# Patient Record
Sex: MALE | Race: BLACK OR AFRICAN AMERICAN | NOT HISPANIC OR LATINO | Employment: OTHER | URBAN - METROPOLITAN AREA
[De-identification: names, ages, dates, MRNs, and addresses within clinical notes are randomized per-mention and may not be internally consistent; named-entity substitution may affect disease eponyms.]

---

## 2020-09-01 ENCOUNTER — OFFICE VISIT (OUTPATIENT)
Dept: FAMILY MEDICINE | Facility: CLINIC | Age: 80
End: 2020-09-01
Payer: MEDICARE

## 2020-09-01 ENCOUNTER — TELEPHONE (OUTPATIENT)
Dept: ORTHOPEDICS | Facility: CLINIC | Age: 80
End: 2020-09-01

## 2020-09-01 ENCOUNTER — LAB VISIT (OUTPATIENT)
Dept: LAB | Facility: HOSPITAL | Age: 80
End: 2020-09-01
Attending: INTERNAL MEDICINE
Payer: MEDICARE

## 2020-09-01 VITALS
HEIGHT: 69 IN | RESPIRATION RATE: 18 BRPM | DIASTOLIC BLOOD PRESSURE: 60 MMHG | TEMPERATURE: 99 F | SYSTOLIC BLOOD PRESSURE: 112 MMHG | HEART RATE: 71 BPM | BODY MASS INDEX: 25.18 KG/M2 | WEIGHT: 170 LBS | OXYGEN SATURATION: 97 %

## 2020-09-01 DIAGNOSIS — M17.12 PRIMARY OSTEOARTHRITIS OF LEFT KNEE: ICD-10-CM

## 2020-09-01 DIAGNOSIS — E78.5 HYPERLIPIDEMIA, UNSPECIFIED HYPERLIPIDEMIA TYPE: ICD-10-CM

## 2020-09-01 DIAGNOSIS — I50.9 CONGESTIVE HEART FAILURE, UNSPECIFIED HF CHRONICITY, UNSPECIFIED HEART FAILURE TYPE: ICD-10-CM

## 2020-09-01 PROCEDURE — 1101F PT FALLS ASSESS-DOCD LE1/YR: CPT | Mod: HCNC,CPTII,S$GLB, | Performed by: INTERNAL MEDICINE

## 2020-09-01 PROCEDURE — 1159F PR MEDICATION LIST DOCUMENTED IN MEDICAL RECORD: ICD-10-PCS | Mod: HCNC,S$GLB,, | Performed by: INTERNAL MEDICINE

## 2020-09-01 PROCEDURE — 1101F PR PT FALLS ASSESS DOC 0-1 FALLS W/OUT INJ PAST YR: ICD-10-PCS | Mod: HCNC,CPTII,S$GLB, | Performed by: INTERNAL MEDICINE

## 2020-09-01 PROCEDURE — 99999 PR PBB SHADOW E&M-NEW PATIENT-LVL IV: ICD-10-PCS | Mod: PBBFAC,HCNC,, | Performed by: INTERNAL MEDICINE

## 2020-09-01 PROCEDURE — 80053 COMPREHEN METABOLIC PANEL: CPT | Mod: HCNC

## 2020-09-01 PROCEDURE — 99999 PR PBB SHADOW E&M-NEW PATIENT-LVL IV: CPT | Mod: PBBFAC,HCNC,, | Performed by: INTERNAL MEDICINE

## 2020-09-01 PROCEDURE — 84443 ASSAY THYROID STIM HORMONE: CPT | Mod: HCNC

## 2020-09-01 PROCEDURE — 1159F MED LIST DOCD IN RCRD: CPT | Mod: HCNC,S$GLB,, | Performed by: INTERNAL MEDICINE

## 2020-09-01 PROCEDURE — 80061 LIPID PANEL: CPT | Mod: HCNC

## 2020-09-01 PROCEDURE — 99204 PR OFFICE/OUTPT VISIT, NEW, LEVL IV, 45-59 MIN: ICD-10-PCS | Mod: HCNC,S$GLB,, | Performed by: INTERNAL MEDICINE

## 2020-09-01 PROCEDURE — 36415 COLL VENOUS BLD VENIPUNCTURE: CPT | Mod: HCNC,PO

## 2020-09-01 PROCEDURE — 1125F AMNT PAIN NOTED PAIN PRSNT: CPT | Mod: HCNC,S$GLB,, | Performed by: INTERNAL MEDICINE

## 2020-09-01 PROCEDURE — 85025 COMPLETE CBC W/AUTO DIFF WBC: CPT | Mod: HCNC

## 2020-09-01 PROCEDURE — 99499 RISK ADDL DX/OHS AUDIT: ICD-10-PCS | Mod: S$GLB,,, | Performed by: INTERNAL MEDICINE

## 2020-09-01 PROCEDURE — 99204 OFFICE O/P NEW MOD 45 MIN: CPT | Mod: HCNC,S$GLB,, | Performed by: INTERNAL MEDICINE

## 2020-09-01 PROCEDURE — 99499 UNLISTED E&M SERVICE: CPT | Mod: S$GLB,,, | Performed by: INTERNAL MEDICINE

## 2020-09-01 PROCEDURE — 1125F PR PAIN SEVERITY QUANTIFIED, PAIN PRESENT: ICD-10-PCS | Mod: HCNC,S$GLB,, | Performed by: INTERNAL MEDICINE

## 2020-09-01 RX ORDER — ATORVASTATIN CALCIUM 40 MG/1
40 TABLET, FILM COATED ORAL DAILY
COMMUNITY
End: 2020-09-01

## 2020-09-01 RX ORDER — ROSUVASTATIN CALCIUM 40 MG/1
40 TABLET, COATED ORAL NIGHTLY
COMMUNITY
End: 2021-01-21

## 2020-09-01 RX ORDER — FUROSEMIDE 20 MG/1
20 TABLET ORAL DAILY
COMMUNITY
End: 2021-01-21

## 2020-09-01 NOTE — PROGRESS NOTES
Subjective:       Patient ID: Rocael Perez is a 80 y.o. male.    Chief Complaint: Establish Care, Knee Pain (left knee pain x years ), Hyperlipidemia, and Congestive Heart Failure    Knee Pain   Pertinent negatives include no numbness.   Hyperlipidemia  Associated symptoms include myalgias. Pertinent negatives include no chest pain or shortness of breath.   Congestive Heart Failure  Pertinent negatives include no abdominal pain, chest pain, fatigue, palpitations, shortness of breath or unexpected weight change.     Past Medical History:   Diagnosis Date    CHF (congestive heart failure)     Hyperlipidemia      Past Surgical History:   Procedure Laterality Date    HERNIA REPAIR       Family History   Problem Relation Age of Onset    Diabetes Mother      Social History     Socioeconomic History    Marital status: Single     Spouse name: Not on file    Number of children: Not on file    Years of education: Not on file    Highest education level: Not on file   Occupational History    Not on file   Social Needs    Financial resource strain: Not on file    Food insecurity     Worry: Not on file     Inability: Not on file    Transportation needs     Medical: Not on file     Non-medical: Not on file   Tobacco Use    Smoking status: Former Smoker     Types: Cigarettes   Substance and Sexual Activity    Alcohol use: Yes     Frequency: Monthly or less     Drinks per session: 1 or 2     Binge frequency: Never    Drug use: Never    Sexual activity: Not Currently   Lifestyle    Physical activity     Days per week: Not on file     Minutes per session: Not on file    Stress: Not on file   Relationships    Social connections     Talks on phone: Not on file     Gets together: Not on file     Attends Congregation service: Not on file     Active member of club or organization: Not on file     Attends meetings of clubs or organizations: Not on file     Relationship status: Not on file   Other Topics Concern    Not on  file   Social History Narrative    Not on file     Review of Systems   Constitutional: Negative for activity change, appetite change, chills, diaphoresis, fatigue, fever and unexpected weight change.   HENT: Negative for drooling, ear discharge, ear pain, facial swelling, hearing loss, mouth sores, nosebleeds, postnasal drip, rhinorrhea, sinus pressure, sneezing, sore throat, tinnitus, trouble swallowing and voice change.    Eyes: Negative for photophobia, redness and visual disturbance.   Respiratory: Negative for apnea, cough, choking, chest tightness, shortness of breath and wheezing.    Cardiovascular: Negative for chest pain and palpitations.   Gastrointestinal: Negative for abdominal distention, abdominal pain, anal bleeding, blood in stool, constipation, diarrhea, nausea and vomiting.   Endocrine: Negative for cold intolerance, heat intolerance, polydipsia, polyphagia and polyuria.   Genitourinary: Negative for difficulty urinating, dysuria, enuresis, flank pain, frequency, genital sores, hematuria and urgency.   Musculoskeletal: Positive for arthralgias, gait problem and myalgias. Negative for back pain, joint swelling, neck pain and neck stiffness.   Skin: Negative for color change, pallor, rash and wound.   Allergic/Immunologic: Negative for food allergies and immunocompromised state.   Neurological: Negative for dizziness, tremors, seizures, syncope, facial asymmetry, speech difficulty, weakness, light-headedness, numbness and headaches.   Hematological: Negative for adenopathy. Does not bruise/bleed easily.   Psychiatric/Behavioral: Negative for agitation, behavioral problems, confusion, decreased concentration, dysphoric mood, hallucinations, self-injury, sleep disturbance and suicidal ideas. The patient is not nervous/anxious and is not hyperactive.        Objective:      Physical Exam  Vitals signs and nursing note reviewed.   Constitutional:       General: He is not in acute distress.      Appearance: He is well-developed. He is not diaphoretic.   HENT:      Head: Normocephalic and atraumatic.      Mouth/Throat:      Pharynx: No oropharyngeal exudate.   Eyes:      General: No scleral icterus.     Pupils: Pupils are equal, round, and reactive to light.   Neck:      Musculoskeletal: Normal range of motion and neck supple.      Thyroid: No thyromegaly.      Vascular: No carotid bruit or JVD.      Trachea: No tracheal deviation.   Cardiovascular:      Rate and Rhythm: Normal rate and regular rhythm.      Heart sounds: Normal heart sounds.   Pulmonary:      Effort: Pulmonary effort is normal. No respiratory distress.      Breath sounds: Normal breath sounds. No wheezing or rales.   Chest:      Chest wall: No tenderness.   Abdominal:      General: Bowel sounds are normal. There is no distension.      Palpations: Abdomen is soft.      Tenderness: There is no abdominal tenderness. There is no guarding or rebound.   Musculoskeletal: Normal range of motion.         General: No tenderness.   Lymphadenopathy:      Cervical: No cervical adenopathy.   Skin:     General: Skin is warm and dry.      Coloration: Skin is not pale.      Findings: No erythema or rash.   Neurological:      Mental Status: He is alert and oriented to person, place, and time.   Psychiatric:         Behavior: Behavior normal.         Thought Content: Thought content normal.         Judgment: Judgment normal.         CMP  No results found for: NA, K, CL, CO2, GLU, BUN, CREATININE, CALCIUM, PROT, ALBUMIN, BILITOT, ALKPHOS, AST, ALT, ANIONGAP, ESTGFRAFRICA, EGFRNONAA  No results found for: WBC, HGB, HCT, MCV, PLT  No results found for: CHOL  No results found for: HDL  No results found for: LDLCALC  No results found for: TRIG  No results found for: CHOLHDL  No results found for: TSH  No results found for: LABA1C, HGBA1C  Assessment:       1. Primary osteoarthritis of left knee    2. Hyperlipidemia, unspecified hyperlipidemia type    3. Congestive  heart failure, unspecified HF chronicity, unspecified heart failure type        Plan:   Primary osteoarthritis of left knee  -     Ambulatory referral/consult to Orthopedics; Future; Expected date: 09/08/2020    Hyperlipidemia, unspecified hyperlipidemia type  -     Comprehensive metabolic panel; Future; Expected date: 09/01/2020  -     CBC auto differential; Future; Expected date: 09/01/2020  -     Lipid Panel; Future; Expected date: 09/01/2020  -     TSH; Future; Expected date: 09/01/2020    Congestive heart failure, unspecified HF chronicity, unspecified heart failure type    Stable-------------------continue meds.                        Sees cards dr morgan.                           F/u 3 months------------

## 2020-09-02 ENCOUNTER — TELEPHONE (OUTPATIENT)
Dept: ORTHOPEDICS | Facility: CLINIC | Age: 80
End: 2020-09-02

## 2020-09-02 ENCOUNTER — TELEPHONE (OUTPATIENT)
Dept: FAMILY MEDICINE | Facility: CLINIC | Age: 80
End: 2020-09-02

## 2020-09-02 DIAGNOSIS — R79.89 ABNORMAL CBC: Primary | ICD-10-CM

## 2020-09-02 LAB
ALBUMIN SERPL BCP-MCNC: 3.4 G/DL (ref 3.5–5.2)
ALP SERPL-CCNC: 71 U/L (ref 55–135)
ALT SERPL W/O P-5'-P-CCNC: 16 U/L (ref 10–44)
ANION GAP SERPL CALC-SCNC: 7 MMOL/L (ref 8–16)
AST SERPL-CCNC: 27 U/L (ref 10–40)
BASOPHILS # BLD AUTO: 0.02 K/UL (ref 0–0.2)
BASOPHILS NFR BLD: 0.5 % (ref 0–1.9)
BILIRUB SERPL-MCNC: 0.5 MG/DL (ref 0.1–1)
BUN SERPL-MCNC: 18 MG/DL (ref 8–23)
CALCIUM SERPL-MCNC: 9.5 MG/DL (ref 8.7–10.5)
CHLORIDE SERPL-SCNC: 106 MMOL/L (ref 95–110)
CHOLEST SERPL-MCNC: 191 MG/DL (ref 120–199)
CHOLEST/HDLC SERPL: 6 {RATIO} (ref 2–5)
CO2 SERPL-SCNC: 22 MMOL/L (ref 23–29)
CREAT SERPL-MCNC: 1.3 MG/DL (ref 0.5–1.4)
DIFFERENTIAL METHOD: ABNORMAL
EOSINOPHIL # BLD AUTO: 0 K/UL (ref 0–0.5)
EOSINOPHIL NFR BLD: 0.9 % (ref 0–8)
ERYTHROCYTE [DISTWIDTH] IN BLOOD BY AUTOMATED COUNT: 16.1 % (ref 11.5–14.5)
EST. GFR  (AFRICAN AMERICAN): 59.6 ML/MIN/1.73 M^2
EST. GFR  (NON AFRICAN AMERICAN): 51.5 ML/MIN/1.73 M^2
GLUCOSE SERPL-MCNC: 62 MG/DL (ref 70–110)
HCT VFR BLD AUTO: 42.2 % (ref 40–54)
HDLC SERPL-MCNC: 32 MG/DL (ref 40–75)
HDLC SERPL: 16.8 % (ref 20–50)
HGB BLD-MCNC: 12.9 G/DL (ref 14–18)
IMM GRANULOCYTES # BLD AUTO: 0.01 K/UL (ref 0–0.04)
IMM GRANULOCYTES NFR BLD AUTO: 0.2 % (ref 0–0.5)
LDLC SERPL CALC-MCNC: 141.4 MG/DL (ref 63–159)
LYMPHOCYTES # BLD AUTO: 2.4 K/UL (ref 1–4.8)
LYMPHOCYTES NFR BLD: 55.7 % (ref 18–48)
MCH RBC QN AUTO: 28.4 PG (ref 27–31)
MCHC RBC AUTO-ENTMCNC: 30.6 G/DL (ref 32–36)
MCV RBC AUTO: 93 FL (ref 82–98)
MONOCYTES # BLD AUTO: 0.6 K/UL (ref 0.3–1)
MONOCYTES NFR BLD: 14 % (ref 4–15)
NEUTROPHILS # BLD AUTO: 1.3 K/UL (ref 1.8–7.7)
NEUTROPHILS NFR BLD: 28.7 % (ref 38–73)
NONHDLC SERPL-MCNC: 159 MG/DL
NRBC BLD-RTO: 0 /100 WBC
PLATELET # BLD AUTO: 115 K/UL (ref 150–350)
PMV BLD AUTO: 14 FL (ref 9.2–12.9)
POTASSIUM SERPL-SCNC: 4.3 MMOL/L (ref 3.5–5.1)
PROT SERPL-MCNC: 8.8 G/DL (ref 6–8.4)
RBC # BLD AUTO: 4.55 M/UL (ref 4.6–6.2)
SODIUM SERPL-SCNC: 135 MMOL/L (ref 136–145)
TRIGL SERPL-MCNC: 88 MG/DL (ref 30–150)
TSH SERPL DL<=0.005 MIU/L-ACNC: 2.07 UIU/ML (ref 0.4–4)
WBC # BLD AUTO: 4.36 K/UL (ref 3.9–12.7)

## 2020-09-03 ENCOUNTER — OFFICE VISIT (OUTPATIENT)
Dept: ORTHOPEDICS | Facility: CLINIC | Age: 80
End: 2020-09-03
Payer: MEDICARE

## 2020-09-03 ENCOUNTER — HOSPITAL ENCOUNTER (OUTPATIENT)
Dept: RADIOLOGY | Facility: HOSPITAL | Age: 80
Discharge: HOME | End: 2020-09-03
Attending: PHYSICAL MEDICINE & REHABILITATION
Payer: MEDICARE

## 2020-09-03 VITALS
WEIGHT: 169 LBS | HEIGHT: 69 IN | HEART RATE: 93 BPM | SYSTOLIC BLOOD PRESSURE: 136 MMHG | DIASTOLIC BLOOD PRESSURE: 79 MMHG | BODY MASS INDEX: 25.03 KG/M2

## 2020-09-03 DIAGNOSIS — M17.12 PRIMARY OSTEOARTHRITIS OF LEFT KNEE: Primary | ICD-10-CM

## 2020-09-03 DIAGNOSIS — R26.9 GAIT ABNORMALITY: ICD-10-CM

## 2020-09-03 DIAGNOSIS — M17.12 PRIMARY OSTEOARTHRITIS OF LEFT KNEE: ICD-10-CM

## 2020-09-03 DIAGNOSIS — M17.11 PRIMARY OSTEOARTHRITIS OF RIGHT KNEE: ICD-10-CM

## 2020-09-03 PROCEDURE — 1125F PR PAIN SEVERITY QUANTIFIED, PAIN PRESENT: ICD-10-PCS | Mod: HCNC,S$GLB,, | Performed by: PHYSICAL MEDICINE & REHABILITATION

## 2020-09-03 PROCEDURE — 1101F PR PT FALLS ASSESS DOC 0-1 FALLS W/OUT INJ PAST YR: ICD-10-PCS | Mod: HCNC,CPTII,S$GLB, | Performed by: PHYSICAL MEDICINE & REHABILITATION

## 2020-09-03 PROCEDURE — 99999 PR PBB SHADOW E&M-EST. PATIENT-LVL III: ICD-10-PCS | Mod: PBBFAC,HCNC,, | Performed by: PHYSICAL MEDICINE & REHABILITATION

## 2020-09-03 PROCEDURE — 73562 X-RAY EXAM OF KNEE 3: CPT | Mod: TC,HCNC,RT,59

## 2020-09-03 PROCEDURE — 1125F AMNT PAIN NOTED PAIN PRSNT: CPT | Mod: HCNC,S$GLB,, | Performed by: PHYSICAL MEDICINE & REHABILITATION

## 2020-09-03 PROCEDURE — 99204 OFFICE O/P NEW MOD 45 MIN: CPT | Mod: HCNC,S$GLB,, | Performed by: PHYSICAL MEDICINE & REHABILITATION

## 2020-09-03 PROCEDURE — 99204 PR OFFICE/OUTPT VISIT, NEW, LEVL IV, 45-59 MIN: ICD-10-PCS | Mod: HCNC,S$GLB,, | Performed by: PHYSICAL MEDICINE & REHABILITATION

## 2020-09-03 PROCEDURE — 73564 XR KNEE ORTHO LEFT WITH FLEXION: ICD-10-PCS | Mod: 26,HCNC,LT, | Performed by: RADIOLOGY

## 2020-09-03 PROCEDURE — 73562 XR KNEE ORTHO LEFT WITH FLEXION: ICD-10-PCS | Mod: 26,HCNC,59,RT | Performed by: RADIOLOGY

## 2020-09-03 PROCEDURE — 1101F PT FALLS ASSESS-DOCD LE1/YR: CPT | Mod: HCNC,CPTII,S$GLB, | Performed by: PHYSICAL MEDICINE & REHABILITATION

## 2020-09-03 PROCEDURE — 73564 X-RAY EXAM KNEE 4 OR MORE: CPT | Mod: 26,HCNC,LT, | Performed by: RADIOLOGY

## 2020-09-03 PROCEDURE — 1159F PR MEDICATION LIST DOCUMENTED IN MEDICAL RECORD: ICD-10-PCS | Mod: HCNC,S$GLB,, | Performed by: PHYSICAL MEDICINE & REHABILITATION

## 2020-09-03 PROCEDURE — 73562 X-RAY EXAM OF KNEE 3: CPT | Mod: 26,HCNC,59,RT | Performed by: RADIOLOGY

## 2020-09-03 PROCEDURE — 99999 PR PBB SHADOW E&M-EST. PATIENT-LVL III: CPT | Mod: PBBFAC,HCNC,, | Performed by: PHYSICAL MEDICINE & REHABILITATION

## 2020-09-03 PROCEDURE — 1159F MED LIST DOCD IN RCRD: CPT | Mod: HCNC,S$GLB,, | Performed by: PHYSICAL MEDICINE & REHABILITATION

## 2020-09-03 RX ORDER — NAPROXEN 500 MG/1
500 TABLET ORAL 2 TIMES DAILY WITH MEALS
Qty: 60 TABLET | Refills: 0 | Status: SHIPPED | OUTPATIENT
Start: 2020-09-03 | End: 2021-01-21

## 2020-09-03 NOTE — PROGRESS NOTES
SPORTS MEDICINE / PM&R New Patient Visit :    Referring Physician: Eric Mcdaniel*    Chief Complaint   Patient presents with    Left Knee - Pain       HPI: This is a 80 y.o.  male being seen in clinic today for evaluation of Pain of the Left Knee   The problem first began a few years ago. He feels aching pain on his left knee .The symptoms are worsening. He has tried rub his knee with alcohol without improvement. He has not tried therapy. Has not seen anyone previously for knee pain. Denies right knee pain. Lives in .     History obtained from patient.    Past family, medical, social, surgical history, and vital signs reviewed in chart.    Review of Systems   Constitutional: Negative for chills, fever and weight loss.   HENT: Negative for hearing loss and sore throat.    Eyes: Negative for blurred vision, photophobia and pain.   Respiratory: Negative for shortness of breath.    Cardiovascular: Negative for chest pain.   Gastrointestinal: Negative for abdominal pain.   Genitourinary: Negative for dysuria.   Skin: Negative for rash.   Neurological: Negative for tingling and headaches.   Endo/Heme/Allergies: Does not bruise/bleed easily.   Psychiatric/Behavioral: Negative for depression.       General    Nursing note and vitals reviewed.  Constitutional: He is oriented to person, place, and time. He appears well-developed and well-nourished.   HENT:   Head: Normocephalic and atraumatic.   Eyes: Conjunctivae and EOM are normal. Pupils are equal, round, and reactive to light.   Neck: Neck supple.   Cardiovascular: Intact distal pulses.    Pulmonary/Chest: Effort normal. No respiratory distress.   Abdominal: He exhibits no distension.   Neurological: He is alert and oriented to person, place, and time.   Psychiatric: He has a normal mood and affect.     General Musculoskeletal Exam   Gait: antalgic       Right Knee Exam     Inspection   Erythema: absent  Swelling: absent  Effusion: absent  Deformity:  absent  Bruising: absent    Range of Motion   Extension: normal   Flexion: abnormal     Tests   Ligament Examination Lachman: normal (-1 to 2mm) PCL-Posterior Drawer: normal (0 to 2mm)     MCL - Valgus: normal (0 to 2mm)  LCL - Varus: normal  Patella   Patellar apprehension: negative  Patellar Tracking: normal    Other   Popliteal (Baker's) Cyst: absent  Sensation: normal    Left Knee Exam     Inspection   Erythema: absent  Scars: absent  Swelling: absent  Effusion: absent  Deformity: absent  Bruising: absent    Tenderness   The patient tender to palpation of the medial joint line.    Range of Motion   Extension: abnormal   Flexion: abnormal     Tests   Stability Lachman: normal (-1 to 2mm) PCL-Posterior Drawer: normal (0 to 2mm)  MCL - Valgus: normal (0 to 2mm)  LCL - Varus: normal (0 to 2mm)  Patella   Patellar apprehension: negative  Patellar Tracking: normal    Other   Popliteal (Baker's) Cyst: absent  Sensation: normal    Right Hip Exam   Right hip exam is normal.     Tests   Pain w/ forced internal rotation (CASSIE): absent  Left Hip Exam   Left hip exam is normal.    Tests   Pain w/ forced internal rotation (CASSIE): absent          Muscle Strength   Right Lower Extremity   Hip Abduction: 4/5   Hip Adduction: 5/5   Hip Flexion: 5/5   Quadriceps:  4/5   Hamstrin/5   Left Lower Extremity   Hip Abduction: 4/5   Hip Adduction: 5/5   Hip Flexion: 5/5   Quadriceps:  4/5   Hamstrin/5     Reflexes     Left Side  Achilles:  0  Quadriceps:  0    Right Side   Achilles:  0  Quadriceps:  0    Vascular Exam     Right Pulses  Dorsalis Pedis:      2+          Left Pulses  Dorsalis Pedis:      2+              Narrative & Impression     EXAMINATION:  XR KNEE ORTHO LEFT WITH FLEXION     CLINICAL HISTORY:  . Unilateral primary osteoarthritis, left knee     TECHNIQUE:  AP standing view of both knees, PA flexion standing views of both knees, and Merchant views of both knees were performed. A lateral view of the left knee  was also performed.     COMPARISON:  None     FINDINGS:  Osteopenia and bilateral tricompartment degenerative changes.  There is bone on bone articulation in the left medial compartment and near bone on bone articulation in the right lateral compartment.  Prominent degenerative change throughout the remaining compartments bilaterally.  Prominent degenerative change patellofemoral joints, greater on the right.  Equivocal small suprapatellar effusion on the left with calcific density projecting superiorly in this area.  Cannot exclude loose body.  Prominent hypertrophic marginal osteophytes bilaterally in all compartments.  There is lateral subluxation of the left tibia.     Impression:     As above.        Electronically signed by: Rk Aiken MD  Date:                                            09/03/2020  Time:                                           16:51       IMPRESSION/PLAN: This is a 80 y.o.  male with:    Primary osteoarthritis of left knee  -     Ambulatory referral/consult to Orthopedics  -     naproxen (NAPROSYN) 500 MG tablet; Take 1 tablet (500 mg total) by mouth 2 (two) times daily with meals.  Dispense: 60 tablet; Refill: 0    Primary osteoarthritis of right knee    Gait abnormality        The findings were discussed with Rocael and his wife in detail. We personally reviewed his x-rays, which show worst bone on bone OA in his left medial knee joint where he hurts most. However, the other areas of both knees are nearly bone on bone as well. It's amazing that his right knee doesn't hurt him much. He's really never tried conservative options for this, and it was unclear if he was seeking surgical treatment. We will try naproxen and voltaren gel for now. If he decides to consider surgery, I'd rather hold off on CSI for now. If decides no surgery, we'll do CSI in a few weeks. I will check in with him in 2 weeks to see how he's feeling.  He was provided with this plan in writing. All of his questions were  answered.      Aleisha Hargrove M.D.  Sports Medicine

## 2020-09-03 NOTE — PATIENT INSTRUCTIONS
Recommend trying Voltaren Gel over the counter, and following the directions on the package.   Remember that you may need to use it consistently for several days before seeing results.      Getting Ready for Knee Replacement: Preparing for Your Recovery     Aerobic exercise, such as riding a stationary bike, can help improve your general fitness and ease your recovery.     Preparing for your knee replacement helps make your recovery faster and smoother. You can even prepare for your rehabilitation program that youll follow after surgery. It will help you strengthen and use your new knee.  Why preparing for recovery helps  The more in shape you are before surgery, the sooner youll be able to get back to activities you enjoy.  Help recovery faster by:  · Strengthening and stretching your leg muscles. This helps to support the knee as it heals. It also gives you a head start on rehabilitation.  · Preparing to use a walker or crutches. Learn to safely use walking aids before surgery. This will help you get up and around sooner. Strengthening your upper body can also make it easier to use walking aids.  · Preparing your home. Make some simple arrangements at home. These can prevent falls. They can also make daily tasks easier as you recover. This includes moving objects youll need within reach and asking in advance for help with certain chores. You should also remove objects on the floor that could cause a fall, such as loose rugs and cords.   You and your team  Your healthcare team may include:  · A physical therapist (PT). He or she will design an exercise program to build strength and aid recovery.  · An occupational therapist (OT). He or she will help you make daily activities safer and easier during recovery.  · An orthopaedic surgeon. He or she will perform the surgery and oversee your care.  · A nurse or . He or she will coordinate your care.  Understanding your role  When it comes to preparing for  recovery, much of the work is up to you. So make time each day for the exercises youve been given. Always follow the instructions that your healthcare team gives you.  Date Last Reviewed: 10/1/2015  © 5989-6823 [employer]. [address] [place] [address], [place] [address]. All rights reserved. This information is not intended as a substitute for professional medical care. Always follow your healthcare professional's instructions.        Total Knee Replacement  During total knee replacement surgery, your damaged knee joint is replaced with an artificial joint, called a prosthesis. This surgery almost always reduces joint pain and improves your quality of life.     The parts of the prosthesis are secured to the bones of the knee. Together they form the new joint.   Before your surgery  You will most likely arrive at the hospital on the morning of the surgery. Be sure to follow all of your doctors instructions on preparing for surgery:  · Follow any directions you are given for taking medicines or for not eating or drinking before surgery.  · At the hospital, your temperature, pulse, breathing, and blood pressure will be checked.  · An IV (intravenous) line will be started to provide fluids and medicines needed during surgery.  The surgical procedure  When the surgical team is ready, youll be taken to the operating room. There youll be given anesthesia to help you sleep through surgery, or to make you numb from the waist down. Then an incision is made on the front or side of your knee. Any damaged bone is cleaned away, and the new joint components are put into place. The incision is closed with surgical staples or stitches.  After your surgery  After surgery, youll be sent to the recovery room. When you are fully awake, youll be moved to your room. The nurses will give you medicines to ease your pain. You may have a catheter (small tube) in your bladder. A continuous passive motion machine may be used on  your knee to keep it from getting stiff. A sequential compression machine may be used to prevent blood clots by gently squeezing then releasing your leg. You may be given medicine to prevent blood clots. Soon, healthcare providers will help you get up and moving.  When to call your doctor  Once at home, call your doctor if you have any of the symptoms below:  · An increase in knee pain  · Pain or swelling in the calf or leg  · Unusual redness, heat, or drainage at the incision site  · Fever of 100.4°F (38°C) or higher  · Shaking chills  · Trouble breathing or chest pain (call 911)   Date Last Reviewed: 9/20/2015  © 9992-1603 [employer]. [address] [place] [address], [place] [address]. All rights reserved. This information is not intended as a substitute for professional medical care. Always follow your healthcare professional's instructions.        Understanding Knee Replacement  The knee is a hingelike joint, formed where the thighbone (femur), shinbone (tibia), and kneecap (patella) meet. It is supported by muscles, tendons, and ligaments and lined with cushioning cartilage. Over time, cartilage can wear away. As it does, the knee becomes stiff and painful. A knee prosthesis (artificial joint) can replace the painful joint and restore movement.    A healthy knee  A healthy knee joint bends easily. Cartilage, a smooth tissue, covers the ends of the thighbone and shinbone and the underside of the kneecap. Healthy cartilage absorbs stress and allows the bones to glide freely over each other. Joint fluid lubricates the cartilage surfaces, making movement even easier.       A problem knee  A problem knee is stiff or painful. Cartilage cracks or wears away due to usage, inflammation, or injury. Worn, roughened cartilage no longer allows the joint to glide freely, so it feels stiff and painful. As more cartilage wears away, exposed bones rub together when the knee bends, causing pain. With time, bone surfaces  also become rough, making pain worse.       A knee prosthesis  A knee prosthesis lets your knee bend easily again. The roughened ends of the thighbone and shinbone and the underside of the kneecap are replaced with metal and strong plastic components. With new smooth surfaces, the bones can once again glide freely jwithout arthritic pain. A knee prosthesis does have limitations. But it can let you walk and move with greater comfort.  Date Last Reviewed: 9/20/2015 © 2000-2017 [employer]. [address] [place] [address], [place] [address]. All rights reserved. This information is not intended as a substitute for professional medical care. Always follow your healthcare professional's instructions.

## 2020-09-03 NOTE — LETTER
September 3, 2020      Eric Mcdaniel MD  [address] [place] [address]           [facility]  [address]  [place] [address]  Phone: [phone]  Fax: [phone]          Patient: Rocael Perez   MR Number: [id number]   YOB: 1940   Date of Visit: 9/3/2020       Dear Dr. Eric Mcdaniel:    Thank you for referring Rocael Perez to me for evaluation. Attached you will find relevant portions of my assessment and plan of care.    If you have questions, please do not hesitate to call me. I look forward to following Rocael Perez along with you.    Sincerely,    Aleisha Hargrove MD    Enclosure  CC:  No Recipients    If you would like to receive this communication electronically, please contact [email or web address] or [phone] to request more information on [employer] access.    For providers and/or their staff who would like to refer a patient to [facility], please contact us through our one-stop-shop provider referral line, [facility] , at [phone].    If you feel you have received this communication in error or would no longer like to receive these types of communications, please e-mail [email or web address]

## 2020-10-01 ENCOUNTER — LAB VISIT (OUTPATIENT)
Dept: LAB | Facility: HOSPITAL | Age: 80
End: 2020-10-01
Attending: INTERNAL MEDICINE
Payer: MEDICARE

## 2020-10-01 ENCOUNTER — OFFICE VISIT (OUTPATIENT)
Dept: HEMATOLOGY/ONCOLOGY | Facility: CLINIC | Age: 80
End: 2020-10-01
Payer: MEDICARE

## 2020-10-01 VITALS
HEART RATE: 57 BPM | TEMPERATURE: 98 F | SYSTOLIC BLOOD PRESSURE: 156 MMHG | HEIGHT: 69 IN | BODY MASS INDEX: 26.02 KG/M2 | DIASTOLIC BLOOD PRESSURE: 66 MMHG | OXYGEN SATURATION: 95 % | WEIGHT: 175.69 LBS

## 2020-10-01 DIAGNOSIS — M17.12 PRIMARY OSTEOARTHRITIS OF LEFT KNEE: ICD-10-CM

## 2020-10-01 DIAGNOSIS — C91.10 CLL (CHRONIC LYMPHOCYTIC LEUKEMIA): ICD-10-CM

## 2020-10-01 DIAGNOSIS — D51.8 OTHER VITAMIN B12 DEFICIENCY ANEMIAS: ICD-10-CM

## 2020-10-01 DIAGNOSIS — R79.89 ABNORMAL CBC: ICD-10-CM

## 2020-10-01 DIAGNOSIS — R94.5 ABNORMAL RESULTS OF LIVER FUNCTION STUDIES: ICD-10-CM

## 2020-10-01 DIAGNOSIS — C91.10 CLL (CHRONIC LYMPHOCYTIC LEUKEMIA): Primary | ICD-10-CM

## 2020-10-01 LAB
BASOPHILS # BLD AUTO: 0.02 K/UL (ref 0–0.2)
BASOPHILS NFR BLD: 0.4 % (ref 0–1.9)
DIFFERENTIAL METHOD: ABNORMAL
EOSINOPHIL # BLD AUTO: 0.1 K/UL (ref 0–0.5)
EOSINOPHIL NFR BLD: 1.6 % (ref 0–8)
ERYTHROCYTE [DISTWIDTH] IN BLOOD BY AUTOMATED COUNT: 15.4 % (ref 11.5–14.5)
HCT VFR BLD AUTO: 40.9 % (ref 40–54)
HGB BLD-MCNC: 13.3 G/DL (ref 14–18)
IMM GRANULOCYTES # BLD AUTO: 0 K/UL (ref 0–0.04)
IMM GRANULOCYTES NFR BLD AUTO: 0 % (ref 0–0.5)
LDH SERPL L TO P-CCNC: 197 U/L (ref 110–260)
LYMPHOCYTES # BLD AUTO: 3 K/UL (ref 1–4.8)
LYMPHOCYTES NFR BLD: 61.1 % (ref 18–48)
MCH RBC QN AUTO: 28.8 PG (ref 27–31)
MCHC RBC AUTO-ENTMCNC: 32.5 G/DL (ref 32–36)
MCV RBC AUTO: 89 FL (ref 82–98)
MONOCYTES # BLD AUTO: 0.6 K/UL (ref 0.3–1)
MONOCYTES NFR BLD: 12.1 % (ref 4–15)
NEUTROPHILS # BLD AUTO: 1.2 K/UL (ref 1.8–7.7)
NEUTROPHILS NFR BLD: 24.8 % (ref 38–73)
NRBC BLD-RTO: 0 /100 WBC
PLATELET # BLD AUTO: 137 K/UL (ref 150–350)
PMV BLD AUTO: 12.5 FL (ref 9.2–12.9)
RBC # BLD AUTO: 4.62 M/UL (ref 4.6–6.2)
RETICS/RBC NFR AUTO: 0.8 % (ref 0.4–2)
WBC # BLD AUTO: 4.88 K/UL (ref 3.9–12.7)

## 2020-10-01 PROCEDURE — 99499 RISK ADDL DX/OHS AUDIT: ICD-10-PCS | Mod: S$GLB,,, | Performed by: INTERNAL MEDICINE

## 2020-10-01 PROCEDURE — 87389 HIV-1 AG W/HIV-1&-2 AB AG IA: CPT

## 2020-10-01 PROCEDURE — 84165 PROTEIN E-PHORESIS SERUM: CPT | Mod: 26,,, | Performed by: PATHOLOGY

## 2020-10-01 PROCEDURE — 1159F MED LIST DOCD IN RCRD: CPT | Mod: S$GLB,,, | Performed by: INTERNAL MEDICINE

## 2020-10-01 PROCEDURE — 83615 LACTATE (LD) (LDH) ENZYME: CPT

## 2020-10-01 PROCEDURE — 86334 IMMUNOFIX E-PHORESIS SERUM: CPT

## 2020-10-01 PROCEDURE — 1159F PR MEDICATION LIST DOCUMENTED IN MEDICAL RECORD: ICD-10-PCS | Mod: S$GLB,,, | Performed by: INTERNAL MEDICINE

## 2020-10-01 PROCEDURE — 99999 PR PBB SHADOW E&M-EST. PATIENT-LVL IV: CPT | Mod: PBBFAC,,, | Performed by: INTERNAL MEDICINE

## 2020-10-01 PROCEDURE — 1126F AMNT PAIN NOTED NONE PRSNT: CPT | Mod: S$GLB,,, | Performed by: INTERNAL MEDICINE

## 2020-10-01 PROCEDURE — 85025 COMPLETE CBC W/AUTO DIFF WBC: CPT

## 2020-10-01 PROCEDURE — 86334 PATHOLOGIST INTERPRETATION IFE: ICD-10-PCS | Mod: 26,,, | Performed by: PATHOLOGY

## 2020-10-01 PROCEDURE — 30000890 MAYO MISCELLANEOUS TEST (REFLEX)

## 2020-10-01 PROCEDURE — 80074 ACUTE HEPATITIS PANEL: CPT

## 2020-10-01 PROCEDURE — 85045 AUTOMATED RETICULOCYTE COUNT: CPT

## 2020-10-01 PROCEDURE — 84165 PROTEIN E-PHORESIS SERUM: CPT

## 2020-10-01 PROCEDURE — 82607 VITAMIN B-12: CPT

## 2020-10-01 PROCEDURE — 86703 HIV-1/HIV-2 1 RESULT ANTBDY: CPT

## 2020-10-01 PROCEDURE — 83010 ASSAY OF HAPTOGLOBIN QUANT: CPT

## 2020-10-01 PROCEDURE — 1126F PR PAIN SEVERITY QUANTIFIED, NO PAIN PRESENT: ICD-10-PCS | Mod: S$GLB,,, | Performed by: INTERNAL MEDICINE

## 2020-10-01 PROCEDURE — 99205 PR OFFICE/OUTPT VISIT, NEW, LEVL V, 60-74 MIN: ICD-10-PCS | Mod: S$GLB,,, | Performed by: INTERNAL MEDICINE

## 2020-10-01 PROCEDURE — 84165 PATHOLOGIST INTERPRETATION SPE: ICD-10-PCS | Mod: 26,,, | Performed by: PATHOLOGY

## 2020-10-01 PROCEDURE — 1101F PT FALLS ASSESS-DOCD LE1/YR: CPT | Mod: CPTII,S$GLB,, | Performed by: INTERNAL MEDICINE

## 2020-10-01 PROCEDURE — 88189 FLOWCYTOMETRY/READ 16 & >: CPT | Mod: ,,, | Performed by: PATHOLOGY

## 2020-10-01 PROCEDURE — 82728 ASSAY OF FERRITIN: CPT

## 2020-10-01 PROCEDURE — 83540 ASSAY OF IRON: CPT

## 2020-10-01 PROCEDURE — 88185 FLOWCYTOMETRY/TC ADD-ON: CPT | Mod: 59 | Performed by: PATHOLOGY

## 2020-10-01 PROCEDURE — 99999 PR PBB SHADOW E&M-EST. PATIENT-LVL IV: ICD-10-PCS | Mod: PBBFAC,,, | Performed by: INTERNAL MEDICINE

## 2020-10-01 PROCEDURE — 1101F PR PT FALLS ASSESS DOC 0-1 FALLS W/OUT INJ PAST YR: ICD-10-PCS | Mod: CPTII,S$GLB,, | Performed by: INTERNAL MEDICINE

## 2020-10-01 PROCEDURE — 83520 IMMUNOASSAY QUANT NOS NONAB: CPT

## 2020-10-01 PROCEDURE — 88271 CYTOGENETICS DNA PROBE: CPT

## 2020-10-01 PROCEDURE — 86334 IMMUNOFIX E-PHORESIS SERUM: CPT | Mod: 26,,, | Performed by: PATHOLOGY

## 2020-10-01 PROCEDURE — 36415 COLL VENOUS BLD VENIPUNCTURE: CPT

## 2020-10-01 PROCEDURE — 88189 PR  FLOWCYTOMETRY/READ, 16 & > MARKERS: ICD-10-PCS | Mod: ,,, | Performed by: PATHOLOGY

## 2020-10-01 PROCEDURE — 99205 OFFICE O/P NEW HI 60 MIN: CPT | Mod: S$GLB,,, | Performed by: INTERNAL MEDICINE

## 2020-10-01 PROCEDURE — 88184 FLOWCYTOMETRY/ TC 1 MARKER: CPT | Performed by: PATHOLOGY

## 2020-10-01 PROCEDURE — 99499 UNLISTED E&M SERVICE: CPT | Mod: S$GLB,,, | Performed by: INTERNAL MEDICINE

## 2020-10-01 NOTE — PROGRESS NOTES
Subjective:       Patient ID: Rocael Perez is a 80 y.o. male.    Chief Complaint: Results and Anemia    HPI 80-year-old male referred by primary physician for abnormal CBC with anemia and thrombocytopenia and in decreasing will lymphocytosis.  ECOG status 2    Past Medical History:   Diagnosis Date    CHF (congestive heart failure)     Hyperlipidemia      Family History   Problem Relation Age of Onset    Diabetes Mother      Social History     Socioeconomic History    Marital status: Single     Spouse name: Not on file    Number of children: Not on file    Years of education: Not on file    Highest education level: Not on file   Occupational History    Not on file   Social Needs    Financial resource strain: Not on file    Food insecurity     Worry: Not on file     Inability: Not on file    Transportation needs     Medical: Not on file     Non-medical: Not on file   Tobacco Use    Smoking status: Former Smoker     Types: Cigarettes   Substance and Sexual Activity    Alcohol use: Yes     Frequency: Monthly or less     Drinks per session: 1 or 2     Binge frequency: Never    Drug use: Never    Sexual activity: Not Currently   Lifestyle    Physical activity     Days per week: Not on file     Minutes per session: Not on file    Stress: Not on file   Relationships    Social connections     Talks on phone: Not on file     Gets together: Not on file     Attends [employer]ism service: Not on file     Active member of club or organization: Not on file     Attends meetings of clubs or organizations: Not on file     Relationship status: Not on file   Other Topics Concern    Not on file   Social History Narrative    Not on file     Past Surgical History:   Procedure Laterality Date    HERNIA REPAIR         Labs:  Lab Results   Component Value Date    WBC 4.36 09/01/2020    HGB 12.9 (L) 09/01/2020    HCT 42.2 09/01/2020    MCV 93 09/01/2020     (L) 09/01/2020     BMP  Lab Results   Component Value Date      (L) 09/01/2020    K 4.3 09/01/2020     09/01/2020    CO2 22 (L) 09/01/2020    BUN 18 09/01/2020    CREATININE 1.3 09/01/2020    CALCIUM 9.5 09/01/2020    ANIONGAP 7 (L) 09/01/2020    ESTGFRAFRICA 59.6 (A) 09/01/2020    EGFRNONAA 51.5 (A) 09/01/2020     Lab Results   Component Value Date    ALT 16 09/01/2020    AST 27 09/01/2020    ALKPHOS 71 09/01/2020    BILITOT 0.5 09/01/2020       No results found for: IRON, TIBC, FERRITIN, SATURATEDIRO  No results found for: [id number]  No results found for: FOLATE  Lab Results   Component Value Date    TSH 2.074 09/01/2020         Review of Systems   Constitutional: Negative for activity change, appetite change, chills, diaphoresis, fatigue, fever and unexpected weight change.   HENT: Negative for congestion, dental problem, drooling, ear discharge, ear pain, facial swelling, hearing loss, mouth sores, nosebleeds, postnasal drip, rhinorrhea, sinus pressure, sneezing, sore throat, tinnitus, trouble swallowing and voice change.    Eyes: Negative for photophobia, pain, discharge, redness, itching and visual disturbance.   Respiratory: Negative for apnea, cough, choking, chest tightness, shortness of breath, wheezing and stridor.    Cardiovascular: Negative for chest pain, palpitations and leg swelling.   Gastrointestinal: Negative for abdominal distention, abdominal pain, anal bleeding, blood in stool, constipation, diarrhea, nausea, rectal pain and vomiting.   Endocrine: Negative for cold intolerance, heat intolerance, polydipsia, polyphagia and polyuria.   Genitourinary: Negative for decreased urine volume, difficulty urinating, discharge, dysuria, enuresis, flank pain, frequency, genital sores, hematuria, penile pain, penile swelling, scrotal swelling, testicular pain and urgency.   Musculoskeletal: Positive for gait problem. Negative for arthralgias, back pain, joint swelling, myalgias, neck pain and neck stiffness.   Skin: Negative for color change, pallor,  rash and wound.   Allergic/Immunologic: Negative for environmental allergies, food allergies and immunocompromised state.   Neurological: Positive for weakness. Negative for dizziness, tremors, seizures, syncope, facial asymmetry, speech difficulty, light-headedness, numbness and headaches.   Hematological: Negative for adenopathy. Does not bruise/bleed easily.   Psychiatric/Behavioral: Positive for dysphoric mood. Negative for agitation, behavioral problems, confusion, decreased concentration, hallucinations, self-injury, sleep disturbance and suicidal ideas. The patient is nervous/anxious. The patient is not hyperactive.        Objective:      Physical Exam  Vitals signs reviewed.   Constitutional:       General: He is not in acute distress.     Appearance: He is well-developed. He is not diaphoretic.   HENT:      Head: Normocephalic.      Right Ear: External ear normal.      Left Ear: External ear normal.      Nose: Nose normal.      Right Sinus: No maxillary sinus tenderness or frontal sinus tenderness.      Left Sinus: No maxillary sinus tenderness or frontal sinus tenderness.      Mouth/Throat:      Pharynx: No oropharyngeal exudate.   Eyes:      General: Lids are normal. No scleral icterus.        Right eye: No discharge.         Left eye: No discharge.      Extraocular Movements:      Right eye: Normal extraocular motion.      Left eye: Normal extraocular motion.      Conjunctiva/sclera:      Right eye: Right conjunctiva is not injected. No hemorrhage.     Left eye: Left conjunctiva is not injected. No hemorrhage.     Pupils: Pupils are equal, round, and reactive to light.   Neck:      Musculoskeletal: Normal range of motion and neck supple.      Thyroid: No thyromegaly.      Vascular: No JVD.      Trachea: No tracheal deviation.   Cardiovascular:      Rate and Rhythm: Normal rate and regular rhythm.      Heart sounds: Normal heart sounds.   Pulmonary:      Effort: Pulmonary effort is normal. No respiratory  distress.      Breath sounds: Normal breath sounds. No stridor.   Abdominal:      General: Bowel sounds are normal.      Palpations: Abdomen is soft. There is no hepatomegaly, splenomegaly or mass.      Tenderness: There is no abdominal tenderness.   Musculoskeletal: Normal range of motion.         General: No tenderness.   Lymphadenopathy:      Head:      Right side of head: No posterior auricular or occipital adenopathy.      Left side of head: No posterior auricular or occipital adenopathy.      Cervical: No cervical adenopathy.      Right cervical: No superficial, deep or posterior cervical adenopathy.     Left cervical: No superficial, deep or posterior cervical adenopathy.      Upper Body:      Right upper body: No supraclavicular adenopathy.      Left upper body: No supraclavicular adenopathy.   Skin:     General: Skin is dry.      Findings: No erythema or rash.      Nails: There is no clubbing.     Neurological:      Mental Status: He is alert and oriented to person, place, and time.      Cranial Nerves: No cranial nerve deficit.      Coordination: Coordination abnormal.   Psychiatric:         Behavior: Behavior normal.         Thought Content: Thought content normal.         Judgment: Judgment normal.             Assessment:      1. CLL (chronic lymphocytic leukemia)    2. Abnormal CBC    3. Primary osteoarthritis of left knee           Plan:     Review of laboratory studies high likelihood representing CLL with anemia lymphocytosis and thrombocytopenia.  Will proceed with leukemia immunophenotyping.  Peripheral blood in addition to other laboratory studies and have patient return after was for review will try to establish on patient portal for communication.  Otherwise follow-up with me in 7-10 days discussed implications with him and his wife        Edin Li Jr, MD FACP

## 2020-10-01 NOTE — LETTER
October 1, 2020      Eric Mcdaniel MD  [address] [place] [address]           [facility] Hematology Oncology  [address] [facility]  [place] [address]  Phone: [phone]  Fax: [phone]          Patient: Rocael Perez   MR Number: [id number]   YOB: 1940   Date of Visit: 10/1/2020       Dear Dr. Eric Mcdaniel:    Thank you for referring Rocael Perez to me for evaluation. Attached you will find relevant portions of my assessment and plan of care.    If you have questions, please do not hesitate to call me. I look forward to following Rocael Perez along with you.    Sincerely,    Edin iL MD    Enclosure  CC:  No Recipients    If you would like to receive this communication electronically, please contact [email or web address] or [phone] to request more information on [employer] Link access.    For providers and/or their staff who would like to refer a patient to [facility], please contact us through our one-stop-shop provider referral line, [facility], at [phone].    If you feel you have received this communication in error or would no longer like to receive these types of communications, please e-mail [email or web address]

## 2020-10-02 LAB
FERRITIN SERPL-MCNC: 485 NG/ML (ref 20–300)
HAPTOGLOB SERPL-MCNC: 141 MG/DL (ref 30–250)
IRON SERPL-MCNC: 64 UG/DL (ref 45–160)
SATURATED IRON: 20 % (ref 20–50)
TOTAL IRON BINDING CAPACITY: 320 UG/DL (ref 250–450)
TRANSFERRIN SERPL-MCNC: 216 MG/DL (ref 200–375)
VIT B12 SERPL-MCNC: 573 PG/ML (ref 210–950)

## 2020-10-05 LAB
ALBUMIN SERPL ELPH-MCNC: 3.79 G/DL (ref 3.35–5.55)
ALPHA1 GLOB SERPL ELPH-MCNC: 0.62 G/DL (ref 0.17–0.41)
ALPHA2 GLOB SERPL ELPH-MCNC: 0.63 G/DL (ref 0.43–0.99)
B-GLOBULIN SERPL ELPH-MCNC: 1.26 G/DL (ref 0.5–1.1)
GAMMA GLOB SERPL ELPH-MCNC: 2.5 G/DL (ref 0.67–1.58)
KAPPA LC SER QL IA: 12.76 MG/DL (ref 0.33–1.94)
KAPPA LC/LAMBDA SER IA: 1.06 (ref 0.26–1.65)
LAMBDA LC SER QL IA: 12.05 MG/DL (ref 0.57–2.63)
PATHOLOGIST INTERPRETATION SPE: NORMAL
PROT SERPL-MCNC: 8.8 G/DL (ref 6–8.4)

## 2020-10-06 LAB
HAV IGM SERPL QL IA: NEGATIVE
HBV CORE IGM SERPL QL IA: NEGATIVE
HBV SURFACE AG SERPL QL IA: NEGATIVE
HCV AB SERPL QL IA: NEGATIVE
INTERPRETATION SERPL IFE-IMP: NORMAL
PATHOLOGIST INTERPRETATION IFE: NORMAL

## 2020-10-07 LAB
FLOW CYTOMETRY ANTIBODIES ANALYZED - BLOOD: NORMAL
FLOW CYTOMETRY COMMENT - BLOOD: NORMAL
FLOW CYTOMETRY INTERPRETATION - BLOOD: NORMAL

## 2020-10-08 ENCOUNTER — TELEPHONE (OUTPATIENT)
Dept: HEMATOLOGY/ONCOLOGY | Facility: CLINIC | Age: 80
End: 2020-10-08

## 2020-10-08 LAB
HIV 1+2 AB+HIV1 P24 AG SERPL QL IA: POSITIVE
HIV SUPPLEMENTAL ASSAY INTERPRETATION: ABNORMAL
HIV-1 RESULT: POSITIVE
HIV-2 RESULT: NEGATIVE

## 2020-10-08 NOTE — TELEPHONE ENCOUNTER
Attempted to reach the patient he is  unreachable I could not leave a message. Dr. Li wanted to see patient on tomorrow in regards to his lab testing.

## 2020-10-12 LAB — MAYO MISCELLANEOUS RESULT (REF): NORMAL

## 2020-10-15 ENCOUNTER — DOCUMENTATION ONLY (OUTPATIENT)
Dept: EMERGENCY MEDICINE | Facility: HOSPITAL | Age: 80
End: 2020-10-15

## 2020-10-15 NOTE — PROGRESS NOTES
Certified letter mailed on this day to attempt to contact patient in regards to results from Dr Li office.  Please have patient contact Dr Li office if he replies and secure alternate contact information.

## 2020-10-16 ENCOUNTER — TELEPHONE (OUTPATIENT)
Dept: HEMATOLOGY/ONCOLOGY | Facility: CLINIC | Age: 80
End: 2020-10-16

## 2020-10-16 NOTE — TELEPHONE ENCOUNTER
Attempted to reach the patient patient did not answer he has been rescheduled and appt has been mailed out to him. Patient did not have a voicemail to leave a message.

## 2020-10-19 ENCOUNTER — TELEPHONE (OUTPATIENT)
Dept: EMERGENCY MEDICINE | Facility: HOSPITAL | Age: 80
End: 2020-10-19

## 2020-10-19 NOTE — TELEPHONE ENCOUNTER
Contact with WILEY ODELL concerning patients HIV status and information on locating patient.  Patient has been entered into the HIV Registry as per [facility] and attempts will be made to contact him in person to initiate linkage to care and [facility] Medications, if indicated.  Case assessment assigned to Ms Gloria Young @ [facility].  Will follow

## 2020-10-21 ENCOUNTER — TELEPHONE (OUTPATIENT)
Dept: EMERGENCY MEDICINE | Facility: HOSPITAL | Age: 80
End: 2020-10-21

## 2020-10-21 NOTE — TELEPHONE ENCOUNTER
10/21/2020:  [place]  [facility]igator reports no success with locating this client.  None of the phone numbers listed allow one to leave a message and one number answered and said it was the housekeeping department and they were unable to verify if client was there or not as well as if he was employed there or not.  Navigator left card in the apartment door of the listed address but 'felt as though no one lived there'.  She has not been contacted by client or family as of today at 1345.  Dr Li and Dr Mcdaniel were notified of the above.

## 2020-10-21 NOTE — TELEPHONE ENCOUNTER
Patient contacted by [employer] and found to live at his sisters house. RSN informed sister that this nurse would be calling.  1415:  Patient was informed of his Positive HIV status after proper identification confirmed.  He was informed of the need to f/u at appointment tomorrow at 4 pm with Dr Li.   He stated that he was not aware of his status, that he was not sexually active and asked that I talk to his sister who was there with him. I educated the patient on refraining from sexual intercourse and he voiced understanding.  I spoke with his sister and explained that it was important for him to keep his appointment tomorrow 10/22/2020 with Dr Li for f/u and possible referral to Infectious Disease.  She stated that she would have him there tomorrow.  She also stated that she did not want [place]  to come to her house to offer linkage to care.  She stated that she would seek care for him within [facility].  She voiced her concerns about transmission because she is patients primary care giver.  Caregiver educated about means of transmission in simple terms and she was able to voice understanding and teach back to this nurse. I asked sister to provide an updated address for patients file and she stated that she would tend to that at Dr Li office tomorrow.

## 2020-10-22 ENCOUNTER — OFFICE VISIT (OUTPATIENT)
Dept: HEMATOLOGY/ONCOLOGY | Facility: CLINIC | Age: 80
End: 2020-10-22
Payer: MEDICARE

## 2020-10-22 VITALS
DIASTOLIC BLOOD PRESSURE: 69 MMHG | BODY MASS INDEX: 25.4 KG/M2 | WEIGHT: 171.5 LBS | SYSTOLIC BLOOD PRESSURE: 142 MMHG | TEMPERATURE: 98 F | HEART RATE: 82 BPM | HEIGHT: 69 IN

## 2020-10-22 DIAGNOSIS — B20 HIV INFECTION, UNSPECIFIED SYMPTOM STATUS: Primary | ICD-10-CM

## 2020-10-22 DIAGNOSIS — D64.9 ANEMIA, UNSPECIFIED TYPE: ICD-10-CM

## 2020-10-22 DIAGNOSIS — D69.6 THROMBOCYTOPENIA: ICD-10-CM

## 2020-10-22 PROCEDURE — 1159F PR MEDICATION LIST DOCUMENTED IN MEDICAL RECORD: ICD-10-PCS | Mod: S$GLB,,, | Performed by: NURSE PRACTITIONER

## 2020-10-22 PROCEDURE — 99999 PR PBB SHADOW E&M-EST. PATIENT-LVL III: ICD-10-PCS | Mod: PBBFAC,,, | Performed by: NURSE PRACTITIONER

## 2020-10-22 PROCEDURE — 1126F PR PAIN SEVERITY QUANTIFIED, NO PAIN PRESENT: ICD-10-PCS | Mod: S$GLB,,, | Performed by: NURSE PRACTITIONER

## 2020-10-22 PROCEDURE — 1101F PR PT FALLS ASSESS DOC 0-1 FALLS W/OUT INJ PAST YR: ICD-10-PCS | Mod: CPTII,S$GLB,, | Performed by: NURSE PRACTITIONER

## 2020-10-22 PROCEDURE — 99499 RISK ADDL DX/OHS AUDIT: ICD-10-PCS | Mod: S$GLB,,, | Performed by: NURSE PRACTITIONER

## 2020-10-22 PROCEDURE — 99214 PR OFFICE/OUTPT VISIT, EST, LEVL IV, 30-39 MIN: ICD-10-PCS | Mod: S$GLB,,, | Performed by: NURSE PRACTITIONER

## 2020-10-22 PROCEDURE — 99499 UNLISTED E&M SERVICE: CPT | Mod: S$GLB,,, | Performed by: NURSE PRACTITIONER

## 2020-10-22 PROCEDURE — 1159F MED LIST DOCD IN RCRD: CPT | Mod: S$GLB,,, | Performed by: NURSE PRACTITIONER

## 2020-10-22 PROCEDURE — 99999 PR PBB SHADOW E&M-EST. PATIENT-LVL III: CPT | Mod: PBBFAC,,, | Performed by: NURSE PRACTITIONER

## 2020-10-22 PROCEDURE — 1101F PT FALLS ASSESS-DOCD LE1/YR: CPT | Mod: CPTII,S$GLB,, | Performed by: NURSE PRACTITIONER

## 2020-10-22 PROCEDURE — 1126F AMNT PAIN NOTED NONE PRSNT: CPT | Mod: S$GLB,,, | Performed by: NURSE PRACTITIONER

## 2020-10-22 PROCEDURE — 99214 OFFICE O/P EST MOD 30 MIN: CPT | Mod: S$GLB,,, | Performed by: NURSE PRACTITIONER

## 2020-10-23 NOTE — PROGRESS NOTES
Subjective:      Patient ID: Rocael Perez is a 80 y.o. male.    Chief Complaint: lab discussion    HPI:  Patient is an 80 year old male who has previously been evaluated in the clinic by Dr. Li sent by PCP for evaluation of anemia, thrombocytopenia, lymphocytosis.  Extensive workup done found patient to have positive HIV 1/2 AG/AB 4 th gen lab results.  Patient is accompanied by his sister today, who he is currently living with.  Asked permission with patient by himself, after telling him his diagnosis, if it would be ok to speak in front of his sister regarding his diagnosis.  Patient agreed.    He denies recent flu like illness.  He denies being sexually active.  He denies IV drug use.  I believe his hematology abnormalities are due to his current HIV infection.  His anemia is stable as well as his thrombocytopenia.  He denies melena, hematochezia, hematuria, epistaxis, gingival bleeding, or unusual bruising.       Social History     Socioeconomic History    Marital status: Single     Spouse name: Not on file    Number of children: Not on file    Years of education: Not on file    Highest education level: Not on file   Occupational History    Not on file   Social Needs    Financial resource strain: Not on file    Food insecurity     Worry: Not on file     Inability: Not on file    Transportation needs     Medical: Not on file     Non-medical: Not on file   Tobacco Use    Smoking status: Former Smoker     Types: Cigarettes   Substance and Sexual Activity    Alcohol use: Yes     Frequency: Monthly or less     Drinks per session: 1 or 2     Binge frequency: Never    Drug use: Never    Sexual activity: Not Currently   Lifestyle    Physical activity     Days per week: Not on file     Minutes per session: Not on file    Stress: Not on file   Relationships    Social connections     Talks on phone: Not on file     Gets together: Not on file     Attends Orthodoxy service: Not on file     Active member  of club or organization: Not on file     Attends meetings of clubs or organizations: Not on file     Relationship status: Not on file   Other Topics Concern    Not on file   Social History Narrative    Not on file       Family History   Problem Relation Age of Onset    Diabetes Mother        Past Surgical History:   Procedure Laterality Date    HERNIA REPAIR         Past Medical History:   Diagnosis Date    CHF (congestive heart failure)     Hyperlipidemia        Review of Systems   Constitutional: Negative.    HENT: Negative.    Respiratory: Negative.    Cardiovascular: Negative.    Gastrointestinal: Negative.    Endocrine: Negative.    Genitourinary: Negative.    Musculoskeletal: Positive for gait problem.   Skin: Negative.    Allergic/Immunologic: Negative.    Hematological: Negative.    Psychiatric/Behavioral: The patient is nervous/anxious.           Medication List with Changes/Refills   Current Medications    FUROSEMIDE (LASIX) 20 MG TABLET    Take 20 mg by mouth once daily.    NAPROXEN (NAPROSYN) 500 MG TABLET    Take 1 tablet (500 mg total) by mouth 2 (two) times daily with meals.    ROSUVASTATIN (CRESTOR) 40 MG TAB    Take 40 mg by mouth every evening.        Objective:     Vitals:    10/22/20 1610   BP: (!) 142/69   Pulse: 82   Temp: 97.5 °F (36.4 °C)       Physical Exam  Vitals signs reviewed.   Constitutional:       Appearance: Normal appearance.   HENT:      Head: Normocephalic and atraumatic.   Cardiovascular:      Rate and Rhythm: Normal rate and regular rhythm.      Heart sounds: Normal heart sounds, S1 normal and S2 normal.   Pulmonary:      Effort: Pulmonary effort is normal.      Breath sounds: Normal breath sounds.   Abdominal:      General: There is no distension.   Musculoskeletal: Normal range of motion.   Skin:     General: Skin is warm.   Neurological:      General: No focal deficit present.      Mental Status: He is alert and oriented to person, place, and time.   Psychiatric:          Attention and Perception: Attention and perception normal.         Mood and Affect: Mood is anxious.         Speech: Speech normal.         Behavior: Behavior normal.         Thought Content: Thought content normal.         Cognition and Memory: Cognition normal.         Judgment: Judgment normal.         Assessment:     Problem List Items Addressed This Visit     None      Visit Diagnoses     HIV infection, unspecified symptom status    -  Primary    Relevant Orders    Ambulatory referral/consult to Infectious Disease    CBC auto differential    Anemia, unspecified type        Relevant Orders    CBC auto differential    Thrombocytopenia        Relevant Orders    CBC auto differential        Lab Results   Component Value Date    WBC 4.88 10/01/2020    HGB 13.3 (L) 10/01/2020    HCT 40.9 10/01/2020    MCV 89 10/01/2020     (L) 10/01/2020       BMP  Lab Results   Component Value Date     (L) 09/01/2020    K 4.3 09/01/2020     09/01/2020    CO2 22 (L) 09/01/2020    BUN 18 09/01/2020    CREATININE 1.3 09/01/2020    CALCIUM 9.5 09/01/2020    ANIONGAP 7 (L) 09/01/2020    ESTGFRAFRICA 59.6 (A) 09/01/2020    EGFRNONAA 51.5 (A) 09/01/2020     Lab Results   Component Value Date    ALT 16 09/01/2020    AST 27 09/01/2020    ALKPHOS 71 09/01/2020    BILITOT 0.5 09/01/2020       Plan:   HIV infection, unspecified symptom status  -     Ambulatory referral/consult to Infectious Disease; Future; Expected date: 10/29/2020  -     CBC auto differential; Future; Expected date: 10/22/2020    Anemia, unspecified type  -     CBC auto differential; Future; Expected date: 10/22/2020    Thrombocytopenia  -     CBC auto differential; Future; Expected date: 10/22/2020    Will refer patient to ID for complete evaluation and treatment.  patient verbalized understanding.  We discussed avoidance of sexual intercourse at this time.  Patient verbalized understanding.  Will see patient back in 6 months with labs to ensure  resolution of hematologic abnormalities once starting treatment - monitor for lymphoma due to increased risk with HIV infection.      Collaborating Provider:  Dr. Francisco Javier Miguel    Thank You,  SUSAN Bentley

## 2021-01-11 ENCOUNTER — TELEPHONE (OUTPATIENT)
Dept: INFECTIOUS DISEASES | Facility: CLINIC | Age: 81
End: 2021-01-11

## 2021-01-21 ENCOUNTER — LAB VISIT (OUTPATIENT)
Dept: LAB | Facility: HOSPITAL | Age: 81
End: 2021-01-21
Attending: REGISTERED NURSE
Payer: MEDICARE

## 2021-01-21 ENCOUNTER — OFFICE VISIT (OUTPATIENT)
Dept: FAMILY MEDICINE | Facility: CLINIC | Age: 81
End: 2021-01-21
Payer: MEDICARE

## 2021-01-21 VITALS
HEART RATE: 72 BPM | WEIGHT: 176.38 LBS | BODY MASS INDEX: 26.12 KG/M2 | DIASTOLIC BLOOD PRESSURE: 70 MMHG | TEMPERATURE: 98 F | HEIGHT: 69 IN | SYSTOLIC BLOOD PRESSURE: 132 MMHG

## 2021-01-21 DIAGNOSIS — I50.9 CONGESTIVE HEART FAILURE, UNSPECIFIED HF CHRONICITY, UNSPECIFIED HEART FAILURE TYPE: ICD-10-CM

## 2021-01-21 DIAGNOSIS — R60.9 CHRONIC EDEMA: Primary | ICD-10-CM

## 2021-01-21 DIAGNOSIS — Z21 HIV POSITIVE: ICD-10-CM

## 2021-01-21 DIAGNOSIS — R60.9 CHRONIC EDEMA: ICD-10-CM

## 2021-01-21 LAB
BASOPHILS # BLD AUTO: 0.03 K/UL (ref 0–0.2)
BASOPHILS NFR BLD: 0.6 % (ref 0–1.9)
DIFFERENTIAL METHOD: ABNORMAL
EOSINOPHIL # BLD AUTO: 0.1 K/UL (ref 0–0.5)
EOSINOPHIL NFR BLD: 2.4 % (ref 0–8)
ERYTHROCYTE [DISTWIDTH] IN BLOOD BY AUTOMATED COUNT: 14.6 % (ref 11.5–14.5)
HCT VFR BLD AUTO: 39 % (ref 40–54)
HGB BLD-MCNC: 12.1 G/DL (ref 14–18)
IMM GRANULOCYTES # BLD AUTO: 0 K/UL (ref 0–0.04)
IMM GRANULOCYTES NFR BLD AUTO: 0 % (ref 0–0.5)
LYMPHOCYTES # BLD AUTO: 2.8 K/UL (ref 1–4.8)
LYMPHOCYTES NFR BLD: 50.9 % (ref 18–48)
MCH RBC QN AUTO: 28.7 PG (ref 27–31)
MCHC RBC AUTO-ENTMCNC: 31 G/DL (ref 32–36)
MCV RBC AUTO: 92 FL (ref 82–98)
MONOCYTES # BLD AUTO: 0.9 K/UL (ref 0.3–1)
MONOCYTES NFR BLD: 17.2 % (ref 4–15)
NEUTROPHILS # BLD AUTO: 1.6 K/UL (ref 1.8–7.7)
NEUTROPHILS NFR BLD: 28.9 % (ref 38–73)
NRBC BLD-RTO: 0 /100 WBC
PLATELET # BLD AUTO: 138 K/UL (ref 150–350)
PMV BLD AUTO: 13.8 FL (ref 9.2–12.9)
RBC # BLD AUTO: 4.22 M/UL (ref 4.6–6.2)
WBC # BLD AUTO: 5.42 K/UL (ref 3.9–12.7)

## 2021-01-21 PROCEDURE — 99999 PR PBB SHADOW E&M-EST. PATIENT-LVL III: ICD-10-PCS | Mod: PBBFAC,,, | Performed by: REGISTERED NURSE

## 2021-01-21 PROCEDURE — 3288F FALL RISK ASSESSMENT DOCD: CPT | Mod: CPTII,S$GLB,, | Performed by: REGISTERED NURSE

## 2021-01-21 PROCEDURE — 80053 COMPREHEN METABOLIC PANEL: CPT

## 2021-01-21 PROCEDURE — 85025 COMPLETE CBC W/AUTO DIFF WBC: CPT

## 2021-01-21 PROCEDURE — 3288F PR FALLS RISK ASSESSMENT DOCUMENTED: ICD-10-PCS | Mod: CPTII,S$GLB,, | Performed by: REGISTERED NURSE

## 2021-01-21 PROCEDURE — 36415 COLL VENOUS BLD VENIPUNCTURE: CPT | Mod: PO

## 2021-01-21 PROCEDURE — 1101F PR PT FALLS ASSESS DOC 0-1 FALLS W/OUT INJ PAST YR: ICD-10-PCS | Mod: CPTII,S$GLB,, | Performed by: REGISTERED NURSE

## 2021-01-21 PROCEDURE — 99213 PR OFFICE/OUTPT VISIT, EST, LEVL III, 20-29 MIN: ICD-10-PCS | Mod: S$GLB,,, | Performed by: REGISTERED NURSE

## 2021-01-21 PROCEDURE — 1159F MED LIST DOCD IN RCRD: CPT | Mod: S$GLB,,, | Performed by: REGISTERED NURSE

## 2021-01-21 PROCEDURE — 1126F PR PAIN SEVERITY QUANTIFIED, NO PAIN PRESENT: ICD-10-PCS | Mod: S$GLB,,, | Performed by: REGISTERED NURSE

## 2021-01-21 PROCEDURE — 99213 OFFICE O/P EST LOW 20 MIN: CPT | Mod: S$GLB,,, | Performed by: REGISTERED NURSE

## 2021-01-21 PROCEDURE — 99999 PR PBB SHADOW E&M-EST. PATIENT-LVL III: CPT | Mod: PBBFAC,,, | Performed by: REGISTERED NURSE

## 2021-01-21 PROCEDURE — 1101F PT FALLS ASSESS-DOCD LE1/YR: CPT | Mod: CPTII,S$GLB,, | Performed by: REGISTERED NURSE

## 2021-01-21 PROCEDURE — 99499 RISK ADDL DX/OHS AUDIT: ICD-10-PCS | Mod: S$GLB,,, | Performed by: REGISTERED NURSE

## 2021-01-21 PROCEDURE — 99499 UNLISTED E&M SERVICE: CPT | Mod: S$GLB,,, | Performed by: REGISTERED NURSE

## 2021-01-21 PROCEDURE — 1126F AMNT PAIN NOTED NONE PRSNT: CPT | Mod: S$GLB,,, | Performed by: REGISTERED NURSE

## 2021-01-21 PROCEDURE — 83880 ASSAY OF NATRIURETIC PEPTIDE: CPT

## 2021-01-21 PROCEDURE — 1159F PR MEDICATION LIST DOCUMENTED IN MEDICAL RECORD: ICD-10-PCS | Mod: S$GLB,,, | Performed by: REGISTERED NURSE

## 2021-01-21 RX ORDER — METOPROLOL SUCCINATE 25 MG/1
25 TABLET, EXTENDED RELEASE ORAL DAILY
COMMUNITY
Start: 2020-11-11

## 2021-01-21 RX ORDER — ASPIRIN 81 MG/1
81 TABLET ORAL DAILY
COMMUNITY
Start: 2020-11-11

## 2021-01-22 LAB
ALBUMIN SERPL BCP-MCNC: 3.3 G/DL (ref 3.5–5.2)
ALP SERPL-CCNC: 84 U/L (ref 55–135)
ALT SERPL W/O P-5'-P-CCNC: 8 U/L (ref 10–44)
ANION GAP SERPL CALC-SCNC: 8 MMOL/L (ref 8–16)
AST SERPL-CCNC: 21 U/L (ref 10–40)
BILIRUB SERPL-MCNC: 0.4 MG/DL (ref 0.1–1)
BNP SERPL-MCNC: 272 PG/ML (ref 0–99)
BUN SERPL-MCNC: 23 MG/DL (ref 8–23)
CALCIUM SERPL-MCNC: 9.5 MG/DL (ref 8.7–10.5)
CHLORIDE SERPL-SCNC: 103 MMOL/L (ref 95–110)
CO2 SERPL-SCNC: 24 MMOL/L (ref 23–29)
CREAT SERPL-MCNC: 1.4 MG/DL (ref 0.5–1.4)
EST. GFR  (AFRICAN AMERICAN): 54.5 ML/MIN/1.73 M^2
EST. GFR  (NON AFRICAN AMERICAN): 47.1 ML/MIN/1.73 M^2
GLUCOSE SERPL-MCNC: 86 MG/DL (ref 70–110)
POTASSIUM SERPL-SCNC: 4.7 MMOL/L (ref 3.5–5.1)
PROT SERPL-MCNC: 9 G/DL (ref 6–8.4)
SODIUM SERPL-SCNC: 135 MMOL/L (ref 136–145)

## 2021-01-27 ENCOUNTER — OFFICE VISIT (OUTPATIENT)
Dept: INFECTIOUS DISEASES | Facility: CLINIC | Age: 81
End: 2021-01-27
Payer: MEDICARE

## 2021-01-27 ENCOUNTER — LAB VISIT (OUTPATIENT)
Dept: LAB | Facility: HOSPITAL | Age: 81
End: 2021-01-27
Attending: INTERNAL MEDICINE
Payer: MEDICARE

## 2021-01-27 VITALS — SYSTOLIC BLOOD PRESSURE: 132 MMHG | HEART RATE: 87 BPM | TEMPERATURE: 97 F | DIASTOLIC BLOOD PRESSURE: 60 MMHG

## 2021-01-27 DIAGNOSIS — D64.9 ANEMIA, UNSPECIFIED TYPE: ICD-10-CM

## 2021-01-27 DIAGNOSIS — D69.6 THROMBOCYTOPENIA: ICD-10-CM

## 2021-01-27 DIAGNOSIS — B20 HIV INFECTION, UNSPECIFIED SYMPTOM STATUS: ICD-10-CM

## 2021-01-27 DIAGNOSIS — I50.9 CONGESTIVE HEART FAILURE, UNSPECIFIED HF CHRONICITY, UNSPECIFIED HEART FAILURE TYPE: ICD-10-CM

## 2021-01-27 DIAGNOSIS — Z21 HIV POSITIVE: Primary | ICD-10-CM

## 2021-01-27 DIAGNOSIS — E78.5 HYPERLIPIDEMIA, UNSPECIFIED HYPERLIPIDEMIA TYPE: ICD-10-CM

## 2021-01-27 DIAGNOSIS — Z21 HIV POSITIVE: ICD-10-CM

## 2021-01-27 LAB
BASOPHILS # BLD AUTO: 0.02 K/UL (ref 0–0.2)
BASOPHILS NFR BLD: 0.5 % (ref 0–1.9)
DIFFERENTIAL METHOD: ABNORMAL
EOSINOPHIL # BLD AUTO: 0.2 K/UL (ref 0–0.5)
EOSINOPHIL NFR BLD: 3.5 % (ref 0–8)
ERYTHROCYTE [DISTWIDTH] IN BLOOD BY AUTOMATED COUNT: 14 % (ref 11.5–14.5)
HCT VFR BLD AUTO: 39.4 % (ref 40–54)
HGB BLD-MCNC: 12.5 G/DL (ref 14–18)
IMM GRANULOCYTES # BLD AUTO: 0.01 K/UL (ref 0–0.04)
IMM GRANULOCYTES NFR BLD AUTO: 0.2 % (ref 0–0.5)
LYMPHOCYTES # BLD AUTO: 2.3 K/UL (ref 1–4.8)
LYMPHOCYTES NFR BLD: 53.4 % (ref 18–48)
MCH RBC QN AUTO: 28.2 PG (ref 27–31)
MCHC RBC AUTO-ENTMCNC: 31.7 G/DL (ref 32–36)
MCV RBC AUTO: 89 FL (ref 82–98)
MONOCYTES # BLD AUTO: 0.5 K/UL (ref 0.3–1)
MONOCYTES NFR BLD: 12.1 % (ref 4–15)
NEUTROPHILS # BLD AUTO: 1.3 K/UL (ref 1.8–7.7)
NEUTROPHILS NFR BLD: 30.3 % (ref 38–73)
NRBC BLD-RTO: 0 /100 WBC
PLATELET # BLD AUTO: 147 K/UL (ref 150–350)
PMV BLD AUTO: 11.5 FL (ref 9.2–12.9)
RBC # BLD AUTO: 4.44 M/UL (ref 4.6–6.2)
WBC # BLD AUTO: 4.29 K/UL (ref 3.9–12.7)

## 2021-01-27 PROCEDURE — 1159F PR MEDICATION LIST DOCUMENTED IN MEDICAL RECORD: ICD-10-PCS | Mod: S$GLB,,, | Performed by: INTERNAL MEDICINE

## 2021-01-27 PROCEDURE — 86592 SYPHILIS TEST NON-TREP QUAL: CPT

## 2021-01-27 PROCEDURE — 80061 LIPID PANEL: CPT

## 2021-01-27 PROCEDURE — 86361 T CELL ABSOLUTE COUNT: CPT

## 2021-01-27 PROCEDURE — 86593 SYPHILIS TEST NON-TREP QUANT: CPT

## 2021-01-27 PROCEDURE — 99204 OFFICE O/P NEW MOD 45 MIN: CPT | Mod: S$GLB,,, | Performed by: INTERNAL MEDICINE

## 2021-01-27 PROCEDURE — 99204 PR OFFICE/OUTPT VISIT, NEW, LEVL IV, 45-59 MIN: ICD-10-PCS | Mod: S$GLB,,, | Performed by: INTERNAL MEDICINE

## 2021-01-27 PROCEDURE — 85025 COMPLETE CBC W/AUTO DIFF WBC: CPT

## 2021-01-27 PROCEDURE — 99999 PR PBB SHADOW E&M-EST. PATIENT-LVL II: ICD-10-PCS | Mod: PBBFAC,,, | Performed by: INTERNAL MEDICINE

## 2021-01-27 PROCEDURE — 36415 COLL VENOUS BLD VENIPUNCTURE: CPT

## 2021-01-27 PROCEDURE — 87536 HIV-1 QUANT&REVRSE TRNSCRPJ: CPT | Mod: 59

## 2021-01-27 PROCEDURE — 1159F MED LIST DOCD IN RCRD: CPT | Mod: S$GLB,,, | Performed by: INTERNAL MEDICINE

## 2021-01-27 PROCEDURE — 1101F PT FALLS ASSESS-DOCD LE1/YR: CPT | Mod: CPTII,S$GLB,, | Performed by: INTERNAL MEDICINE

## 2021-01-27 PROCEDURE — 3288F PR FALLS RISK ASSESSMENT DOCUMENTED: ICD-10-PCS | Mod: CPTII,S$GLB,, | Performed by: INTERNAL MEDICINE

## 2021-01-27 PROCEDURE — 87901 NFCT AGT GNTYP ALYS HIV1 REV: CPT

## 2021-01-27 PROCEDURE — 81381 HLA I TYPING 1 ALLELE HR: CPT

## 2021-01-27 PROCEDURE — 1101F PR PT FALLS ASSESS DOC 0-1 FALLS W/OUT INJ PAST YR: ICD-10-PCS | Mod: CPTII,S$GLB,, | Performed by: INTERNAL MEDICINE

## 2021-01-27 PROCEDURE — 99999 PR PBB SHADOW E&M-EST. PATIENT-LVL II: CPT | Mod: PBBFAC,,, | Performed by: INTERNAL MEDICINE

## 2021-01-27 PROCEDURE — 86780 TREPONEMA PALLIDUM: CPT

## 2021-01-27 PROCEDURE — 3288F FALL RISK ASSESSMENT DOCD: CPT | Mod: CPTII,S$GLB,, | Performed by: INTERNAL MEDICINE

## 2021-01-28 LAB
CD3+CD4+ CELLS # BLD: 341 CELLS/UL (ref 300–1400)
CD3+CD4+ CELLS NFR BLD: 14.6 % (ref 28–57)
CHOLEST SERPL-MCNC: 304 MG/DL (ref 120–199)
CHOLEST/HDLC SERPL: 8.9 {RATIO} (ref 2–5)
HDLC SERPL-MCNC: 34 MG/DL (ref 40–75)
HDLC SERPL: 11.2 % (ref 20–50)
LDLC SERPL CALC-MCNC: 240.8 MG/DL (ref 63–159)
NONHDLC SERPL-MCNC: 270 MG/DL
RPR SER QL: REACTIVE
RPR SER-TITR: ABNORMAL {TITER}
TRIGL SERPL-MCNC: 146 MG/DL (ref 30–150)

## 2021-01-29 LAB
HIV UQ DATE RECEIVED: ABNORMAL
HIV UQ DATE REPORTED: ABNORMAL
HIV1 RNA # SERPL NAA+PROBE: ABNORMAL COPIES/ML
HIV1 RNA SERPL NAA+PROBE-LOG#: 3.71 LOG (10) COPIES/ML
HIV1 RNA SERPL QL NAA+PROBE: DETECTED

## 2021-02-01 ENCOUNTER — DOCUMENTATION ONLY (OUTPATIENT)
Dept: INFECTIOUS DISEASES | Facility: HOSPITAL | Age: 81
End: 2021-02-01

## 2021-02-01 LAB — T PALLIDUM AB SER QL IF: REACTIVE

## 2021-02-02 LAB
B5701 INTERPRETATION: NORMAL
HLA-B27 RELATED AG QL: NEGATIVE
HLA-B27 RELATED AG QL: NORMAL

## 2021-02-03 ENCOUNTER — TELEPHONE (OUTPATIENT)
Dept: INFECTIOUS DISEASES | Facility: CLINIC | Age: 81
End: 2021-02-03

## 2021-02-04 ENCOUNTER — INFUSION (OUTPATIENT)
Dept: INFUSION THERAPY | Facility: HOSPITAL | Age: 81
End: 2021-02-04
Attending: INTERNAL MEDICINE
Payer: MEDICARE

## 2021-02-04 VITALS
HEIGHT: 69 IN | BODY MASS INDEX: 26.12 KG/M2 | TEMPERATURE: 99 F | OXYGEN SATURATION: 99 % | WEIGHT: 176.38 LBS | DIASTOLIC BLOOD PRESSURE: 67 MMHG | SYSTOLIC BLOOD PRESSURE: 129 MMHG | RESPIRATION RATE: 18 BRPM | HEART RATE: 88 BPM

## 2021-02-04 DIAGNOSIS — A53.0 LATENT SYPHILIS: ICD-10-CM

## 2021-02-04 DIAGNOSIS — Z21 HIV POSITIVE: Primary | ICD-10-CM

## 2021-02-04 PROCEDURE — 96372 THER/PROPH/DIAG INJ SC/IM: CPT

## 2021-02-04 PROCEDURE — 63600175 PHARM REV CODE 636 W HCPCS: Mod: JG | Performed by: INTERNAL MEDICINE

## 2021-02-04 RX ADMIN — PENICILLIN G BENZATHINE 2.4 MILLION UNITS: 2400000 INJECTION, SUSPENSION INTRAMUSCULAR at 11:02

## 2021-02-08 LAB — HIV GENTYP ISLT: DETECTED

## 2021-02-11 ENCOUNTER — INFUSION (OUTPATIENT)
Dept: INFUSION THERAPY | Facility: HOSPITAL | Age: 81
End: 2021-02-11
Attending: INTERNAL MEDICINE
Payer: MEDICARE

## 2021-02-11 ENCOUNTER — DOCUMENTATION ONLY (OUTPATIENT)
Dept: INFECTIOUS DISEASES | Facility: HOSPITAL | Age: 81
End: 2021-02-11

## 2021-02-11 VITALS
RESPIRATION RATE: 18 BRPM | OXYGEN SATURATION: 98 % | DIASTOLIC BLOOD PRESSURE: 69 MMHG | BODY MASS INDEX: 26.05 KG/M2 | TEMPERATURE: 98 F | HEIGHT: 69 IN | SYSTOLIC BLOOD PRESSURE: 120 MMHG | HEART RATE: 64 BPM

## 2021-02-11 DIAGNOSIS — Z21 HIV POSITIVE: Primary | ICD-10-CM

## 2021-02-11 PROCEDURE — 96372 THER/PROPH/DIAG INJ SC/IM: CPT

## 2021-02-11 PROCEDURE — 63600175 PHARM REV CODE 636 W HCPCS: Mod: JG | Performed by: INTERNAL MEDICINE

## 2021-02-11 RX ADMIN — PENICILLIN G BENZATHINE 2.4 MILLION UNITS: 2400000 INJECTION, SUSPENSION INTRAMUSCULAR at 11:02

## 2021-02-12 ENCOUNTER — TELEPHONE (OUTPATIENT)
Dept: INFECTIOUS DISEASES | Facility: CLINIC | Age: 81
End: 2021-02-12

## 2021-02-18 ENCOUNTER — INFUSION (OUTPATIENT)
Dept: INFUSION THERAPY | Facility: HOSPITAL | Age: 81
End: 2021-02-18
Attending: INTERNAL MEDICINE
Payer: MEDICARE

## 2021-02-18 VITALS
DIASTOLIC BLOOD PRESSURE: 81 MMHG | RESPIRATION RATE: 18 BRPM | TEMPERATURE: 99 F | HEART RATE: 65 BPM | OXYGEN SATURATION: 98 % | BODY MASS INDEX: 26.05 KG/M2 | SYSTOLIC BLOOD PRESSURE: 130 MMHG | HEIGHT: 69 IN

## 2021-02-18 DIAGNOSIS — Z21 HIV POSITIVE: Primary | ICD-10-CM

## 2021-02-18 PROCEDURE — 96372 THER/PROPH/DIAG INJ SC/IM: CPT

## 2021-02-18 PROCEDURE — 63600175 PHARM REV CODE 636 W HCPCS: Mod: JG | Performed by: INTERNAL MEDICINE

## 2021-02-18 RX ADMIN — PENICILLIN G BENZATHINE 2.4 MILLION UNITS: 2400000 INJECTION, SUSPENSION INTRAMUSCULAR at 10:02

## 2021-02-22 ENCOUNTER — PATIENT OUTREACH (OUTPATIENT)
Dept: ADMINISTRATIVE | Facility: OTHER | Age: 81
End: 2021-02-22

## 2021-02-23 ENCOUNTER — OFFICE VISIT (OUTPATIENT)
Dept: INFECTIOUS DISEASES | Facility: CLINIC | Age: 81
End: 2021-02-23
Payer: MEDICARE

## 2021-02-23 VITALS — DIASTOLIC BLOOD PRESSURE: 70 MMHG | TEMPERATURE: 98 F | SYSTOLIC BLOOD PRESSURE: 164 MMHG | HEART RATE: 86 BPM

## 2021-02-23 DIAGNOSIS — A53.9 SYPHILIS IN MALE: ICD-10-CM

## 2021-02-23 DIAGNOSIS — C91.10 CLL (CHRONIC LYMPHOCYTIC LEUKEMIA): ICD-10-CM

## 2021-02-23 DIAGNOSIS — I50.9 CONGESTIVE HEART FAILURE, UNSPECIFIED HF CHRONICITY, UNSPECIFIED HEART FAILURE TYPE: ICD-10-CM

## 2021-02-23 DIAGNOSIS — Z21 HIV POSITIVE: ICD-10-CM

## 2021-02-23 PROCEDURE — 99999 PR PBB SHADOW E&M-EST. PATIENT-LVL II: CPT | Mod: PBBFAC,,, | Performed by: INTERNAL MEDICINE

## 2021-02-23 PROCEDURE — 99999 PR PBB SHADOW E&M-EST. PATIENT-LVL II: ICD-10-PCS | Mod: PBBFAC,,, | Performed by: INTERNAL MEDICINE

## 2021-02-23 PROCEDURE — 3288F PR FALLS RISK ASSESSMENT DOCUMENTED: ICD-10-PCS | Mod: CPTII,S$GLB,, | Performed by: INTERNAL MEDICINE

## 2021-02-23 PROCEDURE — 99214 PR OFFICE/OUTPT VISIT, EST, LEVL IV, 30-39 MIN: ICD-10-PCS | Mod: S$GLB,,, | Performed by: INTERNAL MEDICINE

## 2021-02-23 PROCEDURE — 99214 OFFICE O/P EST MOD 30 MIN: CPT | Mod: S$GLB,,, | Performed by: INTERNAL MEDICINE

## 2021-02-23 PROCEDURE — 3288F FALL RISK ASSESSMENT DOCD: CPT | Mod: CPTII,S$GLB,, | Performed by: INTERNAL MEDICINE

## 2021-02-23 PROCEDURE — 1101F PT FALLS ASSESS-DOCD LE1/YR: CPT | Mod: CPTII,S$GLB,, | Performed by: INTERNAL MEDICINE

## 2021-02-23 PROCEDURE — 1159F PR MEDICATION LIST DOCUMENTED IN MEDICAL RECORD: ICD-10-PCS | Mod: S$GLB,,, | Performed by: INTERNAL MEDICINE

## 2021-02-23 PROCEDURE — 1159F MED LIST DOCD IN RCRD: CPT | Mod: S$GLB,,, | Performed by: INTERNAL MEDICINE

## 2021-02-23 PROCEDURE — 1101F PR PT FALLS ASSESS DOC 0-1 FALLS W/OUT INJ PAST YR: ICD-10-PCS | Mod: CPTII,S$GLB,, | Performed by: INTERNAL MEDICINE

## 2021-02-23 RX ORDER — FUROSEMIDE 20 MG/1
40 TABLET ORAL DAILY
COMMUNITY
End: 2022-06-13

## 2021-05-17 ENCOUNTER — OFFICE VISIT (OUTPATIENT)
Dept: HEMATOLOGY/ONCOLOGY | Facility: CLINIC | Age: 81
End: 2021-05-17
Payer: MEDICARE

## 2021-05-17 ENCOUNTER — LAB VISIT (OUTPATIENT)
Dept: LAB | Facility: HOSPITAL | Age: 81
End: 2021-05-17
Attending: INTERNAL MEDICINE
Payer: MEDICARE

## 2021-05-17 VITALS
HEART RATE: 72 BPM | HEIGHT: 69 IN | SYSTOLIC BLOOD PRESSURE: 125 MMHG | DIASTOLIC BLOOD PRESSURE: 50 MMHG | OXYGEN SATURATION: 99 % | BODY MASS INDEX: 27.88 KG/M2 | WEIGHT: 188.25 LBS

## 2021-05-17 DIAGNOSIS — Z12.89 ENCOUNTER FOR SCREENING FOR MALIGNANT NEOPLASM OF OTHER SITES: ICD-10-CM

## 2021-05-17 DIAGNOSIS — C91.10 CLL (CHRONIC LYMPHOCYTIC LEUKEMIA): ICD-10-CM

## 2021-05-17 DIAGNOSIS — C91.10 CLL (CHRONIC LYMPHOCYTIC LEUKEMIA): Primary | ICD-10-CM

## 2021-05-17 DIAGNOSIS — R60.0 LOCALIZED EDEMA: ICD-10-CM

## 2021-05-17 DIAGNOSIS — B20 HIV INFECTION, UNSPECIFIED SYMPTOM STATUS: ICD-10-CM

## 2021-05-17 LAB — BNP SERPL-MCNC: 353 PG/ML (ref 0–99)

## 2021-05-17 PROCEDURE — 1101F PR PT FALLS ASSESS DOC 0-1 FALLS W/OUT INJ PAST YR: ICD-10-PCS | Mod: CPTII,S$GLB,, | Performed by: INTERNAL MEDICINE

## 2021-05-17 PROCEDURE — 36415 COLL VENOUS BLD VENIPUNCTURE: CPT | Performed by: INTERNAL MEDICINE

## 2021-05-17 PROCEDURE — 1101F PT FALLS ASSESS-DOCD LE1/YR: CPT | Mod: CPTII,S$GLB,, | Performed by: INTERNAL MEDICINE

## 2021-05-17 PROCEDURE — 83880 ASSAY OF NATRIURETIC PEPTIDE: CPT | Performed by: INTERNAL MEDICINE

## 2021-05-17 PROCEDURE — 1126F PR PAIN SEVERITY QUANTIFIED, NO PAIN PRESENT: ICD-10-PCS | Mod: S$GLB,,, | Performed by: INTERNAL MEDICINE

## 2021-05-17 PROCEDURE — 99214 OFFICE O/P EST MOD 30 MIN: CPT | Mod: S$GLB,,, | Performed by: INTERNAL MEDICINE

## 2021-05-17 PROCEDURE — 99999 PR PBB SHADOW E&M-EST. PATIENT-LVL III: CPT | Mod: PBBFAC,,, | Performed by: INTERNAL MEDICINE

## 2021-05-17 PROCEDURE — 99999 PR PBB SHADOW E&M-EST. PATIENT-LVL III: ICD-10-PCS | Mod: PBBFAC,,, | Performed by: INTERNAL MEDICINE

## 2021-05-17 PROCEDURE — 3288F FALL RISK ASSESSMENT DOCD: CPT | Mod: CPTII,S$GLB,, | Performed by: INTERNAL MEDICINE

## 2021-05-17 PROCEDURE — 1159F PR MEDICATION LIST DOCUMENTED IN MEDICAL RECORD: ICD-10-PCS | Mod: S$GLB,,, | Performed by: INTERNAL MEDICINE

## 2021-05-17 PROCEDURE — 1126F AMNT PAIN NOTED NONE PRSNT: CPT | Mod: S$GLB,,, | Performed by: INTERNAL MEDICINE

## 2021-05-17 PROCEDURE — 82565 ASSAY OF CREATININE: CPT | Performed by: INTERNAL MEDICINE

## 2021-05-17 PROCEDURE — 1159F MED LIST DOCD IN RCRD: CPT | Mod: S$GLB,,, | Performed by: INTERNAL MEDICINE

## 2021-05-17 PROCEDURE — 3288F PR FALLS RISK ASSESSMENT DOCUMENTED: ICD-10-PCS | Mod: CPTII,S$GLB,, | Performed by: INTERNAL MEDICINE

## 2021-05-17 PROCEDURE — 99214 PR OFFICE/OUTPT VISIT, EST, LEVL IV, 30-39 MIN: ICD-10-PCS | Mod: S$GLB,,, | Performed by: INTERNAL MEDICINE

## 2021-05-18 LAB
CREAT SERPL-MCNC: 1.6 MG/DL (ref 0.5–1.4)
EST. GFR  (AFRICAN AMERICAN): 46 ML/MIN/1.73 M^2
EST. GFR  (NON AFRICAN AMERICAN): 39.8 ML/MIN/1.73 M^2

## 2021-05-19 ENCOUNTER — LAB VISIT (OUTPATIENT)
Dept: LAB | Facility: HOSPITAL | Age: 81
End: 2021-05-19
Attending: INTERNAL MEDICINE
Payer: MEDICARE

## 2021-05-19 DIAGNOSIS — C91.10 CLL (CHRONIC LYMPHOCYTIC LEUKEMIA): ICD-10-CM

## 2021-05-19 DIAGNOSIS — Z12.89 ENCOUNTER FOR SCREENING FOR MALIGNANT NEOPLASM OF OTHER SITES: ICD-10-CM

## 2021-05-19 DIAGNOSIS — B20 HIV INFECTION, UNSPECIFIED SYMPTOM STATUS: ICD-10-CM

## 2021-05-19 DIAGNOSIS — R60.0 LOCALIZED EDEMA: ICD-10-CM

## 2021-05-19 PROCEDURE — 84166 PROTEIN E-PHORESIS/URINE/CSF: CPT | Mod: 26,,, | Performed by: PATHOLOGY

## 2021-05-19 PROCEDURE — 84166 PROTEIN E-PHORESIS/URINE/CSF: CPT | Performed by: INTERNAL MEDICINE

## 2021-05-19 PROCEDURE — 84156 ASSAY OF PROTEIN URINE: CPT | Performed by: INTERNAL MEDICINE

## 2021-05-19 PROCEDURE — 84166 PATHOLOGIST INTERPRETATION UPE: ICD-10-PCS | Mod: 26,,, | Performed by: PATHOLOGY

## 2021-05-20 LAB
PROT 24H UR-MRATE: 239 MG/SPEC (ref 0–100)
PROT PATTERN UR ELPH-IMP: NORMAL
PROT UR-MCNC: 9 MG/DL (ref 0–15)
URINE COLLECTION DURATION: 24 HR
URINE VOLUME: 2650 ML

## 2021-05-21 ENCOUNTER — TELEPHONE (OUTPATIENT)
Dept: RADIOLOGY | Facility: HOSPITAL | Age: 81
End: 2021-05-21

## 2021-05-21 LAB — PATHOLOGIST INTERPRETATION UPE: NORMAL

## 2021-05-24 ENCOUNTER — HOSPITAL ENCOUNTER (OUTPATIENT)
Dept: RADIOLOGY | Facility: HOSPITAL | Age: 81
Discharge: HOME | End: 2021-05-24
Attending: INTERNAL MEDICINE
Payer: MEDICARE

## 2021-05-24 DIAGNOSIS — B20 HIV INFECTION, UNSPECIFIED SYMPTOM STATUS: ICD-10-CM

## 2021-05-24 DIAGNOSIS — Z12.89 ENCOUNTER FOR SCREENING FOR MALIGNANT NEOPLASM OF OTHER SITES: ICD-10-CM

## 2021-05-24 DIAGNOSIS — C91.10 CLL (CHRONIC LYMPHOCYTIC LEUKEMIA): ICD-10-CM

## 2021-05-24 DIAGNOSIS — R60.0 LOCALIZED EDEMA: ICD-10-CM

## 2021-05-24 PROCEDURE — 74177 CT ABD & PELVIS W/CONTRAST: CPT | Mod: 26,,, | Performed by: RADIOLOGY

## 2021-05-24 PROCEDURE — 71260 CT THORAX DX C+: CPT | Mod: 26,,, | Performed by: RADIOLOGY

## 2021-05-24 PROCEDURE — 74177 CT CHEST ABDOMEN PELVIS WITH CONTRAST (XPD): ICD-10-PCS | Mod: 26,,, | Performed by: RADIOLOGY

## 2021-05-24 PROCEDURE — 74177 CT ABD & PELVIS W/CONTRAST: CPT | Mod: TC

## 2021-05-24 PROCEDURE — 25500020 PHARM REV CODE 255: Performed by: INTERNAL MEDICINE

## 2021-05-24 PROCEDURE — 71260 CT CHEST ABDOMEN PELVIS WITH CONTRAST (XPD): ICD-10-PCS | Mod: 26,,, | Performed by: RADIOLOGY

## 2021-05-24 PROCEDURE — 71260 CT THORAX DX C+: CPT | Mod: TC

## 2021-05-24 RX ADMIN — IOHEXOL 100 ML: 350 INJECTION, SOLUTION INTRAVENOUS at 03:05

## 2021-05-24 RX ADMIN — IOHEXOL 30 ML: 350 INJECTION, SOLUTION INTRAVENOUS at 02:05

## 2021-06-11 ENCOUNTER — LAB VISIT (OUTPATIENT)
Dept: LAB | Facility: HOSPITAL | Age: 81
End: 2021-06-11
Attending: INTERNAL MEDICINE
Payer: MEDICARE

## 2021-06-11 ENCOUNTER — OFFICE VISIT (OUTPATIENT)
Dept: HEMATOLOGY/ONCOLOGY | Facility: CLINIC | Age: 81
End: 2021-06-11
Payer: MEDICARE

## 2021-06-11 VITALS — TEMPERATURE: 97 F | BODY MASS INDEX: 27.24 KG/M2 | HEIGHT: 69 IN | WEIGHT: 183.88 LBS | OXYGEN SATURATION: 98 %

## 2021-06-11 DIAGNOSIS — A53.9 SYPHILIS IN MALE: ICD-10-CM

## 2021-06-11 DIAGNOSIS — R60.0 BILATERAL LOWER EXTREMITY EDEMA: Primary | ICD-10-CM

## 2021-06-11 DIAGNOSIS — D69.6 THROMBOCYTOPENIA: ICD-10-CM

## 2021-06-11 DIAGNOSIS — Z21 HIV POSITIVE: ICD-10-CM

## 2021-06-11 DIAGNOSIS — R60.0 BILATERAL LOWER EXTREMITY EDEMA: ICD-10-CM

## 2021-06-11 PROCEDURE — 99214 PR OFFICE/OUTPT VISIT, EST, LEVL IV, 30-39 MIN: ICD-10-PCS | Mod: S$GLB,,, | Performed by: INTERNAL MEDICINE

## 2021-06-11 PROCEDURE — 1101F PT FALLS ASSESS-DOCD LE1/YR: CPT | Mod: CPTII,S$GLB,, | Performed by: INTERNAL MEDICINE

## 2021-06-11 PROCEDURE — 1126F PR PAIN SEVERITY QUANTIFIED, NO PAIN PRESENT: ICD-10-PCS | Mod: S$GLB,,, | Performed by: INTERNAL MEDICINE

## 2021-06-11 PROCEDURE — 1126F AMNT PAIN NOTED NONE PRSNT: CPT | Mod: S$GLB,,, | Performed by: INTERNAL MEDICINE

## 2021-06-11 PROCEDURE — 1159F MED LIST DOCD IN RCRD: CPT | Mod: S$GLB,,, | Performed by: INTERNAL MEDICINE

## 2021-06-11 PROCEDURE — 1159F PR MEDICATION LIST DOCUMENTED IN MEDICAL RECORD: ICD-10-PCS | Mod: S$GLB,,, | Performed by: INTERNAL MEDICINE

## 2021-06-11 PROCEDURE — 83880 ASSAY OF NATRIURETIC PEPTIDE: CPT | Performed by: INTERNAL MEDICINE

## 2021-06-11 PROCEDURE — 99214 OFFICE O/P EST MOD 30 MIN: CPT | Mod: S$GLB,,, | Performed by: INTERNAL MEDICINE

## 2021-06-11 PROCEDURE — 1101F PR PT FALLS ASSESS DOC 0-1 FALLS W/OUT INJ PAST YR: ICD-10-PCS | Mod: CPTII,S$GLB,, | Performed by: INTERNAL MEDICINE

## 2021-06-11 PROCEDURE — 3288F PR FALLS RISK ASSESSMENT DOCUMENTED: ICD-10-PCS | Mod: CPTII,S$GLB,, | Performed by: INTERNAL MEDICINE

## 2021-06-11 PROCEDURE — 36415 COLL VENOUS BLD VENIPUNCTURE: CPT | Performed by: INTERNAL MEDICINE

## 2021-06-11 PROCEDURE — 99999 PR PBB SHADOW E&M-EST. PATIENT-LVL IV: CPT | Mod: PBBFAC,,, | Performed by: INTERNAL MEDICINE

## 2021-06-11 PROCEDURE — 3288F FALL RISK ASSESSMENT DOCD: CPT | Mod: CPTII,S$GLB,, | Performed by: INTERNAL MEDICINE

## 2021-06-11 PROCEDURE — 99999 PR PBB SHADOW E&M-EST. PATIENT-LVL IV: ICD-10-PCS | Mod: PBBFAC,,, | Performed by: INTERNAL MEDICINE

## 2021-06-11 RX ORDER — BICTEGRAVIR SODIUM, EMTRICITABINE, AND TENOFOVIR ALAFENAMIDE FUMARATE 50; 200; 25 MG/1; MG/1; MG/1
1 TABLET ORAL DAILY
COMMUNITY
End: 2021-07-29

## 2021-06-12 LAB — BNP SERPL-MCNC: 158 PG/ML (ref 0–99)

## 2021-06-23 ENCOUNTER — TELEPHONE (OUTPATIENT)
Dept: RADIOLOGY | Facility: HOSPITAL | Age: 81
End: 2021-06-23

## 2021-06-24 ENCOUNTER — HOSPITAL ENCOUNTER (OUTPATIENT)
Dept: RADIOLOGY | Facility: HOSPITAL | Age: 81
Discharge: HOME | End: 2021-06-24
Attending: INTERNAL MEDICINE
Payer: MEDICARE

## 2021-06-24 ENCOUNTER — HOSPITAL ENCOUNTER (OUTPATIENT)
Dept: CARDIOLOGY | Facility: HOSPITAL | Age: 81
Discharge: HOME | End: 2021-06-24
Attending: INTERNAL MEDICINE
Payer: MEDICARE

## 2021-06-24 VITALS
BODY MASS INDEX: 27.11 KG/M2 | HEIGHT: 69 IN | DIASTOLIC BLOOD PRESSURE: 50 MMHG | SYSTOLIC BLOOD PRESSURE: 125 MMHG | WEIGHT: 183 LBS

## 2021-06-24 DIAGNOSIS — R60.0 BILATERAL LOWER EXTREMITY EDEMA: ICD-10-CM

## 2021-06-24 LAB
AORTIC ROOT ANNULUS: 3.38 CM
ASCENDING AORTA: 3.67 CM
AV INDEX (PROSTH): 0.38
AV MEAN GRADIENT: 11 MMHG
AV PEAK GRADIENT: 20 MMHG
AV VALVE AREA: 1.44 CM2
AV VELOCITY RATIO: 0.36
BSA FOR ECHO PROCEDURE: 2.01 M2
CV ECHO LV RWT: 0.43 CM
DOP CALC AO PEAK VEL: 2.22 M/S
DOP CALC AO VTI: 45.89 CM
DOP CALC LVOT AREA: 3.8 CM2
DOP CALC LVOT DIAMETER: 2.21 CM
DOP CALC LVOT PEAK VEL: 0.81 M/S
DOP CALC LVOT STROKE VOLUME: 66.29 CM3
DOP CALC RVOT PEAK VEL: 0.66 M/S
DOP CALC RVOT VTI: 14.42 CM
DOP CALCLVOT PEAK VEL VTI: 17.29 CM
E WAVE DECELERATION TIME: 190.28 MSEC
E/A RATIO: 1.6
E/E' RATIO: 9.86 M/S
ECHO LV POSTERIOR WALL: 1.06 CM (ref 0.6–1.1)
EJECTION FRACTION: 60 %
FRACTIONAL SHORTENING: 34 % (ref 28–44)
INTERVENTRICULAR SEPTUM: 1.12 CM (ref 0.6–1.1)
IVRT: 97.05 MSEC
LA MAJOR: 5.78 CM
LA MINOR: 5.58 CM
LA WIDTH: 4.93 CM
LEFT ATRIUM SIZE: 4.44 CM
LEFT ATRIUM VOLUME INDEX: 53.1 ML/M2
LEFT ATRIUM VOLUME: 105.65 CM3
LEFT INTERNAL DIMENSION IN SYSTOLE: 3.23 CM (ref 2.1–4)
LEFT VENTRICLE DIASTOLIC VOLUME INDEX: 57.47 ML/M2
LEFT VENTRICLE DIASTOLIC VOLUME: 114.37 ML
LEFT VENTRICLE MASS INDEX: 100 G/M2
LEFT VENTRICLE SYSTOLIC VOLUME INDEX: 21 ML/M2
LEFT VENTRICLE SYSTOLIC VOLUME: 41.77 ML
LEFT VENTRICULAR INTERNAL DIMENSION IN DIASTOLE: 4.93 CM (ref 3.5–6)
LEFT VENTRICULAR MASS: 199.95 G
LV LATERAL E/E' RATIO: 9.86 M/S
LV SEPTAL E/E' RATIO: 9.86 M/S
MV PEAK A VEL: 0.43 M/S
MV PEAK E VEL: 0.69 M/S
MV STENOSIS PRESSURE HALF TIME: 55.18 MS
MV VALVE AREA P 1/2 METHOD: 3.99 CM2
PISA TR MAX VEL: 3.2 M/S
PV MEAN GRADIENT: 1 MMHG
PV PEAK VELOCITY: 1.81 CM/S
RA MAJOR: 5.28 CM
RA PRESSURE: 3 MMHG
RA WIDTH: 3.49 CM
RIGHT VENTRICULAR END-DIASTOLIC DIMENSION: 3.41 CM
SINUS: 3.54 CM
STJ: 3.15 CM
TDI LATERAL: 0.07 M/S
TDI SEPTAL: 0.07 M/S
TDI: 0.07 M/S
TR MAX PG: 41 MMHG
TRICUSPID ANNULAR PLANE SYSTOLIC EXCURSION: 3.17 CM
TV REST PULMONARY ARTERY PRESSURE: 44 MMHG

## 2021-06-24 PROCEDURE — 93970 EXTREMITY STUDY: CPT | Mod: TC

## 2021-06-24 PROCEDURE — 93306 ECHO (CUPID ONLY): ICD-10-PCS | Mod: 26,,, | Performed by: INTERNAL MEDICINE

## 2021-06-24 PROCEDURE — 93970 US LOWER EXTREMITY VEINS BILATERAL: ICD-10-PCS | Mod: 26,,, | Performed by: RADIOLOGY

## 2021-06-24 PROCEDURE — 93306 TTE W/DOPPLER COMPLETE: CPT | Mod: 26,,, | Performed by: INTERNAL MEDICINE

## 2021-06-24 PROCEDURE — 93970 EXTREMITY STUDY: CPT | Mod: 26,,, | Performed by: RADIOLOGY

## 2021-06-24 PROCEDURE — 93306 TTE W/DOPPLER COMPLETE: CPT

## 2021-06-29 ENCOUNTER — LAB VISIT (OUTPATIENT)
Dept: LAB | Facility: HOSPITAL | Age: 81
End: 2021-06-29
Attending: INTERNAL MEDICINE
Payer: MEDICARE

## 2021-06-29 ENCOUNTER — OFFICE VISIT (OUTPATIENT)
Dept: INFECTIOUS DISEASES | Facility: CLINIC | Age: 81
End: 2021-06-29
Payer: MEDICARE

## 2021-06-29 VITALS — DIASTOLIC BLOOD PRESSURE: 76 MMHG | SYSTOLIC BLOOD PRESSURE: 164 MMHG | HEART RATE: 68 BPM | TEMPERATURE: 98 F

## 2021-06-29 DIAGNOSIS — C91.10 CLL (CHRONIC LYMPHOCYTIC LEUKEMIA): ICD-10-CM

## 2021-06-29 DIAGNOSIS — I50.9 CONGESTIVE HEART FAILURE, UNSPECIFIED HF CHRONICITY, UNSPECIFIED HEART FAILURE TYPE: ICD-10-CM

## 2021-06-29 DIAGNOSIS — Z21 HIV POSITIVE: ICD-10-CM

## 2021-06-29 DIAGNOSIS — A53.9 SYPHILIS IN MALE: ICD-10-CM

## 2021-06-29 PROCEDURE — 1101F PT FALLS ASSESS-DOCD LE1/YR: CPT | Mod: CPTII,S$GLB,, | Performed by: INTERNAL MEDICINE

## 2021-06-29 PROCEDURE — 99214 OFFICE O/P EST MOD 30 MIN: CPT | Mod: S$GLB,,, | Performed by: INTERNAL MEDICINE

## 2021-06-29 PROCEDURE — 99499 UNLISTED E&M SERVICE: CPT | Mod: S$GLB,,, | Performed by: INTERNAL MEDICINE

## 2021-06-29 PROCEDURE — 99499 RISK ADDL DX/OHS AUDIT: ICD-10-PCS | Mod: S$GLB,,, | Performed by: INTERNAL MEDICINE

## 2021-06-29 PROCEDURE — 3288F PR FALLS RISK ASSESSMENT DOCUMENTED: ICD-10-PCS | Mod: CPTII,S$GLB,, | Performed by: INTERNAL MEDICINE

## 2021-06-29 PROCEDURE — 99214 PR OFFICE/OUTPT VISIT, EST, LEVL IV, 30-39 MIN: ICD-10-PCS | Mod: S$GLB,,, | Performed by: INTERNAL MEDICINE

## 2021-06-29 PROCEDURE — 87536 HIV-1 QUANT&REVRSE TRNSCRPJ: CPT | Performed by: INTERNAL MEDICINE

## 2021-06-29 PROCEDURE — 1101F PR PT FALLS ASSESS DOC 0-1 FALLS W/OUT INJ PAST YR: ICD-10-PCS | Mod: CPTII,S$GLB,, | Performed by: INTERNAL MEDICINE

## 2021-06-29 PROCEDURE — 99999 PR PBB SHADOW E&M-EST. PATIENT-LVL II: ICD-10-PCS | Mod: PBBFAC,,, | Performed by: INTERNAL MEDICINE

## 2021-06-29 PROCEDURE — 1159F MED LIST DOCD IN RCRD: CPT | Mod: S$GLB,,, | Performed by: INTERNAL MEDICINE

## 2021-06-29 PROCEDURE — 36415 COLL VENOUS BLD VENIPUNCTURE: CPT | Performed by: INTERNAL MEDICINE

## 2021-06-29 PROCEDURE — 86361 T CELL ABSOLUTE COUNT: CPT | Performed by: INTERNAL MEDICINE

## 2021-06-29 PROCEDURE — 3288F FALL RISK ASSESSMENT DOCD: CPT | Mod: CPTII,S$GLB,, | Performed by: INTERNAL MEDICINE

## 2021-06-29 PROCEDURE — 99999 PR PBB SHADOW E&M-EST. PATIENT-LVL II: CPT | Mod: PBBFAC,,, | Performed by: INTERNAL MEDICINE

## 2021-06-29 PROCEDURE — 1159F PR MEDICATION LIST DOCUMENTED IN MEDICAL RECORD: ICD-10-PCS | Mod: S$GLB,,, | Performed by: INTERNAL MEDICINE

## 2021-06-30 LAB
CD3+CD4+ CELLS # BLD: 485 CELLS/UL (ref 300–1400)
CD3+CD4+ CELLS NFR BLD: 15.9 % (ref 28–57)

## 2021-07-01 LAB — HIV1 RNA # PLAS NAA DL=20: NORMAL COPIES/ML

## 2021-07-06 ENCOUNTER — TELEPHONE (OUTPATIENT)
Dept: HEMATOLOGY/ONCOLOGY | Facility: CLINIC | Age: 81
End: 2021-07-06

## 2021-07-07 ENCOUNTER — OFFICE VISIT (OUTPATIENT)
Dept: HEMATOLOGY/ONCOLOGY | Facility: CLINIC | Age: 81
End: 2021-07-07
Payer: MEDICARE

## 2021-07-07 ENCOUNTER — LAB VISIT (OUTPATIENT)
Dept: LAB | Facility: HOSPITAL | Age: 81
End: 2021-07-07
Attending: INTERNAL MEDICINE
Payer: MEDICARE

## 2021-07-07 ENCOUNTER — TELEPHONE (OUTPATIENT)
Dept: HEMATOLOGY/ONCOLOGY | Facility: CLINIC | Age: 81
End: 2021-07-07

## 2021-07-07 VITALS
WEIGHT: 180.75 LBS | BODY MASS INDEX: 26.77 KG/M2 | OXYGEN SATURATION: 99 % | SYSTOLIC BLOOD PRESSURE: 131 MMHG | HEART RATE: 67 BPM | DIASTOLIC BLOOD PRESSURE: 66 MMHG | TEMPERATURE: 98 F | HEIGHT: 69 IN

## 2021-07-07 DIAGNOSIS — R60.0 BILATERAL LOWER EXTREMITY EDEMA: ICD-10-CM

## 2021-07-07 DIAGNOSIS — D47.2 MGUS (MONOCLONAL GAMMOPATHY OF UNKNOWN SIGNIFICANCE): Primary | ICD-10-CM

## 2021-07-07 DIAGNOSIS — D69.6 THROMBOCYTOPENIA: ICD-10-CM

## 2021-07-07 DIAGNOSIS — Z21 HIV POSITIVE: ICD-10-CM

## 2021-07-07 DIAGNOSIS — C91.10 CLL (CHRONIC LYMPHOCYTIC LEUKEMIA): ICD-10-CM

## 2021-07-07 DIAGNOSIS — A53.9 SYPHILIS IN MALE: ICD-10-CM

## 2021-07-07 LAB
BASOPHILS # BLD AUTO: 0.05 K/UL (ref 0–0.2)
BASOPHILS NFR BLD: 0.8 % (ref 0–1.9)
DIFFERENTIAL METHOD: ABNORMAL
EOSINOPHIL # BLD AUTO: 0.2 K/UL (ref 0–0.5)
EOSINOPHIL NFR BLD: 2.4 % (ref 0–8)
ERYTHROCYTE [DISTWIDTH] IN BLOOD BY AUTOMATED COUNT: 14.5 % (ref 11.5–14.5)
HCT VFR BLD AUTO: 40.5 % (ref 40–54)
HGB BLD-MCNC: 12.9 G/DL (ref 14–18)
IMM GRANULOCYTES # BLD AUTO: 0.01 K/UL (ref 0–0.04)
IMM GRANULOCYTES NFR BLD AUTO: 0.2 % (ref 0–0.5)
LYMPHOCYTES # BLD AUTO: 3.6 K/UL (ref 1–4.8)
LYMPHOCYTES NFR BLD: 56.4 % (ref 18–48)
MCH RBC QN AUTO: 29.8 PG (ref 27–31)
MCHC RBC AUTO-ENTMCNC: 31.9 G/DL (ref 32–36)
MCV RBC AUTO: 94 FL (ref 82–98)
MONOCYTES # BLD AUTO: 0.9 K/UL (ref 0.3–1)
MONOCYTES NFR BLD: 13.3 % (ref 4–15)
NEUTROPHILS # BLD AUTO: 1.7 K/UL (ref 1.8–7.7)
NEUTROPHILS NFR BLD: 26.9 % (ref 38–73)
NRBC BLD-RTO: 0 /100 WBC
PLATELET # BLD AUTO: 171 K/UL (ref 150–450)
PMV BLD AUTO: 11.6 FL (ref 9.2–12.9)
RBC # BLD AUTO: 4.33 M/UL (ref 4.6–6.2)
WBC # BLD AUTO: 6.38 K/UL (ref 3.9–12.7)

## 2021-07-07 PROCEDURE — 1159F PR MEDICATION LIST DOCUMENTED IN MEDICAL RECORD: ICD-10-PCS | Mod: S$GLB,,, | Performed by: INTERNAL MEDICINE

## 2021-07-07 PROCEDURE — 99214 PR OFFICE/OUTPT VISIT, EST, LEVL IV, 30-39 MIN: ICD-10-PCS | Mod: S$GLB,,, | Performed by: INTERNAL MEDICINE

## 2021-07-07 PROCEDURE — 1126F AMNT PAIN NOTED NONE PRSNT: CPT | Mod: S$GLB,,, | Performed by: INTERNAL MEDICINE

## 2021-07-07 PROCEDURE — 99499 RISK ADDL DX/OHS AUDIT: ICD-10-PCS | Mod: HCNC,S$GLB,, | Performed by: INTERNAL MEDICINE

## 2021-07-07 PROCEDURE — 99999 PR PBB SHADOW E&M-EST. PATIENT-LVL III: ICD-10-PCS | Mod: PBBFAC,,, | Performed by: INTERNAL MEDICINE

## 2021-07-07 PROCEDURE — 1101F PT FALLS ASSESS-DOCD LE1/YR: CPT | Mod: CPTII,S$GLB,, | Performed by: INTERNAL MEDICINE

## 2021-07-07 PROCEDURE — 36415 COLL VENOUS BLD VENIPUNCTURE: CPT | Performed by: INTERNAL MEDICINE

## 2021-07-07 PROCEDURE — 99999 PR PBB SHADOW E&M-EST. PATIENT-LVL III: CPT | Mod: PBBFAC,,, | Performed by: INTERNAL MEDICINE

## 2021-07-07 PROCEDURE — 99499 UNLISTED E&M SERVICE: CPT | Mod: HCNC,S$GLB,, | Performed by: INTERNAL MEDICINE

## 2021-07-07 PROCEDURE — 3288F PR FALLS RISK ASSESSMENT DOCUMENTED: ICD-10-PCS | Mod: CPTII,S$GLB,, | Performed by: INTERNAL MEDICINE

## 2021-07-07 PROCEDURE — 1159F MED LIST DOCD IN RCRD: CPT | Mod: S$GLB,,, | Performed by: INTERNAL MEDICINE

## 2021-07-07 PROCEDURE — 99214 OFFICE O/P EST MOD 30 MIN: CPT | Mod: S$GLB,,, | Performed by: INTERNAL MEDICINE

## 2021-07-07 PROCEDURE — 85025 COMPLETE CBC W/AUTO DIFF WBC: CPT | Performed by: INTERNAL MEDICINE

## 2021-07-07 PROCEDURE — 1101F PR PT FALLS ASSESS DOC 0-1 FALLS W/OUT INJ PAST YR: ICD-10-PCS | Mod: CPTII,S$GLB,, | Performed by: INTERNAL MEDICINE

## 2021-07-07 PROCEDURE — 1126F PR PAIN SEVERITY QUANTIFIED, NO PAIN PRESENT: ICD-10-PCS | Mod: S$GLB,,, | Performed by: INTERNAL MEDICINE

## 2021-07-07 PROCEDURE — 3288F FALL RISK ASSESSMENT DOCD: CPT | Mod: CPTII,S$GLB,, | Performed by: INTERNAL MEDICINE

## 2021-09-28 ENCOUNTER — IMMUNIZATION (OUTPATIENT)
Dept: PRIMARY CARE CLINIC | Facility: CLINIC | Age: 81
End: 2021-09-28
Payer: MEDICARE

## 2021-09-28 DIAGNOSIS — Z23 NEED FOR VACCINATION: Primary | ICD-10-CM

## 2021-09-28 PROCEDURE — 91300 COVID-19, MRNA, LNP-S, PF, 30 MCG/0.3 ML DOSE VACCINE: CPT | Mod: PBBFAC | Performed by: FAMILY MEDICINE

## 2021-09-28 PROCEDURE — 0003A COVID-19, MRNA, LNP-S, PF, 30 MCG/0.3 ML DOSE VACCINE: CPT | Mod: CV19,PBBFAC | Performed by: FAMILY MEDICINE

## 2021-11-02 ENCOUNTER — OFFICE VISIT (OUTPATIENT)
Dept: INFECTIOUS DISEASES | Facility: CLINIC | Age: 81
End: 2021-11-02
Payer: MEDICARE

## 2021-11-02 ENCOUNTER — LAB VISIT (OUTPATIENT)
Dept: LAB | Facility: HOSPITAL | Age: 81
End: 2021-11-02
Attending: INTERNAL MEDICINE
Payer: MEDICARE

## 2021-11-02 VITALS
BODY MASS INDEX: 26.77 KG/M2 | SYSTOLIC BLOOD PRESSURE: 158 MMHG | WEIGHT: 180.75 LBS | RESPIRATION RATE: 16 BRPM | DIASTOLIC BLOOD PRESSURE: 80 MMHG | HEIGHT: 69 IN | HEART RATE: 47 BPM

## 2021-11-02 DIAGNOSIS — C91.10 CLL (CHRONIC LYMPHOCYTIC LEUKEMIA): ICD-10-CM

## 2021-11-02 DIAGNOSIS — Z21 HIV POSITIVE: ICD-10-CM

## 2021-11-02 DIAGNOSIS — D69.6 THROMBOCYTOPENIA: ICD-10-CM

## 2021-11-02 DIAGNOSIS — Z21 HIV POSITIVE: Primary | ICD-10-CM

## 2021-11-02 DIAGNOSIS — M17.12 PRIMARY OSTEOARTHRITIS OF LEFT KNEE: ICD-10-CM

## 2021-11-02 LAB
ALBUMIN SERPL BCP-MCNC: 3.3 G/DL (ref 3.5–5.2)
ALP SERPL-CCNC: 82 U/L (ref 55–135)
ALT SERPL W/O P-5'-P-CCNC: 12 U/L (ref 10–44)
ANION GAP SERPL CALC-SCNC: 10 MMOL/L (ref 8–16)
AST SERPL-CCNC: 22 U/L (ref 10–40)
BILIRUB SERPL-MCNC: 0.6 MG/DL (ref 0.1–1)
BUN SERPL-MCNC: 20 MG/DL (ref 8–23)
CALCIUM SERPL-MCNC: 9.5 MG/DL (ref 8.7–10.5)
CHLORIDE SERPL-SCNC: 105 MMOL/L (ref 95–110)
CO2 SERPL-SCNC: 23 MMOL/L (ref 23–29)
CREAT SERPL-MCNC: 1.5 MG/DL (ref 0.5–1.4)
EST. GFR  (AFRICAN AMERICAN): 49.8 ML/MIN/1.73 M^2
EST. GFR  (NON AFRICAN AMERICAN): 43 ML/MIN/1.73 M^2
GLUCOSE SERPL-MCNC: 90 MG/DL (ref 70–110)
POTASSIUM SERPL-SCNC: 4.4 MMOL/L (ref 3.5–5.1)
PROT SERPL-MCNC: 8.2 G/DL (ref 6–8.4)
SODIUM SERPL-SCNC: 138 MMOL/L (ref 136–145)

## 2021-11-02 PROCEDURE — 3077F SYST BP >= 140 MM HG: CPT | Mod: HCNC,CPTII,S$GLB, | Performed by: INTERNAL MEDICINE

## 2021-11-02 PROCEDURE — 87536 HIV-1 QUANT&REVRSE TRNSCRPJ: CPT | Mod: HCNC | Performed by: INTERNAL MEDICINE

## 2021-11-02 PROCEDURE — 1101F PT FALLS ASSESS-DOCD LE1/YR: CPT | Mod: HCNC,CPTII,S$GLB, | Performed by: INTERNAL MEDICINE

## 2021-11-02 PROCEDURE — 99499 UNLISTED E&M SERVICE: CPT | Mod: S$GLB,,, | Performed by: INTERNAL MEDICINE

## 2021-11-02 PROCEDURE — 99499 RISK ADDL DX/OHS AUDIT: ICD-10-PCS | Mod: S$GLB,,, | Performed by: INTERNAL MEDICINE

## 2021-11-02 PROCEDURE — 1159F MED LIST DOCD IN RCRD: CPT | Mod: HCNC,CPTII,S$GLB, | Performed by: INTERNAL MEDICINE

## 2021-11-02 PROCEDURE — 1159F PR MEDICATION LIST DOCUMENTED IN MEDICAL RECORD: ICD-10-PCS | Mod: HCNC,CPTII,S$GLB, | Performed by: INTERNAL MEDICINE

## 2021-11-02 PROCEDURE — 85025 COMPLETE CBC W/AUTO DIFF WBC: CPT | Mod: HCNC | Performed by: INTERNAL MEDICINE

## 2021-11-02 PROCEDURE — 99999 PR PBB SHADOW E&M-EST. PATIENT-LVL III: CPT | Mod: PBBFAC,HCNC,, | Performed by: INTERNAL MEDICINE

## 2021-11-02 PROCEDURE — 3079F DIAST BP 80-89 MM HG: CPT | Mod: HCNC,CPTII,S$GLB, | Performed by: INTERNAL MEDICINE

## 2021-11-02 PROCEDURE — 80053 COMPREHEN METABOLIC PANEL: CPT | Mod: HCNC | Performed by: INTERNAL MEDICINE

## 2021-11-02 PROCEDURE — 3288F FALL RISK ASSESSMENT DOCD: CPT | Mod: HCNC,CPTII,S$GLB, | Performed by: INTERNAL MEDICINE

## 2021-11-02 PROCEDURE — 99999 PR PBB SHADOW E&M-EST. PATIENT-LVL III: ICD-10-PCS | Mod: PBBFAC,HCNC,, | Performed by: INTERNAL MEDICINE

## 2021-11-02 PROCEDURE — 99214 PR OFFICE/OUTPT VISIT, EST, LEVL IV, 30-39 MIN: ICD-10-PCS | Mod: HCNC,S$GLB,, | Performed by: INTERNAL MEDICINE

## 2021-11-02 PROCEDURE — 3079F PR MOST RECENT DIASTOLIC BLOOD PRESSURE 80-89 MM HG: ICD-10-PCS | Mod: HCNC,CPTII,S$GLB, | Performed by: INTERNAL MEDICINE

## 2021-11-02 PROCEDURE — 86361 T CELL ABSOLUTE COUNT: CPT | Mod: HCNC | Performed by: INTERNAL MEDICINE

## 2021-11-02 PROCEDURE — 99214 OFFICE O/P EST MOD 30 MIN: CPT | Mod: HCNC,S$GLB,, | Performed by: INTERNAL MEDICINE

## 2021-11-02 PROCEDURE — 3288F PR FALLS RISK ASSESSMENT DOCUMENTED: ICD-10-PCS | Mod: HCNC,CPTII,S$GLB, | Performed by: INTERNAL MEDICINE

## 2021-11-02 PROCEDURE — 1101F PR PT FALLS ASSESS DOC 0-1 FALLS W/OUT INJ PAST YR: ICD-10-PCS | Mod: HCNC,CPTII,S$GLB, | Performed by: INTERNAL MEDICINE

## 2021-11-02 PROCEDURE — 36415 COLL VENOUS BLD VENIPUNCTURE: CPT | Mod: HCNC | Performed by: INTERNAL MEDICINE

## 2021-11-02 PROCEDURE — 3077F PR MOST RECENT SYSTOLIC BLOOD PRESSURE >= 140 MM HG: ICD-10-PCS | Mod: HCNC,CPTII,S$GLB, | Performed by: INTERNAL MEDICINE

## 2021-11-03 LAB
BASOPHILS # BLD AUTO: 0.04 K/UL (ref 0–0.2)
BASOPHILS NFR BLD: 0.8 % (ref 0–1.9)
CD3+CD4+ CELLS # BLD: 377 CELLS/UL (ref 300–1400)
CD3+CD4+ CELLS NFR BLD: 15.9 % (ref 28–57)
DIFFERENTIAL METHOD: ABNORMAL
EOSINOPHIL # BLD AUTO: 0.1 K/UL (ref 0–0.5)
EOSINOPHIL NFR BLD: 1.5 % (ref 0–8)
ERYTHROCYTE [DISTWIDTH] IN BLOOD BY AUTOMATED COUNT: 15 % (ref 11.5–14.5)
HCT VFR BLD AUTO: 39.3 % (ref 40–54)
HGB BLD-MCNC: 12.4 G/DL (ref 14–18)
IMM GRANULOCYTES # BLD AUTO: 0.01 K/UL (ref 0–0.04)
IMM GRANULOCYTES NFR BLD AUTO: 0.2 % (ref 0–0.5)
LYMPHOCYTES # BLD AUTO: 2.3 K/UL (ref 1–4.8)
LYMPHOCYTES NFR BLD: 48.7 % (ref 18–48)
MCH RBC QN AUTO: 30.6 PG (ref 27–31)
MCHC RBC AUTO-ENTMCNC: 31.6 G/DL (ref 32–36)
MCV RBC AUTO: 97 FL (ref 82–98)
MONOCYTES # BLD AUTO: 0.7 K/UL (ref 0.3–1)
MONOCYTES NFR BLD: 13.9 % (ref 4–15)
NEUTROPHILS # BLD AUTO: 1.7 K/UL (ref 1.8–7.7)
NEUTROPHILS NFR BLD: 34.9 % (ref 38–73)
NRBC BLD-RTO: 0 /100 WBC
PLATELET # BLD AUTO: 174 K/UL (ref 150–450)
PMV BLD AUTO: 12.7 FL (ref 9.2–12.9)
RBC # BLD AUTO: 4.05 M/UL (ref 4.6–6.2)
WBC # BLD AUTO: 4.74 K/UL (ref 3.9–12.7)

## 2021-11-04 LAB — HIV1 RNA # PLAS NAA DL=20: <20 COPIES/ML

## 2022-02-02 ENCOUNTER — LAB VISIT (OUTPATIENT)
Dept: LAB | Facility: HOSPITAL | Age: 82
End: 2022-02-02
Attending: INTERNAL MEDICINE
Payer: MEDICARE

## 2022-02-02 DIAGNOSIS — Z21 HIV POSITIVE: ICD-10-CM

## 2022-02-02 LAB
ALBUMIN SERPL BCP-MCNC: 3.1 G/DL (ref 3.5–5.2)
ALP SERPL-CCNC: 90 U/L (ref 55–135)
ALT SERPL W/O P-5'-P-CCNC: 10 U/L (ref 10–44)
ANION GAP SERPL CALC-SCNC: 12 MMOL/L (ref 8–16)
AST SERPL-CCNC: 23 U/L (ref 10–40)
BASOPHILS # BLD AUTO: 0.03 K/UL (ref 0–0.2)
BASOPHILS NFR BLD: 0.6 % (ref 0–1.9)
BILIRUB SERPL-MCNC: 0.4 MG/DL (ref 0.1–1)
BUN SERPL-MCNC: 20 MG/DL (ref 8–23)
CALCIUM SERPL-MCNC: 10.1 MG/DL (ref 8.7–10.5)
CHLORIDE SERPL-SCNC: 99 MMOL/L (ref 95–110)
CO2 SERPL-SCNC: 22 MMOL/L (ref 23–29)
CREAT SERPL-MCNC: 1.4 MG/DL (ref 0.5–1.4)
DIFFERENTIAL METHOD: ABNORMAL
EOSINOPHIL # BLD AUTO: 0.1 K/UL (ref 0–0.5)
EOSINOPHIL NFR BLD: 1.8 % (ref 0–8)
ERYTHROCYTE [DISTWIDTH] IN BLOOD BY AUTOMATED COUNT: 13.2 % (ref 11.5–14.5)
EST. GFR  (AFRICAN AMERICAN): 54.1 ML/MIN/1.73 M^2
EST. GFR  (NON AFRICAN AMERICAN): 46.8 ML/MIN/1.73 M^2
GLUCOSE SERPL-MCNC: 65 MG/DL (ref 70–110)
HCT VFR BLD AUTO: 37.8 % (ref 40–54)
HGB BLD-MCNC: 11.8 G/DL (ref 14–18)
IMM GRANULOCYTES # BLD AUTO: 0 K/UL (ref 0–0.04)
IMM GRANULOCYTES NFR BLD AUTO: 0 % (ref 0–0.5)
LYMPHOCYTES # BLD AUTO: 2.8 K/UL (ref 1–4.8)
LYMPHOCYTES NFR BLD: 56.1 % (ref 18–48)
MCH RBC QN AUTO: 30.4 PG (ref 27–31)
MCHC RBC AUTO-ENTMCNC: 31.2 G/DL (ref 32–36)
MCV RBC AUTO: 97 FL (ref 82–98)
MONOCYTES # BLD AUTO: 0.7 K/UL (ref 0.3–1)
MONOCYTES NFR BLD: 13.6 % (ref 4–15)
NEUTROPHILS # BLD AUTO: 1.4 K/UL (ref 1.8–7.7)
NEUTROPHILS NFR BLD: 27.9 % (ref 38–73)
NRBC BLD-RTO: 0 /100 WBC
PLATELET # BLD AUTO: 206 K/UL (ref 150–450)
PMV BLD AUTO: 12.7 FL (ref 9.2–12.9)
POTASSIUM SERPL-SCNC: 4.2 MMOL/L (ref 3.5–5.1)
PROT SERPL-MCNC: 8.2 G/DL (ref 6–8.4)
RBC # BLD AUTO: 3.88 M/UL (ref 4.6–6.2)
SODIUM SERPL-SCNC: 133 MMOL/L (ref 136–145)
WBC # BLD AUTO: 5.01 K/UL (ref 3.9–12.7)

## 2022-02-02 PROCEDURE — 80053 COMPREHEN METABOLIC PANEL: CPT | Mod: HCNC | Performed by: INTERNAL MEDICINE

## 2022-02-02 PROCEDURE — 87536 HIV-1 QUANT&REVRSE TRNSCRPJ: CPT | Mod: HCNC | Performed by: INTERNAL MEDICINE

## 2022-02-02 PROCEDURE — 85025 COMPLETE CBC W/AUTO DIFF WBC: CPT | Mod: HCNC | Performed by: INTERNAL MEDICINE

## 2022-02-02 PROCEDURE — 36415 COLL VENOUS BLD VENIPUNCTURE: CPT | Mod: HCNC | Performed by: INTERNAL MEDICINE

## 2022-02-06 LAB — HIV1 RNA # PLAS NAA DL=20: NORMAL COPIES/ML

## 2022-02-22 DIAGNOSIS — D84.9 IMMUNOSUPPRESSED STATUS: ICD-10-CM

## 2022-03-04 ENCOUNTER — PATIENT OUTREACH (OUTPATIENT)
Dept: ADMINISTRATIVE | Facility: OTHER | Age: 82
End: 2022-03-04
Payer: MEDICARE

## 2022-03-05 NOTE — PROGRESS NOTES
Health Maintenance Due   Topic Date Due    Shingles Vaccine (1 of 2) Never done    Influenza Vaccine (1) 09/01/2021     Updates were requested from care everywhere.  Chart was reviewed for overdue Proactive [facility] Encounters (JUAN ANTONIO) topics (CRS, Breast Cancer Screening, Eye exam)  Health Maintenance has been updated.  LINKS immunization registry triggered.  Immunizations were reconciled.

## 2022-03-06 NOTE — PROGRESS NOTES
HIV Follow-up Visit  Rocael Perez is here for follow-up of HIV infection. He is feeling better since his last visit.     Patient is compliant with HIV medications.He is on Biktarvy .  Patient does not have side effects with the HIV medications.none  Labs-  Component      Latest Ref Rng & Units 2/2/2022 11/2/2021 6/29/2021   HIV-1 RNA, Quantitative      Undetected copies/mL Undetected <20 (A) Undetected     The following portions of the patient's history were reviewed and updated as appropriate: allergies, current medications, past family history, past medical history, past social history, past surgical history and problem list.    Review of Systems   Constitutional: Negative.  Negative for chills, diaphoresis, fever, malaise/fatigue and weight loss.   HENT: Negative for hearing loss.    Eyes: Negative for blurred vision, double vision and photophobia.   Respiratory: Negative for cough and hemoptysis.    Cardiovascular: Negative for chest pain.   Gastrointestinal: Negative for heartburn, nausea and vomiting.   Genitourinary: Negative for dysuria and urgency.   Skin: Negative for itching and rash.   Neurological: Negative for dizziness and tingling.   Endo/Heme/Allergies: Negative for environmental allergies. Does not bruise/bleed easily.   Psychiatric/Behavioral: Negative.  Negative for depression and suicidal ideas.          Physical Exam  Vitals and nursing note reviewed.   HENT:      Head: Normocephalic and atraumatic.   Eyes:      Pupils: Pupils are equal, round, and reactive to light.   Neck:      Thyroid: No thyromegaly.   Cardiovascular:      Rate and Rhythm: Normal rate and regular rhythm.   Pulmonary:      Effort: Pulmonary effort is normal. No respiratory distress.      Breath sounds: No wheezing.   Abdominal:      General: Bowel sounds are normal.      Palpations: Abdomen is soft.      Tenderness: There is no abdominal tenderness.   Musculoskeletal:      Cervical back: Normal range of motion and neck  supple.          Immunization History   Administered Date(s) Administered    COVID-19, MRNA, LN-S, PF (Pfizer) (Purple Cap) 02/24/2021, 03/20/2021, 09/28/2021    Influenza - High Dose - PF (65 years and older) 11/07/2015, 08/09/2017    Influenza - Quadrivalent - High Dose - PF (65 years and older) 11/20/2020    Influenza - Quadrivalent - PF *Preferred* (6 months and older) 09/18/2019    Influenza - Trivalent - PF (ADULT) 10/16/2018    Pneumococcal Conjugate - 13 Valent 08/09/2017    Pneumococcal Polysaccharide - 23 Valent 06/25/2015, 10/16/2018    Tdap 09/18/2019             Assessment:  Patient Active Problem List   Diagnosis    Primary osteoarthritis of left knee    Hyperlipidemia    CHF (congestive heart failure)    CLL (chronic lymphocytic leukemia)    HIV positive    Thrombocytopenia    Syphilis in male     Plan:  Problem List Items Addressed This Visit     CLL (chronic lymphocytic leukemia)     Oncology follow up           HIV positive     Component      Latest Ref Rng & Units 2/2/2022 11/2/2021 6/29/2021   HIV-1 RNA, Quantitative      Undetected copies/mL Undetected <20 (A) Undetected   The viral load remains undetectable and the CD4 count remains steady .He  has good virologic and immunologic control of HIV.   Continue Biktarvy.           Syphilis in male     Treated                   The patient has been counseled regarding the importance of compliance with every dose of HIV medications. Possible side effects have been reviewed and the patients questions have been answered.

## 2022-03-06 NOTE — ASSESSMENT & PLAN NOTE
Component      Latest Ref Rng & Units 2/2/2022 11/2/2021 6/29/2021   HIV-1 RNA, Quantitative      Undetected copies/mL Undetected <20 (A) Undetected   The viral load remains undetectable and the CD4 count remains steady .He  has good virologic and immunologic control of HIV.   Continue Biktarvy.

## 2022-03-07 ENCOUNTER — OFFICE VISIT (OUTPATIENT)
Dept: INFECTIOUS DISEASES | Facility: CLINIC | Age: 82
End: 2022-03-07
Payer: MEDICARE

## 2022-03-07 VITALS
WEIGHT: 183 LBS | DIASTOLIC BLOOD PRESSURE: 84 MMHG | HEART RATE: 87 BPM | BODY MASS INDEX: 27.02 KG/M2 | SYSTOLIC BLOOD PRESSURE: 162 MMHG

## 2022-03-07 DIAGNOSIS — A53.9 SYPHILIS IN MALE: ICD-10-CM

## 2022-03-07 DIAGNOSIS — Z21 HIV POSITIVE: ICD-10-CM

## 2022-03-07 DIAGNOSIS — C91.10 CLL (CHRONIC LYMPHOCYTIC LEUKEMIA): ICD-10-CM

## 2022-03-07 PROCEDURE — 3288F PR FALLS RISK ASSESSMENT DOCUMENTED: ICD-10-PCS | Mod: CPTII,S$GLB,, | Performed by: INTERNAL MEDICINE

## 2022-03-07 PROCEDURE — 99214 PR OFFICE/OUTPT VISIT, EST, LEVL IV, 30-39 MIN: ICD-10-PCS | Mod: S$GLB,,, | Performed by: INTERNAL MEDICINE

## 2022-03-07 PROCEDURE — 1101F PT FALLS ASSESS-DOCD LE1/YR: CPT | Mod: CPTII,S$GLB,, | Performed by: INTERNAL MEDICINE

## 2022-03-07 PROCEDURE — 3288F FALL RISK ASSESSMENT DOCD: CPT | Mod: CPTII,S$GLB,, | Performed by: INTERNAL MEDICINE

## 2022-03-07 PROCEDURE — 3079F DIAST BP 80-89 MM HG: CPT | Mod: CPTII,S$GLB,, | Performed by: INTERNAL MEDICINE

## 2022-03-07 PROCEDURE — 3077F PR MOST RECENT SYSTOLIC BLOOD PRESSURE >= 140 MM HG: ICD-10-PCS | Mod: CPTII,S$GLB,, | Performed by: INTERNAL MEDICINE

## 2022-03-07 PROCEDURE — 99214 OFFICE O/P EST MOD 30 MIN: CPT | Mod: S$GLB,,, | Performed by: INTERNAL MEDICINE

## 2022-03-07 PROCEDURE — 1159F MED LIST DOCD IN RCRD: CPT | Mod: CPTII,S$GLB,, | Performed by: INTERNAL MEDICINE

## 2022-03-07 PROCEDURE — 3079F PR MOST RECENT DIASTOLIC BLOOD PRESSURE 80-89 MM HG: ICD-10-PCS | Mod: CPTII,S$GLB,, | Performed by: INTERNAL MEDICINE

## 2022-03-07 PROCEDURE — 3077F SYST BP >= 140 MM HG: CPT | Mod: CPTII,S$GLB,, | Performed by: INTERNAL MEDICINE

## 2022-03-07 PROCEDURE — 99999 PR PBB SHADOW E&M-EST. PATIENT-LVL II: CPT | Mod: PBBFAC,,, | Performed by: INTERNAL MEDICINE

## 2022-03-07 PROCEDURE — 99999 PR PBB SHADOW E&M-EST. PATIENT-LVL II: ICD-10-PCS | Mod: PBBFAC,,, | Performed by: INTERNAL MEDICINE

## 2022-03-07 PROCEDURE — 1159F PR MEDICATION LIST DOCUMENTED IN MEDICAL RECORD: ICD-10-PCS | Mod: CPTII,S$GLB,, | Performed by: INTERNAL MEDICINE

## 2022-03-07 PROCEDURE — 1101F PR PT FALLS ASSESS DOC 0-1 FALLS W/OUT INJ PAST YR: ICD-10-PCS | Mod: CPTII,S$GLB,, | Performed by: INTERNAL MEDICINE

## 2022-03-07 RX ORDER — BICTEGRAVIR SODIUM, EMTRICITABINE, AND TENOFOVIR ALAFENAMIDE FUMARATE 50; 200; 25 MG/1; MG/1; MG/1
1 TABLET ORAL DAILY
Qty: 30 TABLET | Refills: 5 | Status: SHIPPED | OUTPATIENT
Start: 2022-03-07 | End: 2023-01-26

## 2022-03-07 RX ORDER — METOLAZONE 2.5 MG/1
2.5 TABLET ORAL DAILY
COMMUNITY

## 2022-03-07 RX ORDER — FUROSEMIDE 40 MG/1
40 TABLET ORAL DAILY
COMMUNITY

## 2022-04-21 ENCOUNTER — OFFICE VISIT (OUTPATIENT)
Dept: HEMATOLOGY/ONCOLOGY | Facility: CLINIC | Age: 82
End: 2022-04-21
Payer: MEDICARE

## 2022-04-21 VITALS
DIASTOLIC BLOOD PRESSURE: 73 MMHG | OXYGEN SATURATION: 99 % | TEMPERATURE: 98 F | HEIGHT: 69 IN | BODY MASS INDEX: 27.62 KG/M2 | SYSTOLIC BLOOD PRESSURE: 134 MMHG | HEART RATE: 77 BPM | WEIGHT: 186.5 LBS

## 2022-04-21 DIAGNOSIS — D69.6 THROMBOCYTOPENIA: ICD-10-CM

## 2022-04-21 DIAGNOSIS — Z21 HIV POSITIVE: ICD-10-CM

## 2022-04-21 DIAGNOSIS — N18.31 CHRONIC KIDNEY DISEASE, STAGE 3A: ICD-10-CM

## 2022-04-21 DIAGNOSIS — C91.10 CLL (CHRONIC LYMPHOCYTIC LEUKEMIA): Primary | ICD-10-CM

## 2022-04-21 DIAGNOSIS — D47.2 MGUS (MONOCLONAL GAMMOPATHY OF UNKNOWN SIGNIFICANCE): ICD-10-CM

## 2022-04-21 PROCEDURE — 1160F RVW MEDS BY RX/DR IN RCRD: CPT | Mod: CPTII,S$GLB,, | Performed by: INTERNAL MEDICINE

## 2022-04-21 PROCEDURE — 1160F PR REVIEW ALL MEDS BY PRESCRIBER/CLIN PHARMACIST DOCUMENTED: ICD-10-PCS | Mod: CPTII,S$GLB,, | Performed by: INTERNAL MEDICINE

## 2022-04-21 PROCEDURE — 3078F PR MOST RECENT DIASTOLIC BLOOD PRESSURE < 80 MM HG: ICD-10-PCS | Mod: CPTII,S$GLB,, | Performed by: INTERNAL MEDICINE

## 2022-04-21 PROCEDURE — 1126F PR PAIN SEVERITY QUANTIFIED, NO PAIN PRESENT: ICD-10-PCS | Mod: CPTII,S$GLB,, | Performed by: INTERNAL MEDICINE

## 2022-04-21 PROCEDURE — 3288F FALL RISK ASSESSMENT DOCD: CPT | Mod: CPTII,S$GLB,, | Performed by: INTERNAL MEDICINE

## 2022-04-21 PROCEDURE — 1101F PR PT FALLS ASSESS DOC 0-1 FALLS W/OUT INJ PAST YR: ICD-10-PCS | Mod: CPTII,S$GLB,, | Performed by: INTERNAL MEDICINE

## 2022-04-21 PROCEDURE — 99214 PR OFFICE/OUTPT VISIT, EST, LEVL IV, 30-39 MIN: ICD-10-PCS | Mod: S$GLB,,, | Performed by: INTERNAL MEDICINE

## 2022-04-21 PROCEDURE — 3075F PR MOST RECENT SYSTOLIC BLOOD PRESS GE 130-139MM HG: ICD-10-PCS | Mod: CPTII,S$GLB,, | Performed by: INTERNAL MEDICINE

## 2022-04-21 PROCEDURE — 3075F SYST BP GE 130 - 139MM HG: CPT | Mod: CPTII,S$GLB,, | Performed by: INTERNAL MEDICINE

## 2022-04-21 PROCEDURE — 1159F PR MEDICATION LIST DOCUMENTED IN MEDICAL RECORD: ICD-10-PCS | Mod: CPTII,S$GLB,, | Performed by: INTERNAL MEDICINE

## 2022-04-21 PROCEDURE — 3288F PR FALLS RISK ASSESSMENT DOCUMENTED: ICD-10-PCS | Mod: CPTII,S$GLB,, | Performed by: INTERNAL MEDICINE

## 2022-04-21 PROCEDURE — 3078F DIAST BP <80 MM HG: CPT | Mod: CPTII,S$GLB,, | Performed by: INTERNAL MEDICINE

## 2022-04-21 PROCEDURE — 1159F MED LIST DOCD IN RCRD: CPT | Mod: CPTII,S$GLB,, | Performed by: INTERNAL MEDICINE

## 2022-04-21 PROCEDURE — 1101F PT FALLS ASSESS-DOCD LE1/YR: CPT | Mod: CPTII,S$GLB,, | Performed by: INTERNAL MEDICINE

## 2022-04-21 PROCEDURE — 1126F AMNT PAIN NOTED NONE PRSNT: CPT | Mod: CPTII,S$GLB,, | Performed by: INTERNAL MEDICINE

## 2022-04-21 PROCEDURE — 99999 PR PBB SHADOW E&M-EST. PATIENT-LVL IV: CPT | Mod: PBBFAC,,, | Performed by: INTERNAL MEDICINE

## 2022-04-21 PROCEDURE — 99214 OFFICE O/P EST MOD 30 MIN: CPT | Mod: S$GLB,,, | Performed by: INTERNAL MEDICINE

## 2022-04-21 PROCEDURE — 99999 PR PBB SHADOW E&M-EST. PATIENT-LVL IV: ICD-10-PCS | Mod: PBBFAC,,, | Performed by: INTERNAL MEDICINE

## 2022-04-21 RX ORDER — POTASSIUM CHLORIDE 750 MG/1
10 TABLET, EXTENDED RELEASE ORAL ONCE
COMMUNITY

## 2022-04-21 NOTE — PROGRESS NOTES
Subjective:       Patient ID: Rocael Perez is a 81 y.o. male.    Chief Complaint: Results    HPI 81-year-old male history of atypical lymphoid infiltrate.  On peripheral smear.  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    Past Medical History:   Diagnosis Date    CHF (congestive heart failure)     CLL (chronic lymphocytic leukemia)     HIV (human immunodeficiency virus infection)     Hyperlipidemia     MGUS (monoclonal gammopathy of unknown significance)      Family History   Problem Relation Age of Onset    Diabetes Mother      Social History     Socioeconomic History    Marital status: Single   Tobacco Use    Smoking status: Former Smoker     Types: Cigarettes    Smokeless tobacco: Never Used   Substance and Sexual Activity    Alcohol use: Yes    Drug use: Never    Sexual activity: Not Currently     Past Surgical History:   Procedure Laterality Date    HERNIA REPAIR         Labs:  Lab Results   Component Value Date    WBC 5.01 02/02/2022    HGB 11.8 (L) 02/02/2022    HCT 37.8 (L) 02/02/2022    MCV 97 02/02/2022     02/02/2022     BMP  Lab Results   Component Value Date     (L) 02/02/2022    K 4.2 02/02/2022    CL 99 02/02/2022    CO2 22 (L) 02/02/2022    BUN 20 02/02/2022    CREATININE 1.4 02/02/2022    CALCIUM 10.1 02/02/2022    ANIONGAP 12 02/02/2022    ESTGFRAFRICA 54.1 (A) 02/02/2022    EGFRNONAA 46.8 (A) 02/02/2022     Lab Results   Component Value Date    ALT 10 02/02/2022    AST 23 02/02/2022    ALKPHOS 90 02/02/2022    BILITOT 0.4 02/02/2022       Lab Results   Component Value Date    IRON 64 10/01/2020    TIBC 320 10/01/2020    FERRITIN 485 (H) 10/01/2020     Lab Results   Component Value Date    DKMQQALR05 573 10/01/2020     No results found for: FOLATE  Lab Results   Component Value Date    TSH 2.074 09/01/2020         Review of Systems   Constitutional: Negative for activity change, appetite change, chills, diaphoresis, fatigue, fever and unexpected weight  change.   HENT: Negative for congestion, dental problem, drooling, ear discharge, ear pain, facial swelling, hearing loss, mouth sores, nosebleeds, postnasal drip, rhinorrhea, sinus pressure, sneezing, sore throat, tinnitus, trouble swallowing and voice change.    Eyes: Negative for photophobia, pain, discharge, redness, itching and visual disturbance.   Respiratory: Negative for apnea, cough, choking, chest tightness, shortness of breath, wheezing and stridor.    Cardiovascular: Negative for chest pain, palpitations and leg swelling.   Gastrointestinal: Negative for abdominal distention, abdominal pain, anal bleeding, blood in stool, constipation, diarrhea, nausea, rectal pain and vomiting.   Endocrine: Negative for cold intolerance, heat intolerance, polydipsia, polyphagia and polyuria.   Genitourinary: Negative for decreased urine volume, difficulty urinating, dysuria, enuresis, flank pain, frequency, genital sores, hematuria, penile discharge, penile pain, penile swelling, scrotal swelling, testicular pain and urgency.   Musculoskeletal: Positive for arthralgias, gait problem and myalgias. Negative for back pain, joint swelling, neck pain and neck stiffness.   Skin: Negative for color change, pallor, rash and wound.   Allergic/Immunologic: Negative for environmental allergies, food allergies and immunocompromised state.   Neurological: Negative for dizziness, tremors, seizures, syncope, facial asymmetry, speech difficulty, weakness, light-headedness, numbness and headaches.   Hematological: Negative for adenopathy. Does not bruise/bleed easily.   Psychiatric/Behavioral: Positive for dysphoric mood. Negative for agitation, behavioral problems, confusion, decreased concentration, hallucinations, self-injury, sleep disturbance and suicidal ideas. The patient is nervous/anxious. The patient is not hyperactive.        Objective:      Physical Exam  Vitals reviewed.   Constitutional:       General: He is not in acute  distress.     Appearance: He is well-developed. He is not diaphoretic.   HENT:      Head: Normocephalic.      Right Ear: External ear normal.      Left Ear: External ear normal.      Nose: Nose normal.      Right Sinus: No maxillary sinus tenderness or frontal sinus tenderness.      Left Sinus: No maxillary sinus tenderness or frontal sinus tenderness.      Mouth/Throat:      Pharynx: No oropharyngeal exudate.   Eyes:      General: Lids are normal. No scleral icterus.        Right eye: No discharge.         Left eye: No discharge.      Extraocular Movements:      Right eye: Normal extraocular motion.      Left eye: Normal extraocular motion.      Conjunctiva/sclera:      Right eye: Right conjunctiva is not injected. No hemorrhage.     Left eye: Left conjunctiva is not injected. No hemorrhage.     Pupils: Pupils are equal, round, and reactive to light.   Neck:      Thyroid: No thyromegaly.      Vascular: No JVD.      Trachea: No tracheal deviation.   Cardiovascular:      Rate and Rhythm: Normal rate.   Pulmonary:      Effort: Pulmonary effort is normal. No respiratory distress.      Breath sounds: No stridor.   Chest:   Breasts:      Right: No supraclavicular adenopathy.      Left: No supraclavicular adenopathy.       Abdominal:      General: Bowel sounds are normal.      Palpations: Abdomen is soft. There is no hepatomegaly, splenomegaly or mass.      Tenderness: There is no abdominal tenderness.   Musculoskeletal:         General: No tenderness. Normal range of motion.      Cervical back: Normal range of motion and neck supple.   Lymphadenopathy:      Head:      Right side of head: No posterior auricular or occipital adenopathy.      Left side of head: No posterior auricular or occipital adenopathy.      Cervical: No cervical adenopathy.      Right cervical: No superficial, deep or posterior cervical adenopathy.     Left cervical: No superficial, deep or posterior cervical adenopathy.      Upper Body:      Right  upper body: No supraclavicular adenopathy.      Left upper body: No supraclavicular adenopathy.   Skin:     General: Skin is dry.      Findings: No erythema or rash.      Nails: There is no clubbing.   Neurological:      Mental Status: He is alert and oriented to person, place, and time.      Cranial Nerves: No cranial nerve deficit.      Motor: Weakness present.      Coordination: Coordination abnormal.      Gait: Gait abnormal.   Psychiatric:         Behavior: Behavior normal.         Thought Content: Thought content normal.         Judgment: Judgment normal.             Assessment:      1. CLL (chronic lymphocytic leukemia)    2. MGUS (monoclonal gammopathy of unknown significance)    3. HIV positive    4. Chronic kidney disease, stage 3a    5. Thrombocytopenia           Plan:     History of chronic lymphocytic leukemia.  At this time will proceed with follow-up in 6-9 months CBC essentially stable no evidence of progression reviewed information with him.        Edin Li Jr, MD FACP

## 2022-05-31 ENCOUNTER — PATIENT OUTREACH (OUTPATIENT)
Dept: ADMINISTRATIVE | Facility: HOSPITAL | Age: 82
End: 2022-05-31
Payer: MEDICARE

## 2022-06-10 NOTE — PROGRESS NOTES
Subjective:       Patient ID: Rocael Perez is a 82 y.o. Black or  male here today for:   KNEE PAIN    PCP:  Eric Mcdaniel MD --- date of last visit 9/1/2020  The patient's last visit with me was on 1/21/2021.    The patient is brought to the clinic by his sister.    HPI    Mr. Perez is here with c/o chronic knee pain d/t OA.  Both knees bother him with pain, stiffness and swelling.  Elevates sporadically, uses walker.  Treats pain with Tylenol occasionally.       Review of Systems   Constitutional: Positive for activity change.   Respiratory: Negative.    Cardiovascular: Negative.    Musculoskeletal: Positive for arthralgias, gait problem and joint swelling.   Neurological: Positive for weakness. Negative for light-headedness, numbness and headaches.         Review of patient's allergies indicates:  No Known Allergies    Patient Active Problem List   Diagnosis    Primary osteoarthritis of left knee    Hyperlipidemia    CHF (congestive heart failure)    CLL (chronic lymphocytic leukemia)    HIV positive    Thrombocytopenia    Syphilis in male    Chronic kidney disease, stage 3a         Current Outpatient Medications:     aspirin (ECOTRIN) 81 MG EC tablet, Take 81 mg by mouth once daily., Disp: , Rfl:     swlrorrxr-qyerjymz-kdchqrz ala (BIKTARVY) -25 mg (25 kg or greater), Take 1 tablet by mouth once daily., Disp: 30 tablet, Rfl: 5    furosemide (LASIX) 20 MG tablet, Take 40 mg by mouth once daily. , Disp: , Rfl:     furosemide (LASIX) 40 MG tablet, Take 40 mg by mouth once daily., Disp: , Rfl:     metOLazone (ZAROXOLYN) 2.5 MG tablet, Take 2.5 mg by mouth once daily., Disp: , Rfl:     metoprolol succinate (TOPROL-XL) 25 MG 24 hr tablet, Take 25 mg by mouth once daily., Disp: , Rfl:     potassium chloride (KLOR-CON) 10 MEQ TbSR, Take 10 mEq by mouth once., Disp: , Rfl:       Past medical, surgical, family and social histories have been reviewed  "today.      Objective:       Vitals:    06/13/22 1349   BP: 122/72   Pulse: 94   Temp: 97.1 °F (36.2 °C)   SpO2: 98%   Weight: 86.7 kg (191 lb 0.5 oz)   Height: 5' 9" (1.753 m)   PainSc:   6   PainLoc: Knee       Physical Exam  Constitutional:       General: He is not in acute distress.  Musculoskeletal:         General: Swelling and tenderness present.      Comments: Increased joint pain bilat knee with swelling, crepitus and stiffness.  ROM decreased.   Neurological:      Mental Status: He is alert and oriented to person, place, and time.         Diagnosis/Assessment:     1. Osteoarthritis of both knees, unspecified osteoarthritis type  - diclofenac sodium (VOLTAREN) 1 % Gel; Apply 2-4 g topically 4 (four) times daily as needed (knee pain).  Dispense: 100 g; Refill: 2       Plan:     Avoid oral NSAIDs for pain due to CKD.  Tylenol prn pain.  Topical pain creams, rubs or patches prn.  Letter provided for housing accommodations request --- move to 1st floor or near elevator.    Follow-up:     RTC as directed or on prn basis.        ARSENIO Fitzpatrick  [facility]       Patient Care Team:  Eric Mcdaniel MD as PCP - General (Internal Medicine)      30 minutes of total time spent on the encounter, which includes face to face time and non-face to face time preparing to see the patient (eg, review of tests), obtaining and/or reviewing separately obtained history, and documenting clinical information in the electronic or other health record.  Also includes independent interpretation of results (not separately reported) and communicating results to the patient/family/caregiver, with care coordination (not separately reported).           "

## 2022-06-13 ENCOUNTER — OFFICE VISIT (OUTPATIENT)
Dept: FAMILY MEDICINE | Facility: CLINIC | Age: 82
End: 2022-06-13
Payer: MEDICARE

## 2022-06-13 VITALS
HEIGHT: 69 IN | HEART RATE: 94 BPM | SYSTOLIC BLOOD PRESSURE: 122 MMHG | BODY MASS INDEX: 28.29 KG/M2 | TEMPERATURE: 97 F | DIASTOLIC BLOOD PRESSURE: 72 MMHG | OXYGEN SATURATION: 98 % | WEIGHT: 191 LBS

## 2022-06-13 DIAGNOSIS — M17.0 OSTEOARTHRITIS OF BOTH KNEES, UNSPECIFIED OSTEOARTHRITIS TYPE: Primary | ICD-10-CM

## 2022-06-13 PROCEDURE — 1159F MED LIST DOCD IN RCRD: CPT | Mod: CPTII,S$GLB,, | Performed by: REGISTERED NURSE

## 2022-06-13 PROCEDURE — 99214 OFFICE O/P EST MOD 30 MIN: CPT | Mod: S$GLB,,, | Performed by: REGISTERED NURSE

## 2022-06-13 PROCEDURE — 1101F PR PT FALLS ASSESS DOC 0-1 FALLS W/OUT INJ PAST YR: ICD-10-PCS | Mod: CPTII,S$GLB,, | Performed by: REGISTERED NURSE

## 2022-06-13 PROCEDURE — 99999 PR PBB SHADOW E&M-EST. PATIENT-LVL III: ICD-10-PCS | Mod: PBBFAC,,, | Performed by: REGISTERED NURSE

## 2022-06-13 PROCEDURE — 3078F PR MOST RECENT DIASTOLIC BLOOD PRESSURE < 80 MM HG: ICD-10-PCS | Mod: CPTII,S$GLB,, | Performed by: REGISTERED NURSE

## 2022-06-13 PROCEDURE — 3288F FALL RISK ASSESSMENT DOCD: CPT | Mod: CPTII,S$GLB,, | Performed by: REGISTERED NURSE

## 2022-06-13 PROCEDURE — 1125F PR PAIN SEVERITY QUANTIFIED, PAIN PRESENT: ICD-10-PCS | Mod: CPTII,S$GLB,, | Performed by: REGISTERED NURSE

## 2022-06-13 PROCEDURE — 3074F PR MOST RECENT SYSTOLIC BLOOD PRESSURE < 130 MM HG: ICD-10-PCS | Mod: CPTII,S$GLB,, | Performed by: REGISTERED NURSE

## 2022-06-13 PROCEDURE — 99214 PR OFFICE/OUTPT VISIT, EST, LEVL IV, 30-39 MIN: ICD-10-PCS | Mod: S$GLB,,, | Performed by: REGISTERED NURSE

## 2022-06-13 PROCEDURE — 1159F PR MEDICATION LIST DOCUMENTED IN MEDICAL RECORD: ICD-10-PCS | Mod: CPTII,S$GLB,, | Performed by: REGISTERED NURSE

## 2022-06-13 PROCEDURE — 99999 PR PBB SHADOW E&M-EST. PATIENT-LVL III: CPT | Mod: PBBFAC,,, | Performed by: REGISTERED NURSE

## 2022-06-13 PROCEDURE — 3074F SYST BP LT 130 MM HG: CPT | Mod: CPTII,S$GLB,, | Performed by: REGISTERED NURSE

## 2022-06-13 PROCEDURE — 1125F AMNT PAIN NOTED PAIN PRSNT: CPT | Mod: CPTII,S$GLB,, | Performed by: REGISTERED NURSE

## 2022-06-13 PROCEDURE — 1101F PT FALLS ASSESS-DOCD LE1/YR: CPT | Mod: CPTII,S$GLB,, | Performed by: REGISTERED NURSE

## 2022-06-13 PROCEDURE — 3078F DIAST BP <80 MM HG: CPT | Mod: CPTII,S$GLB,, | Performed by: REGISTERED NURSE

## 2022-06-13 PROCEDURE — 3288F PR FALLS RISK ASSESSMENT DOCUMENTED: ICD-10-PCS | Mod: CPTII,S$GLB,, | Performed by: REGISTERED NURSE

## 2022-06-13 RX ORDER — DICLOFENAC SODIUM 10 MG/G
2-4 GEL TOPICAL 4 TIMES DAILY PRN
Qty: 100 G | Refills: 2 | Status: SHIPPED | OUTPATIENT
Start: 2022-06-13

## 2022-06-27 ENCOUNTER — LAB VISIT (OUTPATIENT)
Dept: LAB | Facility: HOSPITAL | Age: 82
End: 2022-06-27
Attending: INTERNAL MEDICINE
Payer: MEDICARE

## 2022-06-27 DIAGNOSIS — Z21 HIV POSITIVE: ICD-10-CM

## 2022-06-27 LAB
ALBUMIN SERPL BCP-MCNC: 3.4 G/DL (ref 3.5–5.2)
ALP SERPL-CCNC: 83 U/L (ref 55–135)
ALT SERPL W/O P-5'-P-CCNC: 6 U/L (ref 10–44)
ANION GAP SERPL CALC-SCNC: 13 MMOL/L (ref 8–16)
AST SERPL-CCNC: 19 U/L (ref 10–40)
BASOPHILS # BLD AUTO: 0.03 K/UL (ref 0–0.2)
BASOPHILS NFR BLD: 0.7 % (ref 0–1.9)
BILIRUB SERPL-MCNC: 0.6 MG/DL (ref 0.1–1)
BUN SERPL-MCNC: 16 MG/DL (ref 8–23)
CALCIUM SERPL-MCNC: 9.3 MG/DL (ref 8.7–10.5)
CHLORIDE SERPL-SCNC: 107 MMOL/L (ref 95–110)
CO2 SERPL-SCNC: 20 MMOL/L (ref 23–29)
CREAT SERPL-MCNC: 1.5 MG/DL (ref 0.5–1.4)
DIFFERENTIAL METHOD: ABNORMAL
EOSINOPHIL # BLD AUTO: 0.1 K/UL (ref 0–0.5)
EOSINOPHIL NFR BLD: 2.6 % (ref 0–8)
ERYTHROCYTE [DISTWIDTH] IN BLOOD BY AUTOMATED COUNT: 14.9 % (ref 11.5–14.5)
EST. GFR  (AFRICAN AMERICAN): 49.4 ML/MIN/1.73 M^2
EST. GFR  (NON AFRICAN AMERICAN): 42.7 ML/MIN/1.73 M^2
GLUCOSE SERPL-MCNC: 92 MG/DL (ref 70–110)
HCT VFR BLD AUTO: 41 % (ref 40–54)
HGB BLD-MCNC: 12.8 G/DL (ref 14–18)
IMM GRANULOCYTES # BLD AUTO: 0 K/UL (ref 0–0.04)
IMM GRANULOCYTES NFR BLD AUTO: 0 % (ref 0–0.5)
LYMPHOCYTES # BLD AUTO: 1.8 K/UL (ref 1–4.8)
LYMPHOCYTES NFR BLD: 43.2 % (ref 18–48)
MCH RBC QN AUTO: 30.3 PG (ref 27–31)
MCHC RBC AUTO-ENTMCNC: 31.2 G/DL (ref 32–36)
MCV RBC AUTO: 97 FL (ref 82–98)
MONOCYTES # BLD AUTO: 0.5 K/UL (ref 0.3–1)
MONOCYTES NFR BLD: 11.8 % (ref 4–15)
NEUTROPHILS # BLD AUTO: 1.8 K/UL (ref 1.8–7.7)
NEUTROPHILS NFR BLD: 41.7 % (ref 38–73)
NRBC BLD-RTO: 0 /100 WBC
PLATELET # BLD AUTO: 193 K/UL (ref 150–450)
PMV BLD AUTO: 12.6 FL (ref 9.2–12.9)
POTASSIUM SERPL-SCNC: 4.4 MMOL/L (ref 3.5–5.1)
PROT SERPL-MCNC: 7.5 G/DL (ref 6–8.4)
RBC # BLD AUTO: 4.22 M/UL (ref 4.6–6.2)
SODIUM SERPL-SCNC: 140 MMOL/L (ref 136–145)
WBC # BLD AUTO: 4.24 K/UL (ref 3.9–12.7)

## 2022-06-27 PROCEDURE — 80053 COMPREHEN METABOLIC PANEL: CPT | Performed by: INTERNAL MEDICINE

## 2022-06-27 PROCEDURE — 36415 COLL VENOUS BLD VENIPUNCTURE: CPT | Performed by: INTERNAL MEDICINE

## 2022-06-27 PROCEDURE — 87536 HIV-1 QUANT&REVRSE TRNSCRPJ: CPT | Performed by: INTERNAL MEDICINE

## 2022-06-27 PROCEDURE — 85025 COMPLETE CBC W/AUTO DIFF WBC: CPT | Performed by: INTERNAL MEDICINE

## 2022-06-29 LAB — HIV1 RNA # PLAS NAA DL=20: <20 COPIES/ML

## 2022-08-16 ENCOUNTER — TELEPHONE (OUTPATIENT)
Dept: ADMINISTRATIVE | Facility: HOSPITAL | Age: 82
End: 2022-08-16
Payer: MEDICARE

## 2022-09-09 ENCOUNTER — LAB VISIT (OUTPATIENT)
Dept: LAB | Facility: HOSPITAL | Age: 82
End: 2022-09-09
Attending: INTERNAL MEDICINE
Payer: MEDICARE

## 2022-09-09 DIAGNOSIS — Z21 HIV POSITIVE: ICD-10-CM

## 2022-09-09 LAB
ALBUMIN SERPL BCP-MCNC: 3.6 G/DL (ref 3.5–5.2)
ALP SERPL-CCNC: 83 U/L (ref 55–135)
ALT SERPL W/O P-5'-P-CCNC: 5 U/L (ref 10–44)
ANION GAP SERPL CALC-SCNC: 15 MMOL/L (ref 8–16)
AST SERPL-CCNC: 18 U/L (ref 10–40)
BASOPHILS # BLD AUTO: 0.03 K/UL (ref 0–0.2)
BASOPHILS NFR BLD: 0.6 % (ref 0–1.9)
BILIRUB SERPL-MCNC: 0.5 MG/DL (ref 0.1–1)
BUN SERPL-MCNC: 16 MG/DL (ref 8–23)
CALCIUM SERPL-MCNC: 9.6 MG/DL (ref 8.7–10.5)
CHLORIDE SERPL-SCNC: 108 MMOL/L (ref 95–110)
CO2 SERPL-SCNC: 20 MMOL/L (ref 23–29)
CREAT SERPL-MCNC: 1.3 MG/DL (ref 0.5–1.4)
DIFFERENTIAL METHOD: ABNORMAL
EOSINOPHIL # BLD AUTO: 0.1 K/UL (ref 0–0.5)
EOSINOPHIL NFR BLD: 2.3 % (ref 0–8)
ERYTHROCYTE [DISTWIDTH] IN BLOOD BY AUTOMATED COUNT: 14 % (ref 11.5–14.5)
EST. GFR  (NO RACE VARIABLE): 54.8 ML/MIN/1.73 M^2
GLUCOSE SERPL-MCNC: 75 MG/DL (ref 70–110)
HCT VFR BLD AUTO: 37.9 % (ref 40–54)
HGB BLD-MCNC: 12.6 G/DL (ref 14–18)
IMM GRANULOCYTES # BLD AUTO: 0.01 K/UL (ref 0–0.04)
IMM GRANULOCYTES NFR BLD AUTO: 0.2 % (ref 0–0.5)
LYMPHOCYTES # BLD AUTO: 2.2 K/UL (ref 1–4.8)
LYMPHOCYTES NFR BLD: 46 % (ref 18–48)
MCH RBC QN AUTO: 31.7 PG (ref 27–31)
MCHC RBC AUTO-ENTMCNC: 33.2 G/DL (ref 32–36)
MCV RBC AUTO: 96 FL (ref 82–98)
MONOCYTES # BLD AUTO: 0.7 K/UL (ref 0.3–1)
MONOCYTES NFR BLD: 13.8 % (ref 4–15)
NEUTROPHILS # BLD AUTO: 1.8 K/UL (ref 1.8–7.7)
NEUTROPHILS NFR BLD: 37.1 % (ref 38–73)
NRBC BLD-RTO: 0 /100 WBC
PLATELET # BLD AUTO: 167 K/UL (ref 150–450)
PMV BLD AUTO: 12.9 FL (ref 9.2–12.9)
POTASSIUM SERPL-SCNC: 4.4 MMOL/L (ref 3.5–5.1)
PROT SERPL-MCNC: 8.4 G/DL (ref 6–8.4)
RBC # BLD AUTO: 3.97 M/UL (ref 4.6–6.2)
SODIUM SERPL-SCNC: 143 MMOL/L (ref 136–145)
WBC # BLD AUTO: 4.8 K/UL (ref 3.9–12.7)

## 2022-09-09 PROCEDURE — 80053 COMPREHEN METABOLIC PANEL: CPT | Performed by: INTERNAL MEDICINE

## 2022-09-09 PROCEDURE — 36415 COLL VENOUS BLD VENIPUNCTURE: CPT | Performed by: INTERNAL MEDICINE

## 2022-09-09 PROCEDURE — 85025 COMPLETE CBC W/AUTO DIFF WBC: CPT | Performed by: INTERNAL MEDICINE

## 2022-09-09 PROCEDURE — 87536 HIV-1 QUANT&REVRSE TRNSCRPJ: CPT | Performed by: INTERNAL MEDICINE

## 2022-09-12 ENCOUNTER — TELEPHONE (OUTPATIENT)
Dept: INTERNAL MEDICINE | Facility: CLINIC | Age: 82
End: 2022-09-12
Payer: MEDICARE

## 2022-09-12 LAB — HIV1 RNA # PLAS NAA DL=20: <20 COPIES/ML

## 2022-09-13 ENCOUNTER — OFFICE VISIT (OUTPATIENT)
Dept: INFECTIOUS DISEASES | Facility: CLINIC | Age: 82
End: 2022-09-13
Payer: MEDICARE

## 2022-09-13 VITALS
HEIGHT: 69 IN | DIASTOLIC BLOOD PRESSURE: 90 MMHG | SYSTOLIC BLOOD PRESSURE: 166 MMHG | WEIGHT: 191.81 LBS | BODY MASS INDEX: 28.41 KG/M2 | HEART RATE: 78 BPM

## 2022-09-13 DIAGNOSIS — I50.9 CONGESTIVE HEART FAILURE, UNSPECIFIED HF CHRONICITY, UNSPECIFIED HEART FAILURE TYPE: ICD-10-CM

## 2022-09-13 DIAGNOSIS — M17.12 PRIMARY OSTEOARTHRITIS OF LEFT KNEE: ICD-10-CM

## 2022-09-13 DIAGNOSIS — Z21 HIV POSITIVE: ICD-10-CM

## 2022-09-13 DIAGNOSIS — I35.0 AORTIC VALVE STENOSIS, ETIOLOGY OF CARDIAC VALVE DISEASE UNSPECIFIED: ICD-10-CM

## 2022-09-13 DIAGNOSIS — C91.10 CLL (CHRONIC LYMPHOCYTIC LEUKEMIA): ICD-10-CM

## 2022-09-13 PROBLEM — I25.10 CORONARY ATHEROSCLEROSIS: Status: ACTIVE | Noted: 2022-09-13

## 2022-09-13 PROBLEM — K76.9 LIVER LESION: Status: ACTIVE | Noted: 2022-09-13

## 2022-09-13 PROBLEM — I70.0 ATHEROSCLEROSIS OF AORTA: Status: ACTIVE | Noted: 2022-09-13

## 2022-09-13 PROBLEM — I27.9 PULMONARY HEART DISEASE: Status: ACTIVE | Noted: 2022-09-13

## 2022-09-13 PROCEDURE — 3080F PR MOST RECENT DIASTOLIC BLOOD PRESSURE >= 90 MM HG: ICD-10-PCS | Mod: CPTII,S$GLB,, | Performed by: INTERNAL MEDICINE

## 2022-09-13 PROCEDURE — 1159F MED LIST DOCD IN RCRD: CPT | Mod: CPTII,S$GLB,, | Performed by: INTERNAL MEDICINE

## 2022-09-13 PROCEDURE — 1159F PR MEDICATION LIST DOCUMENTED IN MEDICAL RECORD: ICD-10-PCS | Mod: CPTII,S$GLB,, | Performed by: INTERNAL MEDICINE

## 2022-09-13 PROCEDURE — 3080F DIAST BP >= 90 MM HG: CPT | Mod: CPTII,S$GLB,, | Performed by: INTERNAL MEDICINE

## 2022-09-13 PROCEDURE — 1101F PR PT FALLS ASSESS DOC 0-1 FALLS W/OUT INJ PAST YR: ICD-10-PCS | Mod: CPTII,S$GLB,, | Performed by: INTERNAL MEDICINE

## 2022-09-13 PROCEDURE — 99999 PR PBB SHADOW E&M-EST. PATIENT-LVL II: ICD-10-PCS | Mod: PBBFAC,,, | Performed by: INTERNAL MEDICINE

## 2022-09-13 PROCEDURE — 99214 OFFICE O/P EST MOD 30 MIN: CPT | Mod: S$GLB,,, | Performed by: INTERNAL MEDICINE

## 2022-09-13 PROCEDURE — 3288F FALL RISK ASSESSMENT DOCD: CPT | Mod: CPTII,S$GLB,, | Performed by: INTERNAL MEDICINE

## 2022-09-13 PROCEDURE — 99999 PR PBB SHADOW E&M-EST. PATIENT-LVL II: CPT | Mod: PBBFAC,,, | Performed by: INTERNAL MEDICINE

## 2022-09-13 PROCEDURE — 99214 PR OFFICE/OUTPT VISIT, EST, LEVL IV, 30-39 MIN: ICD-10-PCS | Mod: S$GLB,,, | Performed by: INTERNAL MEDICINE

## 2022-09-13 PROCEDURE — 3077F PR MOST RECENT SYSTOLIC BLOOD PRESSURE >= 140 MM HG: ICD-10-PCS | Mod: CPTII,S$GLB,, | Performed by: INTERNAL MEDICINE

## 2022-09-13 PROCEDURE — 3077F SYST BP >= 140 MM HG: CPT | Mod: CPTII,S$GLB,, | Performed by: INTERNAL MEDICINE

## 2022-09-13 PROCEDURE — 3288F PR FALLS RISK ASSESSMENT DOCUMENTED: ICD-10-PCS | Mod: CPTII,S$GLB,, | Performed by: INTERNAL MEDICINE

## 2022-09-13 PROCEDURE — 1101F PT FALLS ASSESS-DOCD LE1/YR: CPT | Mod: CPTII,S$GLB,, | Performed by: INTERNAL MEDICINE

## 2022-09-13 NOTE — PROGRESS NOTES
HIV Follow-up Visit  Rocael Perez is here for follow-up of HIV infection. He is feeling better since his last visit.   He is on Biktarvy.    Patient is compliant with HIV medications. He is here with the sister.  Labs-   Component      Latest Ref Rng & Units 9/9/2022 6/27/2022   HIV-1 RNA, Quantitative      Undetected copies/mL <20 (A) <20 (A)     Patient does not have side effects with the HIV medications.none    The following portions of the patient's history were reviewed and updated as appropriate: allergies, current medications, past family history, past medical history, past social history, past surgical history, and problem list.    Review of Systems   Constitutional: Negative.  Negative for chills, diaphoresis, fever, malaise/fatigue and weight loss.   HENT:  Negative for hearing loss.    Eyes:  Negative for blurred vision, double vision and photophobia.   Respiratory:  Negative for cough and hemoptysis.    Cardiovascular:  Negative for chest pain.   Gastrointestinal:  Negative for heartburn, nausea and vomiting.   Genitourinary:  Negative for dysuria and urgency.   Skin:  Negative for itching and rash.   Neurological:  Negative for dizziness and tingling.   Endo/Heme/Allergies:  Negative for environmental allergies. Does not bruise/bleed easily.   Psychiatric/Behavioral: Negative.  Negative for depression and suicidal ideas.         Physical Exam  Vitals and nursing note reviewed.   HENT:      Head: Normocephalic and atraumatic.   Eyes:      Pupils: Pupils are equal, round, and reactive to light.   Neck:      Thyroid: No thyromegaly.   Cardiovascular:      Rate and Rhythm: Normal rate and regular rhythm.      Heart sounds: Murmur heard.   Pulmonary:      Effort: Pulmonary effort is normal. No respiratory distress.      Breath sounds: No wheezing.   Abdominal:      General: Bowel sounds are normal.      Palpations: Abdomen is soft.      Tenderness: There is no abdominal tenderness.   Musculoskeletal:       Cervical back: Normal range of motion and neck supple.      Right lower leg: Edema present.      Left lower leg: Edema present.      Comments: Ambulates with a walker   Neurological:      General: No focal deficit present.        Immunization History   Administered Date(s) Administered    COVID-19, MRNA, LN-S, PF (Pfizer) (Purple Cap) 02/24/2021, 03/20/2021, 09/28/2021    Influenza - High Dose - PF (65 years and older) 11/07/2015, 08/09/2017    Influenza - Quadrivalent - High Dose - PF (65 years and older) 11/20/2020    Influenza - Quadrivalent - PF *Preferred* (6 months and older) 09/18/2019    Influenza - Trivalent - PF (ADULT) 10/16/2018    Pneumococcal Conjugate - 13 Valent 08/09/2017    Pneumococcal Polysaccharide - 23 Valent 06/25/2015, 10/16/2018    Tdap 09/18/2019    Zoster Recombinant 07/05/2022             Assessment:  1. CLL (chronic lymphocytic leukemia)        2. HIV positive        3. Congestive heart failure, unspecified HF chronicity, unspecified heart failure type        4. Primary osteoarthritis of left knee              Plan:  Problem List Items Addressed This Visit       Primary osteoarthritis of left knee     Follow PCP          CHF (congestive heart failure)     On lasix.metoloazone         CLL (chronic lymphocytic leukemia)     Follow oncology          HIV positive     He has good virologic control.  Continue Biktarvy          Aortic stenosis     Cardiology follow up                 The patient has been counseled regarding the importance of compliance with every dose of HIV medications. Possible side effects have been reviewed and the patients questions have been answered.

## 2022-09-21 ENCOUNTER — PES CALL (OUTPATIENT)
Dept: ADMINISTRATIVE | Facility: CLINIC | Age: 82
End: 2022-09-21
Payer: MEDICARE

## 2022-10-11 ENCOUNTER — LAB VISIT (OUTPATIENT)
Dept: LAB | Facility: HOSPITAL | Age: 82
End: 2022-10-11
Attending: INTERNAL MEDICINE
Payer: MEDICARE

## 2022-10-11 ENCOUNTER — OFFICE VISIT (OUTPATIENT)
Dept: HEMATOLOGY/ONCOLOGY | Facility: CLINIC | Age: 82
End: 2022-10-11
Payer: MEDICARE

## 2022-10-11 VITALS
OXYGEN SATURATION: 98 % | RESPIRATION RATE: 20 BRPM | DIASTOLIC BLOOD PRESSURE: 81 MMHG | TEMPERATURE: 98 F | SYSTOLIC BLOOD PRESSURE: 149 MMHG | HEART RATE: 81 BPM | BODY MASS INDEX: 29.03 KG/M2 | HEIGHT: 69 IN | WEIGHT: 196 LBS

## 2022-10-11 DIAGNOSIS — D47.2 MGUS (MONOCLONAL GAMMOPATHY OF UNKNOWN SIGNIFICANCE): ICD-10-CM

## 2022-10-11 DIAGNOSIS — C91.10 CLL (CHRONIC LYMPHOCYTIC LEUKEMIA): Primary | ICD-10-CM

## 2022-10-11 DIAGNOSIS — C91.10 CLL (CHRONIC LYMPHOCYTIC LEUKEMIA): ICD-10-CM

## 2022-10-11 DIAGNOSIS — D69.6 THROMBOCYTOPENIA: ICD-10-CM

## 2022-10-11 DIAGNOSIS — Z21 HIV POSITIVE: ICD-10-CM

## 2022-10-11 DIAGNOSIS — A53.9 SYPHILIS IN MALE: ICD-10-CM

## 2022-10-11 DIAGNOSIS — N18.31 CHRONIC KIDNEY DISEASE, STAGE 3A: ICD-10-CM

## 2022-10-11 DIAGNOSIS — I70.0 ATHEROSCLEROSIS OF AORTA: ICD-10-CM

## 2022-10-11 LAB
ALBUMIN SERPL BCP-MCNC: 3.7 G/DL (ref 3.5–5.2)
ALP SERPL-CCNC: 84 U/L (ref 55–135)
ALT SERPL W/O P-5'-P-CCNC: 10 U/L (ref 10–44)
ANION GAP SERPL CALC-SCNC: 13 MMOL/L (ref 8–16)
AST SERPL-CCNC: 21 U/L (ref 10–40)
BASOPHILS # BLD AUTO: 0.04 K/UL (ref 0–0.2)
BASOPHILS NFR BLD: 0.7 % (ref 0–1.9)
BILIRUB SERPL-MCNC: 0.5 MG/DL (ref 0.1–1)
BUN SERPL-MCNC: 22 MG/DL (ref 8–23)
CALCIUM SERPL-MCNC: 9.9 MG/DL (ref 8.7–10.5)
CHLORIDE SERPL-SCNC: 102 MMOL/L (ref 95–110)
CO2 SERPL-SCNC: 21 MMOL/L (ref 23–29)
CREAT SERPL-MCNC: 1.7 MG/DL (ref 0.5–1.4)
DIFFERENTIAL METHOD: ABNORMAL
EOSINOPHIL # BLD AUTO: 0.1 K/UL (ref 0–0.5)
EOSINOPHIL NFR BLD: 1.5 % (ref 0–8)
ERYTHROCYTE [DISTWIDTH] IN BLOOD BY AUTOMATED COUNT: 13.8 % (ref 11.5–14.5)
EST. GFR  (NO RACE VARIABLE): 40 ML/MIN/1.73 M^2
GLUCOSE SERPL-MCNC: 87 MG/DL (ref 70–110)
HCT VFR BLD AUTO: 38.9 % (ref 40–54)
HGB BLD-MCNC: 12.9 G/DL (ref 14–18)
IMM GRANULOCYTES # BLD AUTO: 0.01 K/UL (ref 0–0.04)
IMM GRANULOCYTES NFR BLD AUTO: 0.2 % (ref 0–0.5)
LDH SERPL L TO P-CCNC: 198 U/L (ref 110–260)
LYMPHOCYTES # BLD AUTO: 2.1 K/UL (ref 1–4.8)
LYMPHOCYTES NFR BLD: 38.7 % (ref 18–48)
MCH RBC QN AUTO: 30.9 PG (ref 27–31)
MCHC RBC AUTO-ENTMCNC: 33.2 G/DL (ref 32–36)
MCV RBC AUTO: 93 FL (ref 82–98)
MONOCYTES # BLD AUTO: 0.7 K/UL (ref 0.3–1)
MONOCYTES NFR BLD: 13.6 % (ref 4–15)
NEUTROPHILS # BLD AUTO: 2.5 K/UL (ref 1.8–7.7)
NEUTROPHILS NFR BLD: 45.3 % (ref 38–73)
NRBC BLD-RTO: 0 /100 WBC
PLATELET # BLD AUTO: 189 K/UL (ref 150–450)
PMV BLD AUTO: 11.9 FL (ref 9.2–12.9)
POTASSIUM SERPL-SCNC: 4.8 MMOL/L (ref 3.5–5.1)
PROT SERPL-MCNC: 8.1 G/DL (ref 6–8.4)
RBC # BLD AUTO: 4.18 M/UL (ref 4.6–6.2)
SODIUM SERPL-SCNC: 136 MMOL/L (ref 136–145)
WBC # BLD AUTO: 5.45 K/UL (ref 3.9–12.7)

## 2022-10-11 PROCEDURE — 1101F PR PT FALLS ASSESS DOC 0-1 FALLS W/OUT INJ PAST YR: ICD-10-PCS | Mod: CPTII,S$GLB,, | Performed by: INTERNAL MEDICINE

## 2022-10-11 PROCEDURE — 3077F PR MOST RECENT SYSTOLIC BLOOD PRESSURE >= 140 MM HG: ICD-10-PCS | Mod: CPTII,S$GLB,, | Performed by: INTERNAL MEDICINE

## 2022-10-11 PROCEDURE — 84165 PROTEIN E-PHORESIS SERUM: CPT | Performed by: INTERNAL MEDICINE

## 2022-10-11 PROCEDURE — 99214 OFFICE O/P EST MOD 30 MIN: CPT | Mod: S$GLB,,, | Performed by: INTERNAL MEDICINE

## 2022-10-11 PROCEDURE — 99999 PR PBB SHADOW E&M-EST. PATIENT-LVL III: CPT | Mod: PBBFAC,,, | Performed by: INTERNAL MEDICINE

## 2022-10-11 PROCEDURE — 3288F FALL RISK ASSESSMENT DOCD: CPT | Mod: CPTII,S$GLB,, | Performed by: INTERNAL MEDICINE

## 2022-10-11 PROCEDURE — 83615 LACTATE (LD) (LDH) ENZYME: CPT | Performed by: INTERNAL MEDICINE

## 2022-10-11 PROCEDURE — 99214 PR OFFICE/OUTPT VISIT, EST, LEVL IV, 30-39 MIN: ICD-10-PCS | Mod: S$GLB,,, | Performed by: INTERNAL MEDICINE

## 2022-10-11 PROCEDURE — 1126F PR PAIN SEVERITY QUANTIFIED, NO PAIN PRESENT: ICD-10-PCS | Mod: CPTII,S$GLB,, | Performed by: INTERNAL MEDICINE

## 2022-10-11 PROCEDURE — 3077F SYST BP >= 140 MM HG: CPT | Mod: CPTII,S$GLB,, | Performed by: INTERNAL MEDICINE

## 2022-10-11 PROCEDURE — 99999 PR PBB SHADOW E&M-EST. PATIENT-LVL III: ICD-10-PCS | Mod: PBBFAC,,, | Performed by: INTERNAL MEDICINE

## 2022-10-11 PROCEDURE — 99499 UNLISTED E&M SERVICE: CPT | Mod: S$GLB,,, | Performed by: INTERNAL MEDICINE

## 2022-10-11 PROCEDURE — 85025 COMPLETE CBC W/AUTO DIFF WBC: CPT | Performed by: INTERNAL MEDICINE

## 2022-10-11 PROCEDURE — 84165 PATHOLOGIST INTERPRETATION SPE: ICD-10-PCS | Mod: 26,,, | Performed by: PATHOLOGY

## 2022-10-11 PROCEDURE — 36415 COLL VENOUS BLD VENIPUNCTURE: CPT | Performed by: INTERNAL MEDICINE

## 2022-10-11 PROCEDURE — 86334 IMMUNOFIX E-PHORESIS SERUM: CPT | Mod: 26,,, | Performed by: PATHOLOGY

## 2022-10-11 PROCEDURE — 80053 COMPREHEN METABOLIC PANEL: CPT | Performed by: INTERNAL MEDICINE

## 2022-10-11 PROCEDURE — 86334 PATHOLOGIST INTERPRETATION IFE: ICD-10-PCS | Mod: 26,,, | Performed by: PATHOLOGY

## 2022-10-11 PROCEDURE — 1159F MED LIST DOCD IN RCRD: CPT | Mod: CPTII,S$GLB,, | Performed by: INTERNAL MEDICINE

## 2022-10-11 PROCEDURE — 83520 IMMUNOASSAY QUANT NOS NONAB: CPT | Mod: 59 | Performed by: INTERNAL MEDICINE

## 2022-10-11 PROCEDURE — 84165 PROTEIN E-PHORESIS SERUM: CPT | Mod: 26,,, | Performed by: PATHOLOGY

## 2022-10-11 PROCEDURE — 1160F RVW MEDS BY RX/DR IN RCRD: CPT | Mod: CPTII,S$GLB,, | Performed by: INTERNAL MEDICINE

## 2022-10-11 PROCEDURE — 3288F PR FALLS RISK ASSESSMENT DOCUMENTED: ICD-10-PCS | Mod: CPTII,S$GLB,, | Performed by: INTERNAL MEDICINE

## 2022-10-11 PROCEDURE — 1159F PR MEDICATION LIST DOCUMENTED IN MEDICAL RECORD: ICD-10-PCS | Mod: CPTII,S$GLB,, | Performed by: INTERNAL MEDICINE

## 2022-10-11 PROCEDURE — 3079F DIAST BP 80-89 MM HG: CPT | Mod: CPTII,S$GLB,, | Performed by: INTERNAL MEDICINE

## 2022-10-11 PROCEDURE — 1101F PT FALLS ASSESS-DOCD LE1/YR: CPT | Mod: CPTII,S$GLB,, | Performed by: INTERNAL MEDICINE

## 2022-10-11 PROCEDURE — 86334 IMMUNOFIX E-PHORESIS SERUM: CPT | Performed by: INTERNAL MEDICINE

## 2022-10-11 PROCEDURE — 1160F PR REVIEW ALL MEDS BY PRESCRIBER/CLIN PHARMACIST DOCUMENTED: ICD-10-PCS | Mod: CPTII,S$GLB,, | Performed by: INTERNAL MEDICINE

## 2022-10-11 PROCEDURE — 3079F PR MOST RECENT DIASTOLIC BLOOD PRESSURE 80-89 MM HG: ICD-10-PCS | Mod: CPTII,S$GLB,, | Performed by: INTERNAL MEDICINE

## 2022-10-11 PROCEDURE — 1126F AMNT PAIN NOTED NONE PRSNT: CPT | Mod: CPTII,S$GLB,, | Performed by: INTERNAL MEDICINE

## 2022-10-11 NOTE — PROGRESS NOTES
Subjective:       Patient ID: Rocael Perez is a 82 y.o. male.    Chief Complaint: Results (Chronic lymphocytic leukemia) and Leukemia    HPI:  82-year-old male history of chronic lymphocytic leukemia patient stage 0 continues follow-up with comorbid conditions no evidence of lymphadenopathy no fevers chills night sweats ECOG status 1    Past Medical History:   Diagnosis Date    CHF (congestive heart failure)     CLL (chronic lymphocytic leukemia)     HIV (human immunodeficiency virus infection)     Hyperlipidemia     MGUS (monoclonal gammopathy of unknown significance)      Family History   Problem Relation Age of Onset    Diabetes Mother      Social History     Socioeconomic History    Marital status: Single   Tobacco Use    Smoking status: Former     Types: Cigarettes    Smokeless tobacco: Never   Substance and Sexual Activity    Alcohol use: Yes    Drug use: Never    Sexual activity: Not Currently     Past Surgical History:   Procedure Laterality Date    HERNIA REPAIR         Labs:  Lab Results   Component Value Date    WBC 5.45 10/11/2022    HGB 12.9 (L) 10/11/2022    HCT 38.9 (L) 10/11/2022    MCV 93 10/11/2022     10/11/2022     BMP  Lab Results   Component Value Date     10/11/2022    K 4.8 10/11/2022     10/11/2022    CO2 21 (L) 10/11/2022    BUN 22 10/11/2022    CREATININE 1.7 (H) 10/11/2022    CALCIUM 9.9 10/11/2022    ANIONGAP 13 10/11/2022    ESTGFRAFRICA 49.4 (A) 06/27/2022    EGFRNONAA 42.7 (A) 06/27/2022     Lab Results   Component Value Date    ALT 10 10/11/2022    AST 21 10/11/2022    ALKPHOS 84 10/11/2022    BILITOT 0.5 10/11/2022       Lab Results   Component Value Date    IRON 64 10/01/2020    TIBC 320 10/01/2020    FERRITIN 485 (H) 10/01/2020     Lab Results   Component Value Date    JHFXZXGQ01 573 10/01/2020     No results found for: FOLATE  Lab Results   Component Value Date    TSH 2.074 09/01/2020         Review of Systems   Constitutional:  Negative for activity change,  appetite change, chills, diaphoresis, fatigue, fever and unexpected weight change.   HENT:  Negative for congestion, dental problem, drooling, ear discharge, ear pain, facial swelling, hearing loss, mouth sores, nosebleeds, postnasal drip, rhinorrhea, sinus pressure, sneezing, sore throat, tinnitus, trouble swallowing and voice change.    Eyes:  Negative for photophobia, pain, discharge, redness, itching and visual disturbance.   Respiratory:  Negative for apnea, cough, choking, chest tightness, shortness of breath, wheezing and stridor.    Cardiovascular:  Negative for chest pain, palpitations and leg swelling.   Gastrointestinal:  Negative for abdominal distention, abdominal pain, anal bleeding, blood in stool, constipation, diarrhea, nausea, rectal pain and vomiting.   Endocrine: Negative for cold intolerance, heat intolerance, polydipsia, polyphagia and polyuria.   Genitourinary:  Negative for decreased urine volume, difficulty urinating, dysuria, enuresis, flank pain, frequency, genital sores, hematuria, penile discharge, penile pain, penile swelling, scrotal swelling, testicular pain and urgency.   Musculoskeletal:  Negative for arthralgias, back pain, gait problem, joint swelling, myalgias, neck pain and neck stiffness.   Skin:  Negative for color change, pallor, rash and wound.   Allergic/Immunologic: Negative for environmental allergies, food allergies and immunocompromised state.   Neurological:  Negative for dizziness, tremors, seizures, syncope, facial asymmetry, speech difficulty, weakness, light-headedness, numbness and headaches.   Hematological:  Negative for adenopathy. Does not bruise/bleed easily.   Psychiatric/Behavioral:  Negative for agitation, behavioral problems, confusion, decreased concentration, dysphoric mood, hallucinations, self-injury, sleep disturbance and suicidal ideas. The patient is not nervous/anxious and is not hyperactive.      Objective:      Physical Exam  Vitals reviewed.    Constitutional:       General: He is not in acute distress.     Appearance: He is well-developed. He is not diaphoretic.   HENT:      Head: Normocephalic.      Right Ear: External ear normal.      Left Ear: External ear normal.      Nose: Nose normal.      Right Sinus: No maxillary sinus tenderness or frontal sinus tenderness.      Left Sinus: No maxillary sinus tenderness or frontal sinus tenderness.      Mouth/Throat:      Pharynx: No oropharyngeal exudate.   Eyes:      General: Lids are normal. No scleral icterus.        Right eye: No discharge.         Left eye: No discharge.      Extraocular Movements:      Right eye: Normal extraocular motion.      Left eye: Normal extraocular motion.      Conjunctiva/sclera:      Right eye: Right conjunctiva is not injected. No hemorrhage.     Left eye: Left conjunctiva is not injected. No hemorrhage.     Pupils: Pupils are equal, round, and reactive to light.   Neck:      Thyroid: No thyromegaly.      Vascular: No JVD.      Trachea: No tracheal deviation.   Cardiovascular:      Rate and Rhythm: Normal rate.   Pulmonary:      Effort: Pulmonary effort is normal. No respiratory distress.      Breath sounds: No stridor.   Abdominal:      General: Bowel sounds are normal.      Palpations: Abdomen is soft. There is no hepatomegaly, splenomegaly or mass.      Tenderness: There is no abdominal tenderness.   Musculoskeletal:         General: No tenderness. Normal range of motion.      Cervical back: Normal range of motion and neck supple.   Lymphadenopathy:      Head:      Right side of head: No posterior auricular or occipital adenopathy.      Left side of head: No posterior auricular or occipital adenopathy.      Cervical: No cervical adenopathy.      Right cervical: No superficial, deep or posterior cervical adenopathy.     Left cervical: No superficial, deep or posterior cervical adenopathy.      Upper Body:      Right upper body: No supraclavicular adenopathy.      Left upper  body: No supraclavicular adenopathy.   Skin:     General: Skin is dry.      Findings: No erythema or rash.      Nails: There is no clubbing.   Neurological:      Mental Status: He is alert and oriented to person, place, and time.      Cranial Nerves: No cranial nerve deficit.      Coordination: Coordination normal.   Psychiatric:         Behavior: Behavior normal.         Thought Content: Thought content normal.         Judgment: Judgment normal.           Assessment:      1. CLL (chronic lymphocytic leukemia)    2. Atherosclerosis of aorta    3. Thrombocytopenia    4. Syphilis in male    5. HIV positive    6. Chronic kidney disease, stage 3a           Plan:     Early stage chronic lymphocytic leukemia with comorbid conditions followed by Infectious Disease HIV previous history of syphilis.  At this time no clear indications for treatment follow-up in 6 months can be seen back by nurse practitioner/AP P and I will see back in year standing labs placed; flu shot in COVID omicron booster recommended        Edin Li Jr, MD FACP

## 2022-10-12 LAB
ALBUMIN SERPL ELPH-MCNC: 3.46 G/DL (ref 3.35–5.55)
ALPHA1 GLOB SERPL ELPH-MCNC: 0.91 G/DL (ref 0.17–0.41)
ALPHA2 GLOB SERPL ELPH-MCNC: 0.75 G/DL (ref 0.43–0.99)
B-GLOBULIN SERPL ELPH-MCNC: 1.31 G/DL (ref 0.5–1.1)
GAMMA GLOB SERPL ELPH-MCNC: 1.57 G/DL (ref 0.67–1.58)
KAPPA LC SER QL IA: 7.36 MG/DL (ref 0.33–1.94)
KAPPA LC/LAMBDA SER IA: 1.56 (ref 0.26–1.65)
LAMBDA LC SER QL IA: 4.71 MG/DL (ref 0.57–2.63)
PROT SERPL-MCNC: 8 G/DL (ref 6–8.4)

## 2022-10-13 LAB
INTERPRETATION SERPL IFE-IMP: NORMAL
PATHOLOGIST INTERPRETATION IFE: NORMAL

## 2022-10-15 LAB — PATHOLOGIST INTERPRETATION SPE: NORMAL

## 2022-10-18 ENCOUNTER — PES CALL (OUTPATIENT)
Dept: ADMINISTRATIVE | Facility: OTHER | Age: 82
End: 2022-10-18
Payer: MEDICARE

## 2022-10-21 ENCOUNTER — PATIENT OUTREACH (OUTPATIENT)
Dept: ADMINISTRATIVE | Facility: HOSPITAL | Age: 82
End: 2022-10-21
Payer: MEDICARE

## 2022-10-21 NOTE — PROGRESS NOTES
Working HTN Report.    Spoke with Mr Perez regarding scheduling an annual exam. He said he would like his sister Ms Cazares to schedule the appt.  Requested he have her call to schedule.

## 2022-10-26 ENCOUNTER — OFFICE VISIT (OUTPATIENT)
Dept: FAMILY MEDICINE | Facility: CLINIC | Age: 82
End: 2022-10-26
Payer: MEDICARE

## 2022-10-26 VITALS
DIASTOLIC BLOOD PRESSURE: 80 MMHG | BODY MASS INDEX: 29.12 KG/M2 | RESPIRATION RATE: 20 BRPM | OXYGEN SATURATION: 95 % | WEIGHT: 196.63 LBS | HEIGHT: 69 IN | TEMPERATURE: 98 F | SYSTOLIC BLOOD PRESSURE: 130 MMHG | HEART RATE: 78 BPM

## 2022-10-26 DIAGNOSIS — I25.10 ATHEROSCLEROSIS OF CORONARY ARTERY, UNSPECIFIED VESSEL OR LESION TYPE, UNSPECIFIED WHETHER ANGINA PRESENT, UNSPECIFIED WHETHER NATIVE OR TRANSPLANTED HEART: ICD-10-CM

## 2022-10-26 DIAGNOSIS — M17.12 PRIMARY OSTEOARTHRITIS OF LEFT KNEE: ICD-10-CM

## 2022-10-26 DIAGNOSIS — I50.9 CONGESTIVE HEART FAILURE, UNSPECIFIED HF CHRONICITY, UNSPECIFIED HEART FAILURE TYPE: ICD-10-CM

## 2022-10-26 DIAGNOSIS — Z95.5 PRESENCE OF CORONARY ANGIOPLASTY IMPLANT AND GRAFT: ICD-10-CM

## 2022-10-26 DIAGNOSIS — I27.9 PULMONARY HEART DISEASE: ICD-10-CM

## 2022-10-26 DIAGNOSIS — C91.10 CLL (CHRONIC LYMPHOCYTIC LEUKEMIA): ICD-10-CM

## 2022-10-26 DIAGNOSIS — E78.5 HYPERLIPIDEMIA, UNSPECIFIED HYPERLIPIDEMIA TYPE: ICD-10-CM

## 2022-10-26 DIAGNOSIS — Z00.00 ENCOUNTER FOR PREVENTIVE HEALTH EXAMINATION: Primary | ICD-10-CM

## 2022-10-26 DIAGNOSIS — K76.9 LIVER LESION: ICD-10-CM

## 2022-10-26 DIAGNOSIS — D69.6 THROMBOCYTOPENIA: ICD-10-CM

## 2022-10-26 DIAGNOSIS — N18.31 CHRONIC KIDNEY DISEASE, STAGE 3A: ICD-10-CM

## 2022-10-26 DIAGNOSIS — Z21 HIV POSITIVE: ICD-10-CM

## 2022-10-26 PROCEDURE — 1101F PR PT FALLS ASSESS DOC 0-1 FALLS W/OUT INJ PAST YR: ICD-10-PCS | Mod: CPTII,S$GLB,, | Performed by: NURSE PRACTITIONER

## 2022-10-26 PROCEDURE — 1159F PR MEDICATION LIST DOCUMENTED IN MEDICAL RECORD: ICD-10-PCS | Mod: CPTII,S$GLB,, | Performed by: NURSE PRACTITIONER

## 2022-10-26 PROCEDURE — G0439 PR MEDICARE ANNUAL WELLNESS SUBSEQUENT VISIT: ICD-10-PCS | Mod: S$GLB,,, | Performed by: NURSE PRACTITIONER

## 2022-10-26 PROCEDURE — 1101F PT FALLS ASSESS-DOCD LE1/YR: CPT | Mod: CPTII,S$GLB,, | Performed by: NURSE PRACTITIONER

## 2022-10-26 PROCEDURE — 99999 PR PBB SHADOW E&M-EST. PATIENT-LVL IV: ICD-10-PCS | Mod: PBBFAC,,, | Performed by: NURSE PRACTITIONER

## 2022-10-26 PROCEDURE — 1160F RVW MEDS BY RX/DR IN RCRD: CPT | Mod: CPTII,S$GLB,, | Performed by: NURSE PRACTITIONER

## 2022-10-26 PROCEDURE — 1159F MED LIST DOCD IN RCRD: CPT | Mod: CPTII,S$GLB,, | Performed by: NURSE PRACTITIONER

## 2022-10-26 PROCEDURE — 1170F PR FUNCTIONAL STATUS ASSESSED: ICD-10-PCS | Mod: CPTII,S$GLB,, | Performed by: NURSE PRACTITIONER

## 2022-10-26 PROCEDURE — G0439 PPPS, SUBSEQ VISIT: HCPCS | Mod: S$GLB,,, | Performed by: NURSE PRACTITIONER

## 2022-10-26 PROCEDURE — 90694 VACC AIIV4 NO PRSRV 0.5ML IM: CPT | Mod: S$GLB,,, | Performed by: NURSE PRACTITIONER

## 2022-10-26 PROCEDURE — 1126F PR PAIN SEVERITY QUANTIFIED, NO PAIN PRESENT: ICD-10-PCS | Mod: CPTII,S$GLB,, | Performed by: NURSE PRACTITIONER

## 2022-10-26 PROCEDURE — 1160F PR REVIEW ALL MEDS BY PRESCRIBER/CLIN PHARMACIST DOCUMENTED: ICD-10-PCS | Mod: CPTII,S$GLB,, | Performed by: NURSE PRACTITIONER

## 2022-10-26 PROCEDURE — 3079F DIAST BP 80-89 MM HG: CPT | Mod: CPTII,S$GLB,, | Performed by: NURSE PRACTITIONER

## 2022-10-26 PROCEDURE — 3288F FALL RISK ASSESSMENT DOCD: CPT | Mod: CPTII,S$GLB,, | Performed by: NURSE PRACTITIONER

## 2022-10-26 PROCEDURE — 3075F PR MOST RECENT SYSTOLIC BLOOD PRESS GE 130-139MM HG: ICD-10-PCS | Mod: CPTII,S$GLB,, | Performed by: NURSE PRACTITIONER

## 2022-10-26 PROCEDURE — 1170F FXNL STATUS ASSESSED: CPT | Mod: CPTII,S$GLB,, | Performed by: NURSE PRACTITIONER

## 2022-10-26 PROCEDURE — 1126F AMNT PAIN NOTED NONE PRSNT: CPT | Mod: CPTII,S$GLB,, | Performed by: NURSE PRACTITIONER

## 2022-10-26 PROCEDURE — 3079F PR MOST RECENT DIASTOLIC BLOOD PRESSURE 80-89 MM HG: ICD-10-PCS | Mod: CPTII,S$GLB,, | Performed by: NURSE PRACTITIONER

## 2022-10-26 PROCEDURE — 3075F SYST BP GE 130 - 139MM HG: CPT | Mod: CPTII,S$GLB,, | Performed by: NURSE PRACTITIONER

## 2022-10-26 PROCEDURE — 99999 PR PBB SHADOW E&M-EST. PATIENT-LVL IV: CPT | Mod: PBBFAC,,, | Performed by: NURSE PRACTITIONER

## 2022-10-26 PROCEDURE — 99499 RISK ADDL DX/OHS AUDIT: ICD-10-PCS | Mod: S$GLB,,, | Performed by: NURSE PRACTITIONER

## 2022-10-26 PROCEDURE — 99499 UNLISTED E&M SERVICE: CPT | Mod: S$GLB,,, | Performed by: NURSE PRACTITIONER

## 2022-10-26 PROCEDURE — 3288F PR FALLS RISK ASSESSMENT DOCUMENTED: ICD-10-PCS | Mod: CPTII,S$GLB,, | Performed by: NURSE PRACTITIONER

## 2022-10-26 PROCEDURE — 90694 FLU VACCINE - QUADRIVALENT - ADJUVANTED: ICD-10-PCS | Mod: S$GLB,,, | Performed by: NURSE PRACTITIONER

## 2022-10-26 PROCEDURE — G0008 FLU VACCINE - QUADRIVALENT - ADJUVANTED: ICD-10-PCS | Mod: S$GLB,,, | Performed by: NURSE PRACTITIONER

## 2022-10-26 PROCEDURE — G0008 ADMIN INFLUENZA VIRUS VAC: HCPCS | Mod: S$GLB,,, | Performed by: NURSE PRACTITIONER

## 2022-10-26 NOTE — PROGRESS NOTES
"  Rocael Perez presented for a  Medicare AWV and comprehensive Health Risk Assessment today. The following components were reviewed and updated:  Patient accompanied by sister  , who was present throughout the visit and aided in information gathering.        Medical history  Family History  Social history  Allergies and Current Medications  Health Risk Assessment  Health Maintenance  Care Team         ** See Completed Assessments for Annual Wellness Visit within the encounter summary.**         The following assessments were completed:  Living Situation  CAGE  Depression Screening  Timed Get Up and Go  Whisper Test  Cognitive Function Screening  Nutrition Screening  ADL Screening  PAQ Screening        Vitals:    10/26/22 1113   BP: 130/80   BP Location: Left arm   Patient Position: Sitting   Pulse: 78   Resp: 20   Temp: 98.3 °F (36.8 °C)   SpO2: 95%   Weight: 89.2 kg (196 lb 10.4 oz)   Height: 5' 9" (1.753 m)     Body mass index is 29.04 kg/m².  Physical Exam  Vitals and nursing note reviewed.   Constitutional:       General: He is not in acute distress.     Appearance: He is well-developed.   HENT:      Head: Normocephalic and atraumatic.   Eyes:      Pupils: Pupils are equal, round, and reactive to light.   Neck:      Vascular: No carotid bruit.   Cardiovascular:      Rate and Rhythm: Normal rate and regular rhythm.      Pulses: Normal pulses.      Heart sounds: Normal heart sounds. No murmur heard.    No gallop.   Pulmonary:      Effort: Pulmonary effort is normal.      Breath sounds: Normal breath sounds.   Abdominal:      General: Bowel sounds are normal. There is no distension.      Palpations: Abdomen is soft.      Tenderness: no abdominal tenderness   Musculoskeletal:         General: Normal range of motion.      Right lower le+ Edema present.      Left lower le+ Edema present.   Skin:     General: Skin is warm and dry.   Neurological:      Motor: Weakness present. No abnormal muscle tone.      " Gait: Gait abnormal.   Psychiatric:         Speech: Speech normal.         Behavior: Behavior is slowed.         Thought Content: Thought content normal.         Judgment: Judgment normal.     Current Outpatient Medications   Medication Instructions    aspirin (ECOTRIN) 81 mg, Oral, Daily    ceharfkut-clnzpzsh-mbkowdw ala (BIKTARVY) -25 mg (25 kg or greater) 1 tablet, Oral, Daily    diclofenac sodium (VOLTAREN) 2-4 g, Topical (Top), 4 times daily PRN    furosemide (LASIX) 40 mg, Oral, Daily    metOLazone (ZAROXOLYN) 2.5 mg, Oral, Daily    metoprolol succinate (TOPROL-XL) 25 mg, Oral, Daily    potassium chloride (KLOR-CON) 10 MEQ TbSR 10 mEq, Oral, Once             Diagnoses and health risks identified today and associated recommendations/orders:    1. Encounter for preventive health examination  Flu shot given     2. CLL (chronic lymphocytic leukemia)  Chronic and Stable. Grade 0  no medical tx -undservellance  Continue current treatment plan as previously prescribed with your oncologist    3. HIV positive  Component Ref Range & Units 1 mo ago 4 mo ago 8 mo ago 11 mo ago 1 yr ago   HIV-1 RNA, Quantitative Undetected copies/mL <20 Abnormal   <20 Abnormal  CM  Undetected CM  <20 Abnormal     Chronic and Stable on Biktarvy . Continue current treatment plan as previously prescribed with your infectious MD    4. Thrombocytopenia  10/11/2022   4.18 (L)   12.9 (L)   38.9 (L)   93   30.9   33.2     Component      Latest Ref Rng & Units 10/11/2022   Platelets      150 - 450 K/uL 189   Chronic and Stable on Platelets. Continue current treatment plan as previously prescribed with your oncologist    5. Congestive heart failure, unspecified HF chronicity, unspecified heart failure type   Echo 6/24/ 2021  Concentric remodeling and normal systolic function.  The estimated ejection fraction is 60%.  Grade II left ventricular diastolic dysfunction.  With normal right ventricular systolic function.  There is mild aortic valve  stenosis.  Aortic valve area is 1.44 cm2; peak velocity is 2.22 m/s; mean gradient is 11 mmHg.  Mild to moderate tricuspid regurgitation.  Severe left atrial enlargement.  Mild to moderate pulmonic regurgitation.  Moderate right atrial enlargement.  Mild mitral regurgitation.  Normal central venous pressure (3 mmHg).  The estimated PA systolic pressure is 44 mmHg.  There is pulmonary hypertension.   Chronic and Stable on Lasix/Toprol and ZarolsynContinue current treatment plan as previously prescribed with your outside cardiologist- Dr. Johnson    6. Atherosclerosis of coronary artery, unspecified vessel or lesion type, unspecified whether angina present, unspecified whether native or transplanted heart  Hx of angioplasty inplant  Chronic and Stable on Lasix/Toprol and Zarolsyn Continue current treatment plan as previously prescribed with your outside cardiologist- Dr. Johnson    7. Hyperlipidemia, unspecified hyperlipidemia type  Chronic and Stable. Continue current treatment plan as previously prescribed with your PCP    8. Pulmonary heart disease  The estimated PA systolic pressure is 44 mmHg.  Chronic and Stable on Lasix/Toprol and Zaroslyn Continue current treatment plan as previously prescribed with your outside cardiologist- Dr. Johnson    9. Chronic kidney disease, stage 3a    Creatinine 0.5 - 1.4 mg/dL 1.7 High   1.3  1.   Chronic and Stable. Continue current treatment plan as previously prescribed with your PCP    10. Primary osteoarthritis of left knee  Chronic and Stable on Tylenol. Continue current treatment plan as previously prescribed with your PCP    I offered to discuss advanced care planning, including how to pick a person who would make decisions for you if you were unable to make them for yourself, called a health care power of , and what kind of decisions you might make such as use of life sustaining treatments such as ventilators and tube feeding when faced with a life limiting illness  recorded on a living will that they will need to know. (How you want to be cared for as you near the end of your natural life)     X  Patient is unable to engage in a discussion regarding advanced directives due to severe physical and/or cognitive impairment- sister POA      Provided Rocael with a 5-10 year written screening schedule and personal prevention plan. Recommendations were developed using the USPSTF age appropriate recommendations. Education, counseling, and referrals were provided as needed. After Visit Summary printed and given to patient which includes a list of additional screenings\tests needed.    Follow up in about 1 year (around 10/26/2023) for Follow up with PCP- follow up.    Chey Henry NP

## 2022-10-26 NOTE — PATIENT INSTRUCTIONS
Counseling and Referral of Other Preventative  (Italic type indicates deductible and co-insurance are waived)    Patient Name: Rocael Perez  Today's Date: 10/26/2022    Health Maintenance       Date Due Completion Date    Shingles Vaccine (2 of 2) 08/30/2022 7/5/2022    Influenza Vaccine (1) 09/01/2022 11/20/2020    Lipid Panel 01/27/2026 1/27/2021    TETANUS VACCINE 09/18/2029 9/18/2019        No orders of the defined types were placed in this encounter.      The following information is provided to all patients.  This information is to help you find resources for any of the problems found today that may be affecting your health:                Living healthy guide: www.[facility].[place].gov      Understanding Diabetes: www.diabetes.org      Eating healthy: www.cdc.gov/healthyweight      CDC home safety checklist: www.cdc.gov/steadi/patient.html      Agency on Aging: www.goea.[place].[facility]      Alcoholics anonymous (AA): www.aa.org      Physical Activity: www.claus.nih.gov/bw7eguy      Tobacco use: www.quitwithusla.org

## 2022-10-27 ENCOUNTER — TELEPHONE (OUTPATIENT)
Dept: ADMINISTRATIVE | Facility: HOSPITAL | Age: 82
End: 2022-10-27
Payer: MEDICARE

## 2023-03-01 ENCOUNTER — LAB VISIT (OUTPATIENT)
Dept: LAB | Facility: HOSPITAL | Age: 83
End: 2023-03-01
Attending: INTERNAL MEDICINE
Payer: MEDICARE

## 2023-03-01 DIAGNOSIS — Z21 HIV POSITIVE: ICD-10-CM

## 2023-03-01 DIAGNOSIS — Z21 HIV POSITIVE: Primary | ICD-10-CM

## 2023-03-01 LAB
ALBUMIN SERPL BCP-MCNC: 3.5 G/DL (ref 3.5–5.2)
ALP SERPL-CCNC: 75 U/L (ref 55–135)
ALT SERPL W/O P-5'-P-CCNC: 8 U/L (ref 10–44)
ANION GAP SERPL CALC-SCNC: 9 MMOL/L (ref 8–16)
AST SERPL-CCNC: 17 U/L (ref 10–40)
BILIRUB SERPL-MCNC: 0.3 MG/DL (ref 0.1–1)
BUN SERPL-MCNC: 19 MG/DL (ref 8–23)
CALCIUM SERPL-MCNC: 9.4 MG/DL (ref 8.7–10.5)
CHLORIDE SERPL-SCNC: 107 MMOL/L (ref 95–110)
CO2 SERPL-SCNC: 22 MMOL/L (ref 23–29)
CREAT SERPL-MCNC: 1.6 MG/DL (ref 0.5–1.4)
EST. GFR  (NO RACE VARIABLE): 42.8 ML/MIN/1.73 M^2
GLUCOSE SERPL-MCNC: 79 MG/DL (ref 70–110)
POTASSIUM SERPL-SCNC: 4.6 MMOL/L (ref 3.5–5.1)
PROT SERPL-MCNC: 7.4 G/DL (ref 6–8.4)
SODIUM SERPL-SCNC: 138 MMOL/L (ref 136–145)

## 2023-03-01 PROCEDURE — 87536 HIV-1 QUANT&REVRSE TRNSCRPJ: CPT | Mod: HCNC | Performed by: INTERNAL MEDICINE

## 2023-03-01 PROCEDURE — 85025 COMPLETE CBC W/AUTO DIFF WBC: CPT | Mod: HCNC | Performed by: INTERNAL MEDICINE

## 2023-03-01 PROCEDURE — 36415 COLL VENOUS BLD VENIPUNCTURE: CPT | Mod: HCNC | Performed by: INTERNAL MEDICINE

## 2023-03-01 PROCEDURE — 80053 COMPREHEN METABOLIC PANEL: CPT | Mod: HCNC | Performed by: INTERNAL MEDICINE

## 2023-03-02 LAB
BASOPHILS # BLD AUTO: 0.03 K/UL (ref 0–0.2)
BASOPHILS NFR BLD: 0.7 % (ref 0–1.9)
DIFFERENTIAL METHOD: ABNORMAL
EOSINOPHIL # BLD AUTO: 0 K/UL (ref 0–0.5)
EOSINOPHIL NFR BLD: 0.9 % (ref 0–8)
ERYTHROCYTE [DISTWIDTH] IN BLOOD BY AUTOMATED COUNT: 14.1 % (ref 11.5–14.5)
HCT VFR BLD AUTO: 38.2 % (ref 40–54)
HGB BLD-MCNC: 11.8 G/DL (ref 14–18)
HIV1 RNA # SERPL NAA+PROBE: NOT DETECTED COPIES/ML
HIV1 RNA SERPL QL NAA+PROBE: NOT DETECTED
IMM GRANULOCYTES # BLD AUTO: 0.01 K/UL (ref 0–0.04)
IMM GRANULOCYTES NFR BLD AUTO: 0.2 % (ref 0–0.5)
LYMPHOCYTES # BLD AUTO: 1.7 K/UL (ref 1–4.8)
LYMPHOCYTES NFR BLD: 36.7 % (ref 18–48)
MCH RBC QN AUTO: 29.6 PG (ref 27–31)
MCHC RBC AUTO-ENTMCNC: 30.9 G/DL (ref 32–36)
MCV RBC AUTO: 96 FL (ref 82–98)
MONOCYTES # BLD AUTO: 0.6 K/UL (ref 0.3–1)
MONOCYTES NFR BLD: 13.9 % (ref 4–15)
NEUTROPHILS # BLD AUTO: 2.2 K/UL (ref 1.8–7.7)
NEUTROPHILS NFR BLD: 47.6 % (ref 38–73)
NRBC BLD-RTO: 0 /100 WBC
PLATELET # BLD AUTO: 189 K/UL (ref 150–450)
PMV BLD AUTO: 13.3 FL (ref 9.2–12.9)
RBC # BLD AUTO: 3.98 M/UL (ref 4.6–6.2)
WBC # BLD AUTO: 4.52 K/UL (ref 3.9–12.7)

## 2023-03-14 ENCOUNTER — OFFICE VISIT (OUTPATIENT)
Dept: INFECTIOUS DISEASES | Facility: CLINIC | Age: 83
End: 2023-03-14
Payer: MEDICARE

## 2023-03-14 VITALS
HEIGHT: 69 IN | HEART RATE: 109 BPM | DIASTOLIC BLOOD PRESSURE: 96 MMHG | BODY MASS INDEX: 29.06 KG/M2 | SYSTOLIC BLOOD PRESSURE: 184 MMHG | WEIGHT: 196.19 LBS

## 2023-03-14 DIAGNOSIS — N18.31 CHRONIC KIDNEY DISEASE, STAGE 3A: ICD-10-CM

## 2023-03-14 DIAGNOSIS — C91.10 CLL (CHRONIC LYMPHOCYTIC LEUKEMIA): ICD-10-CM

## 2023-03-14 DIAGNOSIS — Z21 HIV POSITIVE: ICD-10-CM

## 2023-03-14 PROCEDURE — 1101F PR PT FALLS ASSESS DOC 0-1 FALLS W/OUT INJ PAST YR: ICD-10-PCS | Mod: HCNC,CPTII,S$GLB, | Performed by: INTERNAL MEDICINE

## 2023-03-14 PROCEDURE — 3288F FALL RISK ASSESSMENT DOCD: CPT | Mod: HCNC,CPTII,S$GLB, | Performed by: INTERNAL MEDICINE

## 2023-03-14 PROCEDURE — 1159F PR MEDICATION LIST DOCUMENTED IN MEDICAL RECORD: ICD-10-PCS | Mod: HCNC,CPTII,S$GLB, | Performed by: INTERNAL MEDICINE

## 2023-03-14 PROCEDURE — 1101F PT FALLS ASSESS-DOCD LE1/YR: CPT | Mod: HCNC,CPTII,S$GLB, | Performed by: INTERNAL MEDICINE

## 2023-03-14 PROCEDURE — 3080F DIAST BP >= 90 MM HG: CPT | Mod: HCNC,CPTII,S$GLB, | Performed by: INTERNAL MEDICINE

## 2023-03-14 PROCEDURE — 3080F PR MOST RECENT DIASTOLIC BLOOD PRESSURE >= 90 MM HG: ICD-10-PCS | Mod: HCNC,CPTII,S$GLB, | Performed by: INTERNAL MEDICINE

## 2023-03-14 PROCEDURE — 1159F MED LIST DOCD IN RCRD: CPT | Mod: HCNC,CPTII,S$GLB, | Performed by: INTERNAL MEDICINE

## 2023-03-14 PROCEDURE — 99214 PR OFFICE/OUTPT VISIT, EST, LEVL IV, 30-39 MIN: ICD-10-PCS | Mod: HCNC,S$GLB,, | Performed by: INTERNAL MEDICINE

## 2023-03-14 PROCEDURE — 3077F SYST BP >= 140 MM HG: CPT | Mod: HCNC,CPTII,S$GLB, | Performed by: INTERNAL MEDICINE

## 2023-03-14 PROCEDURE — 99214 OFFICE O/P EST MOD 30 MIN: CPT | Mod: HCNC,S$GLB,, | Performed by: INTERNAL MEDICINE

## 2023-03-14 PROCEDURE — 99999 PR PBB SHADOW E&M-EST. PATIENT-LVL III: ICD-10-PCS | Mod: PBBFAC,HCNC,, | Performed by: INTERNAL MEDICINE

## 2023-03-14 PROCEDURE — 3288F PR FALLS RISK ASSESSMENT DOCUMENTED: ICD-10-PCS | Mod: HCNC,CPTII,S$GLB, | Performed by: INTERNAL MEDICINE

## 2023-03-14 PROCEDURE — 3077F PR MOST RECENT SYSTOLIC BLOOD PRESSURE >= 140 MM HG: ICD-10-PCS | Mod: HCNC,CPTII,S$GLB, | Performed by: INTERNAL MEDICINE

## 2023-03-14 PROCEDURE — 99999 PR PBB SHADOW E&M-EST. PATIENT-LVL III: CPT | Mod: PBBFAC,HCNC,, | Performed by: INTERNAL MEDICINE

## 2023-03-14 NOTE — ASSESSMENT & PLAN NOTE
The viral load remains undetectable and the CD4 count remains steady .  He has good virologic and immunologic control of HIV.       Will continue Biktarvy

## 2023-03-14 NOTE — PROGRESS NOTES
HIV Follow-up Visit  Rocael Perez is here for follow-up of HIV infection. He is feeling better since his last visit.     Patient is not compliant with HIV medications.  Patient does not have side effects with the HIV medications.none  He is on Biktarvy     HIV labs -03/01- viral load -undetected   The following portions of the patient's history were reviewed and updated as appropriate: allergies, current medications, past family history, past medical history, past social history, past surgical history, and problem list.    Review of Systems   Constitutional:  Negative for chills and fever.   Eyes:  Negative for blurred vision and double vision.        Physical Exam  Vitals and nursing note reviewed.   Constitutional:       Appearance: Normal appearance.   HENT:      Mouth/Throat:      Mouth: Mucous membranes are moist.   Cardiovascular:      Rate and Rhythm: Normal rate.      Heart sounds: Murmur heard.   Pulmonary:      Effort: Pulmonary effort is normal.   Musculoskeletal:      Comments: Has a walker    Skin:     General: Skin is warm.   Neurological:      General: No focal deficit present.      Mental Status: He is alert.        Immunization History   Administered Date(s) Administered    COVID-19, MRNA, LN-S, PF (Pfizer) (Purple Cap) 02/24/2021, 03/20/2021, 09/28/2021    COVID-19, mRNA, LNP-S, bivalent booster, PF (PFIZER OMICRON) 10/14/2022    Influenza (FLUAD) - Quadrivalent - Adjuvanted - PF *Preferred* (65+) 10/26/2022    Influenza - High Dose - PF (65 years and older) 11/07/2015, 08/09/2017    Influenza - Quadrivalent - High Dose - PF (65 years and older) 11/20/2020    Influenza - Quadrivalent - PF *Preferred* (6 months and older) 09/18/2019    Influenza - Trivalent - PF (ADULT) 10/16/2018    Pneumococcal Conjugate - 13 Valent 08/09/2017    Pneumococcal Polysaccharide - 23 Valent 06/25/2015, 10/16/2018    Tdap 09/18/2019    Zoster Recombinant 07/05/2022             Assessment:  1. HIV positive        2.  CLL (chronic lymphocytic leukemia)        3. Chronic kidney disease, stage 3a              Plan:  Problem List Items Addressed This Visit       CLL (chronic lymphocytic leukemia)     Will follow oncology          HIV positive     The viral load remains undetectable and the CD4 count remains steady .  He has good virologic and immunologic control of HIV.       Will continue Biktarvy         Chronic kidney disease, stage 3a     Will monitor CMP,              The patient has been counseled regarding the importance of compliance with every dose of HIV medications. Possible side effects have been reviewed and the patients questions have been answered.

## 2023-04-11 NOTE — PROGRESS NOTES
Subjective:       Patient ID: Rocael Perez is a 82 y.o. male.    Chief Complaint: Leukemia    Primary Oncologist/Hematologist: Dr. Li    HPI: Mr. Perez is an 82 year old male who is following up for his chronic lymphocytic leukemia (CLL).   Pmhx: followed by infectious disease for HIV and hx of syphilis.     Today: patient presents with wife. He states he has been well. He denies any swelling, adenopathy, n/v/d/c, fevers, night sweats, weight loss, falls.     Social History     Socioeconomic History    Marital status: Single   Tobacco Use    Smoking status: Former     Types: Cigarettes    Smokeless tobacco: Never   Substance and Sexual Activity    Alcohol use: Not Currently    Drug use: Never    Sexual activity: Not Currently       Past Medical History:   Diagnosis Date    CHF (congestive heart failure)     CLL (chronic lymphocytic leukemia)     HIV (human immunodeficiency virus infection)     Hyperlipidemia     MGUS (monoclonal gammopathy of unknown significance)        Family History   Problem Relation Age of Onset    Diabetes Mother        Past Surgical History:   Procedure Laterality Date    HERNIA REPAIR      SHOULDER SURGERY Right        Review of Systems   Constitutional:  Negative for activity change, appetite change, chills, diaphoresis, fatigue, fever and unexpected weight change.   HENT:  Negative for congestion, nosebleeds and rhinorrhea.    Respiratory:  Negative for cough and shortness of breath.    Cardiovascular:  Negative for chest pain and leg swelling.   Gastrointestinal:  Negative for abdominal pain, anal bleeding, blood in stool, constipation, diarrhea, nausea and vomiting.   Genitourinary:  Negative for hematuria.   Musculoskeletal:  Positive for gait problem.   Skin:  Negative for color change and pallor.   Allergic/Immunologic: Positive for immunocompromised state.   Neurological:  Negative for dizziness, weakness, light-headedness, numbness and headaches.       Medication List  with Changes/Refills   Current Medications    ASPIRIN (ECOTRIN) 81 MG EC TABLET    Take 81 mg by mouth once daily.    BIKTARVY -25 MG (25 KG OR GREATER)    TAKE 1 TABLET BY MOUTH EVERY DAY    DICLOFENAC SODIUM (VOLTAREN) 1 % GEL    Apply 2-4 g topically 4 (four) times daily as needed (knee pain).    FUROSEMIDE (LASIX) 40 MG TABLET    Take 40 mg by mouth once daily.    METOLAZONE (ZAROXOLYN) 2.5 MG TABLET    Take 2.5 mg by mouth once daily.    METOPROLOL SUCCINATE (TOPROL-XL) 25 MG 24 HR TABLET    Take 25 mg by mouth once daily.    POTASSIUM CHLORIDE (KLOR-CON) 10 MEQ TBSR    Take 10 mEq by mouth once.     Objective:     Vitals:    04/13/23 1116   BP: (!) 141/77   Pulse: 81   Temp: 98.3 °F (36.8 °C)       Physical Exam  Vitals reviewed.   Constitutional:       General: He is not in acute distress.     Appearance: He is not ill-appearing, toxic-appearing or diaphoretic.   HENT:      Head: Normocephalic and atraumatic.   Eyes:      Conjunctiva/sclera: Conjunctivae normal.   Cardiovascular:      Rate and Rhythm: Normal rate.      Heart sounds: Murmur heard.   Pulmonary:      Effort: Pulmonary effort is normal.   Abdominal:      General: Bowel sounds are normal.   Musculoskeletal:      Right lower leg: No edema.      Left lower leg: No edema.   Skin:     General: Skin is warm.      Coloration: Skin is not jaundiced or pale.      Findings: No bruising, erythema, lesion or rash.   Neurological:      Mental Status: He is alert.      Gait: Gait abnormal (walker).   Psychiatric:         Mood and Affect: Mood normal.         Behavior: Behavior normal.         Thought Content: Thought content normal.          Labs/Results:  Lab Results   Component Value Date    WBC 4.37 04/13/2023    RBC 4.32 (L) 04/13/2023    HGB 12.8 (L) 04/13/2023    HCT 39.2 (L) 04/13/2023    MCV 91 04/13/2023    MCH 29.6 04/13/2023    MCHC 32.7 04/13/2023    RDW 13.4 04/13/2023     04/13/2023    MPV 11.2 04/13/2023    GRAN 2.2 03/01/2023     GRAN 47.6 03/01/2023    LYMPH 1.7 03/01/2023    LYMPH 36.7 03/01/2023    MONO 0.6 03/01/2023    MONO 13.9 03/01/2023    EOS 0.0 03/01/2023    BASO 0.03 03/01/2023    EOSINOPHIL 0.9 03/01/2023    BASOPHIL 0.7 03/01/2023     CMP  Sodium   Date Value Ref Range Status   04/13/2023 135 (L) 136 - 145 mmol/L Final     Potassium   Date Value Ref Range Status   04/13/2023 4.2 3.5 - 5.1 mmol/L Final     Chloride   Date Value Ref Range Status   04/13/2023 102 95 - 110 mmol/L Final     CO2   Date Value Ref Range Status   04/13/2023 23 23 - 29 mmol/L Final     Glucose   Date Value Ref Range Status   04/13/2023 94 70 - 110 mg/dL Final     BUN   Date Value Ref Range Status   04/13/2023 19 8 - 23 mg/dL Final     Creatinine   Date Value Ref Range Status   04/13/2023 1.6 (H) 0.5 - 1.4 mg/dL Final     Calcium   Date Value Ref Range Status   04/13/2023 9.6 8.7 - 10.5 mg/dL Final     Total Protein   Date Value Ref Range Status   04/13/2023 8.2 6.0 - 8.4 g/dL Final     Albumin   Date Value Ref Range Status   04/13/2023 3.6 3.5 - 5.2 g/dL Final     Total Bilirubin   Date Value Ref Range Status   04/13/2023 0.4 0.1 - 1.0 mg/dL Final     Comment:     For infants and newborns, interpretation of results should be based  on gestational age, weight and in agreement with clinical  observations.    Premature Infant recommended reference ranges:  Up to 24 hours.............<8.0 mg/dL  Up to 48 hours............<12.0 mg/dL  3-5 days..................<15.0 mg/dL  6-29 days.................<15.0 mg/dL       Alkaline Phosphatase   Date Value Ref Range Status   04/13/2023 86 55 - 135 U/L Final     AST   Date Value Ref Range Status   04/13/2023 20 10 - 40 U/L Final     ALT   Date Value Ref Range Status   04/13/2023 8 (L) 10 - 44 U/L Final     Anion Gap   Date Value Ref Range Status   04/13/2023 10 8 - 16 mmol/L Final     eGFR   Date Value Ref Range Status   04/13/2023 43 (A) >60 mL/min/1.73 m^2 Final      - 260 U/L 202        Assessment:      Problem List Items Addressed This Visit          Renal/    Chronic kidney disease, stage 3a - Primary       Hematology    Thrombocytopenia       Oncology    CLL (chronic lymphocytic leukemia)     Plan:     CLL (chronic lymphocytic leukemia)  --wbc:4.37-WNL  -- early stage atypical lymphocytosis  --continue to monitor  --low likelihood of progression    --continue to follow with infectious dz    Follow-Up: 6 months with cbc cmp prior with primary oncologist-standing orders in    Dayna Pérez PA-C  Hematology Oncology    Route Chart for Scheduling    Med Onc Chart Routing      Follow up with physician 6 months. with cbc cmp ldh hapto prior   Follow up with LAZARO    Infusion scheduling note    Injection scheduling note    Labs    Imaging    Pharmacy appointment    Other referrals

## 2023-04-13 ENCOUNTER — OFFICE VISIT (OUTPATIENT)
Dept: HEMATOLOGY/ONCOLOGY | Facility: CLINIC | Age: 83
End: 2023-04-13
Payer: MEDICARE

## 2023-04-13 ENCOUNTER — LAB VISIT (OUTPATIENT)
Dept: LAB | Facility: HOSPITAL | Age: 83
End: 2023-04-13
Attending: INTERNAL MEDICINE
Payer: MEDICARE

## 2023-04-13 ENCOUNTER — TELEPHONE (OUTPATIENT)
Dept: HEMATOLOGY/ONCOLOGY | Facility: CLINIC | Age: 83
End: 2023-04-13

## 2023-04-13 VITALS
HEIGHT: 69 IN | BODY MASS INDEX: 26.64 KG/M2 | WEIGHT: 179.88 LBS | HEART RATE: 81 BPM | TEMPERATURE: 98 F | OXYGEN SATURATION: 99 % | DIASTOLIC BLOOD PRESSURE: 77 MMHG | SYSTOLIC BLOOD PRESSURE: 141 MMHG

## 2023-04-13 DIAGNOSIS — C91.10 CLL (CHRONIC LYMPHOCYTIC LEUKEMIA): ICD-10-CM

## 2023-04-13 DIAGNOSIS — D69.6 THROMBOCYTOPENIA: ICD-10-CM

## 2023-04-13 DIAGNOSIS — N18.31 CHRONIC KIDNEY DISEASE, STAGE 3A: Primary | ICD-10-CM

## 2023-04-13 LAB
ALBUMIN SERPL BCP-MCNC: 3.6 G/DL (ref 3.5–5.2)
ALP SERPL-CCNC: 86 U/L (ref 55–135)
ALT SERPL W/O P-5'-P-CCNC: 8 U/L (ref 10–44)
ANION GAP SERPL CALC-SCNC: 10 MMOL/L (ref 8–16)
AST SERPL-CCNC: 20 U/L (ref 10–40)
BASOPHILS # BLD AUTO: 0.05 K/UL (ref 0–0.2)
BASOPHILS NFR BLD: 1.1 % (ref 0–1.9)
BILIRUB SERPL-MCNC: 0.4 MG/DL (ref 0.1–1)
BUN SERPL-MCNC: 19 MG/DL (ref 8–23)
CALCIUM SERPL-MCNC: 9.6 MG/DL (ref 8.7–10.5)
CHLORIDE SERPL-SCNC: 102 MMOL/L (ref 95–110)
CO2 SERPL-SCNC: 23 MMOL/L (ref 23–29)
CREAT SERPL-MCNC: 1.6 MG/DL (ref 0.5–1.4)
DIFFERENTIAL METHOD: ABNORMAL
EOSINOPHIL # BLD AUTO: 0.1 K/UL (ref 0–0.5)
EOSINOPHIL NFR BLD: 2.1 % (ref 0–8)
ERYTHROCYTE [DISTWIDTH] IN BLOOD BY AUTOMATED COUNT: 13.4 % (ref 11.5–14.5)
EST. GFR  (NO RACE VARIABLE): 43 ML/MIN/1.73 M^2
GLUCOSE SERPL-MCNC: 94 MG/DL (ref 70–110)
HCT VFR BLD AUTO: 39.2 % (ref 40–54)
HGB BLD-MCNC: 12.8 G/DL (ref 14–18)
IMM GRANULOCYTES # BLD AUTO: 0.01 K/UL (ref 0–0.04)
IMM GRANULOCYTES NFR BLD AUTO: 0.2 % (ref 0–0.5)
LDH SERPL L TO P-CCNC: 202 U/L (ref 110–260)
LYMPHOCYTES # BLD AUTO: 2.2 K/UL (ref 1–4.8)
LYMPHOCYTES NFR BLD: 49.7 % (ref 18–48)
MCH RBC QN AUTO: 29.6 PG (ref 27–31)
MCHC RBC AUTO-ENTMCNC: 32.7 G/DL (ref 32–36)
MCV RBC AUTO: 91 FL (ref 82–98)
MONOCYTES # BLD AUTO: 0.6 K/UL (ref 0.3–1)
MONOCYTES NFR BLD: 13.3 % (ref 4–15)
NEUTROPHILS # BLD AUTO: 1.5 K/UL (ref 1.8–7.7)
NEUTROPHILS NFR BLD: 33.6 % (ref 38–73)
NRBC BLD-RTO: 0 /100 WBC
PLATELET # BLD AUTO: 195 K/UL (ref 150–450)
PMV BLD AUTO: 11.2 FL (ref 9.2–12.9)
POTASSIUM SERPL-SCNC: 4.2 MMOL/L (ref 3.5–5.1)
PROT SERPL-MCNC: 8.2 G/DL (ref 6–8.4)
RBC # BLD AUTO: 4.32 M/UL (ref 4.6–6.2)
SODIUM SERPL-SCNC: 135 MMOL/L (ref 136–145)
WBC # BLD AUTO: 4.37 K/UL (ref 3.9–12.7)

## 2023-04-13 PROCEDURE — 1126F AMNT PAIN NOTED NONE PRSNT: CPT | Mod: HCNC,CPTII,S$GLB,

## 2023-04-13 PROCEDURE — 3288F FALL RISK ASSESSMENT DOCD: CPT | Mod: HCNC,CPTII,S$GLB,

## 2023-04-13 PROCEDURE — 99215 OFFICE O/P EST HI 40 MIN: CPT | Mod: HCNC,S$GLB,,

## 2023-04-13 PROCEDURE — 85025 COMPLETE CBC W/AUTO DIFF WBC: CPT | Mod: HCNC | Performed by: INTERNAL MEDICINE

## 2023-04-13 PROCEDURE — 36415 COLL VENOUS BLD VENIPUNCTURE: CPT | Mod: HCNC | Performed by: INTERNAL MEDICINE

## 2023-04-13 PROCEDURE — 99215 PR OFFICE/OUTPT VISIT, EST, LEVL V, 40-54 MIN: ICD-10-PCS | Mod: HCNC,S$GLB,,

## 2023-04-13 PROCEDURE — 3077F SYST BP >= 140 MM HG: CPT | Mod: HCNC,CPTII,S$GLB,

## 2023-04-13 PROCEDURE — 3288F PR FALLS RISK ASSESSMENT DOCUMENTED: ICD-10-PCS | Mod: HCNC,CPTII,S$GLB,

## 2023-04-13 PROCEDURE — 83615 LACTATE (LD) (LDH) ENZYME: CPT | Mod: HCNC | Performed by: INTERNAL MEDICINE

## 2023-04-13 PROCEDURE — 3078F DIAST BP <80 MM HG: CPT | Mod: HCNC,CPTII,S$GLB,

## 2023-04-13 PROCEDURE — 99999 PR PBB SHADOW E&M-EST. PATIENT-LVL III: CPT | Mod: PBBFAC,HCNC,,

## 2023-04-13 PROCEDURE — 83010 ASSAY OF HAPTOGLOBIN QUANT: CPT | Mod: HCNC | Performed by: INTERNAL MEDICINE

## 2023-04-13 PROCEDURE — 1101F PT FALLS ASSESS-DOCD LE1/YR: CPT | Mod: HCNC,CPTII,S$GLB,

## 2023-04-13 PROCEDURE — 80053 COMPREHEN METABOLIC PANEL: CPT | Mod: HCNC | Performed by: INTERNAL MEDICINE

## 2023-04-13 PROCEDURE — 1101F PR PT FALLS ASSESS DOC 0-1 FALLS W/OUT INJ PAST YR: ICD-10-PCS | Mod: HCNC,CPTII,S$GLB,

## 2023-04-13 PROCEDURE — 3077F PR MOST RECENT SYSTOLIC BLOOD PRESSURE >= 140 MM HG: ICD-10-PCS | Mod: HCNC,CPTII,S$GLB,

## 2023-04-13 PROCEDURE — 1159F PR MEDICATION LIST DOCUMENTED IN MEDICAL RECORD: ICD-10-PCS | Mod: HCNC,CPTII,S$GLB,

## 2023-04-13 PROCEDURE — 99999 PR PBB SHADOW E&M-EST. PATIENT-LVL III: ICD-10-PCS | Mod: PBBFAC,HCNC,,

## 2023-04-13 PROCEDURE — 3078F PR MOST RECENT DIASTOLIC BLOOD PRESSURE < 80 MM HG: ICD-10-PCS | Mod: HCNC,CPTII,S$GLB,

## 2023-04-13 PROCEDURE — 1126F PR PAIN SEVERITY QUANTIFIED, NO PAIN PRESENT: ICD-10-PCS | Mod: HCNC,CPTII,S$GLB,

## 2023-04-13 PROCEDURE — 1159F MED LIST DOCD IN RCRD: CPT | Mod: HCNC,CPTII,S$GLB,

## 2023-04-14 LAB — HAPTOGLOB SERPL-MCNC: 171 MG/DL (ref 30–250)

## 2023-06-29 ENCOUNTER — PES CALL (OUTPATIENT)
Dept: ADMINISTRATIVE | Facility: CLINIC | Age: 83
End: 2023-06-29
Payer: MEDICARE

## 2023-07-25 ENCOUNTER — PES CALL (OUTPATIENT)
Dept: ADMINISTRATIVE | Facility: CLINIC | Age: 83
End: 2023-07-25
Payer: MEDICARE

## 2023-07-31 DIAGNOSIS — Z21 HIV POSITIVE: Primary | ICD-10-CM

## 2023-08-04 RX ORDER — BICTEGRAVIR SODIUM, EMTRICITABINE, AND TENOFOVIR ALAFENAMIDE FUMARATE 50; 200; 25 MG/1; MG/1; MG/1
1 TABLET ORAL DAILY
Qty: 30 TABLET | Refills: 5 | Status: SHIPPED | OUTPATIENT
Start: 2023-08-04

## 2023-09-11 ENCOUNTER — TELEPHONE (OUTPATIENT)
Dept: INFECTIOUS DISEASES | Facility: CLINIC | Age: 83
End: 2023-09-11
Payer: MEDICARE

## 2023-09-11 NOTE — TELEPHONE ENCOUNTER
Attempted to contact pt to schedule labs prior to appt on 9/13. Pt did not answer. Unable to lvm.

## 2023-09-12 ENCOUNTER — TELEPHONE (OUTPATIENT)
Dept: INFECTIOUS DISEASES | Facility: CLINIC | Age: 83
End: 2023-09-12
Payer: MEDICARE

## 2023-09-12 NOTE — TELEPHONE ENCOUNTER
Attempted to inform pt that appt on 9/13 with Nnadi needed to be r/s due to provider book out. Pt did not answer. Unable to lvm, line just kept ringing and eventually gave a busy signal. Portal not set up for messaging. Appt cancelled.

## 2023-09-26 ENCOUNTER — OFFICE VISIT (OUTPATIENT)
Dept: INTERNAL MEDICINE | Facility: CLINIC | Age: 83
End: 2023-09-26
Payer: MEDICARE

## 2023-09-26 ENCOUNTER — HOSPITAL ENCOUNTER (OUTPATIENT)
Dept: RADIOLOGY | Facility: HOSPITAL | Age: 83
Discharge: HOME | End: 2023-09-26
Attending: FAMILY MEDICINE
Payer: MEDICARE

## 2023-09-26 VITALS
BODY MASS INDEX: 29.19 KG/M2 | HEART RATE: 73 BPM | DIASTOLIC BLOOD PRESSURE: 76 MMHG | OXYGEN SATURATION: 96 % | HEIGHT: 69 IN | SYSTOLIC BLOOD PRESSURE: 134 MMHG | WEIGHT: 197.06 LBS | TEMPERATURE: 98 F

## 2023-09-26 DIAGNOSIS — I27.9 PULMONARY HEART DISEASE: ICD-10-CM

## 2023-09-26 DIAGNOSIS — M25.511 CHRONIC RIGHT SHOULDER PAIN: ICD-10-CM

## 2023-09-26 DIAGNOSIS — C91.10 CLL (CHRONIC LYMPHOCYTIC LEUKEMIA): ICD-10-CM

## 2023-09-26 DIAGNOSIS — M25.511 CHRONIC RIGHT SHOULDER PAIN: Primary | ICD-10-CM

## 2023-09-26 DIAGNOSIS — Z23 NEED FOR INFLUENZA VACCINATION: ICD-10-CM

## 2023-09-26 DIAGNOSIS — I35.0 NONRHEUMATIC AORTIC VALVE STENOSIS: ICD-10-CM

## 2023-09-26 DIAGNOSIS — N18.31 CHRONIC KIDNEY DISEASE, STAGE 3A: ICD-10-CM

## 2023-09-26 DIAGNOSIS — Z21 HIV POSITIVE: ICD-10-CM

## 2023-09-26 DIAGNOSIS — I70.0 ATHEROSCLEROSIS OF AORTA: ICD-10-CM

## 2023-09-26 DIAGNOSIS — I50.42 CHRONIC COMBINED SYSTOLIC AND DIASTOLIC CONGESTIVE HEART FAILURE: ICD-10-CM

## 2023-09-26 DIAGNOSIS — E78.01 FAMILIAL HYPERCHOLESTEROLEMIA: ICD-10-CM

## 2023-09-26 DIAGNOSIS — Z53.20 STATIN MEDICATION DECLINED BY PATIENT: ICD-10-CM

## 2023-09-26 DIAGNOSIS — Z95.5 PRESENCE OF CORONARY ANGIOPLASTY IMPLANT AND GRAFT: ICD-10-CM

## 2023-09-26 DIAGNOSIS — G89.29 CHRONIC RIGHT SHOULDER PAIN: Primary | ICD-10-CM

## 2023-09-26 DIAGNOSIS — I25.10 ATHEROSCLEROSIS OF NATIVE CORONARY ARTERY OF NATIVE HEART WITHOUT ANGINA PECTORIS: ICD-10-CM

## 2023-09-26 DIAGNOSIS — D69.6 THROMBOCYTOPENIA: ICD-10-CM

## 2023-09-26 DIAGNOSIS — G89.29 CHRONIC RIGHT SHOULDER PAIN: ICD-10-CM

## 2023-09-26 DIAGNOSIS — M17.12 PRIMARY OSTEOARTHRITIS OF LEFT KNEE: ICD-10-CM

## 2023-09-26 PROBLEM — E55.9 VITAMIN D DEFICIENCY: Status: ACTIVE | Noted: 2018-02-14

## 2023-09-26 PROBLEM — N40.1 NOCTURIA ASSOCIATED WITH BENIGN PROSTATIC HYPERPLASIA: Status: ACTIVE | Noted: 2018-02-14

## 2023-09-26 PROBLEM — R35.1 NOCTURIA ASSOCIATED WITH BENIGN PROSTATIC HYPERPLASIA: Status: ACTIVE | Noted: 2018-02-14

## 2023-09-26 PROCEDURE — 3288F PR FALLS RISK ASSESSMENT DOCUMENTED: ICD-10-PCS | Mod: HCNC,CPTII,S$GLB, | Performed by: FAMILY MEDICINE

## 2023-09-26 PROCEDURE — 1126F AMNT PAIN NOTED NONE PRSNT: CPT | Mod: HCNC,CPTII,S$GLB, | Performed by: FAMILY MEDICINE

## 2023-09-26 PROCEDURE — 99215 OFFICE O/P EST HI 40 MIN: CPT | Mod: 25,HCNC,S$GLB, | Performed by: FAMILY MEDICINE

## 2023-09-26 PROCEDURE — 1159F PR MEDICATION LIST DOCUMENTED IN MEDICAL RECORD: ICD-10-PCS | Mod: HCNC,CPTII,S$GLB, | Performed by: FAMILY MEDICINE

## 2023-09-26 PROCEDURE — 1100F PR PT FALLS ASSESS DOC 2+ FALLS/FALL W/INJURY/YR: ICD-10-PCS | Mod: HCNC,CPTII,S$GLB, | Performed by: FAMILY MEDICINE

## 2023-09-26 PROCEDURE — 3288F FALL RISK ASSESSMENT DOCD: CPT | Mod: HCNC,CPTII,S$GLB, | Performed by: FAMILY MEDICINE

## 2023-09-26 PROCEDURE — 73030 XR SHOULDER COMPLETE 2 OR MORE VIEWS RIGHT: ICD-10-PCS | Mod: 26,HCNC,RT, | Performed by: RADIOLOGY

## 2023-09-26 PROCEDURE — 99999 PR PBB SHADOW E&M-EST. PATIENT-LVL V: ICD-10-PCS | Mod: PBBFAC,HCNC,, | Performed by: FAMILY MEDICINE

## 2023-09-26 PROCEDURE — 99999 PR PBB SHADOW E&M-EST. PATIENT-LVL V: CPT | Mod: PBBFAC,HCNC,, | Performed by: FAMILY MEDICINE

## 2023-09-26 PROCEDURE — 90694 VACC AIIV4 NO PRSRV 0.5ML IM: CPT | Mod: HCNC,S$GLB,, | Performed by: FAMILY MEDICINE

## 2023-09-26 PROCEDURE — 3075F SYST BP GE 130 - 139MM HG: CPT | Mod: HCNC,CPTII,S$GLB, | Performed by: FAMILY MEDICINE

## 2023-09-26 PROCEDURE — 73030 X-RAY EXAM OF SHOULDER: CPT | Mod: TC,HCNC,RT

## 2023-09-26 PROCEDURE — 3078F PR MOST RECENT DIASTOLIC BLOOD PRESSURE < 80 MM HG: ICD-10-PCS | Mod: HCNC,CPTII,S$GLB, | Performed by: FAMILY MEDICINE

## 2023-09-26 PROCEDURE — 73030 X-RAY EXAM OF SHOULDER: CPT | Mod: 26,HCNC,RT, | Performed by: RADIOLOGY

## 2023-09-26 PROCEDURE — G0008 ADMIN INFLUENZA VIRUS VAC: HCPCS | Mod: HCNC,S$GLB,, | Performed by: FAMILY MEDICINE

## 2023-09-26 PROCEDURE — 3078F DIAST BP <80 MM HG: CPT | Mod: HCNC,CPTII,S$GLB, | Performed by: FAMILY MEDICINE

## 2023-09-26 PROCEDURE — 99215 PR OFFICE/OUTPT VISIT, EST, LEVL V, 40-54 MIN: ICD-10-PCS | Mod: 25,HCNC,S$GLB, | Performed by: FAMILY MEDICINE

## 2023-09-26 PROCEDURE — 3075F PR MOST RECENT SYSTOLIC BLOOD PRESS GE 130-139MM HG: ICD-10-PCS | Mod: HCNC,CPTII,S$GLB, | Performed by: FAMILY MEDICINE

## 2023-09-26 PROCEDURE — G0008 FLU VACCINE - QUADRIVALENT - ADJUVANTED: ICD-10-PCS | Mod: HCNC,S$GLB,, | Performed by: FAMILY MEDICINE

## 2023-09-26 PROCEDURE — 1126F PR PAIN SEVERITY QUANTIFIED, NO PAIN PRESENT: ICD-10-PCS | Mod: HCNC,CPTII,S$GLB, | Performed by: FAMILY MEDICINE

## 2023-09-26 PROCEDURE — 1100F PTFALLS ASSESS-DOCD GE2>/YR: CPT | Mod: HCNC,CPTII,S$GLB, | Performed by: FAMILY MEDICINE

## 2023-09-26 PROCEDURE — 1159F MED LIST DOCD IN RCRD: CPT | Mod: HCNC,CPTII,S$GLB, | Performed by: FAMILY MEDICINE

## 2023-09-26 PROCEDURE — 90694 FLU VACCINE - QUADRIVALENT - ADJUVANTED: ICD-10-PCS | Mod: HCNC,S$GLB,, | Performed by: FAMILY MEDICINE

## 2023-09-26 NOTE — PROGRESS NOTES
"Subjective:   Patient ID: Rocael Perez is a 83 y.o. male.  Chief Complaint:  Establish Care and Shoulder Pain    Presents to establish care  Main complaint is chronic right shoulder pain     Overall poor historian   Medical history obtained from chart shows:   - CHF with chronic kidney disease stage IIIA.  No diagnosis of hypertension.  On Toprol-XL 25 mg daily, Zaroxolyn 2.5 mg daily, Lasix 40 mg daily, and potassium supplementation..  Reports compliance.  Denies side effects.  Denies any current shortness of breath swelling.  - Familial hypercholesteremia with known coronary artery disease, aortic atherosclerosis ptosis, status post angioplasty but refusal of statin medication/treatment.  On aspirin 81 mg daily.  Denies any current chest pain or claudication.  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  Stable.  Asymptomatic.  - HIV positive.  On Biktarvy.  Followed by Infectious Disease.    - CLL and thrombocytopenia.  Followed by Hematology/Oncology.  - Osteoarthritis left knee.  Was told needed surgery.  Due to concerns about ability to care for self following procedure inability to rehab following procedure, surgery deferred    Regarding chronic right shoulder pain, states significantly increased in severity   Topical Voltaren no help  Had previous rotator cuff surgery repair   In addition to pain has had significant decrease in range of motion, mobility, and overall function    Health maintenance needs include shingles vaccine, COVID vaccine, and flu vaccine      Review of Systems   Musculoskeletal:  Positive for arthralgias, gait problem, joint swelling and myalgias.   Neurological:  Positive for weakness.       Objective:   /76 (BP Location: Left arm, Patient Position: Sitting, BP Method: Large (Manual))   Pulse 73   Temp 97.7 °F (36.5 °C) (Tympanic)   Ht 5' 9" (1.753 m)   Wt 89.4 kg (197 lb 1.5 oz)   SpO2 96%   BMI 29.11 kg/m²     Physical Exam  Vitals and nursing " note reviewed.   Constitutional:       Appearance: Normal appearance. He is well-developed and overweight.      Comments:   Examined in wheelchair   Cardiovascular:      Rate and Rhythm: Normal rate and regular rhythm.   Pulmonary:      Effort: Pulmonary effort is normal.   Musculoskeletal:      Right shoulder: Bony tenderness (AC joint) and crepitus present. No deformity. Decreased range of motion (Significant both active and passive). Decreased strength.      Right lower leg: No edema.      Left lower leg: No edema.   Skin:     Findings: No rash.   Neurological:      Motor: Weakness present. No tremor.      Gait: Gait abnormal.   Psychiatric:         Attention and Perception: He is inattentive. He does not perceive auditory or visual hallucinations.         Mood and Affect: Mood and affect normal.         Speech: Speech normal.         Behavior: Behavior is cooperative.         Thought Content: Thought content is not paranoid or delusional.         Cognition and Memory: Cognition is impaired. Memory is impaired.       Assessment:       ICD-10-CM ICD-9-CM   1. Chronic right shoulder pain  M25.511 719.41    G89.29 338.29   2. Primary osteoarthritis of left knee  M17.12 715.16   3. Need for influenza vaccination  Z23 V04.81   4. Chronic combined systolic and diastolic congestive heart failure  I50.42 428.42     428.0   5. Chronic kidney disease, stage 3a  N18.31 585.3   6. Familial hypercholesterolemia  E78.01 272.0   7. Atherosclerosis of native coronary artery of native heart without angina pectoris  I25.10 414.01   8. Atherosclerosis of aorta  I70.0 440.0   9. Presence of coronary angioplasty implant and graft  Z95.5 V45.82   10. Statin medication declined by patient  Z53.20 V64.2   11. Nonrheumatic aortic valve stenosis  I35.0 424.1   12. Pulmonary heart disease  I27.9 416.9   13. HIV positive  Z21 V08   14. CLL (chronic lymphocytic leukemia)  C91.10 204.10   15. Thrombocytopenia  D69.6 287.5     Plan:   Chronic  right shoulder pain  -     X-ray Shoulder 2 or More Views Right; Future; Expected date: 09/26/2023  -     BATH/SHOWER CHAIR FOR HOME USE  Continue topical Voltaren   Check x-ray  Referral to orthopedist for injection   Home health consult for PT/OT     Primary osteoarthritis of left knee  -     BATH/SHOWER CHAIR FOR HOME USE    Need for influenza vaccination  -     Influenza - Quadrivalent (Adjuvanted)    Chronic combined systolic and diastolic congestive heart failure  Chronic kidney disease, stage 3a  Stable.  Asymptomatic.    Continue current medications.    Follow-up cardiology as scheduled     Familial hypercholesterolemia  Atherosclerosis of native coronary artery of native heart without angina pectoris  Atherosclerosis of aorta  Presence of coronary angioplasty implant and graft  Statin medication declined by patient  Stable.  Asymptomatic.    Continues to decline statin.    Continue aspirin 81 mg daily.    Follow-up cardiology as scheduled.      Nonrheumatic aortic valve stenosis  Pulmonary heart disease  Stable.  Asymptomatic.    Follow-up cardiology as scheduled     HIV positive  Continue per Infectious Disease     CLL (chronic lymphocytic leukemia)  Thrombocytopenia  Continue per Hematology/Oncology     Return to clinic as needed    60+ minutes of total time spent on the encounter, which includes face to face time and non-face to face time preparing to see the patient (eg, review of tests), Obtaining and/or reviewing separately obtained history, documenting clinical information in the electronic or other health record, independently interpreting results (not separately reported) and communicating results to the patient/family/caregiver, or Care coordination (not separately reported).

## 2023-10-13 ENCOUNTER — LAB VISIT (OUTPATIENT)
Dept: LAB | Facility: HOSPITAL | Age: 83
End: 2023-10-13
Attending: INTERNAL MEDICINE
Payer: MEDICARE

## 2023-10-13 ENCOUNTER — OFFICE VISIT (OUTPATIENT)
Dept: HEMATOLOGY/ONCOLOGY | Facility: CLINIC | Age: 83
End: 2023-10-13
Payer: MEDICARE

## 2023-10-13 VITALS
OXYGEN SATURATION: 98 % | TEMPERATURE: 99 F | HEART RATE: 79 BPM | RESPIRATION RATE: 20 BRPM | DIASTOLIC BLOOD PRESSURE: 80 MMHG | SYSTOLIC BLOOD PRESSURE: 127 MMHG | BODY MASS INDEX: 28.63 KG/M2 | HEIGHT: 69 IN | WEIGHT: 193.31 LBS

## 2023-10-13 DIAGNOSIS — C91.10 CLL (CHRONIC LYMPHOCYTIC LEUKEMIA): Primary | ICD-10-CM

## 2023-10-13 DIAGNOSIS — Z21 HIV POSITIVE: ICD-10-CM

## 2023-10-13 DIAGNOSIS — D47.2 MGUS (MONOCLONAL GAMMOPATHY OF UNKNOWN SIGNIFICANCE): ICD-10-CM

## 2023-10-13 DIAGNOSIS — D69.6 THROMBOCYTOPENIA: ICD-10-CM

## 2023-10-13 DIAGNOSIS — C91.10 CLL (CHRONIC LYMPHOCYTIC LEUKEMIA): ICD-10-CM

## 2023-10-13 LAB
ALBUMIN SERPL BCP-MCNC: 3.4 G/DL (ref 3.5–5.2)
ALP SERPL-CCNC: 74 U/L (ref 55–135)
ALT SERPL W/O P-5'-P-CCNC: 7 U/L (ref 10–44)
ANION GAP SERPL CALC-SCNC: 12 MMOL/L (ref 8–16)
AST SERPL-CCNC: 18 U/L (ref 10–40)
BASOPHILS # BLD AUTO: 0.03 K/UL (ref 0–0.2)
BASOPHILS NFR BLD: 0.8 % (ref 0–1.9)
BILIRUB SERPL-MCNC: 0.5 MG/DL (ref 0.1–1)
BUN SERPL-MCNC: 18 MG/DL (ref 8–23)
CALCIUM SERPL-MCNC: 9.3 MG/DL (ref 8.7–10.5)
CHLORIDE SERPL-SCNC: 103 MMOL/L (ref 95–110)
CO2 SERPL-SCNC: 21 MMOL/L (ref 23–29)
CREAT SERPL-MCNC: 1.5 MG/DL (ref 0.5–1.4)
DIFFERENTIAL METHOD: ABNORMAL
EOSINOPHIL # BLD AUTO: 0.1 K/UL (ref 0–0.5)
EOSINOPHIL NFR BLD: 2.3 % (ref 0–8)
ERYTHROCYTE [DISTWIDTH] IN BLOOD BY AUTOMATED COUNT: 14.4 % (ref 11.5–14.5)
EST. GFR  (NO RACE VARIABLE): 46 ML/MIN/1.73 M^2
GLUCOSE SERPL-MCNC: 123 MG/DL (ref 70–110)
HCT VFR BLD AUTO: 35.6 % (ref 40–54)
HGB BLD-MCNC: 12 G/DL (ref 14–18)
IMM GRANULOCYTES # BLD AUTO: 0.01 K/UL (ref 0–0.04)
IMM GRANULOCYTES NFR BLD AUTO: 0.3 % (ref 0–0.5)
LDH SERPL L TO P-CCNC: 190 U/L (ref 110–260)
LYMPHOCYTES # BLD AUTO: 1.9 K/UL (ref 1–4.8)
LYMPHOCYTES NFR BLD: 48.6 % (ref 18–48)
MCH RBC QN AUTO: 31 PG (ref 27–31)
MCHC RBC AUTO-ENTMCNC: 33.7 G/DL (ref 32–36)
MCV RBC AUTO: 92 FL (ref 82–98)
MONOCYTES # BLD AUTO: 0.6 K/UL (ref 0.3–1)
MONOCYTES NFR BLD: 14.7 % (ref 4–15)
NEUTROPHILS # BLD AUTO: 1.3 K/UL (ref 1.8–7.7)
NEUTROPHILS NFR BLD: 33.3 % (ref 38–73)
NRBC BLD-RTO: 0 /100 WBC
PLATELET # BLD AUTO: 198 K/UL (ref 150–450)
PMV BLD AUTO: 11.1 FL (ref 9.2–12.9)
POTASSIUM SERPL-SCNC: 3.7 MMOL/L (ref 3.5–5.1)
PROT SERPL-MCNC: 8.1 G/DL (ref 6–8.4)
RBC # BLD AUTO: 3.87 M/UL (ref 4.6–6.2)
SODIUM SERPL-SCNC: 136 MMOL/L (ref 136–145)
WBC # BLD AUTO: 3.95 K/UL (ref 3.9–12.7)

## 2023-10-13 PROCEDURE — 1101F PR PT FALLS ASSESS DOC 0-1 FALLS W/OUT INJ PAST YR: ICD-10-PCS | Mod: HCNC,CPTII,S$GLB, | Performed by: INTERNAL MEDICINE

## 2023-10-13 PROCEDURE — 1101F PT FALLS ASSESS-DOCD LE1/YR: CPT | Mod: HCNC,CPTII,S$GLB, | Performed by: INTERNAL MEDICINE

## 2023-10-13 PROCEDURE — 3074F SYST BP LT 130 MM HG: CPT | Mod: HCNC,CPTII,S$GLB, | Performed by: INTERNAL MEDICINE

## 2023-10-13 PROCEDURE — 3079F DIAST BP 80-89 MM HG: CPT | Mod: HCNC,CPTII,S$GLB, | Performed by: INTERNAL MEDICINE

## 2023-10-13 PROCEDURE — 85025 COMPLETE CBC W/AUTO DIFF WBC: CPT | Mod: HCNC | Performed by: INTERNAL MEDICINE

## 2023-10-13 PROCEDURE — 1160F PR REVIEW ALL MEDS BY PRESCRIBER/CLIN PHARMACIST DOCUMENTED: ICD-10-PCS | Mod: HCNC,CPTII,S$GLB, | Performed by: INTERNAL MEDICINE

## 2023-10-13 PROCEDURE — 3074F PR MOST RECENT SYSTOLIC BLOOD PRESSURE < 130 MM HG: ICD-10-PCS | Mod: HCNC,CPTII,S$GLB, | Performed by: INTERNAL MEDICINE

## 2023-10-13 PROCEDURE — 3288F PR FALLS RISK ASSESSMENT DOCUMENTED: ICD-10-PCS | Mod: HCNC,CPTII,S$GLB, | Performed by: INTERNAL MEDICINE

## 2023-10-13 PROCEDURE — 99999 PR PBB SHADOW E&M-EST. PATIENT-LVL III: ICD-10-PCS | Mod: PBBFAC,HCNC,, | Performed by: INTERNAL MEDICINE

## 2023-10-13 PROCEDURE — 83010 ASSAY OF HAPTOGLOBIN QUANT: CPT | Mod: HCNC | Performed by: INTERNAL MEDICINE

## 2023-10-13 PROCEDURE — 87536 HIV-1 QUANT&REVRSE TRNSCRPJ: CPT | Mod: HCNC | Performed by: INTERNAL MEDICINE

## 2023-10-13 PROCEDURE — 1159F PR MEDICATION LIST DOCUMENTED IN MEDICAL RECORD: ICD-10-PCS | Mod: HCNC,CPTII,S$GLB, | Performed by: INTERNAL MEDICINE

## 2023-10-13 PROCEDURE — 3079F PR MOST RECENT DIASTOLIC BLOOD PRESSURE 80-89 MM HG: ICD-10-PCS | Mod: HCNC,CPTII,S$GLB, | Performed by: INTERNAL MEDICINE

## 2023-10-13 PROCEDURE — 3288F FALL RISK ASSESSMENT DOCD: CPT | Mod: HCNC,CPTII,S$GLB, | Performed by: INTERNAL MEDICINE

## 2023-10-13 PROCEDURE — 99214 OFFICE O/P EST MOD 30 MIN: CPT | Mod: HCNC,S$GLB,, | Performed by: INTERNAL MEDICINE

## 2023-10-13 PROCEDURE — 1159F MED LIST DOCD IN RCRD: CPT | Mod: HCNC,CPTII,S$GLB, | Performed by: INTERNAL MEDICINE

## 2023-10-13 PROCEDURE — 80053 COMPREHEN METABOLIC PANEL: CPT | Mod: HCNC | Performed by: INTERNAL MEDICINE

## 2023-10-13 PROCEDURE — 83615 LACTATE (LD) (LDH) ENZYME: CPT | Mod: HCNC | Performed by: INTERNAL MEDICINE

## 2023-10-13 PROCEDURE — 99999 PR PBB SHADOW E&M-EST. PATIENT-LVL III: CPT | Mod: PBBFAC,HCNC,, | Performed by: INTERNAL MEDICINE

## 2023-10-13 PROCEDURE — 1126F PR PAIN SEVERITY QUANTIFIED, NO PAIN PRESENT: ICD-10-PCS | Mod: HCNC,CPTII,S$GLB, | Performed by: INTERNAL MEDICINE

## 2023-10-13 PROCEDURE — 1126F AMNT PAIN NOTED NONE PRSNT: CPT | Mod: HCNC,CPTII,S$GLB, | Performed by: INTERNAL MEDICINE

## 2023-10-13 PROCEDURE — 1160F RVW MEDS BY RX/DR IN RCRD: CPT | Mod: HCNC,CPTII,S$GLB, | Performed by: INTERNAL MEDICINE

## 2023-10-13 PROCEDURE — 99214 PR OFFICE/OUTPT VISIT, EST, LEVL IV, 30-39 MIN: ICD-10-PCS | Mod: HCNC,S$GLB,, | Performed by: INTERNAL MEDICINE

## 2023-10-13 PROCEDURE — 36415 COLL VENOUS BLD VENIPUNCTURE: CPT | Mod: HCNC | Performed by: INTERNAL MEDICINE

## 2023-10-13 NOTE — PROGRESS NOTES
"Subjective:       Patient ID: Rocael Perez is a 83 y.o. male.    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  Patient has HIV control returns for clinical follow-up ECOG status 2    Past Medical History:   Diagnosis Date    CHF (congestive heart failure)     CLL (chronic lymphocytic leukemia)     HIV (human immunodeficiency virus infection)     Hyperlipidemia     MGUS (monoclonal gammopathy of unknown significance)      Family History   Problem Relation Age of Onset    Diabetes Mother      Social History     Socioeconomic History    Marital status: Single   Tobacco Use    Smoking status: Former     Types: Cigarettes    Smokeless tobacco: Never   Substance and Sexual Activity    Alcohol use: Not Currently    Drug use: Never    Sexual activity: Not Currently     Past Surgical History:   Procedure Laterality Date    HERNIA REPAIR      SHOULDER SURGERY Right        Labs:  Lab Results   Component Value Date    WBC 3.95 10/13/2023    HGB 12.0 (L) 10/13/2023    HCT 35.6 (L) 10/13/2023    MCV 92 10/13/2023     10/13/2023     BMP  Lab Results   Component Value Date     (L) 04/13/2023    K 4.2 04/13/2023     04/13/2023    CO2 23 04/13/2023    BUN 19 04/13/2023    CREATININE 1.6 (H) 04/13/2023    CALCIUM 9.6 04/13/2023    ANIONGAP 10 04/13/2023    ESTGFRAFRICA 49.4 (A) 06/27/2022    EGFRNONAA 42.7 (A) 06/27/2022     Lab Results   Component Value Date    ALT 8 (L) 04/13/2023    AST 20 04/13/2023    ALKPHOS 86 04/13/2023    BILITOT 0.4 04/13/2023       Lab Results   Component Value Date    IRON 64 10/01/2020    TIBC 320 10/01/2020    FERRITIN 485 (H) 10/01/2020     Lab Results   Component Value Date    IJXHCURX92 573 10/01/2020     No results found for: "FOLATE"  Lab Results   Component Value Date    TSH 2.074 09/01/2020         Review of Systems   Constitutional:  " Positive for fatigue. Negative for activity change, appetite change, chills, diaphoresis, fever and unexpected weight change.   HENT:  Negative for congestion, dental problem, drooling, ear discharge, ear pain, facial swelling, hearing loss, mouth sores, nosebleeds, postnasal drip, rhinorrhea, sinus pressure, sneezing, sore throat, tinnitus, trouble swallowing and voice change.    Eyes:  Negative for photophobia, pain, discharge, redness, itching and visual disturbance.   Respiratory:  Negative for apnea, cough, choking, chest tightness, shortness of breath, wheezing and stridor.    Cardiovascular:  Negative for chest pain, palpitations and leg swelling.   Gastrointestinal:  Negative for abdominal distention, abdominal pain, anal bleeding, blood in stool, constipation, diarrhea, nausea, rectal pain and vomiting.   Endocrine: Negative for cold intolerance, heat intolerance, polydipsia, polyphagia and polyuria.   Genitourinary:  Negative for decreased urine volume, difficulty urinating, dysuria, enuresis, flank pain, frequency, genital sores, hematuria, penile discharge, penile pain, penile swelling, scrotal swelling, testicular pain and urgency.   Musculoskeletal:  Positive for gait problem. Negative for arthralgias, back pain, joint swelling, myalgias, neck pain and neck stiffness.   Skin:  Negative for color change, pallor, rash and wound.   Allergic/Immunologic: Negative for environmental allergies, food allergies and immunocompromised state.   Neurological:  Positive for weakness. Negative for dizziness, tremors, seizures, syncope, facial asymmetry, speech difficulty, light-headedness, numbness and headaches.   Hematological:  Negative for adenopathy. Does not bruise/bleed easily.   Psychiatric/Behavioral:  Negative for agitation, behavioral problems, confusion, decreased concentration, dysphoric mood, hallucinations, self-injury, sleep disturbance and suicidal ideas. The patient is not nervous/anxious and is not  hyperactive.        Objective:      Physical Exam  Vitals reviewed.   Constitutional:       General: He is not in acute distress.     Appearance: He is well-developed. He is not diaphoretic.   HENT:      Head: Normocephalic.      Right Ear: External ear normal.      Left Ear: External ear normal.      Nose: Nose normal.      Right Sinus: No maxillary sinus tenderness or frontal sinus tenderness.      Left Sinus: No maxillary sinus tenderness or frontal sinus tenderness.      Mouth/Throat:      Pharynx: No oropharyngeal exudate.   Eyes:      General: Lids are normal. No scleral icterus.        Right eye: No discharge.         Left eye: No discharge.      Extraocular Movements:      Right eye: Normal extraocular motion.      Left eye: Normal extraocular motion.      Conjunctiva/sclera:      Right eye: Right conjunctiva is not injected. No hemorrhage.     Left eye: Left conjunctiva is not injected. No hemorrhage.     Pupils: Pupils are equal, round, and reactive to light.   Neck:      Thyroid: No thyromegaly.      Vascular: No JVD.      Trachea: No tracheal deviation.   Cardiovascular:      Rate and Rhythm: Normal rate.   Pulmonary:      Effort: Pulmonary effort is normal. No respiratory distress.      Breath sounds: No stridor.   Abdominal:      General: Bowel sounds are normal.      Palpations: Abdomen is soft. There is no hepatomegaly, splenomegaly or mass.      Tenderness: There is no abdominal tenderness.   Musculoskeletal:         General: No tenderness. Normal range of motion.      Cervical back: Normal range of motion and neck supple.   Lymphadenopathy:      Head:      Right side of head: No posterior auricular or occipital adenopathy.      Left side of head: No posterior auricular or occipital adenopathy.      Cervical: No cervical adenopathy.      Right cervical: No superficial, deep or posterior cervical adenopathy.     Left cervical: No superficial, deep or posterior cervical adenopathy.      Upper Body:       Right upper body: No supraclavicular adenopathy.      Left upper body: No supraclavicular adenopathy.   Skin:     General: Skin is dry.      Findings: No erythema or rash.      Nails: There is no clubbing.   Neurological:      Mental Status: He is alert and oriented to person, place, and time.      Cranial Nerves: No cranial nerve deficit.      Coordination: Coordination normal.      Gait: Gait abnormal.   Psychiatric:         Behavior: Behavior normal.         Thought Content: Thought content normal.         Judgment: Judgment normal.             Assessment:      1. CLL (chronic lymphocytic leukemia)    2. HIV positive    3. Thrombocytopenia    4. MGUS (monoclonal gammopathy of unknown significance)           Med Onc Chart Routing      Follow up with physician    Follow up with [facility] . Can be seen in 6 months with CBC CMP LDH haptoglobin along with SPEP free light chain HIV RNA   Infusion scheduling note    Injection scheduling note    Labs    Imaging    Pharmacy appointment    Other referrals                   Plan:     Patient is doing well continue with current treatment plan and course of action.  Followed by Infectious Disease for stable HIV disease laboratory studies as detailed above fever precautions reviewed vaccinations discussed for fall of 2023 including flu, RSV, and COVID booster        Edin Li Jr, MD FACP

## 2023-10-14 LAB — HAPTOGLOB SERPL-MCNC: 179 MG/DL (ref 30–250)

## 2023-10-16 LAB
HIV1 RNA # SERPL NAA+PROBE: NOT DETECTED COPIES/ML
HIV1 RNA SERPL QL NAA+PROBE: NOT DETECTED

## 2023-10-23 ENCOUNTER — TELEPHONE (OUTPATIENT)
Dept: INFECTIOUS DISEASES | Facility: CLINIC | Age: 83
End: 2023-10-23
Payer: MEDICARE

## 2023-10-24 ENCOUNTER — OFFICE VISIT (OUTPATIENT)
Dept: INFECTIOUS DISEASES | Facility: CLINIC | Age: 83
End: 2023-10-24
Payer: MEDICARE

## 2023-10-24 VITALS
TEMPERATURE: 99 F | SYSTOLIC BLOOD PRESSURE: 113 MMHG | BODY MASS INDEX: 28.63 KG/M2 | WEIGHT: 193.31 LBS | HEIGHT: 69 IN | DIASTOLIC BLOOD PRESSURE: 69 MMHG | RESPIRATION RATE: 18 BRPM | HEART RATE: 62 BPM

## 2023-10-24 DIAGNOSIS — C91.10 CLL (CHRONIC LYMPHOCYTIC LEUKEMIA): ICD-10-CM

## 2023-10-24 DIAGNOSIS — Z21 HIV POSITIVE: ICD-10-CM

## 2023-10-24 PROCEDURE — 3074F PR MOST RECENT SYSTOLIC BLOOD PRESSURE < 130 MM HG: ICD-10-PCS | Mod: HCNC,CPTII,S$GLB, | Performed by: INTERNAL MEDICINE

## 2023-10-24 PROCEDURE — 3074F SYST BP LT 130 MM HG: CPT | Mod: HCNC,CPTII,S$GLB, | Performed by: INTERNAL MEDICINE

## 2023-10-24 PROCEDURE — 99214 PR OFFICE/OUTPT VISIT, EST, LEVL IV, 30-39 MIN: ICD-10-PCS | Mod: HCNC,S$GLB,, | Performed by: INTERNAL MEDICINE

## 2023-10-24 PROCEDURE — 99214 OFFICE O/P EST MOD 30 MIN: CPT | Mod: HCNC,S$GLB,, | Performed by: INTERNAL MEDICINE

## 2023-10-24 PROCEDURE — 1101F PT FALLS ASSESS-DOCD LE1/YR: CPT | Mod: HCNC,CPTII,S$GLB, | Performed by: INTERNAL MEDICINE

## 2023-10-24 PROCEDURE — 1159F MED LIST DOCD IN RCRD: CPT | Mod: HCNC,CPTII,S$GLB, | Performed by: INTERNAL MEDICINE

## 2023-10-24 PROCEDURE — 1126F AMNT PAIN NOTED NONE PRSNT: CPT | Mod: HCNC,CPTII,S$GLB, | Performed by: INTERNAL MEDICINE

## 2023-10-24 PROCEDURE — 1159F PR MEDICATION LIST DOCUMENTED IN MEDICAL RECORD: ICD-10-PCS | Mod: HCNC,CPTII,S$GLB, | Performed by: INTERNAL MEDICINE

## 2023-10-24 PROCEDURE — 3078F DIAST BP <80 MM HG: CPT | Mod: HCNC,CPTII,S$GLB, | Performed by: INTERNAL MEDICINE

## 2023-10-24 PROCEDURE — 3078F PR MOST RECENT DIASTOLIC BLOOD PRESSURE < 80 MM HG: ICD-10-PCS | Mod: HCNC,CPTII,S$GLB, | Performed by: INTERNAL MEDICINE

## 2023-10-24 PROCEDURE — 3288F PR FALLS RISK ASSESSMENT DOCUMENTED: ICD-10-PCS | Mod: HCNC,CPTII,S$GLB, | Performed by: INTERNAL MEDICINE

## 2023-10-24 PROCEDURE — 99999 PR PBB SHADOW E&M-EST. PATIENT-LVL III: CPT | Mod: PBBFAC,HCNC,, | Performed by: INTERNAL MEDICINE

## 2023-10-24 PROCEDURE — 1101F PR PT FALLS ASSESS DOC 0-1 FALLS W/OUT INJ PAST YR: ICD-10-PCS | Mod: HCNC,CPTII,S$GLB, | Performed by: INTERNAL MEDICINE

## 2023-10-24 PROCEDURE — 99999 PR PBB SHADOW E&M-EST. PATIENT-LVL III: ICD-10-PCS | Mod: PBBFAC,HCNC,, | Performed by: INTERNAL MEDICINE

## 2023-10-24 PROCEDURE — 1126F PR PAIN SEVERITY QUANTIFIED, NO PAIN PRESENT: ICD-10-PCS | Mod: HCNC,CPTII,S$GLB, | Performed by: INTERNAL MEDICINE

## 2023-10-24 PROCEDURE — 3288F FALL RISK ASSESSMENT DOCD: CPT | Mod: HCNC,CPTII,S$GLB, | Performed by: INTERNAL MEDICINE

## 2023-10-24 NOTE — PROGRESS NOTES
HIV Follow-up Visit  Rocael Perez is here for follow-up of HIV infection. He is feeling better since his last visit.       Patient is compliant with HIV medications.  He is on Biktarvy  Labs- 10/11/23- HIV undetected  Patient does not have side effects with the HIV medications.none    The following portions of the patient's history were reviewed and updated as appropriate: allergies, current medications, past family history, past medical history, past social history, past surgical history, and problem list.    Review of Systems   Constitutional:  Negative for chills, fever and weight loss.   Cardiovascular:  Negative for chest pain and palpitations.          Physical Exam  Vitals and nursing note reviewed.   HENT:      Head: Normocephalic.   Musculoskeletal:      Comments: On a wheelchair    Neurological:      Mental Status: Mental status is at baseline.          Immunization History   Administered Date(s) Administered    COVID-19, MRNA, LN-S, PF (Pfizer) (Purple Cap) 02/24/2021, 03/20/2021, 09/28/2021    COVID-19, mRNA, LNP-S, PF (Moderna 2023)Ages 12+ 10/13/2023    COVID-19, mRNA, LNP-S, bivalent booster, PF (PFIZER OMICRON) 10/14/2022    Influenza (FLUAD) - Quadrivalent - Adjuvanted - PF *Preferred* (65+) 10/26/2022, 09/26/2023    Influenza - High Dose - PF (65 years and older) 11/07/2015, 08/09/2017    Influenza - Quadrivalent 08/09/2017    Influenza - Quadrivalent - High Dose - PF (65 years and older) 11/20/2020    Influenza - Quadrivalent - PF *Preferred* (6 months and older) 09/18/2019    Influenza - Trivalent - PF (ADULT) 10/16/2018    Pneumococcal Conjugate - 13 Valent 08/09/2017    Pneumococcal Polysaccharide - 23 Valent 06/25/2015, 10/16/2018    Tdap 09/18/2019    Zoster Recombinant 07/05/2022             Assessment:    1. HIV positive        2. CLL (chronic lymphocytic leukemia)              Plan:    Problem List Items Addressed This Visit       CLL (chronic lymphocytic leukemia)     Oncology follow  up         HIV positive     Will continue Biktarvy - will continue regime           The patient has been counseled regarding the importance of compliance with every dose of HIV medications. Possible side effects have been reviewed and the patients questions have been answered.

## 2023-11-10 ENCOUNTER — TELEPHONE (OUTPATIENT)
Dept: INTERNAL MEDICINE | Facility: CLINIC | Age: 83
End: 2023-11-10
Payer: MEDICARE

## 2023-11-10 DIAGNOSIS — C91.10 CLL (CHRONIC LYMPHOCYTIC LEUKEMIA): ICD-10-CM

## 2023-11-10 DIAGNOSIS — M17.12 PRIMARY OSTEOARTHRITIS OF LEFT KNEE: ICD-10-CM

## 2023-11-10 DIAGNOSIS — G89.29 CHRONIC RIGHT SHOULDER PAIN: Primary | ICD-10-CM

## 2023-11-10 DIAGNOSIS — M25.511 CHRONIC RIGHT SHOULDER PAIN: Primary | ICD-10-CM

## 2023-11-10 NOTE — TELEPHONE ENCOUNTER
Spoke with Kitty ( nurse) MA asked if patient needed new referral to be able to be seen by HH; Kitty stated PCP would have to order new referral /LD

## 2023-11-10 NOTE — TELEPHONE ENCOUNTER
----- Message from Cassandra Young sent at 11/10/2023 10:49 AM CST -----  Contact: [facility]  ..Type:  Patient Returning Call    Who Called:Kitty   Who Left Message for Patient:  Does the patient know what this is regarding?:referral   Would the patient rather a call back or a response via [facility]? Call back   Best Call Back Number:.[phone]  Additional Information: Kitty states she missed a call

## 2023-11-17 NOTE — TELEPHONE ENCOUNTER
Spoke with Richelle at [facility] and informed her that Dr. Ferguson placed a new referral for patient and that I just faxed it over. She verbalized understanding.

## 2024-04-03 ENCOUNTER — LAB VISIT (OUTPATIENT)
Dept: LAB | Facility: HOSPITAL | Age: 84
End: 2024-04-03
Payer: MEDICARE

## 2024-04-03 DIAGNOSIS — N18.31 CHRONIC KIDNEY DISEASE, STAGE 3A: ICD-10-CM

## 2024-04-03 DIAGNOSIS — Z21 HIV POSITIVE: ICD-10-CM

## 2024-04-03 DIAGNOSIS — D69.6 THROMBOCYTOPENIA: ICD-10-CM

## 2024-04-03 DIAGNOSIS — C91.10 CLL (CHRONIC LYMPHOCYTIC LEUKEMIA): ICD-10-CM

## 2024-04-03 DIAGNOSIS — D47.2 MGUS (MONOCLONAL GAMMOPATHY OF UNKNOWN SIGNIFICANCE): ICD-10-CM

## 2024-04-03 LAB
HAPTOGLOB SERPL-MCNC: 172 MG/DL (ref 30–250)
LDH SERPL L TO P-CCNC: 245 U/L (ref 110–260)

## 2024-04-03 PROCEDURE — 83521 IG LIGHT CHAINS FREE EACH: CPT | Mod: 59,HCNC | Performed by: INTERNAL MEDICINE

## 2024-04-03 PROCEDURE — 83615 LACTATE (LD) (LDH) ENZYME: CPT | Mod: HCNC

## 2024-04-03 PROCEDURE — 36415 COLL VENOUS BLD VENIPUNCTURE: CPT | Mod: HCNC | Performed by: INTERNAL MEDICINE

## 2024-04-03 PROCEDURE — 84165 PROTEIN E-PHORESIS SERUM: CPT | Mod: HCNC | Performed by: INTERNAL MEDICINE

## 2024-04-03 PROCEDURE — 84165 PROTEIN E-PHORESIS SERUM: CPT | Mod: 26,HCNC,, | Performed by: PATHOLOGY

## 2024-04-03 PROCEDURE — 83010 ASSAY OF HAPTOGLOBIN QUANT: CPT | Mod: HCNC

## 2024-04-03 PROCEDURE — 87536 HIV-1 QUANT&REVRSE TRNSCRPJ: CPT | Mod: HCNC | Performed by: INTERNAL MEDICINE

## 2024-04-04 LAB
ALBUMIN SERPL ELPH-MCNC: 4.07 G/DL (ref 3.35–5.55)
ALPHA1 GLOB SERPL ELPH-MCNC: 0.25 G/DL (ref 0.17–0.41)
ALPHA2 GLOB SERPL ELPH-MCNC: 0.65 G/DL (ref 0.43–0.99)
B-GLOBULIN SERPL ELPH-MCNC: 1.35 G/DL (ref 0.5–1.1)
GAMMA GLOB SERPL ELPH-MCNC: 1.39 G/DL (ref 0.67–1.58)
HIV1 RNA # SERPL NAA+PROBE: <20 COPIES/ML
HIV1 RNA SERPL NAA+PROBE-LOG#: <1.3 LOG COPIES/ML
HIV1 RNA SERPL QL NAA+PROBE: DETECTED
KAPPA LC SER QL IA: 6.57 MG/DL (ref 0.33–1.94)
KAPPA LC/LAMBDA SER IA: 1.54 (ref 0.26–1.65)
LAMBDA LC SER QL IA: 4.26 MG/DL (ref 0.57–2.63)
PROT SERPL-MCNC: 7.7 G/DL (ref 6–8.4)

## 2024-04-08 PROBLEM — D47.2 MGUS (MONOCLONAL GAMMOPATHY OF UNKNOWN SIGNIFICANCE): Status: ACTIVE | Noted: 2024-04-08

## 2024-04-08 LAB — PATHOLOGIST INTERPRETATION SPE: NORMAL

## 2024-04-10 ENCOUNTER — TELEPHONE (OUTPATIENT)
Dept: HEMATOLOGY/ONCOLOGY | Facility: CLINIC | Age: 84
End: 2024-04-10
Payer: MEDICARE

## 2024-04-10 NOTE — TELEPHONE ENCOUNTER
----- Message from Haylee Garcia sent at 4/10/2024 10:31 AM CDT -----  Contact: Debora/sister  Debora/sister would like a call back at [phone] in regards to patient not going to be able to make it to appointment due to the weather and they would like to reschedule.  Thanks   Am

## 2024-04-15 ENCOUNTER — OFFICE VISIT (OUTPATIENT)
Dept: HEMATOLOGY/ONCOLOGY | Facility: CLINIC | Age: 84
End: 2024-04-15
Payer: MEDICARE

## 2024-04-15 ENCOUNTER — LAB VISIT (OUTPATIENT)
Dept: LAB | Facility: HOSPITAL | Age: 84
End: 2024-04-15
Attending: NURSE PRACTITIONER
Payer: MEDICARE

## 2024-04-15 VITALS
DIASTOLIC BLOOD PRESSURE: 72 MMHG | HEART RATE: 66 BPM | SYSTOLIC BLOOD PRESSURE: 131 MMHG | WEIGHT: 193.81 LBS | HEIGHT: 69 IN | BODY MASS INDEX: 28.71 KG/M2 | TEMPERATURE: 97 F

## 2024-04-15 DIAGNOSIS — C91.10 CLL (CHRONIC LYMPHOCYTIC LEUKEMIA): ICD-10-CM

## 2024-04-15 DIAGNOSIS — D47.2 MGUS (MONOCLONAL GAMMOPATHY OF UNKNOWN SIGNIFICANCE): ICD-10-CM

## 2024-04-15 DIAGNOSIS — C91.10 CLL (CHRONIC LYMPHOCYTIC LEUKEMIA): Primary | ICD-10-CM

## 2024-04-15 LAB
ALBUMIN SERPL BCP-MCNC: 3.5 G/DL (ref 3.5–5.2)
ALP SERPL-CCNC: 81 U/L (ref 55–135)
ALT SERPL W/O P-5'-P-CCNC: 11 U/L (ref 10–44)
ANION GAP SERPL CALC-SCNC: 7 MMOL/L (ref 8–16)
AST SERPL-CCNC: 17 U/L (ref 10–40)
BILIRUB SERPL-MCNC: 0.4 MG/DL (ref 0.1–1)
BUN SERPL-MCNC: 21 MG/DL (ref 8–23)
CALCIUM SERPL-MCNC: 10 MG/DL (ref 8.7–10.5)
CHLORIDE SERPL-SCNC: 104 MMOL/L (ref 95–110)
CO2 SERPL-SCNC: 26 MMOL/L (ref 23–29)
CREAT SERPL-MCNC: 1.8 MG/DL (ref 0.5–1.4)
ERYTHROCYTE [DISTWIDTH] IN BLOOD BY AUTOMATED COUNT: 14.3 % (ref 11.5–14.5)
EST. GFR  (NO RACE VARIABLE): 37 ML/MIN/1.73 M^2
GLUCOSE SERPL-MCNC: 93 MG/DL (ref 70–110)
HCT VFR BLD AUTO: 36.4 % (ref 40–54)
HGB BLD-MCNC: 12.1 G/DL (ref 14–18)
IMM GRANULOCYTES # BLD AUTO: 0 K/UL (ref 0–0.04)
MCH RBC QN AUTO: 30.4 PG (ref 27–31)
MCHC RBC AUTO-ENTMCNC: 33.2 G/DL (ref 32–36)
MCV RBC AUTO: 92 FL (ref 82–98)
NEUTROPHILS # BLD AUTO: 1.9 K/UL (ref 1.8–7.7)
PLATELET # BLD AUTO: 199 K/UL (ref 150–450)
PMV BLD AUTO: 11.4 FL (ref 9.2–12.9)
POTASSIUM SERPL-SCNC: 4.4 MMOL/L (ref 3.5–5.1)
PROT SERPL-MCNC: 8.1 G/DL (ref 6–8.4)
RBC # BLD AUTO: 3.98 M/UL (ref 4.6–6.2)
SODIUM SERPL-SCNC: 137 MMOL/L (ref 136–145)
WBC # BLD AUTO: 4.29 K/UL (ref 3.9–12.7)

## 2024-04-15 PROCEDURE — 36415 COLL VENOUS BLD VENIPUNCTURE: CPT | Mod: HCNC | Performed by: NURSE PRACTITIONER

## 2024-04-15 PROCEDURE — 1126F AMNT PAIN NOTED NONE PRSNT: CPT | Mod: HCNC,CPTII,S$GLB, | Performed by: NURSE PRACTITIONER

## 2024-04-15 PROCEDURE — 85027 COMPLETE CBC AUTOMATED: CPT | Mod: HCNC | Performed by: NURSE PRACTITIONER

## 2024-04-15 PROCEDURE — 1101F PT FALLS ASSESS-DOCD LE1/YR: CPT | Mod: HCNC,CPTII,S$GLB, | Performed by: NURSE PRACTITIONER

## 2024-04-15 PROCEDURE — 3078F DIAST BP <80 MM HG: CPT | Mod: HCNC,CPTII,S$GLB, | Performed by: NURSE PRACTITIONER

## 2024-04-15 PROCEDURE — 99215 OFFICE O/P EST HI 40 MIN: CPT | Mod: HCNC,S$GLB,, | Performed by: NURSE PRACTITIONER

## 2024-04-15 PROCEDURE — 80053 COMPREHEN METABOLIC PANEL: CPT | Mod: HCNC | Performed by: NURSE PRACTITIONER

## 2024-04-15 PROCEDURE — 99999 PR PBB SHADOW E&M-EST. PATIENT-LVL III: CPT | Mod: PBBFAC,HCNC,, | Performed by: NURSE PRACTITIONER

## 2024-04-15 PROCEDURE — 3288F FALL RISK ASSESSMENT DOCD: CPT | Mod: HCNC,CPTII,S$GLB, | Performed by: NURSE PRACTITIONER

## 2024-04-15 PROCEDURE — 1160F RVW MEDS BY RX/DR IN RCRD: CPT | Mod: HCNC,CPTII,S$GLB, | Performed by: NURSE PRACTITIONER

## 2024-04-15 PROCEDURE — 3075F SYST BP GE 130 - 139MM HG: CPT | Mod: HCNC,CPTII,S$GLB, | Performed by: NURSE PRACTITIONER

## 2024-04-15 PROCEDURE — 1159F MED LIST DOCD IN RCRD: CPT | Mod: HCNC,CPTII,S$GLB, | Performed by: NURSE PRACTITIONER

## 2024-04-15 NOTE — PROGRESS NOTES
Subjective:       Patient ID: Rocael Perez is a 83 y.o. male.    Chief Complaint:   1. CLL (chronic lymphocytic leukemia)  Modified Perkins Stage IV (Modified Perkins risk: High, Binet: Stage A, Lymphocytosis: Present, Adenopathy: Absent, Organomegaly: Absent, Anemia: Absent, Thrombocytopenia: Present)       2. MGUS (monoclonal gammopathy of unknown significance)  IgA kappa        Current Treatment:  Observation    Observation     Treatment History:    HPI: This is an 83 year old  male with HIV, hyperlipidemia, CHF who is seen in Hem/Onc for Stage 0 CLL and IgA kappa MGUS. He is currently on Biktarvy and follows with Infectious Disease every 6 months. He is on observation for CLL and MGUS.     His primary Hematologist/Oncologist is Dr. Li.    Interval History: Patient presents for follow up on labs. He presents in a wheelchair with family and has no complaints today. His family is very vocal and upset that he is not seeing a physician. He denies fatigue, fever, night sweats, and unintentional weight loss. He reports adherence to his HIV medications daily; his family attests to this. No CBC, CMP drawn for today's visit. Will order to be drawn today in preparation for upcoming appointment with ID.   Haptoglobin and LDH WNL. SPEP with paraprotein peak in between beta-1 and beta-2 = 0.3 g/dL (unchanged from previous value). HIV qualitative RNA detectable. Patient to continue Biktarvy and follow up with ID.     Reviewed labs with patient:   CBC:   Recent Labs   Lab 10/13/23  1436   WBC 3.95   RBC 3.87 L   Hemoglobin 12.0 L   Hematocrit 35.6 L   Platelets 198   MCV 92   MCH 31.0   MCHC 33.7     CMP:  Recent Labs   Lab 10/13/23  1436   Glucose 123 H   Calcium 9.3   Albumin 3.4 L   Total Protein 8.1   Sodium 136   Potassium 3.7   CO2 21 L   Chloride 103   BUN 18   Creatinine 1.5 H   Alkaline Phosphatase 74   ALT 7 L   AST 18   Total Bilirubin 0.5     Lab Results   Component Value Date     04/03/2024      10/13/2023     04/13/2023      Social History     Socioeconomic History    Marital status: Single   Tobacco Use    Smoking status: Former     Types: Cigarettes     Passive exposure: Past    Smokeless tobacco: Never   Substance and Sexual Activity    Alcohol use: Not Currently    Drug use: Never    Sexual activity: Not Currently     Past Medical History:   Diagnosis Date    CHF (congestive heart failure)     CLL (chronic lymphocytic leukemia)     HIV (human immunodeficiency virus infection)     Hyperlipidemia     MGUS (monoclonal gammopathy of unknown significance)      Family History   Problem Relation Name Age of Onset    Diabetes Mother       Past Surgical History:   Procedure Laterality Date    HERNIA REPAIR      SHOULDER SURGERY Right      Review of Systems   Constitutional:  Negative for appetite change, chills, diaphoresis, fatigue, fever and unexpected weight change.   HENT:  Negative for mouth sores, rhinorrhea and sore throat.    Eyes: Negative.    Respiratory: Negative.     Cardiovascular: Negative.    Gastrointestinal:  Negative for constipation, diarrhea, nausea and vomiting.   Endocrine: Negative.    Genitourinary: Negative.    Musculoskeletal: Negative.    Integumentary:  Negative.   Allergic/Immunologic: Negative.    Neurological:  Negative for weakness and numbness.   Hematological: Negative.    Psychiatric/Behavioral: Negative.         Medication List with Changes/Refills   Current Medications    ASPIRIN (ECOTRIN) 81 MG EC TABLET    Take 81 mg by mouth once daily.    OZUBUIHPL-WCFKWHOJ-ZRFHXOJ ALA (BIKTARVY) -25 MG (25 KG OR GREATER)    Take 1 tablet by mouth once daily.    DICLOFENAC SODIUM (VOLTAREN) 1 % GEL    Apply 2-4 g topically 4 (four) times daily as needed (knee pain).    FUROSEMIDE (LASIX) 40 MG TABLET    Take 40 mg by mouth once daily.    METOLAZONE (ZAROXOLYN) 2.5 MG TABLET    Take 2.5 mg by mouth once daily.    METOPROLOL SUCCINATE (TOPROL-XL) 25 MG 24 HR TABLET     Take 25 mg by mouth once daily.    POTASSIUM CHLORIDE (KLOR-CON) 10 MEQ TBSR    Take 10 mEq by mouth once.     Objective:     Vitals:    04/15/24 1148   BP: 131/72   Pulse: 66   Temp: 97.2 °F (36.2 °C)     Physical Exam  Vitals reviewed.   Constitutional:       Appearance: Normal appearance.   HENT:      Head: Normocephalic.      Mouth/Throat:      Comments: Wearing mask      Eyes:      Extraocular Movements: Extraocular movements intact.      Pupils: Pupils are equal, round, and reactive to light.   Cardiovascular:      Rate and Rhythm: Normal rate and regular rhythm.   Pulmonary:      Effort: Pulmonary effort is normal.      Breath sounds: Normal breath sounds.   Abdominal:      General: Bowel sounds are normal.      Palpations: Abdomen is soft.      Comments: rounded     Genitourinary:     Comments: deferred    Musculoskeletal:      Cervical back: Normal range of motion and neck supple.      Comments: Presents in wheelchair   Skin:     General: Skin is warm and dry.   Neurological:      Mental Status: He is alert and oriented to person, place, and time.   Psychiatric:         Behavior: Behavior normal.         Thought Content: Thought content normal.          (2) Ambulatory and capable of self care, unable to carry out work activity, up and about > 50% or waking hours  Assessment:     Problem List Items Addressed This Visit          Oncology    CLL (chronic lymphocytic leukemia) - Primary    MGUS (monoclonal gammopathy of unknown significance)     Plan:     CLL (chronic lymphocytic leukemia)    MGUS (monoclonal gammopathy of unknown significance)    Labs reviewed.   No CBC, CMP drawn; will order to be done today.   Continue observation for CLL and MGUS.   Follow up in  6 months Dr. Li with  LDH, SPEP, FLC, HIV RNA, haptoglobin, CBC, and Comprehensive Metabolic Panel.  Continue to follow up with Infectious Disease for treatment of HIV.    Route Chart for Scheduling    Med Onc Chart Routing      Follow up with  physician 6 months. Dr. Li (patient's family prefers MD)   Follow up with [facility]    Infusion scheduling note    Injection scheduling note    Labs CBC, CMP, LDH, other, free light chains and SPEP   Scheduling:  Preferred lab:  Lab interval:  and haptoglobin, HIV RNA in 6 months   Imaging None      Pharmacy appointment No pharmacy appointment needed      Other referrals       No additional referrals needed              I will review assessment/plan with collaborating physician.      NILO Swanson

## 2024-05-08 ENCOUNTER — OFFICE VISIT (OUTPATIENT)
Dept: INTERNAL MEDICINE | Facility: CLINIC | Age: 84
End: 2024-05-08
Payer: MEDICARE

## 2024-05-08 ENCOUNTER — LAB VISIT (OUTPATIENT)
Dept: LAB | Facility: HOSPITAL | Age: 84
End: 2024-05-08
Attending: FAMILY MEDICINE
Payer: MEDICARE

## 2024-05-08 VITALS
BODY MASS INDEX: 28.44 KG/M2 | DIASTOLIC BLOOD PRESSURE: 70 MMHG | SYSTOLIC BLOOD PRESSURE: 118 MMHG | WEIGHT: 192 LBS | HEIGHT: 69 IN | HEART RATE: 75 BPM | OXYGEN SATURATION: 98 %

## 2024-05-08 DIAGNOSIS — Z86.39 PERSONAL HISTORY OF OTHER ENDOCRINE, NUTRITIONAL AND METABOLIC DISEASE: ICD-10-CM

## 2024-05-08 DIAGNOSIS — Z53.20 STATIN MEDICATION DECLINED BY PATIENT: ICD-10-CM

## 2024-05-08 DIAGNOSIS — N18.32 STAGE 3B CHRONIC KIDNEY DISEASE: ICD-10-CM

## 2024-05-08 DIAGNOSIS — E55.9 VITAMIN D DEFICIENCY: ICD-10-CM

## 2024-05-08 DIAGNOSIS — C91.10 CLL (CHRONIC LYMPHOCYTIC LEUKEMIA): ICD-10-CM

## 2024-05-08 DIAGNOSIS — Z00.00 ANNUAL PHYSICAL EXAM: Primary | ICD-10-CM

## 2024-05-08 DIAGNOSIS — E78.01 FAMILIAL HYPERCHOLESTEROLEMIA: ICD-10-CM

## 2024-05-08 DIAGNOSIS — I70.0 ATHEROSCLEROSIS OF AORTA: ICD-10-CM

## 2024-05-08 DIAGNOSIS — Z95.5 PRESENCE OF CORONARY ANGIOPLASTY IMPLANT AND GRAFT: ICD-10-CM

## 2024-05-08 DIAGNOSIS — I50.42 CHRONIC COMBINED SYSTOLIC AND DIASTOLIC CONGESTIVE HEART FAILURE: ICD-10-CM

## 2024-05-08 DIAGNOSIS — I35.0 NONRHEUMATIC AORTIC VALVE STENOSIS: ICD-10-CM

## 2024-05-08 DIAGNOSIS — I25.10 ATHEROSCLEROSIS OF NATIVE CORONARY ARTERY OF NATIVE HEART WITHOUT ANGINA PECTORIS: ICD-10-CM

## 2024-05-08 DIAGNOSIS — D69.6 THROMBOCYTOPENIA: ICD-10-CM

## 2024-05-08 DIAGNOSIS — Z21 HIV POSITIVE: ICD-10-CM

## 2024-05-08 DIAGNOSIS — I27.9 PULMONARY HEART DISEASE: ICD-10-CM

## 2024-05-08 DIAGNOSIS — Z00.00 ANNUAL PHYSICAL EXAM: ICD-10-CM

## 2024-05-08 PROBLEM — N18.31 CHRONIC KIDNEY DISEASE, STAGE 3A: Status: RESOLVED | Noted: 2022-04-21 | Resolved: 2024-05-08

## 2024-05-08 PROBLEM — A53.9 SYPHILIS IN MALE: Status: RESOLVED | Noted: 2021-02-23 | Resolved: 2024-05-08

## 2024-05-08 LAB
25(OH)D3+25(OH)D2 SERPL-MCNC: 16 NG/ML (ref 30–96)
CHOLEST SERPL-MCNC: 358 MG/DL (ref 120–199)
CHOLEST/HDLC SERPL: 11.5 {RATIO} (ref 2–5)
ESTIMATED AVG GLUCOSE: 120 MG/DL (ref 68–131)
HBA1C MFR BLD: 5.8 % (ref 4–5.6)
HDLC SERPL-MCNC: 31 MG/DL (ref 40–75)
HDLC SERPL: 8.7 % (ref 20–50)
LDLC SERPL CALC-MCNC: 299.4 MG/DL (ref 63–159)
NONHDLC SERPL-MCNC: 327 MG/DL
TRIGL SERPL-MCNC: 138 MG/DL (ref 30–150)
TSH SERPL DL<=0.005 MIU/L-ACNC: 2.14 UIU/ML (ref 0.4–4)

## 2024-05-08 PROCEDURE — 3078F DIAST BP <80 MM HG: CPT | Mod: HCNC,CPTII,S$GLB, | Performed by: FAMILY MEDICINE

## 2024-05-08 PROCEDURE — 36415 COLL VENOUS BLD VENIPUNCTURE: CPT | Mod: HCNC | Performed by: FAMILY MEDICINE

## 2024-05-08 PROCEDURE — 84443 ASSAY THYROID STIM HORMONE: CPT | Mod: HCNC | Performed by: FAMILY MEDICINE

## 2024-05-08 PROCEDURE — 99214 OFFICE O/P EST MOD 30 MIN: CPT | Mod: 25,HCNC,S$GLB, | Performed by: FAMILY MEDICINE

## 2024-05-08 PROCEDURE — 3288F FALL RISK ASSESSMENT DOCD: CPT | Mod: HCNC,CPTII,S$GLB, | Performed by: FAMILY MEDICINE

## 2024-05-08 PROCEDURE — 1126F AMNT PAIN NOTED NONE PRSNT: CPT | Mod: HCNC,CPTII,S$GLB, | Performed by: FAMILY MEDICINE

## 2024-05-08 PROCEDURE — 1101F PT FALLS ASSESS-DOCD LE1/YR: CPT | Mod: HCNC,CPTII,S$GLB, | Performed by: FAMILY MEDICINE

## 2024-05-08 PROCEDURE — 1160F RVW MEDS BY RX/DR IN RCRD: CPT | Mod: HCNC,CPTII,S$GLB, | Performed by: FAMILY MEDICINE

## 2024-05-08 PROCEDURE — 1159F MED LIST DOCD IN RCRD: CPT | Mod: HCNC,CPTII,S$GLB, | Performed by: FAMILY MEDICINE

## 2024-05-08 PROCEDURE — 99397 PER PM REEVAL EST PAT 65+ YR: CPT | Mod: 25,HCNC,S$GLB, | Performed by: FAMILY MEDICINE

## 2024-05-08 PROCEDURE — 80061 LIPID PANEL: CPT | Mod: HCNC | Performed by: FAMILY MEDICINE

## 2024-05-08 PROCEDURE — 83036 HEMOGLOBIN GLYCOSYLATED A1C: CPT | Mod: HCNC | Performed by: FAMILY MEDICINE

## 2024-05-08 PROCEDURE — 3074F SYST BP LT 130 MM HG: CPT | Mod: HCNC,CPTII,S$GLB, | Performed by: FAMILY MEDICINE

## 2024-05-08 PROCEDURE — 99999 PR PBB SHADOW E&M-EST. PATIENT-LVL III: CPT | Mod: PBBFAC,HCNC,, | Performed by: FAMILY MEDICINE

## 2024-05-08 PROCEDURE — 82306 VITAMIN D 25 HYDROXY: CPT | Mod: HCNC | Performed by: FAMILY MEDICINE

## 2024-05-08 NOTE — PROGRESS NOTES
Subjective:   Patient ID: Rocael Perez is a 84 y.o. male.  Chief Complaint:  Annual Exam and Foot Swelling    Presents with family member for annual physical exam   Unfortunately both patient and family member overall poor historians   Majority of health information obtained from old records reviewable in [facility].  Also followed by Infectious Disease, Cardiology, and Hematology/Oncology     April 2024 CBC with normal white cell count, mildly decreased hemoglobin/hematocrit, and normal platelet levels   April 2024 renal function panel with increased creatinine 1.8 and EGFR 37 with progression to chronic kidney disease stage IIIB      Medical History obtained from chart shows:   - CHF with chronic kidney disease stage 3b.  No diagnosis of hypertension.  Prescribed Toprol-XL 25 mg daily, Zaroxolyn 2.5 mg daily, Lasix 40 mg daily, and potassium supplementation.  Complains of increased right greater than left bilateral lower extremity edema.  Unclear if actually taking diuretics as prescribed.  No shortness a breath.  - Familial hypercholesteremia with known Coronary artery disease, Aortic atherosclerosis ptosis, status post angioplasty but refusal of statin medication/treatment.  On aspirin 81 mg daily.  Denies any current chest pain or claudication.  Patient and family member report no knowledge of previous angioplasty or refusal of statin medication.  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  Reports saw Cardiology 6 months ago.  Had testing done.  Told stable.  Has increased swelling as noted above.  - HIV positive.  On Biktarvy.  Followed by Infectious Disease.    - CLL and thrombocytopenia.  Followed by Hematology/Oncology.  - Osteoarthritis left knee.  Was told needed surgery.  Due to concerns about ability to care for self following procedure inability to rehab following procedure, surgery deferred.  Last visit prescribed Voltaren for shoulder pain but told he can also apply to as  "needed.  Pain pattern stable.    Health maintenance needs include shingles vaccine, RSV vaccine, and COVID booster.      No additional new complaints today        Current Outpatient Medications:     aspirin (ECOTRIN) 81 MG EC tablet, Take 81 mg by mouth once daily., Disp: , Rfl:     jjadgusdm-kibkoprl-jznjzjl ala (BIKTARVY) -25 mg (25 kg or greater), Take 1 tablet by mouth once daily., Disp: 30 tablet, Rfl: 5    diclofenac sodium (VOLTAREN) 1 % Gel, Apply 2-4 g topically 4 (four) times daily as needed (knee pain)., Disp: 100 g, Rfl: 2    furosemide (LASIX) 40 MG tablet, Take 40 mg by mouth once daily., Disp: , Rfl:     metOLazone (ZAROXOLYN) 2.5 MG tablet, Take 2.5 mg by mouth once daily., Disp: , Rfl:     metoprolol succinate (TOPROL-XL) 25 MG 24 hr tablet, Take 25 mg by mouth once daily., Disp: , Rfl:     potassium chloride (KLOR-CON) 10 MEQ TbSR, Take 10 mEq by mouth once., Disp: , Rfl:     Review of Systems   Cardiovascular:  Positive for leg swelling.   Musculoskeletal:  Positive for arthralgias, gait problem, joint swelling and myalgias.   Neurological:  Positive for weakness.       Objective:   /70 (BP Location: Right arm, Patient Position: Sitting, BP Method: Small (Manual))   Pulse 75   Ht 5' 9" (1.753 m)   Wt 87.1 kg (192 lb 0.3 oz)   SpO2 98%   BMI 28.36 kg/m²     Physical Exam  Vitals and nursing note reviewed.   Constitutional:       Appearance: Normal appearance. He is well-developed and overweight.   Neck:      Vascular: No carotid bruit or JVD.   Cardiovascular:      Rate and Rhythm: Normal rate and regular rhythm.      Heart sounds: Murmur heard.   Pulmonary:      Effort: Pulmonary effort is normal.      Breath sounds: Normal breath sounds.   Musculoskeletal:      Right lower leg: Edema present.      Left lower leg: Edema present.   Neurological:      Motor: Weakness present.      Gait: Gait abnormal.   Psychiatric:         Attention and Perception: He is inattentive. He does not " perceive auditory or visual hallucinations.         Cognition and Memory: Cognition is impaired. Memory is impaired.       Assessment:       ICD-10-CM ICD-9-CM   1. Annual physical exam  Z00.00 V70.0   2. Personal history of other endocrine, nutritional and metabolic disease  Z86.39 V12.29   3. Familial hypercholesterolemia  E78.01 272.0   4. Atherosclerosis of aorta  I70.0 440.0   5. Atherosclerosis of native coronary artery of native heart without angina pectoris  I25.10 414.01   6. Presence of coronary angioplasty implant and graft  Z95.5 V45.82   7. Statin medication declined by patient  Z53.20 V64.2   8. Chronic combined systolic and diastolic congestive heart failure  I50.42 428.42     428.0   9. Pulmonary heart disease  I27.9 416.9   10. Nonrheumatic aortic valve stenosis  I35.0 424.1   11. Stage 3b chronic kidney disease  N18.32 585.3   12. Vitamin D deficiency  E55.9 268.9   13. CLL (chronic lymphocytic leukemia)  C91.10 204.10   14. Thrombocytopenia  D69.6 287.5   15. HIV positive  Z21 V08     Plan:   Annual physical exam  Personal history of other endocrine, nutritional and metabolic disease  -     Hemoglobin A1C; Future; Expected date: 05/08/2024  -     Lipid Panel; Future; Expected date: 05/08/2024  -     TSH; Future; Expected date: 05/08/2024  -     Vitamin D; Future; Expected date: 05/08/2024  Blood pressure normal.  BMI 28.  Exam remarkable for increased bilateral lower extremity edema right greater than left   Check labs.  Treat as indicated.    Colon cancer and prostate cancer screening time-out based on age  Recommend RSV vaccine, shingles vaccine, and COVID booster through pharmacy     Familial hypercholesterolemia  Atherosclerosis of aorta  Atherosclerosis of native coronary artery of native heart without angina pectoris  Presence of coronary angioplasty implant and graft  Statin medication declined by patient  Clinically stable and asymptomatic   Recheck lipid panel today   Patient and family  deny any previous knowledge of angioplasty statin refusal  States if indicated, would be agreeable to starting statin medication    Chronic combined systolic and diastolic congestive heart failure  Pulmonary heart disease  Nonrheumatic aortic valve stenosis  Clinically and hemodynamically stable   Needs to verify that patient is actually taking Zaroxolyn and Lasix for swelling as prescribed   If compliant in all labs normal, will need to follow up with Cardiology regarding decrease swelling.    Stage 3b chronic kidney disease  Stable   Continue current medications     Vitamin D deficiency  -     Vitamin D; Future; Expected date: 05/08/2024  Start weekly high-dose a daily low-dose supplement as indicated     CLL (chronic lymphocytic leukemia)  Thrombocytopenia  Clinically stable   White blood cell count normal   Platelet level normal   Follow-up Hematology/Oncology as scheduled     HIV positive  Reports compliance with Biktarvy  Follow up Infectious Disease as scheduled     Return to clinic 6 months or sooner as needed

## 2024-05-09 DIAGNOSIS — E55.9 VITAMIN D DEFICIENCY: ICD-10-CM

## 2024-05-09 DIAGNOSIS — R73.03 PREDIABETES: ICD-10-CM

## 2024-05-09 DIAGNOSIS — I70.0 ATHEROSCLEROSIS OF AORTA: ICD-10-CM

## 2024-05-09 DIAGNOSIS — E78.01 FAMILIAL HYPERCHOLESTEROLEMIA: Primary | ICD-10-CM

## 2024-05-09 DIAGNOSIS — I25.10 ATHEROSCLEROSIS OF NATIVE CORONARY ARTERY OF NATIVE HEART WITHOUT ANGINA PECTORIS: ICD-10-CM

## 2024-05-09 RX ORDER — ROSUVASTATIN CALCIUM 40 MG/1
40 TABLET, COATED ORAL NIGHTLY
Qty: 90 TABLET | Refills: 3 | Status: SHIPPED | OUTPATIENT
Start: 2024-05-09 | End: 2025-05-09

## 2024-05-28 ENCOUNTER — TELEPHONE (OUTPATIENT)
Dept: INFECTIOUS DISEASES | Facility: CLINIC | Age: 84
End: 2024-05-28
Payer: MEDICARE

## 2024-05-29 ENCOUNTER — LAB VISIT (OUTPATIENT)
Dept: LAB | Facility: HOSPITAL | Age: 84
End: 2024-05-29
Attending: INTERNAL MEDICINE
Payer: MEDICARE

## 2024-05-29 ENCOUNTER — OFFICE VISIT (OUTPATIENT)
Dept: INFECTIOUS DISEASES | Facility: CLINIC | Age: 84
End: 2024-05-29
Payer: MEDICARE

## 2024-05-29 VITALS
DIASTOLIC BLOOD PRESSURE: 74 MMHG | WEIGHT: 194.25 LBS | TEMPERATURE: 98 F | RESPIRATION RATE: 18 BRPM | SYSTOLIC BLOOD PRESSURE: 124 MMHG | HEIGHT: 69 IN | OXYGEN SATURATION: 98 % | BODY MASS INDEX: 28.77 KG/M2 | HEART RATE: 75 BPM

## 2024-05-29 DIAGNOSIS — Z21 HIV POSITIVE: ICD-10-CM

## 2024-05-29 DIAGNOSIS — Z21 HIV POSITIVE: Primary | ICD-10-CM

## 2024-05-29 DIAGNOSIS — C91.10 CLL (CHRONIC LYMPHOCYTIC LEUKEMIA): ICD-10-CM

## 2024-05-29 LAB
ALBUMIN SERPL BCP-MCNC: 3.4 G/DL (ref 3.5–5.2)
ALP SERPL-CCNC: 83 U/L (ref 55–135)
ALT SERPL W/O P-5'-P-CCNC: 10 U/L (ref 10–44)
ANION GAP SERPL CALC-SCNC: 11 MMOL/L (ref 8–16)
AST SERPL-CCNC: 22 U/L (ref 10–40)
BASOPHILS # BLD AUTO: 0.03 K/UL (ref 0–0.2)
BASOPHILS NFR BLD: 0.6 % (ref 0–1.9)
BILIRUB SERPL-MCNC: 0.4 MG/DL (ref 0.1–1)
BUN SERPL-MCNC: 22 MG/DL (ref 8–23)
CALCIUM SERPL-MCNC: 10 MG/DL (ref 8.7–10.5)
CHLORIDE SERPL-SCNC: 100 MMOL/L (ref 95–110)
CO2 SERPL-SCNC: 23 MMOL/L (ref 23–29)
CREAT SERPL-MCNC: 1.7 MG/DL (ref 0.5–1.4)
DIFFERENTIAL METHOD BLD: ABNORMAL
EOSINOPHIL # BLD AUTO: 0 K/UL (ref 0–0.5)
EOSINOPHIL NFR BLD: 0.9 % (ref 0–8)
ERYTHROCYTE [DISTWIDTH] IN BLOOD BY AUTOMATED COUNT: 14.6 % (ref 11.5–14.5)
EST. GFR  (NO RACE VARIABLE): 39.3 ML/MIN/1.73 M^2
GLUCOSE SERPL-MCNC: 83 MG/DL (ref 70–110)
HCT VFR BLD AUTO: 39 % (ref 40–54)
HGB BLD-MCNC: 12.5 G/DL (ref 14–18)
IMM GRANULOCYTES # BLD AUTO: 0.01 K/UL (ref 0–0.04)
IMM GRANULOCYTES NFR BLD AUTO: 0.2 % (ref 0–0.5)
LYMPHOCYTES # BLD AUTO: 1.5 K/UL (ref 1–4.8)
LYMPHOCYTES NFR BLD: 32.4 % (ref 18–48)
MCH RBC QN AUTO: 29.6 PG (ref 27–31)
MCHC RBC AUTO-ENTMCNC: 32.1 G/DL (ref 32–36)
MCV RBC AUTO: 92 FL (ref 82–98)
MONOCYTES # BLD AUTO: 0.7 K/UL (ref 0.3–1)
MONOCYTES NFR BLD: 14.3 % (ref 4–15)
NEUTROPHILS # BLD AUTO: 2.4 K/UL (ref 1.8–7.7)
NEUTROPHILS NFR BLD: 51.6 % (ref 38–73)
NRBC BLD-RTO: 0 /100 WBC
PLATELET # BLD AUTO: 183 K/UL (ref 150–450)
PMV BLD AUTO: 13 FL (ref 9.2–12.9)
POTASSIUM SERPL-SCNC: 3.9 MMOL/L (ref 3.5–5.1)
PROT SERPL-MCNC: 8.3 G/DL (ref 6–8.4)
RBC # BLD AUTO: 4.22 M/UL (ref 4.6–6.2)
SODIUM SERPL-SCNC: 134 MMOL/L (ref 136–145)
WBC # BLD AUTO: 4.63 K/UL (ref 3.9–12.7)

## 2024-05-29 PROCEDURE — 3288F FALL RISK ASSESSMENT DOCD: CPT | Mod: HCNC,CPTII,S$GLB, | Performed by: INTERNAL MEDICINE

## 2024-05-29 PROCEDURE — 87536 HIV-1 QUANT&REVRSE TRNSCRPJ: CPT | Mod: HCNC | Performed by: INTERNAL MEDICINE

## 2024-05-29 PROCEDURE — 1101F PT FALLS ASSESS-DOCD LE1/YR: CPT | Mod: HCNC,CPTII,S$GLB, | Performed by: INTERNAL MEDICINE

## 2024-05-29 PROCEDURE — 3078F DIAST BP <80 MM HG: CPT | Mod: HCNC,CPTII,S$GLB, | Performed by: INTERNAL MEDICINE

## 2024-05-29 PROCEDURE — 36415 COLL VENOUS BLD VENIPUNCTURE: CPT | Mod: HCNC | Performed by: INTERNAL MEDICINE

## 2024-05-29 PROCEDURE — 3074F SYST BP LT 130 MM HG: CPT | Mod: HCNC,CPTII,S$GLB, | Performed by: INTERNAL MEDICINE

## 2024-05-29 PROCEDURE — 99214 OFFICE O/P EST MOD 30 MIN: CPT | Mod: HCNC,S$GLB,, | Performed by: INTERNAL MEDICINE

## 2024-05-29 PROCEDURE — 85025 COMPLETE CBC W/AUTO DIFF WBC: CPT | Mod: HCNC | Performed by: INTERNAL MEDICINE

## 2024-05-29 PROCEDURE — 1159F MED LIST DOCD IN RCRD: CPT | Mod: HCNC,CPTII,S$GLB, | Performed by: INTERNAL MEDICINE

## 2024-05-29 PROCEDURE — 99999 PR PBB SHADOW E&M-EST. PATIENT-LVL III: CPT | Mod: PBBFAC,HCNC,, | Performed by: INTERNAL MEDICINE

## 2024-05-29 PROCEDURE — 80053 COMPREHEN METABOLIC PANEL: CPT | Mod: HCNC | Performed by: INTERNAL MEDICINE

## 2024-05-29 NOTE — ASSESSMENT & PLAN NOTE
Will continue Biktarvy - will continue regime    The viral load remains controlled -less than 20 .  He has good virologic and immunologic control of HIV.

## 2024-05-29 NOTE — PROGRESS NOTES
HIV Follow-up Visit  Rocael Perez is here for follow-up of HIV infection. He is feeling better since his last visit.     Patient is compliant with HIV medications.  He is on Biktarvy  Labs-  04/24/24-    Patient does not have side effects with the HIV medications.none    The following portions of the patient's history were reviewed and updated as appropriate: allergies, current medications, past family history, past medical history, past social history, past surgical history, and problem list.    Review of Systems   Constitutional:  Negative for chills, fever, malaise/fatigue and weight loss.   Cardiovascular:  Negative for chest pain.   Musculoskeletal:  Negative for myalgias.          Physical Exam  Vitals and nursing note reviewed.   HENT:      Nose: Nose normal.   Eyes:      Pupils: Pupils are equal, round, and reactive to light.   Cardiovascular:      Rate and Rhythm: Normal rate.   Abdominal:      General: Abdomen is flat.   Musculoskeletal:      Comments: Has a wheel chair   Neurological:      Mental Status: He is oriented to person, place, and time.          Immunization History   Administered Date(s) Administered    COVID-19, MRNA, LN-S, PF (Pfizer) (Purple Cap) 02/24/2021, 03/20/2021, 09/28/2021    COVID-19, mRNA, LNP-S, PF (Moderna 2023)Ages 12+ 10/13/2023    COVID-19, mRNA, LNP-S, bivalent booster, PF (Moderna Omicron)12 + YEARS 10/13/2023    COVID-19, mRNA, LNP-S, bivalent booster, PF (PFIZER OMICRON) 10/14/2022    Influenza (FLUAD) - Quadrivalent - Adjuvanted - PF *Preferred* (65+) 10/26/2022, 09/26/2023    Influenza - High Dose - PF (65 years and older) 11/07/2015, 08/09/2017    Influenza - Quadrivalent 08/09/2017    Influenza - Quadrivalent - High Dose - PF (65 years and older) 11/20/2020    Influenza - Quadrivalent - PF *Preferred* (6 months and older) 09/18/2019    Influenza - Trivalent - PF (ADULT) 10/16/2018    Pneumococcal Conjugate - 13 Valent 08/09/2017    Pneumococcal Polysaccharide - 23  Valent 06/25/2015, 10/16/2018    Tdap 09/18/2019    Zoster Recombinant 07/05/2022             Assessment:  1. HIV positive  HIV RNA, Quantitative, PCR    Comprehensive Metabolic Panel    CBC Auto Differential      2. CLL (chronic lymphocytic leukemia)              Plan:  Problem List Items Addressed This Visit       CLL (chronic lymphocytic leukemia)     Follow oncology          HIV positive - Primary     Will continue Biktarvy - will continue regime    The viral load remains controlled -less than 20 .  He has good virologic and immunologic control of HIV.            Relevant Orders    HIV RNA, Quantitative, PCR    Comprehensive Metabolic Panel    CBC Auto Differential       Problem List Items Addressed This Visit       HIV positive - Primary    Relevant Orders    HIV RNA, Quantitative, PCR    Comprehensive Metabolic Panel    CBC Auto Differential      The patient has been counseled regarding the importance of compliance with every dose of HIV medications. Possible side effects have been reviewed and the patients questions have been answered.

## 2024-05-30 LAB
HIV1 RNA # SERPL NAA+PROBE: <20 COPIES/ML
HIV1 RNA SERPL NAA+PROBE-LOG#: <1.3 LOG COPIES/ML
HIV1 RNA SERPL QL NAA+PROBE: DETECTED

## 2024-07-24 DIAGNOSIS — Z21 HIV POSITIVE: ICD-10-CM

## 2024-07-25 RX ORDER — BICTEGRAVIR SODIUM, EMTRICITABINE, AND TENOFOVIR ALAFENAMIDE FUMARATE 50; 200; 25 MG/1; MG/1; MG/1
1 TABLET ORAL
Qty: 30 TABLET | Refills: 5 | Status: SHIPPED | OUTPATIENT
Start: 2024-07-25

## 2024-10-17 ENCOUNTER — TELEPHONE (OUTPATIENT)
Dept: INTERNAL MEDICINE | Facility: CLINIC | Age: 84
End: 2024-10-17
Payer: MEDICARE

## 2024-10-17 NOTE — TELEPHONE ENCOUNTER
----- Message from Bekah sent at 10/17/2024 10:13 AM CDT -----  Type:  Needs Medical Advice    Who Called: Tyrese [facility] CEDRIC MEZA [[id number]]  Symptoms (please be specific):    How long has patient had these symptoms:    Pharmacy name and phone #:    Would the patient rather a call back or a response via [facility]?   Best Call Back Number: [phone]  Additional Information: Tyrese called in regards to patient needs orders and to confirm medical history

## 2024-10-17 NOTE — TELEPHONE ENCOUNTER
Spoke with Tyrese; he was calling to get clarification on pt medications & diagnosis; He stated pt was admitted to home health for knee arthritis; Tyrese asked for orders for  to come into pt home due to his recent diagnosis; MA gave verbal for social work; Tyrese stated pt is living with sister which is elderly as well & the home seems to not be stable; he says pt is often forgetful /LD

## 2024-10-21 ENCOUNTER — TELEPHONE (OUTPATIENT)
Dept: HEMATOLOGY/ONCOLOGY | Facility: CLINIC | Age: 84
End: 2024-10-21
Payer: MEDICARE

## 2024-10-21 NOTE — TELEPHONE ENCOUNTER
Called patient and spoke to patient about missed appt today- said he had somewhere he had to go- tried to reschedule him for Wednesday 10/23, patient said he would have to call me back and let me know.

## 2024-12-06 ENCOUNTER — TELEPHONE (OUTPATIENT)
Dept: INFECTIOUS DISEASES | Facility: CLINIC | Age: 84
End: 2024-12-06
Payer: MEDICARE

## 2024-12-06 NOTE — TELEPHONE ENCOUNTER
Attempted to contact number left by caller [phone], no answer. Unable to LVM. VM full.    Contacted number on file, pt answered. Informed that sister called to r/s appt to later time on 12/9. Advised pt that Dr. Martinez does not have any openings for a later appointment time. Asked pt if he would like to r/s appt to another date. Pt stated that he would like to keep appt date and time. Same appt in place. Pt verbalized understanding and agreed to appt date, time, location.

## 2024-12-06 NOTE — TELEPHONE ENCOUNTER
----- Message from Jc sent at 12/6/2024  9:27 AM CST -----  Contact:  @ [phone]  Name of Who is Calling:         What is the request in detail: calling to r/s appt to a later time if possible        Can the clinic reply by [facility]: no        What Number [facility] Back [facility]:   [phone]

## 2024-12-09 ENCOUNTER — LAB VISIT (OUTPATIENT)
Dept: LAB | Facility: HOSPITAL | Age: 84
End: 2024-12-09
Attending: INTERNAL MEDICINE
Payer: MEDICARE

## 2024-12-09 ENCOUNTER — OFFICE VISIT (OUTPATIENT)
Dept: INFECTIOUS DISEASES | Facility: CLINIC | Age: 84
End: 2024-12-09
Payer: MEDICARE

## 2024-12-09 DIAGNOSIS — C91.10 CLL (CHRONIC LYMPHOCYTIC LEUKEMIA): ICD-10-CM

## 2024-12-09 DIAGNOSIS — Z21 HIV POSITIVE: ICD-10-CM

## 2024-12-09 DIAGNOSIS — Z21 HIV POSITIVE: Primary | ICD-10-CM

## 2024-12-09 DIAGNOSIS — N18.32 STAGE 3B CHRONIC KIDNEY DISEASE: ICD-10-CM

## 2024-12-09 LAB
ALBUMIN SERPL BCP-MCNC: 3.5 G/DL (ref 3.5–5.2)
ALP SERPL-CCNC: 63 U/L (ref 40–150)
ALT SERPL W/O P-5'-P-CCNC: 9 U/L (ref 10–44)
ANION GAP SERPL CALC-SCNC: 9 MMOL/L (ref 8–16)
AST SERPL-CCNC: 19 U/L (ref 10–40)
BASOPHILS # BLD AUTO: 0.05 K/UL (ref 0–0.2)
BASOPHILS NFR BLD: 1.2 % (ref 0–1.9)
BILIRUB SERPL-MCNC: 0.5 MG/DL (ref 0.1–1)
BUN SERPL-MCNC: 20 MG/DL (ref 8–23)
CALCIUM SERPL-MCNC: 9.9 MG/DL (ref 8.7–10.5)
CHLORIDE SERPL-SCNC: 101 MMOL/L (ref 95–110)
CO2 SERPL-SCNC: 24 MMOL/L (ref 23–29)
CREAT SERPL-MCNC: 1.5 MG/DL (ref 0.5–1.4)
DIFFERENTIAL METHOD BLD: ABNORMAL
EOSINOPHIL # BLD AUTO: 0 K/UL (ref 0–0.5)
EOSINOPHIL NFR BLD: 1 % (ref 0–8)
ERYTHROCYTE [DISTWIDTH] IN BLOOD BY AUTOMATED COUNT: 15.1 % (ref 11.5–14.5)
EST. GFR  (NO RACE VARIABLE): 45.6 ML/MIN/1.73 M^2
GLUCOSE SERPL-MCNC: 82 MG/DL (ref 70–110)
HCT VFR BLD AUTO: 39.7 % (ref 40–54)
HGB BLD-MCNC: 12.4 G/DL (ref 14–18)
IMM GRANULOCYTES # BLD AUTO: 0 K/UL (ref 0–0.04)
IMM GRANULOCYTES NFR BLD AUTO: 0 % (ref 0–0.5)
LYMPHOCYTES # BLD AUTO: 2 K/UL (ref 1–4.8)
LYMPHOCYTES NFR BLD: 49.1 % (ref 18–48)
MCH RBC QN AUTO: 29.7 PG (ref 27–31)
MCHC RBC AUTO-ENTMCNC: 31.2 G/DL (ref 32–36)
MCV RBC AUTO: 95 FL (ref 82–98)
MONOCYTES # BLD AUTO: 0.6 K/UL (ref 0.3–1)
MONOCYTES NFR BLD: 14 % (ref 4–15)
NEUTROPHILS # BLD AUTO: 1.4 K/UL (ref 1.8–7.7)
NEUTROPHILS NFR BLD: 34.7 % (ref 38–73)
NRBC BLD-RTO: 0 /100 WBC
PLATELET # BLD AUTO: 196 K/UL (ref 150–450)
PMV BLD AUTO: 12.7 FL (ref 9.2–12.9)
POTASSIUM SERPL-SCNC: 3.5 MMOL/L (ref 3.5–5.1)
PROT SERPL-MCNC: 7.9 G/DL (ref 6–8.4)
RBC # BLD AUTO: 4.18 M/UL (ref 4.6–6.2)
SODIUM SERPL-SCNC: 134 MMOL/L (ref 136–145)
WBC # BLD AUTO: 4.01 K/UL (ref 3.9–12.7)

## 2024-12-09 PROCEDURE — 36415 COLL VENOUS BLD VENIPUNCTURE: CPT | Mod: HCNC | Performed by: INTERNAL MEDICINE

## 2024-12-09 PROCEDURE — 99214 OFFICE O/P EST MOD 30 MIN: CPT | Mod: HCNC,S$GLB,, | Performed by: INTERNAL MEDICINE

## 2024-12-09 PROCEDURE — 87536 HIV-1 QUANT&REVRSE TRNSCRPJ: CPT | Mod: HCNC | Performed by: INTERNAL MEDICINE

## 2024-12-09 PROCEDURE — 99999 PR PBB SHADOW E&M-EST. PATIENT-LVL I: CPT | Mod: PBBFAC,HCNC,, | Performed by: INTERNAL MEDICINE

## 2024-12-09 PROCEDURE — 85025 COMPLETE CBC W/AUTO DIFF WBC: CPT | Mod: HCNC | Performed by: INTERNAL MEDICINE

## 2024-12-09 PROCEDURE — 80053 COMPREHEN METABOLIC PANEL: CPT | Mod: HCNC | Performed by: INTERNAL MEDICINE

## 2024-12-09 NOTE — PROGRESS NOTES
HIV Follow-up Visit  Rcoael Perez is here for follow-up of HIV infection. He is feeling better since his last visit.     Patient is compliant with HIV medications.  Patient does not have side effects with the HIV medications.none    The following portions of the patient's history were reviewed and updated as appropriate: allergies, current medications, past family history, past medical history, past social history, past surgical history, and problem list.    Review of Systems   Constitutional:  Negative for chills, diaphoresis, fever, malaise/fatigue and weight loss.   Cardiovascular:  Negative for chest pain and palpitations.   Musculoskeletal:  Negative for myalgias and neck pain.          Physical Exam  Vitals and nursing note reviewed.   HENT:      Head: Normocephalic.   Cardiovascular:      Rate and Rhythm: Normal rate.   Musculoskeletal:         General: Normal range of motion.      Cervical back: Normal range of motion.      Comments: Ambulates with  wheel chair   Skin:     General: Skin is warm.   Neurological:      Mental Status: He is alert. Mental status is at baseline.          Immunization History   Administered Date(s) Administered    COVID-19, MRNA, LN-S, PF (Pfizer) (Purple Cap) 02/24/2021, 03/20/2021, 09/28/2021    COVID-19, mRNA, LNP-S, PF (Moderna) Ages 12+ 10/13/2023    COVID-19, mRNA, LNP-S, bivalent booster, PF (Moderna Omicron)12 + YEARS 10/13/2023    COVID-19, mRNA, LNP-S, bivalent booster, PF (PFIZER OMICRON) 10/14/2022    Influenza (FLUAD) - Quadrivalent - Adjuvanted - PF *Preferred* (65+) 10/26/2022, 09/26/2023    Influenza - Quadrivalent 08/09/2017    Influenza - Quadrivalent - High Dose - PF (65 years and older) 11/20/2020    Influenza - Quadrivalent - PF *Preferred* (6 months and older) 09/18/2019    Influenza - Trivalent - Fluarix, Flulaval, Fluzone, Afluria - PF 10/16/2018    Influenza - Trivalent - Fluzone High Dose - PF (65 years and older) 11/07/2015, 08/09/2017    Pneumococcal  Conjugate - 13 Valent 08/09/2017    Pneumococcal Polysaccharide - 23 Valent 06/25/2015, 10/16/2018    Tdap 09/18/2019    Zoster Recombinant 07/05/2022             Assessment:  1. HIV positive  HIV RNA, Quantitative, PCR    Comprehensive Metabolic Panel    CBC Auto Differential      2. Stage 3b chronic kidney disease  Comprehensive Metabolic Panel    CBC Auto Differential    Ambulatory referral/consult to Nephrology      3. CLL (chronic lymphocytic leukemia)  Comprehensive Metabolic Panel    CBC Auto Differential            Plan:  1. HIV positive  Assessment & Plan:      The viral load remains controlled -less than 20 based on last HIv viral load- 05/29/24  He has good virologic and immunologic control of HIV.     Will continue Biktarvy .  Will do labs today     Orders:  -     HIV RNA, Quantitative, PCR; Standing  -     Comprehensive Metabolic Panel; Standing  -     CBC Auto Differential; Standing    2. Stage 3b chronic kidney disease  Assessment & Plan:  Follow PCP, refer to nephrology     Orders:  -     Comprehensive Metabolic Panel; Standing  -     CBC Auto Differential; Standing  -     Ambulatory referral/consult to Nephrology; Future; Expected date: 12/16/2024    3. CLL (chronic lymphocytic leukemia)  Assessment & Plan:  Follow oncology     Orders:  -     Comprehensive Metabolic Panel; Standing  -     CBC Auto Differential; Standing         The patient has been counseled regarding the importance of compliance with every dose of HIV medications. Possible side effects have been reviewed and the patients questions have been answered.

## 2024-12-09 NOTE — ASSESSMENT & PLAN NOTE
The viral load remains controlled -less than 20 based on last HIv viral load- 05/29/24  He has good virologic and immunologic control of HIV.     Will continue Biktarvy .  Will do labs today

## 2024-12-12 DIAGNOSIS — N18.32 STAGE 3B CHRONIC KIDNEY DISEASE: Primary | ICD-10-CM

## 2025-01-14 DIAGNOSIS — Z00.00 ENCOUNTER FOR MEDICARE ANNUAL WELLNESS EXAM: ICD-10-CM

## 2025-01-20 DIAGNOSIS — Z21 HIV POSITIVE: ICD-10-CM

## 2025-01-24 ENCOUNTER — TELEPHONE (OUTPATIENT)
Dept: INFECTIOUS DISEASES | Facility: CLINIC | Age: 85
End: 2025-01-24
Payer: MEDICARE

## 2025-01-24 NOTE — TELEPHONE ENCOUNTER
Request for Biktaricey refill Last visit 12-9-2024 with Dr. Martinez. Last labs 12-9-24.  Please advise.

## 2025-01-27 DIAGNOSIS — Z21 HIV POSITIVE: ICD-10-CM

## 2025-01-27 RX ORDER — BICTEGRAVIR SODIUM, EMTRICITABINE, AND TENOFOVIR ALAFENAMIDE FUMARATE 50; 200; 25 MG/1; MG/1; MG/1
1 TABLET ORAL DAILY
Qty: 30 TABLET | Refills: 5 | Status: CANCELLED | OUTPATIENT
Start: 2025-01-27

## 2025-01-27 RX ORDER — BICTEGRAVIR SODIUM, EMTRICITABINE, AND TENOFOVIR ALAFENAMIDE FUMARATE 50; 200; 25 MG/1; MG/1; MG/1
1 TABLET ORAL
Qty: 30 TABLET | Refills: 5 | Status: SHIPPED | OUTPATIENT
Start: 2025-01-27 | End: 2025-01-31 | Stop reason: SDUPTHER

## 2025-01-27 NOTE — TELEPHONE ENCOUNTER
Advised pt's sister that Dr. Martinez is currently out of clinic until 2/3/25. Rx refill request routed to Dr. Martinez and Dr. Liu to advise. Sister verbalized understanding.

## 2025-01-31 DIAGNOSIS — Z21 HIV POSITIVE: ICD-10-CM

## 2025-01-31 RX ORDER — BICTEGRAVIR SODIUM, EMTRICITABINE, AND TENOFOVIR ALAFENAMIDE FUMARATE 50; 200; 25 MG/1; MG/1; MG/1
1 TABLET ORAL DAILY
Qty: 30 TABLET | Refills: 5 | Status: SHIPPED | OUTPATIENT
Start: 2025-01-31 | End: 2025-07-30

## 2025-02-05 ENCOUNTER — TELEPHONE (OUTPATIENT)
Dept: NEPHROLOGY | Facility: CLINIC | Age: 85
End: 2025-02-05
Payer: MEDICARE

## 2025-02-05 DIAGNOSIS — N18.32 STAGE 3B CHRONIC KIDNEY DISEASE: Primary | ICD-10-CM

## 2025-03-17 ENCOUNTER — TELEPHONE (OUTPATIENT)
Dept: INTERNAL MEDICINE | Facility: CLINIC | Age: 85
End: 2025-03-17
Payer: MEDICARE

## 2025-03-17 DIAGNOSIS — R13.12 OROPHARYNGEAL DYSPHAGIA: Primary | ICD-10-CM

## 2025-03-17 NOTE — TELEPHONE ENCOUNTER
----- Message from Rose sent at 3/17/2025 11:21 AM CDT -----  Contact: Sister Corazon  Type: Needs Medical AdviceWho Called:  Pts sister Mikyymptoms (please be specific):  issues with his throatHow long has patient had these symptoms:  just started about 3 days ago Pharmacy name and phone #:  [facility] Call Back Number: [phone] Information: Pts sister is calling in regards to taking him to [facility] regarding his throat, stated he is not having issues swallowing but he can taste his food afterwards almost like he is having trouble getting it all the way down, the after hours care doctor told the they couldn't get anything in there to look down his throat and was referred to someone else but the referral was sent to [place] and she can't take him down there. She is wanting to see if we can recommend who he should see and see if we may need a referral for this. Thank You.

## 2025-03-20 ENCOUNTER — OFFICE VISIT (OUTPATIENT)
Dept: OTOLARYNGOLOGY | Facility: CLINIC | Age: 85
End: 2025-03-20
Payer: MEDICARE

## 2025-03-20 VITALS — HEIGHT: 69 IN | WEIGHT: 194.25 LBS | BODY MASS INDEX: 28.77 KG/M2

## 2025-03-20 DIAGNOSIS — R13.12 OROPHARYNGEAL DYSPHAGIA: ICD-10-CM

## 2025-03-20 PROCEDURE — 99999 PR PBB SHADOW E&M-EST. PATIENT-LVL III: CPT | Mod: PBBFAC,HCNC,, | Performed by: STUDENT IN AN ORGANIZED HEALTH CARE EDUCATION/TRAINING PROGRAM

## 2025-03-20 NOTE — PROGRESS NOTES
Chief complaint:    Chief Complaint   Patient presents with    Dysphagia     Pt states he is able to eat and drink with no issues   Pt claims he canstill taste his food after eating x 5-8 days            Referring Provider:  Landry Ferguson [place]  [address] [place] [address]      History of present illness:     Mr. Perez is a 84 y.o. w/CLL and HIV presenting for evaluation of cough and dysphagia.     Poor historian, wife helps with history    Onset: years ago, but now over last week new symptom has developed. He states he is tasting his food much later after eating it. Often several days later.    Denies heartburn, bleching, abdominal pain, regurgitation, vomiting    The patient denies neck mass, odynophagia, dysphagia, otalgia, unusual bleeding, or unintentional weight loss.      Knonw history of HIV. Well controlled. Denies side effects   History of CLL.     History      Past Medical History:   Past Medical History:   Diagnosis Date    CHF (congestive heart failure)     CLL (chronic lymphocytic leukemia)     HIV (human immunodeficiency virus infection)     Hyperlipidemia     MGUS (monoclonal gammopathy of unknown significance)          Past Surgical History:  Past Surgical History:   Procedure Laterality Date    HERNIA REPAIR      SHOULDER SURGERY Right          Medications: Medication list reviewed. He  has a current medication list which includes the following prescription(s): aspirin, biktarvy, diclofenac sodium, furosemide, metolazone, metoprolol succinate, potassium chloride, and rosuvastatin.     Allergies:   Review of patient's allergies indicates:   Allergen Reactions    Seasonale [levonorgestrel-ethinyl estrad] Other (See Comments)         Family history: family history includes Diabetes in his mother.         Social History          Alcohol use:  reports that he does not currently use alcohol.            Tobacco:  reports that he has quit smoking. His smoking use included  "cigarettes. He has been exposed to tobacco smoke. He has never used smokeless tobacco.         Physical Examination      Vitals: Height 5' 9" (1.753 m), weight 88.1 kg (194 lb 3.6 oz).      General: Well developed, well nourished, well hydrated.   Voice: no hoarseness, no dysarthria      Head/Face: Normocephalic, atraumatic. No scars or lesions. Facial musculature equal.     Eyes: No scleral icterus or conjunctival hemorrhage. EOMI. PERRLA.     Ears:     Right ear: No gross deformity. EAC is clear of debris and erythema. TM are intact with a pneumatized middle ear. No signs of retraction, fluid or infection.      Left ear: No gross deformity. EAC is clear of debris and erythema. TM are intact with a pneumatized middle ear. No signs of retraction, fluid or infection.      Nose: No gross deformity or lesions. No purulent discharge. No significant NSD.      Mouth/Oropharynx: Lips without any lesions. No mucosal lesions within the oropharynx. No tonsillar exudate or lesions. Pharyngeal walls symmetrical. Uvula midline. Tongue midline without lesions.     Neck: Trachea midline. No masses. No thyromegaly or nodules palpated.     Lymphatic: No lymphadenopathy in the neck.     Extremities: No cyanosis. Warm and well-perfused.     Skin: No scars or lesions on face or neck.      Neurologic: Moving all extremities without gross abnormality.CN II-XII grossly intact. House-Brackmann 1/6. No signs of nystagmus.          Data reviewed      Review of records:      I reviewed records from the referring provider's office visits.  These describe the history, workup, and/or treatment of this problem thus far:     GI evaluation 2020  IMPRESSION: 1 intermittent nonprogressive dysphagia with solids probably due to mild esophageal stenosis related to chronic reflux esophagitis or from a Schatzki's ring. Had a long discussion with the patient and his wife and all of us agree that his symptoms do not warrant further investigation or " treatment at this time.  RECOMMENDATIONS: I had a long discussion with the patient and his wife about dietary measures to control bolus size. He will return if his symptoms persist despite him being diligent about his bite size and if he does then will refer him for EGD with esophageal dilation.      [place] esophogram 2020  FINDINGS:     The esophagus shows normal distensibility, course, caliber and contour. No focal lesion. No extrinsic mass effect. Normal transit of contrast through the esophagus with normal emptying into stomach. The upper and lower esophageal sphincters opened normally.     Small sliding hiatal hernia. Spontaneous gastroesophageal reflux to the thoracic inlet..         Procedures:    Procedure -Transnasal fiberoptic laryngoscopy     Surgeon: Julio Butterfield M.D. .      Anesthesia: topical 0.05% oxymetazoline with 4% lidocaine      Complications: None.     Description of Procedure: With the patient in the sitting position, topical lidocaine and oxymetazoline was applied to the nose. The scope was passed through the nose. Examination was carried out of the nose, nasopharynx, oropharynx, hypopharynx, and larynx with findings as noted above. Scope was removed. The patient tolerated the procedure well.      Findings: No masses or lesions in the nose, nasopharynx, oropharynx, hypopharynx, or larynx. Vocal fold abduction and adduction is normal. No pooling of secretions in the piriform sinuses, penetration, or aspiration.        Assessment/Plan:      1. Oropharyngeal dysphagia       His primary complaint today is tasting his food much later after eating it. Often several days later. I am not sure of the etiology of this, but I think there is low chance of Zenker's (no regurgitation, timing is not consistent with Zenkers)    I recommend observation, if not improved in next few weeks would consider MBS.        Julio Butterfield MD  [facility]   [facility]  [address]  [place] [address]  P: [phone]  F: [phone]

## 2025-03-24 ENCOUNTER — TELEPHONE (OUTPATIENT)
Dept: INTERNAL MEDICINE | Facility: CLINIC | Age: 85
End: 2025-03-24
Payer: MEDICARE

## 2025-03-24 NOTE — TELEPHONE ENCOUNTER
----- Message from Shailesh sent at 3/24/2025  1:11 PM CDT -----  Contact: Debora Hummel -   .Type:  Sooner Apoointment RequestCaller is requesting a sooner appointment.  Caller declined first available appointment listed below.  Caller will not accept being placed on the waitlist and is requesting a message be sent to doctor.Name of Caller:Debora Hummel - When is the first available appointment? 4/30Symptoms:Would the patient rather a call back or a response via [facility]? Call [facility] Call Back Number:.[phone] Information: Pt sister states she is needing a call in back in regard to getting a sooner syed for the one he missed on 3/19. States she cannot do the syed on 4/2 because 8 am is too early and the next available isn't until 4/30.

## 2025-03-24 NOTE — TELEPHONE ENCOUNTER
Attempt to contact pt sister regarding patient appt; patient answered and stated his sister wasn't in; MA advised him she would callback at a later time /LD

## 2025-03-25 ENCOUNTER — TELEPHONE (OUTPATIENT)
Dept: INTERNAL MEDICINE | Facility: CLINIC | Age: 85
End: 2025-03-25
Payer: MEDICARE

## 2025-03-25 NOTE — TELEPHONE ENCOUNTER
Spoke with pt sister; she was calling to reschedule appt from a missed visit on 03/19; MA scheduled next opening with PCP /LD

## 2025-03-25 NOTE — TELEPHONE ENCOUNTER
----- Message from Payal sent at 3/25/2025  3:45 PM CDT -----  Contact: Pt Sister  Type:  Sooner Appointment RequestName of Caller:  Pt Sister Josiane  [phone] is the first available appointment? 5/6Symptoms: 6 mo F/U  / Pt Sister is trying to get an appt next week Please call to advise... Thank you...

## 2025-04-03 ENCOUNTER — LAB VISIT (OUTPATIENT)
Dept: LAB | Facility: HOSPITAL | Age: 85
End: 2025-04-03
Attending: FAMILY MEDICINE
Payer: MEDICARE

## 2025-04-03 ENCOUNTER — OFFICE VISIT (OUTPATIENT)
Dept: INTERNAL MEDICINE | Facility: CLINIC | Age: 85
End: 2025-04-03
Payer: MEDICARE

## 2025-04-03 VITALS
DIASTOLIC BLOOD PRESSURE: 84 MMHG | HEART RATE: 82 BPM | SYSTOLIC BLOOD PRESSURE: 126 MMHG | BODY MASS INDEX: 27.92 KG/M2 | WEIGHT: 188.5 LBS | HEIGHT: 69 IN | OXYGEN SATURATION: 95 %

## 2025-04-03 DIAGNOSIS — I25.10 ATHEROSCLEROSIS OF NATIVE CORONARY ARTERY OF NATIVE HEART WITHOUT ANGINA PECTORIS: ICD-10-CM

## 2025-04-03 DIAGNOSIS — Z00.00 ANNUAL PHYSICAL EXAM: ICD-10-CM

## 2025-04-03 DIAGNOSIS — I50.42 CHRONIC COMBINED SYSTOLIC AND DIASTOLIC CONGESTIVE HEART FAILURE: ICD-10-CM

## 2025-04-03 DIAGNOSIS — I27.9 PULMONARY HEART DISEASE: ICD-10-CM

## 2025-04-03 DIAGNOSIS — Z86.39 PERSONAL HISTORY OF OTHER ENDOCRINE, NUTRITIONAL AND METABOLIC DISEASE: ICD-10-CM

## 2025-04-03 DIAGNOSIS — E55.9 VITAMIN D DEFICIENCY: ICD-10-CM

## 2025-04-03 DIAGNOSIS — Z00.00 ANNUAL PHYSICAL EXAM: Primary | ICD-10-CM

## 2025-04-03 DIAGNOSIS — I70.0 ATHEROSCLEROSIS OF AORTA: ICD-10-CM

## 2025-04-03 DIAGNOSIS — Z95.5 PRESENCE OF CORONARY ANGIOPLASTY IMPLANT AND GRAFT: ICD-10-CM

## 2025-04-03 DIAGNOSIS — N18.32 STAGE 3B CHRONIC KIDNEY DISEASE: ICD-10-CM

## 2025-04-03 DIAGNOSIS — M17.12 PRIMARY OSTEOARTHRITIS OF LEFT KNEE: ICD-10-CM

## 2025-04-03 DIAGNOSIS — Z21 HIV POSITIVE: ICD-10-CM

## 2025-04-03 DIAGNOSIS — E78.01 FAMILIAL HYPERCHOLESTEROLEMIA: ICD-10-CM

## 2025-04-03 DIAGNOSIS — D69.6 THROMBOCYTOPENIA: ICD-10-CM

## 2025-04-03 DIAGNOSIS — C91.10 CLL (CHRONIC LYMPHOCYTIC LEUKEMIA): ICD-10-CM

## 2025-04-03 DIAGNOSIS — R73.03 PREDIABETES: ICD-10-CM

## 2025-04-03 DIAGNOSIS — I35.0 NONRHEUMATIC AORTIC VALVE STENOSIS: ICD-10-CM

## 2025-04-03 DIAGNOSIS — R13.12 OROPHARYNGEAL DYSPHAGIA: ICD-10-CM

## 2025-04-03 LAB
CHOLEST SERPL-MCNC: 211 MG/DL (ref 120–199)
CHOLEST/HDLC SERPL: 6 {RATIO} (ref 2–5)
HDLC SERPL-MCNC: 35 MG/DL (ref 40–75)
HDLC SERPL: 16.6 % (ref 20–50)
LDLC SERPL CALC-MCNC: 153.4 MG/DL (ref 63–159)
NONHDLC SERPL-MCNC: 176 MG/DL
TRIGL SERPL-MCNC: 113 MG/DL (ref 30–150)
TSH SERPL-ACNC: 1.87 UIU/ML (ref 0.4–4)

## 2025-04-03 PROCEDURE — 36415 COLL VENOUS BLD VENIPUNCTURE: CPT | Mod: HCNC

## 2025-04-03 PROCEDURE — 82306 VITAMIN D 25 HYDROXY: CPT | Mod: HCNC

## 2025-04-03 PROCEDURE — 80061 LIPID PANEL: CPT | Mod: HCNC

## 2025-04-03 PROCEDURE — 99999 PR PBB SHADOW E&M-EST. PATIENT-LVL III: CPT | Mod: PBBFAC,HCNC,, | Performed by: FAMILY MEDICINE

## 2025-04-03 PROCEDURE — 83036 HEMOGLOBIN GLYCOSYLATED A1C: CPT | Mod: HCNC

## 2025-04-03 PROCEDURE — 84443 ASSAY THYROID STIM HORMONE: CPT | Mod: HCNC

## 2025-04-03 NOTE — PROGRESS NOTES
Subjective:   Patient ID: Rocael Perez is a 84 y.o. male.  Chief Complaint:  Annual Exam    Presents with family member for annual physical exam   Unfortunately both patient and family member overall poor historians   Majority of health information obtained from old records reviewable in [facility].  Also followed by Infectious Disease, Cardiology, and Hematology/Oncology      Last visit and annual exam May 2024  Vitamin-D level low. Should take over-the-counter vitamin-D 1000 units daily supplement.  Lipid panel with significant elevations. Should start Crestor 40 mg daily. Prescription sent to pharmacy  A1c 5.8%. Prediabetic range. Stable. No medication needed.  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  No diagnosis of hypertension.  Prescribed Toprol-XL 25 mg daily, Zaroxolyn 2.5 mg daily, Lasix 40 mg daily, and potassium supplementation.  Most recent renal function panel with creatinine 1.5 and GFR 45.6, otherwise normal.  Followed by Dr. Johnson.  - Familial hypercholesteremia with known Coronary artery disease, Aortic atherosclerosis, status post angioplasty but refusal of statin medication/treatment.  On aspirin 81 mg daily and Crestor 40 mg daily. Denies any current chest pain or claudication.  Needs repeat lipid panel.  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  Up-to-date on cardiology follow-up.  - HIV positive.  On Biktarvy.  Up-to-date on Infectious Disease follow-up.    - CLL and thrombocytopenia.  Up-to-date on Hematology/Oncology follow-up.  - Osteoarthritis left knee.  Followed by Dr. Perea Had last knee injection October 2024.  Was previously told needed surgery.  Due to concerns about ability to care for self following procedure and inability to go to outpatient inability to rehab following procedure, surgery deferred.  Continues on Voltaren for shoulder and knee pain.      Health maintenance needs include shingles vaccine and  "RSV vaccine     Recently contacted office for possible oropharyngeal dysphagia   However it is more persistent taste of food in back of throat without regurgitation   Saw ENT   NPL unremarkable   Advised if symptoms persistent would need modified barium swallow         Review of Systems   Constitutional:  Negative for chills, diaphoresis, fatigue and fever.   HENT:  Negative for congestion, dental problem, ear discharge, ear pain, postnasal drip, rhinorrhea, sinus pressure, sinus pain, sore throat, trouble swallowing and voice change.    Eyes:  Negative for pain, redness and visual disturbance.   Respiratory:  Negative for cough, chest tightness, shortness of breath and wheezing.    Cardiovascular:  Negative for chest pain, palpitations and leg swelling.   Gastrointestinal:  Negative for abdominal pain, constipation, diarrhea, nausea and vomiting.   Endocrine: Negative for cold intolerance, heat intolerance, polydipsia, polyphagia and polyuria.   Genitourinary:  Negative for difficulty urinating, dysuria, flank pain, frequency, hematuria and urgency.   Musculoskeletal:  Positive for arthralgias, gait problem, joint swelling and myalgias. Negative for neck pain.   Skin:  Negative for rash.   Neurological:  Positive for weakness. Negative for dizziness, tremors, syncope, light-headedness, numbness and headaches.   Hematological:  Negative for adenopathy. Does not bruise/bleed easily.   Psychiatric/Behavioral:  Negative for sleep disturbance. The patient is not nervous/anxious.      Objective:   /84 (BP Location: Right arm, Patient Position: Sitting)   Pulse 82   Ht 5' 9" (1.753 m)   Wt 85.5 kg (188 lb 7.9 oz)   SpO2 95%   BMI 27.84 kg/m²     Physical Exam  Vitals and nursing note reviewed.   Constitutional:       Appearance: Normal appearance. He is well-developed and overweight.   Eyes:      General: No scleral icterus.     Conjunctiva/sclera: Conjunctivae normal.   Neck:      Vascular: Normal carotid " pulses. No carotid bruit or JVD.   Cardiovascular:      Rate and Rhythm: Normal rate and regular rhythm.      Heart sounds: Murmur heard.   Pulmonary:      Effort: Pulmonary effort is normal.      Breath sounds: Normal breath sounds.   Abdominal:      General: There is no distension.      Palpations: Abdomen is soft.      Tenderness: There is no abdominal tenderness.   Musculoskeletal:      Right lower leg: No edema.      Left lower leg: No edema.   Skin:     Findings: No rash.   Neurological:      Motor: Weakness present.      Gait: Gait abnormal.   Psychiatric:         Attention and Perception: He is inattentive. He does not perceive auditory or visual hallucinations.         Cognition and Memory: Cognition is impaired. Memory is impaired.       Assessment:       ICD-10-CM ICD-9-CM   1. Annual physical exam  Z00.00 V70.0   2. Personal history of other endocrine, nutritional and metabolic disease  Z86.39 V12.29   3. Familial hypercholesterolemia  E78.01 272.0   4. Atherosclerosis of native coronary artery of native heart without angina pectoris  I25.10 414.01   5. Atherosclerosis of aorta  I70.0 440.0   6. Presence of coronary angioplasty implant and graft  Z95.5 V45.82   7. Prediabetes  R73.03 790.29   8. Stage 3b chronic kidney disease  N18.32 585.3   9. Vitamin D deficiency  E55.9 268.9   10. Chronic combined systolic and diastolic congestive heart failure  I50.42 428.42     428.0   11. Nonrheumatic aortic valve stenosis  I35.0 424.1   12. Pulmonary heart disease  I27.9 416.9   13. CLL (chronic lymphocytic leukemia)  C91.10 204.10   14. Thrombocytopenia  D69.6 287.5   15. HIV positive  Z21 V08   16. Primary osteoarthritis of left knee  M17.12 715.16     Plan:   Annual physical exam  Personal history of other endocrine, nutritional and metabolic disease  -     Lipid Panel; Future; Expected date: 04/03/2025  -     Hemoglobin A1C; Future; Expected date: 04/03/2025  -     TSH; Future; Expected date: 04/03/2025  -      Vitamin D; Future; Expected date: 04/03/2025  Blood pressure normal.  BMI 28.    Check labs.  Treat as indicated.    Colon cancer screening discontinue based on age   Prostate cancer screening discontinue based on age   Recommend shingles vaccine series and RSV vaccine through pharmacy if agreeable with Infectious Disease   Other immunizations up-to-date    Familial hypercholesterolemia  Atherosclerosis of native coronary artery of native heart without angina pectoris  Atherosclerosis of aorta  Presence of coronary angioplasty implant and graft  -     Lipid Panel; Future; Expected date: 04/03/2025  Asymptomatic   Check lipid panel   Continue Crestor 40 mg daily   Continue aspirin 81 mg daily  Additional agent as needed     Prediabetes  -     Hemoglobin A1C; Future; Expected date: 04/03/2025  Asymptomatic   If A1c less than 6.5%, okay remain off medication   If A1c greater than 6.5%, start medication     Stage 3b chronic kidney disease  Stable chronic kidney disease stage 3 on most recent renal function panel   Follow-up nephrology as scheduled    Vitamin D deficiency  -     Vitamin D; Future; Expected date: 04/03/2025  Continue daily low-dose or start weekly high-dose supplement if indicated     Chronic combined systolic and diastolic congestive heart failure  Nonrheumatic aortic valve stenosis  Pulmonary heart disease  Continue Toprol-XL, Zaroxolyn, Lasix with potassium supplementation per Cardiology   Follow-up cardiology as scheduled     CLL (chronic lymphocytic leukemia)  Thrombocytopenia  Stable on most recent CBC   Follow-up Hematology/Oncology as scheduled     HIV positive  Stable on most recent lab work   Continue Biktarvy  Follow up Infectious Disease as scheduled     Primary osteoarthritis of left knee  Pain pattern stable   Continue topical Voltaren  Follow up Orthopedics as scheduled     Oropharyngeal dysphagia   Not truly described as swallowing difficulty today   More persistent/recurrent taste of  food and back of throat   NPL normal   No reflux pharyngitis   No suspicion for GERD  Offered but declines modified barium swallow at this time     Return to clinic 1 year for annual exam or sooner as needed

## 2025-04-04 ENCOUNTER — RESULTS FOLLOW-UP (OUTPATIENT)
Dept: INTERNAL MEDICINE | Facility: CLINIC | Age: 85
End: 2025-04-04

## 2025-04-04 LAB
25(OH)D3+25(OH)D2 SERPL-MCNC: 22 NG/ML (ref 30–96)
EAG (OHS): 140 MG/DL (ref 68–131)
HBA1C MFR BLD: 6.5 % (ref 4–5.6)

## 2025-04-25 ENCOUNTER — OFFICE VISIT (OUTPATIENT)
Dept: INTERNAL MEDICINE | Facility: CLINIC | Age: 85
End: 2025-04-25
Payer: MEDICARE

## 2025-04-25 VITALS
RESPIRATION RATE: 18 BRPM | DIASTOLIC BLOOD PRESSURE: 60 MMHG | HEART RATE: 78 BPM | TEMPERATURE: 96 F | SYSTOLIC BLOOD PRESSURE: 102 MMHG

## 2025-04-25 DIAGNOSIS — R26.9 ABNORMALITY OF GAIT AND MOBILITY: ICD-10-CM

## 2025-04-25 DIAGNOSIS — R35.1 NOCTURIA ASSOCIATED WITH BENIGN PROSTATIC HYPERPLASIA: ICD-10-CM

## 2025-04-25 DIAGNOSIS — I27.9 PULMONARY HEART DISEASE: ICD-10-CM

## 2025-04-25 DIAGNOSIS — D47.2 MGUS (MONOCLONAL GAMMOPATHY OF UNKNOWN SIGNIFICANCE): ICD-10-CM

## 2025-04-25 DIAGNOSIS — N40.1 NOCTURIA ASSOCIATED WITH BENIGN PROSTATIC HYPERPLASIA: ICD-10-CM

## 2025-04-25 DIAGNOSIS — Z00.00 ENCOUNTER FOR MEDICARE ANNUAL WELLNESS EXAM: Primary | ICD-10-CM

## 2025-04-25 DIAGNOSIS — N18.32 STAGE 3B CHRONIC KIDNEY DISEASE: ICD-10-CM

## 2025-04-25 DIAGNOSIS — E78.01 FAMILIAL HYPERCHOLESTEROLEMIA: ICD-10-CM

## 2025-04-25 DIAGNOSIS — I50.42 CHRONIC COMBINED SYSTOLIC AND DIASTOLIC CONGESTIVE HEART FAILURE: ICD-10-CM

## 2025-04-25 DIAGNOSIS — I25.10 ATHEROSCLEROSIS OF NATIVE CORONARY ARTERY OF NATIVE HEART WITHOUT ANGINA PECTORIS: ICD-10-CM

## 2025-04-25 DIAGNOSIS — Z21 HIV POSITIVE: ICD-10-CM

## 2025-04-25 DIAGNOSIS — E11.9 NEW ONSET TYPE 2 DIABETES MELLITUS: ICD-10-CM

## 2025-04-25 DIAGNOSIS — E55.9 VITAMIN D DEFICIENCY: ICD-10-CM

## 2025-04-25 DIAGNOSIS — C91.10 CLL (CHRONIC LYMPHOCYTIC LEUKEMIA): ICD-10-CM

## 2025-04-25 DIAGNOSIS — I35.0 NONRHEUMATIC AORTIC VALVE STENOSIS: ICD-10-CM

## 2025-04-25 DIAGNOSIS — M17.12 PRIMARY OSTEOARTHRITIS OF LEFT KNEE: ICD-10-CM

## 2025-04-25 DIAGNOSIS — D69.6 THROMBOCYTOPENIA: ICD-10-CM

## 2025-04-25 DIAGNOSIS — Z99.89 DEPENDENCE ON OTHER ENABLING MACHINES AND DEVICES: ICD-10-CM

## 2025-04-25 DIAGNOSIS — I70.0 ATHEROSCLEROSIS OF AORTA: ICD-10-CM

## 2025-04-25 PROCEDURE — 99999 PR PBB SHADOW E&M-EST. PATIENT-LVL IV: CPT | Mod: PBBFAC,HCNC,, | Performed by: NURSE PRACTITIONER

## 2025-04-25 NOTE — PROGRESS NOTES
Rocael Perez presented for a follow-up Medicare AWV today. The following components were reviewed and updated:  Patient accompanied by  sister , who was present throughout the visit and aided in information gathering.        Medical history  Family History  Social history  Allergies and Current Medications  Health Risk Assessment  Health Maintenance  Care Team    **See Completed Assessments for Annual Wellness visit with in the encounter summary    The following assessments were completed:  Depression Screening  Cognitive function Screening  Timed Get Up Test  Whisper Test      Opioid documentation:      Patient does not have a current opioid prescription.          Vitals:    04/25/25 1333   BP: 102/60   Patient Position: Sitting   Pulse: 78   Resp: 18   Temp: 96.4 °F (35.8 °C)     There is no height or weight on file to calculate BMI.       Physical Exam  Constitutional:       Appearance: He is ill-appearing.   Cardiovascular:      Rate and Rhythm: Normal rate.      Heart sounds: Murmur heard.   Pulmonary:      Effort: Pulmonary effort is normal.      Breath sounds: Examination of the right-lower field reveals decreased breath sounds. Examination of the left-lower field reveals decreased breath sounds. Decreased breath sounds present.   Neurological:      Mental Status: He is alert. Mental status is at baseline.      Motor: Weakness present.      Gait: Gait abnormal.      Comments: In wheelchair     Psychiatric:         Attention and Perception: Attention normal.         Mood and Affect: Affect is flat.         Behavior: Behavior normal.         Thought Content: Thought content normal.         Cognition and Memory: Cognition is impaired.       Current Outpatient Medications   Medication Instructions    aspirin (ECOTRIN) 81 mg, Daily    lraryorod-eepozlrt-mfbwebk ala (BIKTARVY) -25 mg (25 kg or greater) 1 tablet, Oral, Daily    diclofenac sodium (VOLTAREN) 2-4 g, Topical (Top), 4 times daily PRN     furosemide (LASIX) 40 mg, Daily    metOLazone (ZAROXOLYN) 2.5 mg, Daily    metoprolol succinate (TOPROL-XL) 25 mg, Daily    potassium chloride (KLOR-CON) 10 MEQ TbSR 10 mEq, Once    rosuvastatin (CRESTOR) 40 mg, Oral, Nightly         Diagnoses and health risks identified today and associated recommendations/orders:  1. Encounter for Medicare annual wellness exam      2. New onset type 2 diabetes mellitus    Hemoglobin A1c 6.5 High  5.8 High      This is a new problem that has been identified during today's visit.   Discussed and recommend  low fat/carb/chol diet. Cardio exercise as tolerated. Life style modifications.  Follow up with Dr. Ferguson for recommendation    3. HIV positive  Chronic and Stable on BIKARTVY. Continue current treatment plan as previously prescribed with your Infection disease    4. 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   Follow up in  6 months Dr. Li with  LDH, SPEP, FLC, HIV RNA, haptoglobin, CBC, and Comprehensive Metabolic Panel.  Followed by onc md    5. CLL (chronic lymphocytic leukemia)  Continue observation for CLL and MGUS.   Follow up in  6 months Dr. Li with  LDH, SPEP, FLC, HIV RNA, haptoglobin, CBC, and Comprehensive Metabolic Panel.  Followed by oncologist  md       6. Non rheumatic aortic valve stenosis  Chronic and Stable. See meds asymptomatic Continue current treatment plan as previously prescribed with your with Dr. Johnson    7. Atherosclerosis of native coronary artery of native heart without angina pectoris  Chronic and Stable. See meds asymptomatic Continue current treatment plan as previously prescribed with your with Dr. Johnson    8. Stage 3b chronic kidney disease  Chronic and Stable. See meds asymptomatic Continue current treatment plan as previously prescribed with your with Dr. Johnson    9. Pulmonary heart disease  Chronic and Stable. See meds asymptomatic Continue current treatment plan as previously  prescribed with your with Dr. Johnson    10. Thrombocytopenia  Continue observation for CLL and MGUS.   Follow up in  6 months Dr. Li with  LDH, SPEP, FLC, HIV RNA, haptoglobin, CBC, and Comprehensive Metabolic Panel.  Followed by oncologist  md     11. Chronic combined systolic and diastolic congestive heart failure  Chronic and Stable. See meds asymptomatic Continue current treatment plan as previously prescribed with your with Dr. Johnson    12. Dependence on other enabling machines and devices  Chronic and Ongoing with walker and wheelchair-falls in the past.     13. Abnormality of gait and mobility  Chronic and Ongoing with walker and wheelchair-falls in the past.     14. Atherosclerosis of aorta  Chronic and Stable. See meds asymptomatic Continue current treatment plan as previously prescribed with your with Dr. Johnson    15. Familial hypercholesterolemia  Chronic and Stable. See meds asymptomatic Continue current treatment plan as previously prescribed with your with Dr. Johnson    16. Nocturia associated with benign prostatic hyperplasia  Chronic and Ongoing. Continue current treatment plan as previously prescribed with your urologist    17. Vitamin D deficiency  Start vitamin D 1000 iu daily  Follow up PCP    18. Primary osteoarthritis of left knee  Chronic and Ongoing. On Tylenol Continue current treatment plan as previously prescribed with your PCP    I offered to discuss advanced care planning, including how to pick a person who would make decisions for you if you were unable to make them for yourself, called a health care power of , and what kind of decisions you might make such as use of life sustaining treatments such as ventilators and tube feeding when faced with a life limiting illness recorded on a living will that they will need to know. (How you want to be cared for as you near the end of your natural life)     X  Patient is unable to engage in a discussion regarding advanced directives  due to severe physical and/or cognitive impairment.    Provided Rocael with a 5-10 year written screening schedule and personal prevention plan. Recommendations were developed using the USPSTF age appropriate recommendations. Education, counseling, and referrals were provided as needed.  After Visit Summary printed and given to patient which includes a list of additional screenings\tests needed.    Follow up in about 1 year (around 4/25/2026).    hCey Henry NP

## 2025-04-25 NOTE — PATIENT INSTRUCTIONS
Counseling and Referral of Other Preventative  (Italic type indicates deductible and co-insurance are waived)    Patient Name: Rocael Perez  Today's Date: 4/25/2025    Health Maintenance       Date Due Completion Date    RSV Vaccine (Age 60+ and Pregnant patients) (1 - 1-dose 75+ series) Never done ---    Shingles Vaccine (2 of 2) 08/30/2022 7/5/2022    Hemoglobin A1c (Prediabetes) 04/03/2026 4/3/2025    Aspirin/Antiplatelet Therapy 04/25/2026 4/25/2025    TETANUS VACCINE 09/18/2029 9/18/2019    Lipid Panel 04/03/2030 4/3/2025        No orders of the defined types were placed in this encounter.      The following information is provided to all patients.  This information is to help you find resources for any of the problems found today that may be affecting your health:                  Living healthy guide: www.[facility].[place].gov      Understanding Diabetes: www.diabetes.org      Eating healthy: www.cdc.gov/healthyweight      [facility] home safety checklist: www.cdc.gov/steadi/patient.html      Agency on Aging: www.goea.[place].[facility]      Alcoholics anonymous (AA): www.aa.org      Physical Activity: www.claus.nih.gov/au0mdsp      Tobacco use: www.quitwithusla.org

## 2025-04-25 NOTE — PROGRESS NOTES
Rocael Perez presented for a follow-up Medicare AWV today. The following components were reviewed and updated:    Medical history  Family History  Social history  Allergies and Current Medications  Health Risk Assessment  Health Maintenance  Care Team    **See Completed Assessments for Annual Wellness visit with in the encounter summary    The following assessments were completed:  Depression Screening  Cognitive function Screening  Timed Get Up Test  Whisper Test      Opioid documentation:      Patient does not have a current opioid prescription.   {Stop here if answer is 'does not'. (This text will automatically delete.) :[id number]}       Vitals:    04/25/25 1333   BP: 102/60   Patient Position: Sitting   Pulse: 78   Resp: 18   Temp: 96.4 °F (35.8 °C)     There is no height or weight on file to calculate BMI.       Physical Exam  Constitutional:       Appearance: He is ill-appearing.   HENT:      Head: Normocephalic.   Cardiovascular:      Rate and Rhythm: Normal rate.      Heart sounds: Murmur heard.   Neurological:      Mental Status: He is alert.      Motor: Weakness present.       Diagnoses and health risks identified today and associated recommendations/orders:  1. Encounter for Medicare annual wellness exam  ***    2. New onset type 2 diabetes mellitus  ***    3. HIV positive  ***    4. MGUS (monoclonal gammopathy of unknown significance)  ***    5. CLL (chronic lymphocytic leukemia)  ***    6. Nonrheumatic aortic valve stenosis  ***    7. Atherosclerosis of native coronary artery of native heart without angina pectoris  ***    8. Stage 3b chronic kidney disease  ***    9. Pulmonary heart disease  ***    10. Thrombocytopenia  ***    11. Chronic combined systolic and diastolic congestive heart failure  ***    12. Dependence on other enabling machines and devices  ***    13. Abnormality of gait and mobility  ***    14. Atherosclerosis of aorta  ***    15. Familial hypercholesterolemia  ***    16. Nocturia  associated with benign prostatic hyperplasia  ***    17. Vitamin D deficiency  ***    18. Primary osteoarthritis of left knee  ***      Provided Rocael with a 5-10 year written screening schedule and personal prevention plan. Recommendations were developed using the USPSTF age appropriate recommendations. Education, counseling, and referrals were provided as needed.  After Visit Summary printed and given to patient which includes a list of additional screenings\tests needed.    Follow up in about 1 year (around 4/25/2026).      Chey Henry NP

## 2025-05-13 DIAGNOSIS — I70.0 ATHEROSCLEROSIS OF AORTA: ICD-10-CM

## 2025-05-13 DIAGNOSIS — I25.10 ATHEROSCLEROSIS OF NATIVE CORONARY ARTERY OF NATIVE HEART WITHOUT ANGINA PECTORIS: ICD-10-CM

## 2025-05-13 DIAGNOSIS — E78.01 FAMILIAL HYPERCHOLESTEROLEMIA: ICD-10-CM

## 2025-05-13 RX ORDER — ROSUVASTATIN CALCIUM 40 MG/1
40 TABLET, COATED ORAL NIGHTLY
Qty: 90 TABLET | Refills: 1 | Status: SHIPPED | OUTPATIENT
Start: 2025-05-13

## 2025-05-13 NOTE — TELEPHONE ENCOUNTER
Refill Decision Note   Rocael Perez  is requesting a refill authorization.  Brief Assessment and Rationale for Refill:  Approve     Medication Therapy Plan:         Comments:     Note composed:7:57 AM 05/13/2025

## 2025-05-13 NOTE — TELEPHONE ENCOUNTER
No care due was identified.  [facility] Embedded Care Due Messages. Reference number: [id number].   5/13/2025 12:18:33 AM CDT

## 2025-05-27 ENCOUNTER — TELEPHONE (OUTPATIENT)
Dept: UROLOGY | Facility: CLINIC | Age: 85
End: 2025-05-27
Payer: MEDICARE

## 2025-05-28 ENCOUNTER — TELEPHONE (OUTPATIENT)
Dept: INTERNAL MEDICINE | Facility: CLINIC | Age: 85
End: 2025-05-28
Payer: MEDICARE

## 2025-05-28 NOTE — TELEPHONE ENCOUNTER
Spoke with patient sister; she stated she would like patient to be referred to Dr Julien-Urologist; MA advised her she would reach out to staff to schedule appt /LD

## 2025-06-09 ENCOUNTER — OFFICE VISIT (OUTPATIENT)
Dept: INFECTIOUS DISEASES | Facility: CLINIC | Age: 85
End: 2025-06-09
Payer: MEDICARE

## 2025-06-09 ENCOUNTER — LAB VISIT (OUTPATIENT)
Dept: LAB | Facility: HOSPITAL | Age: 85
End: 2025-06-09
Attending: INTERNAL MEDICINE
Payer: MEDICARE

## 2025-06-09 VITALS
HEIGHT: 69 IN | HEART RATE: 52 BPM | OXYGEN SATURATION: 96 % | TEMPERATURE: 98 F | DIASTOLIC BLOOD PRESSURE: 57 MMHG | BODY MASS INDEX: 27.92 KG/M2 | SYSTOLIC BLOOD PRESSURE: 104 MMHG | WEIGHT: 188.5 LBS

## 2025-06-09 DIAGNOSIS — C91.10 CLL (CHRONIC LYMPHOCYTIC LEUKEMIA): ICD-10-CM

## 2025-06-09 DIAGNOSIS — N18.32 STAGE 3B CHRONIC KIDNEY DISEASE: ICD-10-CM

## 2025-06-09 DIAGNOSIS — Z21 HIV POSITIVE: Primary | ICD-10-CM

## 2025-06-09 DIAGNOSIS — Z21 HIV POSITIVE: ICD-10-CM

## 2025-06-09 LAB
ABSOLUTE EOSINOPHIL (OHS): 0.12 K/UL
ABSOLUTE MONOCYTE (OHS): 0.78 K/UL (ref 0.3–1)
ABSOLUTE NEUTROPHIL COUNT (OHS): 2.2 K/UL (ref 1.8–7.7)
ALBUMIN SERPL BCP-MCNC: 3.5 G/DL (ref 3.5–5.2)
ALP SERPL-CCNC: 66 UNIT/L (ref 40–150)
ALT SERPL W/O P-5'-P-CCNC: 13 UNIT/L (ref 10–44)
ANION GAP (OHS): 14 MMOL/L (ref 8–16)
AST SERPL-CCNC: 27 UNIT/L (ref 11–45)
BASOPHILS # BLD AUTO: 0.04 K/UL
BASOPHILS NFR BLD AUTO: 0.8 %
BILIRUB SERPL-MCNC: 0.6 MG/DL (ref 0.1–1)
BUN SERPL-MCNC: 30 MG/DL (ref 8–23)
CALCIUM SERPL-MCNC: 9.8 MG/DL (ref 8.7–10.5)
CHLORIDE SERPL-SCNC: 93 MMOL/L (ref 95–110)
CO2 SERPL-SCNC: 29 MMOL/L (ref 23–29)
CREAT SERPL-MCNC: 2.5 MG/DL (ref 0.5–1.4)
ERYTHROCYTE [DISTWIDTH] IN BLOOD BY AUTOMATED COUNT: 16 % (ref 11.5–14.5)
GFR SERPLBLD CREATININE-BSD FMLA CKD-EPI: 25 ML/MIN/1.73/M2
GLUCOSE SERPL-MCNC: 90 MG/DL (ref 70–110)
HCT VFR BLD AUTO: 38.4 % (ref 40–54)
HGB BLD-MCNC: 12.6 GM/DL (ref 14–18)
IMM GRANULOCYTES # BLD AUTO: 0.01 K/UL (ref 0–0.04)
IMM GRANULOCYTES NFR BLD AUTO: 0.2 % (ref 0–0.5)
LYMPHOCYTES # BLD AUTO: 1.98 K/UL (ref 1–4.8)
MCH RBC QN AUTO: 31.1 PG (ref 27–31)
MCHC RBC AUTO-ENTMCNC: 32.8 G/DL (ref 32–36)
MCV RBC AUTO: 95 FL (ref 82–98)
NUCLEATED RBC (/100WBC) (OHS): 0 /100 WBC
PLATELET # BLD AUTO: 193 K/UL (ref 150–450)
PMV BLD AUTO: 11.6 FL (ref 9.2–12.9)
POTASSIUM SERPL-SCNC: 2.9 MMOL/L (ref 3.5–5.1)
PROT SERPL-MCNC: 8.3 GM/DL (ref 6–8.4)
RBC # BLD AUTO: 4.05 M/UL (ref 4.6–6.2)
RELATIVE EOSINOPHIL (OHS): 2.3 %
RELATIVE LYMPHOCYTE (OHS): 38.6 % (ref 18–48)
RELATIVE MONOCYTE (OHS): 15.2 % (ref 4–15)
RELATIVE NEUTROPHIL (OHS): 42.9 % (ref 38–73)
SODIUM SERPL-SCNC: 136 MMOL/L (ref 136–145)
WBC # BLD AUTO: 5.13 K/UL (ref 3.9–12.7)

## 2025-06-09 PROCEDURE — 87536 HIV-1 QUANT&REVRSE TRNSCRPJ: CPT | Mod: HCNC

## 2025-06-09 PROCEDURE — 80053 COMPREHEN METABOLIC PANEL: CPT | Mod: HCNC

## 2025-06-09 PROCEDURE — 36415 COLL VENOUS BLD VENIPUNCTURE: CPT | Mod: HCNC

## 2025-06-09 PROCEDURE — 99214 OFFICE O/P EST MOD 30 MIN: CPT | Mod: HCNC,S$GLB,, | Performed by: INTERNAL MEDICINE

## 2025-06-09 PROCEDURE — 85025 COMPLETE CBC W/AUTO DIFF WBC: CPT | Mod: HCNC

## 2025-06-09 NOTE — ASSESSMENT & PLAN NOTE
He has good virologic and immunologic control of HIV.     Will continue Biktarvy .  Will do labs today =last labs- 12/2024

## 2025-06-09 NOTE — PROGRESS NOTES
HIV Follow-up Visit  Rocael Perez is here for follow-up of HIV infection. He is feeling better since his last visit.       Patient is compliant with HIV medications.  Patient does not have side effects with the HIV medications.none  Blood work was not done since 12.2024  Last blood work -12/2024- HIV RNA- neg  He is on Biktarvy  He is here with the sister  The following portions of the patient's history were reviewed and updated as appropriate: allergies, current medications, past family history, past medical history, past social history, past surgical history, and problem list.    Review of Systems   Constitutional:  Negative for chills, fever, malaise/fatigue and weight loss.   Cardiovascular:  Negative for chest pain and palpitations.   Neurological:  Negative for tingling and tremors.   Psychiatric/Behavioral:  Negative for depression and suicidal ideas.           Physical Exam  Vitals reviewed.   HENT:      Nose: Nose normal.      Mouth/Throat:      Mouth: Mucous membranes are moist.   Cardiovascular:      Rate and Rhythm: Normal rate.   Musculoskeletal:      Comments: On a wheelcahir   Neurological:      General: No focal deficit present.      Mental Status: He is alert.          Immunization History   Administered Date(s) Administered    COVID-19, MRNA, LN-S, PF (Pfizer) (Purple Cap) 02/24/2021, 03/20/2021, 09/28/2021    COVID-19, mRNA, LNP-S, PF (Moderna) Ages 12+ 10/13/2023    COVID-19, mRNA, LNP-S, PF, andres-sucrose, 30 mcg/0.3 mL (Pfizer Ages 12+) 12/09/2024    COVID-19, mRNA, LNP-S, bivalent booster, PF (Moderna Omicron)12 + YEARS 10/13/2023    COVID-19, mRNA, LNP-S, bivalent booster, PF (PFIZER OMICRON) 10/14/2022    Influenza (FLUAD) - Quadrivalent - Adjuvanted - PF *Preferred* (65+) 10/26/2022, 09/26/2023    Influenza - Quadrivalent 08/09/2017    Influenza - Quadrivalent - High Dose - PF (65 years and older) 11/20/2020    Influenza - Quadrivalent - PF *Preferred* (6 months and older) 09/18/2019     Influenza - Trivalent - Fluad - Adjuvanted - PF (65 years and older 12/09/2024    Influenza - Trivalent - Fluarix, Flulaval, Fluzone, Afluria - PF 10/16/2018    Influenza - Trivalent - Fluzone High Dose - PF (65 years and older) 11/07/2015, 08/09/2017    Pneumococcal Conjugate - 13 Valent 08/09/2017    Pneumococcal Polysaccharide - 23 Valent 06/25/2015, 10/16/2018    Tdap 09/18/2019    Zoster Recombinant 07/05/2022             Assessment:  1. HIV positive        2. Stage 3b chronic kidney disease            Problem List[1]  1. HIV positive  Assessment & Plan:  He has good virologic and immunologic control of HIV.     Will continue Biktarvy .  Will do labs today =last labs- 12/2024      2. Stage 3b chronic kidney disease  Assessment & Plan:  Follow PCP      3. CLL (chronic lymphocytic leukemia)  Assessment & Plan:  Follow oncology              The patient has been counseled regarding the importance of compliance with every dose of HIV medications. Possible side effects have been reviewed and the patients questions have been answered.         [1]   Patient Active Problem List  Diagnosis    Primary osteoarthritis of left knee    Familial hypercholesterolemia    Chronic combined systolic and diastolic congestive heart failure    CLL (chronic lymphocytic leukemia)    HIV positive    Thrombocytopenia    Atherosclerosis of aorta    Atherosclerosis of native coronary artery of native heart without angina pectoris    Liver lesion    Nonrheumatic aortic valve stenosis    Pulmonary heart disease    Presence of coronary angioplasty implant and graft    Vitamin D deficiency    Nocturia associated with benign prostatic hyperplasia    MGUS (monoclonal gammopathy of unknown significance)    Stage 3b chronic kidney disease    Prediabetes    New onset type 2 diabetes mellitus

## 2025-06-10 LAB — HIV1 RNA SERPL NAA+PROBE-LOG#: NOT DETECTED {LOG_COPIES}/ML

## 2025-06-12 ENCOUNTER — OFFICE VISIT (OUTPATIENT)
Dept: UROLOGY | Facility: CLINIC | Age: 85
End: 2025-06-12
Payer: MEDICARE

## 2025-06-12 VITALS
HEIGHT: 69 IN | SYSTOLIC BLOOD PRESSURE: 98 MMHG | HEART RATE: 71 BPM | WEIGHT: 188.5 LBS | BODY MASS INDEX: 27.92 KG/M2 | DIASTOLIC BLOOD PRESSURE: 58 MMHG

## 2025-06-12 DIAGNOSIS — N39.41 URGE INCONTINENCE OF URINE: Primary | ICD-10-CM

## 2025-06-12 LAB
BACTERIA #/AREA URNS HPF: ABNORMAL /HPF
BILIRUBIN, UA POC OHS: NEGATIVE
BLOOD, UA POC OHS: ABNORMAL
CLARITY, UA POC OHS: CLEAR
COLOR, UA POC OHS: YELLOW
GLUCOSE, UA POC OHS: NEGATIVE
HYALINE CASTS #/AREA URNS LPF: 15 /LPF (ref 0–1)
KETONES, UA POC OHS: NEGATIVE
LEUKOCYTES, UA POC OHS: NEGATIVE
MICROSCOPIC COMMENT: ABNORMAL
NITRITE, UA POC OHS: NEGATIVE
PH, UA POC OHS: 7.5
POC RESIDUAL URINE VOLUME: 53 ML (ref 0–100)
PROTEIN, UA POC OHS: >=300
RBC #/AREA URNS HPF: 3 /HPF (ref 0–4)
SPECIFIC GRAVITY, UA POC OHS: 1.02
SQUAMOUS #/AREA URNS HPF: 3 /HPF
UROBILINOGEN, UA POC OHS: 0.2
WBC #/AREA URNS HPF: 5 /HPF (ref 0–5)

## 2025-06-12 PROCEDURE — 99999 PR PBB SHADOW E&M-EST. PATIENT-LVL III: CPT | Mod: PBBFAC,HCNC,, | Performed by: UROLOGY

## 2025-06-12 PROCEDURE — 81000 URINALYSIS NONAUTO W/SCOPE: CPT | Mod: HCNC | Performed by: UROLOGY

## 2025-06-12 RX ORDER — CHOLECALCIFEROL (VITAMIN D3) 25 MCG
1000 TABLET ORAL DAILY
COMMUNITY

## 2025-06-12 RX ORDER — VIBEGRON 75 MG/1
75 TABLET, FILM COATED ORAL DAILY
Qty: 30 TABLET | Refills: 3 | Status: SHIPPED | OUTPATIENT
Start: 2025-06-12 | End: 2025-10-10

## 2025-06-12 NOTE — PROGRESS NOTES
"Chief Complaint:   Encounter Diagnosis   Name Primary?    Urge incontinence of urine Yes       HPI:  HPI Rocael Perez mehreen 85 y.o. male who presents with his sister for evaluation of urinary incontinence.  His sister has started taking care him about 3 years ago.  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  He does have a history of CHF in his on Lasix.  She knows that he used to see Dr. Payan about 7-8 years ago.  It is unclear for what.    History:  Social History[1]  Past Medical History:   Diagnosis Date    CHF (congestive heart failure)     CLL (chronic lymphocytic leukemia)     HIV (human immunodeficiency virus infection)     Hyperlipidemia     MGUS (monoclonal gammopathy of unknown significance)      Past Surgical History:   Procedure Laterality Date    HERNIA REPAIR      SHOULDER SURGERY Right      Family History   Problem Relation Name Age of Onset    Diabetes Mother         Medications Ordered Prior to Encounter[2]     Objective:     Vitals:    06/12/25 1110   BP: (!) 98/58   Pulse: 71   Weight: 85.5 kg (188 lb 7.9 oz)   Height: 5' 9" (1.753 m)      BMI Readings from Last 1 Encounters:   06/12/25 27.84 kg/m²          Physical Exam    Patient was able to stand with some assistance   Respirations even unlabored   Abdomen is thin   Digital rectal exam revealed a benign 30 g gland    Lab Results   Component Value Date    CREATININE 2.5 (H) 06/09/2025      Assessment:       1. Urge incontinence of urine        Plan:     1. Urge incontinence of urine       Orders Placed This Encounter    Urinalysis Microscopic    POCT Urinalysis(Instrument)    POCT Bladder Scan    vibegron (GEMTESA) 75 mg Tab     Suspect predominantly urge incontinence.  This may be related to aging or underlying neurological issue.  I will start him on Gemtesa daily.  We will re-evaluate him in a few months.  We will send urine for microscopy as there is blood in the urine today.  There was no " significant BPH or evidence of prostate cancer.on exam        [1]   Social History  Tobacco Use    Smoking status: Former     Types: Cigarettes     Passive exposure: Past    Smokeless tobacco: Never   Substance Use Topics    Alcohol use: Not Currently    Drug use: Never   [2]   Current Outpatient Medications on File Prior to Visit   Medication Sig Dispense Refill    aspirin (ECOTRIN) 81 MG EC tablet Take 81 mg by mouth once daily.      kpfftstgt-fwtsrlmf-qjvqkdy ala (BIKTARVY) -25 mg (25 kg or greater) Take 1 tablet by mouth once daily. 30 tablet 5    diclofenac sodium (VOLTAREN) 1 % Gel Apply 2-4 g topically 4 (four) times daily as needed (knee pain). 100 g 2    furosemide (LASIX) 40 MG tablet Take 40 mg by mouth once daily.      metOLazone (ZAROXOLYN) 2.5 MG tablet Take 2.5 mg by mouth once daily.      metoprolol succinate (TOPROL-XL) 25 MG 24 hr tablet Take 25 mg by mouth once daily.      potassium chloride (KLOR-CON) 10 MEQ TbSR Take 10 mEq by mouth once.      rosuvastatin (CRESTOR) 40 MG Tab TAKE 1 TABLET BY MOUTH EVERY DAY IN THE EVENING 90 tablet 1    vitamin D (VITAMIN D3) 1000 units Tab Take 1,000 Units by mouth once daily.       No current facility-administered medications on file prior to visit.

## 2025-06-16 RX ORDER — VIBEGRON 75 MG/1
1 TABLET, FILM COATED ORAL
Qty: 90 TABLET | Refills: 0 | Status: ON HOLD | OUTPATIENT
Start: 2025-06-16

## 2025-06-19 ENCOUNTER — HOSPITAL ENCOUNTER (INPATIENT)
Facility: HOSPITAL | Age: 85
LOS: 5 days | Discharge: NURSING, REHAB OR LONG-TERM CARE | DRG: 683 | End: 2025-06-25
Attending: EMERGENCY MEDICINE | Admitting: STUDENT IN AN ORGANIZED HEALTH CARE EDUCATION/TRAINING PROGRAM
Payer: MEDICARE

## 2025-06-19 ENCOUNTER — TELEPHONE (OUTPATIENT)
Dept: INFECTIOUS DISEASES | Facility: CLINIC | Age: 85
End: 2025-06-19
Payer: MEDICARE

## 2025-06-19 ENCOUNTER — RESULTS FOLLOW-UP (OUTPATIENT)
Dept: INFECTIOUS DISEASES | Facility: CLINIC | Age: 85
End: 2025-06-19

## 2025-06-19 DIAGNOSIS — N17.9 ACUTE RENAL FAILURE SUPERIMPOSED ON STAGE 3 CHRONIC KIDNEY DISEASE, UNSPECIFIED ACUTE RENAL FAILURE TYPE, UNSPECIFIED WHETHER STAGE 3A OR 3B CKD: Primary | ICD-10-CM

## 2025-06-19 DIAGNOSIS — I50.9 CHF (CONGESTIVE HEART FAILURE): ICD-10-CM

## 2025-06-19 DIAGNOSIS — R07.9 CHEST PAIN: ICD-10-CM

## 2025-06-19 DIAGNOSIS — N18.30 ACUTE RENAL FAILURE SUPERIMPOSED ON STAGE 3 CHRONIC KIDNEY DISEASE, UNSPECIFIED ACUTE RENAL FAILURE TYPE, UNSPECIFIED WHETHER STAGE 3A OR 3B CKD: Primary | ICD-10-CM

## 2025-06-19 DIAGNOSIS — E87.6 HYPOKALEMIA: ICD-10-CM

## 2025-06-19 PROBLEM — E78.5 HLD (HYPERLIPIDEMIA): Status: ACTIVE | Noted: 2025-06-19

## 2025-06-19 PROBLEM — R13.10 DYSPHAGIA: Status: ACTIVE | Noted: 2025-06-19

## 2025-06-19 LAB
ABSOLUTE EOSINOPHIL (OHS): 0.03 K/UL
ABSOLUTE MONOCYTE (OHS): 0.84 K/UL (ref 0.3–1)
ABSOLUTE NEUTROPHIL COUNT (OHS): 2.12 K/UL (ref 1.8–7.7)
ALBUMIN SERPL BCP-MCNC: 3.3 G/DL (ref 3.5–5.2)
ALP SERPL-CCNC: 64 UNIT/L (ref 40–150)
ALT SERPL W/O P-5'-P-CCNC: 12 UNIT/L (ref 10–44)
ANION GAP (OHS): 10 MMOL/L (ref 8–16)
ANION GAP (OHS): 14 MMOL/L (ref 8–16)
AST SERPL-CCNC: 21 UNIT/L (ref 11–45)
BACTERIA #/AREA URNS AUTO: NORMAL /HPF
BASOPHILS # BLD AUTO: 0.02 K/UL
BASOPHILS NFR BLD AUTO: 0.4 %
BILIRUB SERPL-MCNC: 0.7 MG/DL (ref 0.1–1)
BILIRUB UR QL STRIP.AUTO: NEGATIVE
BNP SERPL-MCNC: 161 PG/ML (ref 0–99)
BUN SERPL-MCNC: 25 MG/DL (ref 8–23)
BUN SERPL-MCNC: 27 MG/DL (ref 8–23)
CALCIUM SERPL-MCNC: 10.1 MG/DL (ref 8.7–10.5)
CALCIUM SERPL-MCNC: 10.4 MG/DL (ref 8.7–10.5)
CHLORIDE SERPL-SCNC: 92 MMOL/L (ref 95–110)
CHLORIDE SERPL-SCNC: 96 MMOL/L (ref 95–110)
CK SERPL-CCNC: 132 U/L (ref 20–200)
CLARITY UR: CLEAR
CO2 SERPL-SCNC: 26 MMOL/L (ref 23–29)
CO2 SERPL-SCNC: 30 MMOL/L (ref 23–29)
COLOR UR AUTO: YELLOW
CREAT SERPL-MCNC: 2.2 MG/DL (ref 0.5–1.4)
CREAT SERPL-MCNC: 2.4 MG/DL (ref 0.5–1.4)
CREAT UR-MCNC: 60.8 MG/DL (ref 23–375)
ERYTHROCYTE [DISTWIDTH] IN BLOOD BY AUTOMATED COUNT: 15.5 % (ref 11.5–14.5)
GFR SERPLBLD CREATININE-BSD FMLA CKD-EPI: 26 ML/MIN/1.73/M2
GFR SERPLBLD CREATININE-BSD FMLA CKD-EPI: 29 ML/MIN/1.73/M2
GLUCOSE SERPL-MCNC: 85 MG/DL (ref 70–110)
GLUCOSE SERPL-MCNC: 92 MG/DL (ref 70–110)
GLUCOSE UR QL STRIP: NEGATIVE
HCT VFR BLD AUTO: 34.9 % (ref 40–54)
HGB BLD-MCNC: 11.8 GM/DL (ref 14–18)
HGB UR QL STRIP: ABNORMAL
HOLD SPECIMEN: NORMAL
HYALINE CASTS UR QL AUTO: 0 /LPF (ref 0–1)
IMM GRANULOCYTES # BLD AUTO: 0.01 K/UL (ref 0–0.04)
IMM GRANULOCYTES NFR BLD AUTO: 0.2 % (ref 0–0.5)
KETONES UR QL STRIP: NEGATIVE
LEUKOCYTE ESTERASE UR QL STRIP: NEGATIVE
LYMPHOCYTES # BLD AUTO: 1.79 K/UL (ref 1–4.8)
MAGNESIUM SERPL-MCNC: 2.5 MG/DL (ref 1.6–2.6)
MAGNESIUM SERPL-MCNC: 2.5 MG/DL (ref 1.6–2.6)
MCH RBC QN AUTO: 31.6 PG (ref 27–31)
MCHC RBC AUTO-ENTMCNC: 33.8 G/DL (ref 32–36)
MCV RBC AUTO: 94 FL (ref 82–98)
MICROSCOPIC COMMENT: NORMAL
NITRITE UR QL STRIP: NEGATIVE
NUCLEATED RBC (/100WBC) (OHS): 0 /100 WBC
OHS QRS DURATION: 122 MS
OHS QTC CALCULATION: 627 MS
PH UR STRIP: 8 [PH]
PLATELET # BLD AUTO: 171 K/UL (ref 150–450)
PMV BLD AUTO: 10.9 FL (ref 9.2–12.9)
POTASSIUM SERPL-SCNC: 2.1 MMOL/L (ref 3.5–5.1)
POTASSIUM SERPL-SCNC: 2.5 MMOL/L (ref 3.5–5.1)
PROT SERPL-MCNC: 8.2 GM/DL (ref 6–8.4)
PROT UR QL STRIP: ABNORMAL
RBC # BLD AUTO: 3.73 M/UL (ref 4.6–6.2)
RBC #/AREA URNS AUTO: 0 /HPF (ref 0–4)
RELATIVE EOSINOPHIL (OHS): 0.6 %
RELATIVE LYMPHOCYTE (OHS): 37.2 % (ref 18–48)
RELATIVE MONOCYTE (OHS): 17.5 % (ref 4–15)
RELATIVE NEUTROPHIL (OHS): 44.1 % (ref 38–73)
SODIUM SERPL-SCNC: 132 MMOL/L (ref 136–145)
SODIUM SERPL-SCNC: 136 MMOL/L (ref 136–145)
SODIUM UR-SCNC: 38 MMOL/L (ref 20–250)
SP GR UR STRIP: 1.01
T4 FREE SERPL-MCNC: 0.98 NG/DL (ref 0.71–1.51)
T4 FREE SERPL-MCNC: 0.98 NG/DL (ref 0.71–1.51)
TSH SERPL-ACNC: 2.57 UIU/ML (ref 0.4–4)
URATE SERPL-MCNC: 3.2 MG/DL (ref 3.4–7)
UROBILINOGEN UR STRIP-ACNC: NEGATIVE EU/DL
WBC # BLD AUTO: 4.81 K/UL (ref 3.9–12.7)
WBC #/AREA URNS AUTO: 1 /HPF (ref 0–5)

## 2025-06-19 PROCEDURE — 84439 ASSAY OF FREE THYROXINE: CPT | Mod: HCNC | Performed by: NURSE PRACTITIONER

## 2025-06-19 PROCEDURE — 25000003 PHARM REV CODE 250: Mod: HCNC | Performed by: NURSE PRACTITIONER

## 2025-06-19 PROCEDURE — 83735 ASSAY OF MAGNESIUM: CPT | Mod: 91,HCNC | Performed by: NURSE PRACTITIONER

## 2025-06-19 PROCEDURE — 93005 ELECTROCARDIOGRAM TRACING: CPT | Mod: HCNC

## 2025-06-19 PROCEDURE — 63600175 PHARM REV CODE 636 W HCPCS: Mod: HCNC | Performed by: NURSE PRACTITIONER

## 2025-06-19 PROCEDURE — 36415 COLL VENOUS BLD VENIPUNCTURE: CPT | Mod: HCNC | Performed by: NURSE PRACTITIONER

## 2025-06-19 PROCEDURE — 96372 THER/PROPH/DIAG INJ SC/IM: CPT | Performed by: NURSE PRACTITIONER

## 2025-06-19 PROCEDURE — 96374 THER/PROPH/DIAG INJ IV PUSH: CPT | Mod: HCNC

## 2025-06-19 PROCEDURE — 81001 URINALYSIS AUTO W/SCOPE: CPT | Mod: HCNC | Performed by: NURSE PRACTITIONER

## 2025-06-19 PROCEDURE — 82570 ASSAY OF URINE CREATININE: CPT | Mod: HCNC | Performed by: NURSE PRACTITIONER

## 2025-06-19 PROCEDURE — 99285 EMERGENCY DEPT VISIT HI MDM: CPT | Mod: 25,HCNC

## 2025-06-19 PROCEDURE — 82550 ASSAY OF CK (CPK): CPT | Mod: HCNC | Performed by: NURSE PRACTITIONER

## 2025-06-19 PROCEDURE — G0378 HOSPITAL OBSERVATION PER HR: HCPCS | Mod: HCNC

## 2025-06-19 PROCEDURE — 25000003 PHARM REV CODE 250: Mod: HCNC | Performed by: EMERGENCY MEDICINE

## 2025-06-19 PROCEDURE — 93010 ELECTROCARDIOGRAM REPORT: CPT | Mod: HCNC,,, | Performed by: INTERNAL MEDICINE

## 2025-06-19 PROCEDURE — 85025 COMPLETE CBC W/AUTO DIFF WBC: CPT | Mod: HCNC | Performed by: NURSE PRACTITIONER

## 2025-06-19 PROCEDURE — 83880 ASSAY OF NATRIURETIC PEPTIDE: CPT | Mod: HCNC | Performed by: NURSE PRACTITIONER

## 2025-06-19 PROCEDURE — 84075 ASSAY ALKALINE PHOSPHATASE: CPT | Mod: HCNC | Performed by: NURSE PRACTITIONER

## 2025-06-19 PROCEDURE — 63600175 PHARM REV CODE 636 W HCPCS: Mod: HCNC | Performed by: EMERGENCY MEDICINE

## 2025-06-19 PROCEDURE — 84550 ASSAY OF BLOOD/URIC ACID: CPT | Mod: HCNC | Performed by: NURSE PRACTITIONER

## 2025-06-19 PROCEDURE — 83735 ASSAY OF MAGNESIUM: CPT | Mod: HCNC | Performed by: NURSE PRACTITIONER

## 2025-06-19 PROCEDURE — 84300 ASSAY OF URINE SODIUM: CPT | Mod: HCNC | Performed by: NURSE PRACTITIONER

## 2025-06-19 RX ORDER — POTASSIUM CHLORIDE 20 MEQ/1
40 TABLET, EXTENDED RELEASE ORAL ONCE
Qty: 2 TABLET | Refills: 0 | Status: ON HOLD | OUTPATIENT
Start: 2025-06-19 | End: 2025-06-19

## 2025-06-19 RX ORDER — IBUPROFEN 200 MG
24 TABLET ORAL
Status: DISCONTINUED | OUTPATIENT
Start: 2025-06-19 | End: 2025-06-25 | Stop reason: HOSPADM

## 2025-06-19 RX ORDER — PROCHLORPERAZINE EDISYLATE 5 MG/ML
5 INJECTION INTRAMUSCULAR; INTRAVENOUS EVERY 6 HOURS PRN
Status: DISCONTINUED | OUTPATIENT
Start: 2025-06-19 | End: 2025-06-25 | Stop reason: HOSPADM

## 2025-06-19 RX ORDER — METOPROLOL SUCCINATE 25 MG/1
25 TABLET, EXTENDED RELEASE ORAL DAILY
Status: DISCONTINUED | OUTPATIENT
Start: 2025-06-20 | End: 2025-06-23

## 2025-06-19 RX ORDER — ONDANSETRON HYDROCHLORIDE 2 MG/ML
4 INJECTION, SOLUTION INTRAVENOUS EVERY 6 HOURS PRN
Status: DISCONTINUED | OUTPATIENT
Start: 2025-06-19 | End: 2025-06-25 | Stop reason: HOSPADM

## 2025-06-19 RX ORDER — POTASSIUM CHLORIDE 7.45 MG/ML
10 INJECTION INTRAVENOUS
Status: COMPLETED | OUTPATIENT
Start: 2025-06-19 | End: 2025-06-19

## 2025-06-19 RX ORDER — CHOLECALCIFEROL (VITAMIN D3) 25 MCG
1000 TABLET ORAL DAILY
Status: DISCONTINUED | OUTPATIENT
Start: 2025-06-20 | End: 2025-06-25 | Stop reason: HOSPADM

## 2025-06-19 RX ORDER — ALUMINUM HYDROXIDE, MAGNESIUM HYDROXIDE, AND SIMETHICONE 1200; 120; 1200 MG/30ML; MG/30ML; MG/30ML
30 SUSPENSION ORAL 4 TIMES DAILY PRN
Status: DISCONTINUED | OUTPATIENT
Start: 2025-06-19 | End: 2025-06-25 | Stop reason: HOSPADM

## 2025-06-19 RX ORDER — SODIUM CHLORIDE 9 MG/ML
INJECTION, SOLUTION INTRAVENOUS CONTINUOUS
Status: DISCONTINUED | OUTPATIENT
Start: 2025-06-19 | End: 2025-06-20

## 2025-06-19 RX ORDER — POTASSIUM CHLORIDE 7.45 MG/ML
10 INJECTION INTRAVENOUS
Status: COMPLETED | OUTPATIENT
Start: 2025-06-20 | End: 2025-06-20

## 2025-06-19 RX ORDER — NALOXONE HCL 0.4 MG/ML
0.02 VIAL (ML) INJECTION
Status: DISCONTINUED | OUTPATIENT
Start: 2025-06-19 | End: 2025-06-25 | Stop reason: HOSPADM

## 2025-06-19 RX ORDER — IPRATROPIUM BROMIDE AND ALBUTEROL SULFATE 2.5; .5 MG/3ML; MG/3ML
3 SOLUTION RESPIRATORY (INHALATION) EVERY 6 HOURS PRN
Status: DISCONTINUED | OUTPATIENT
Start: 2025-06-19 | End: 2025-06-25 | Stop reason: HOSPADM

## 2025-06-19 RX ORDER — HEPARIN SODIUM 5000 [USP'U]/ML
5000 INJECTION, SOLUTION INTRAVENOUS; SUBCUTANEOUS EVERY 8 HOURS
Status: DISCONTINUED | OUTPATIENT
Start: 2025-06-19 | End: 2025-06-25 | Stop reason: HOSPADM

## 2025-06-19 RX ORDER — AMOXICILLIN 250 MG
1 CAPSULE ORAL 2 TIMES DAILY
Status: DISCONTINUED | OUTPATIENT
Start: 2025-06-19 | End: 2025-06-25 | Stop reason: HOSPADM

## 2025-06-19 RX ORDER — IBUPROFEN 200 MG
16 TABLET ORAL
Status: DISCONTINUED | OUTPATIENT
Start: 2025-06-19 | End: 2025-06-25 | Stop reason: HOSPADM

## 2025-06-19 RX ORDER — POLYETHYLENE GLYCOL 3350 17 G/17G
17 POWDER, FOR SOLUTION ORAL 2 TIMES DAILY PRN
Status: DISCONTINUED | OUTPATIENT
Start: 2025-06-19 | End: 2025-06-25 | Stop reason: HOSPADM

## 2025-06-19 RX ORDER — SODIUM CHLORIDE 0.9 % (FLUSH) 0.9 %
10 SYRINGE (ML) INJECTION EVERY 8 HOURS PRN
Status: DISCONTINUED | OUTPATIENT
Start: 2025-06-19 | End: 2025-06-25 | Stop reason: HOSPADM

## 2025-06-19 RX ORDER — ACETAMINOPHEN 325 MG/1
650 TABLET ORAL EVERY 4 HOURS PRN
Status: DISCONTINUED | OUTPATIENT
Start: 2025-06-19 | End: 2025-06-25 | Stop reason: HOSPADM

## 2025-06-19 RX ORDER — FAMOTIDINE 20 MG/1
20 TABLET, FILM COATED ORAL EVERY OTHER DAY
Status: DISCONTINUED | OUTPATIENT
Start: 2025-06-20 | End: 2025-06-21

## 2025-06-19 RX ORDER — GLUCAGON 1 MG
1 KIT INJECTION
Status: DISCONTINUED | OUTPATIENT
Start: 2025-06-19 | End: 2025-06-25 | Stop reason: HOSPADM

## 2025-06-19 RX ORDER — POTASSIUM CHLORIDE 20 MEQ/1
40 TABLET, EXTENDED RELEASE ORAL
Status: COMPLETED | OUTPATIENT
Start: 2025-06-19 | End: 2025-06-19

## 2025-06-19 RX ORDER — SIMETHICONE 80 MG
1 TABLET,CHEWABLE ORAL 4 TIMES DAILY PRN
Status: DISCONTINUED | OUTPATIENT
Start: 2025-06-19 | End: 2025-06-25 | Stop reason: HOSPADM

## 2025-06-19 RX ORDER — TALC
6 POWDER (GRAM) TOPICAL NIGHTLY PRN
Status: DISCONTINUED | OUTPATIENT
Start: 2025-06-19 | End: 2025-06-25 | Stop reason: HOSPADM

## 2025-06-19 RX ORDER — ASPIRIN 81 MG/1
81 TABLET ORAL DAILY
Status: DISCONTINUED | OUTPATIENT
Start: 2025-06-20 | End: 2025-06-25 | Stop reason: HOSPADM

## 2025-06-19 RX ADMIN — POTASSIUM BICARBONATE 50 MEQ: 977.5 TABLET, EFFERVESCENT ORAL at 07:06

## 2025-06-19 RX ADMIN — POTASSIUM CHLORIDE 10 MEQ: 7.46 INJECTION, SOLUTION INTRAVENOUS at 03:06

## 2025-06-19 RX ADMIN — SENNOSIDES AND DOCUSATE SODIUM 1 TABLET: 50; 8.6 TABLET ORAL at 08:06

## 2025-06-19 RX ADMIN — SODIUM CHLORIDE: 9 INJECTION, SOLUTION INTRAVENOUS at 07:06

## 2025-06-19 RX ADMIN — POTASSIUM CHLORIDE 40 MEQ: 1500 TABLET, EXTENDED RELEASE ORAL at 03:06

## 2025-06-19 RX ADMIN — POTASSIUM BICARBONATE 50 MEQ: 977.5 TABLET, EFFERVESCENT ORAL at 10:06

## 2025-06-19 RX ADMIN — POTASSIUM CHLORIDE 10 MEQ: 7.46 INJECTION, SOLUTION INTRAVENOUS at 11:06

## 2025-06-19 RX ADMIN — HEPARIN SODIUM 5000 UNITS: 5000 INJECTION INTRAVENOUS; SUBCUTANEOUS at 10:06

## 2025-06-19 NOTE — TELEPHONE ENCOUNTER
After verifying two patient identifiers, results given to patient's sister. Advised patient's sister per Dr. Martinez of recent critical lab results. Advised sister to bring patient to ED for rpt testing and Serum K and serum Mg, and worsening renal function.Pt's sister v/u and stated that she will bring patient to ED at some point today. Dr. Martinez informed

## 2025-06-19 NOTE — PHARMACY MED REC
"Admission Medication History     The home medication history was taken by Devin Henry.    You may go to "Admission" then "Reconcile Home Medications" tabs to review and/or act upon these items.     The home medication list has been updated by the Pharmacy department.   Please read ALL comments highlighted in yellow.   Please address this information as you see fit.    Feel free to contact us if you have any questions or require assistance.      Medications listed below were obtained from: [email or web address], Medical records, and Patient's pharmacy  (Not in a hospital admission)        Devin Henry  [id number]      Current Outpatient Medications on File Prior to Encounter   Medication Sig Dispense Refill Last Dose/Taking    aspirin (ECOTRIN) 81 MG EC tablet Take 81 mg by mouth once daily.       ksmzoylst-zexuwlbl-zjhbbsd ala (BIKTARVY) -25 mg (25 kg or greater) Take 1 tablet by mouth once daily. 30 tablet 5     diclofenac sodium (VOLTAREN) 1 % Gel Apply 2-4 g topically 4 (four) times daily as needed (knee pain). 100 g 2     furosemide (LASIX) 40 MG tablet Take 40 mg by mouth once daily.       metOLazone (ZAROXOLYN) 2.5 MG tablet Take 2.5 mg by mouth once daily.       metoprolol succinate (TOPROL-XL) 25 MG 24 hr tablet Take 25 mg by mouth once daily.       potassium chloride (KLOR-CON) 10 MEQ TbSR Take 10 mEq by mouth once.       potassium chloride SA (K-DUR,KLOR-CON) 20 MEQ tablet Take 2 tablets (40 mEq total) by mouth once. for 1 dose 2 tablet 0     rosuvastatin (CRESTOR) 40 MG Tab TAKE 1 TABLET BY MOUTH EVERY DAY IN THE EVENING 90 tablet 1     vibegron (GEMTESA) 75 mg Tab TAKE 1 TABLET BY MOUTH EVERY DAY 90 tablet 0     vitamin D (VITAMIN D3) 1000 units Tab Take 1,000 Units by mouth once daily.                              .          "

## 2025-06-19 NOTE — ASSESSMENT & PLAN NOTE
ROSA ELENA is likely due to pre-renal azotemia due to dehydration. Baseline creatinine is 1.4- 1.8. Most recent creatinine and eGFR are listed below.  Recent Labs     06/19/25  1412   CREATININE 2.4*   EGFRNORACEVR 26*      Plan  - ROSA ELENA is worsening.   - Avoid nephrotoxins and renally dose meds for GFR listed above  - Monitor urine output, serial BMP, and adjust therapy as needed  - Hold Lasix and Metolazone   -gentle hydration with IVF  -check urine sodium and creatine  Check CPK and Uric acid   -Check renal U/S

## 2025-06-19 NOTE — ED PROVIDER NOTES
SCRIBE #1 NOTE: I, Karol Wang, am scribing for, and in the presence of, Charley Cevallos MD. I have scribed the entire note.       History     Chief Complaint   Patient presents with    Abnormal Lab     Pt had labs drawn by Dr. Martinez last week.  He was called today and told to go to the ED due to low potassium, low magnesium, and decreased kidney function.     Review of patient's allergies indicates:   Allergen Reactions    Seasonale [levonorgestrel-ethinyl estrad] Other (See Comments)         History of Present Illness     HPI    6/19/2025, 2:41 PM  History obtained from the patient and medical records      History of Present Illness: Rocael Perez is a 85 y.o. male patient with a PMHx of HLD, CHF, HIV, CLL, and MGUS who presents to the Emergency Department for evaluation of low potassium and magnesium as well as worsening renal function from labs drawn last week in Dr. Martinez's (Infectious Diseases) office. Pt has no associated sxs. Patient denies any dizziness, lightheadedness, fever, or n/v at this time. No further complaints or concerns at this time.       Arrival mode: Personal Transportation    PCP: Landry Ferguson MD        Past Medical History:  Past Medical History:   Diagnosis Date    CHF (congestive heart failure)     CLL (chronic lymphocytic leukemia)     HIV (human immunodeficiency virus infection)     Hyperlipidemia     MGUS (monoclonal gammopathy of unknown significance)        Past Surgical History:  Past Surgical History:   Procedure Laterality Date    HERNIA REPAIR      SHOULDER SURGERY Right          Family History:  Family History   Problem Relation Name Age of Onset    Diabetes Mother         Social History:  Social History     Tobacco Use    Smoking status: Former     Types: Cigarettes     Passive exposure: Past    Smokeless tobacco: Never   Substance and Sexual Activity    Alcohol use: Not Currently    Drug use: Never    Sexual activity: Not Currently        Review of Systems      Review of Systems   Constitutional:  Negative for fever.   HENT:  Negative for sore throat.    Respiratory:  Negative for shortness of breath.    Cardiovascular:  Negative for chest pain.   Gastrointestinal:  Negative for nausea and vomiting.   Genitourinary:  Negative for dysuria.   Musculoskeletal:  Negative for back pain.   Skin:  Negative for rash.   Neurological:  Negative for dizziness, weakness and light-headedness.   Hematological:  Does not bruise/bleed easily.   All other systems reviewed and are negative.     Physical Exam     Initial Vitals [06/19/25 1314]   BP Pulse Resp Temp SpO2   (!) 114/59 (!) 59 16 98.9 °F (37.2 °C) 98 %      MAP       --          Physical Exam  Nursing Notes and Vital Signs Reviewed.  Constitutional: Patient is in no acute distress. Well-developed and well-nourished.  Head: Atraumatic. Normocephalic.  Eyes:  EOM intact.  ENT: Mucous membranes are moist. Oropharynx is clear and symmetric.    Neck: Supple. Full ROM.   Cardiovascular: Regular rate. Regular rhythm. No murmurs, rubs, or gallops.   Pulmonary/Chest: No respiratory distress. Clear to auscultation bilaterally. No wheezing or rales.   Abdominal: Soft and non-distended. There is no tenderness.  No rebound, guarding, or rigidity.  Musculoskeletal: Moves all extremities. No obvious deformities. No edema. No calf tenderness.   Skin: Warm and dry.  Neurological:  Alert, awake, and appropriate.  Normal speech.  No acute focal neurological deficits are appreciated.  Psychiatric: Normal affect. Good eye contact. Appropriate in content.      ED Course   Critical Care    Date/Time: 6/19/2025 3:27 PM    Performed by: Charley Cevallos MD  Authorized by: Charley Cevallos MD  Direct patient critical care time: 15 minutes  Additional history critical care time: 10 minutes  Ordering / reviewing critical care time: 5 minutes  Documentation critical care time: 5 minutes  Consulting other physicians critical care time: 5  minutes  Consult with family critical care time: 5 minutes  Total critical care time (exclusive of procedural time) : 45 minutes  Critical care time was exclusive of separately billable procedures and treating other patients and teaching time.  Critical care was necessary to treat or prevent imminent or life-threatening deterioration of the following conditions: Hypokalemia.  Critical care was time spent personally by me on the following activities: blood draw for specimens, development of treatment plan with patient or surrogate, discussions with consultants, interpretation of cardiac output measurements, evaluation of patient's response to treatment, examination of patient, obtaining history from patient or surrogate, ordering and performing treatments and interventions, ordering and review of radiographic studies, ordering and review of laboratory studies, pulse oximetry, re-evaluation of patient's condition and review of old charts.        ED Vital Signs:  Vitals:    06/19/25 1314 06/19/25 1420 06/19/25 1520 06/19/25 1530   BP: (!) 114/59 107/72 114/60    Pulse: (!) 59 61 (!) 55 (!) 56   Resp: 16 17 17    Temp: 98.9 °F (37.2 °C)      TempSrc: Oral      SpO2: 98% 100% 100%    Weight: 71.1 kg (156 lb 12.8 oz)       06/19/25 1600   BP: 112/69   Pulse: (!) 55   Resp: 20   Temp:    TempSrc:    SpO2: 100%   Weight:        Abnormal Lab Results:  Labs Reviewed   COMPREHENSIVE METABOLIC PANEL - Abnormal       Result Value    Sodium 132 (*)     Potassium 2.1 (*)     Chloride 92 (*)     CO2 30 (*)     Glucose 92      BUN 27 (*)     Creatinine 2.4 (*)     Calcium 10.1      Protein Total 8.2      Albumin 3.3 (*)     Bilirubin Total 0.7      ALP 64      AST 21      ALT 12      Anion Gap 10      eGFR 26 (*)    URINALYSIS, REFLEX TO URINE CULTURE - Abnormal    Color, UA Yellow      Appearance, UA Clear      pH, UA 8.0      Spec Grav UA 1.010      Protein, UA 1+ (*)     Glucose, UA Negative      Ketones, UA Negative       Bilirubin, UA Negative      Blood, UA 1+ (*)     Nitrites, UA Negative      Urobilinogen, UA Negative      Leukocyte Esterase, UA Negative     CBC WITH DIFFERENTIAL - Abnormal    WBC 4.81      RBC 3.73 (*)     HGB 11.8 (*)     HCT 34.9 (*)     MCV 94      MCH 31.6 (*)     MCHC 33.8      RDW 15.5 (*)     Platelet Count 171      MPV 10.9      Nucleated RBC 0      Neut % 44.1      Lymph % 37.2      Mono % 17.5 (*)     Eos % 0.6      Basophil % 0.4      Imm Grans % 0.2      Neut # 2.12      Lymph # 1.79      Mono # 0.84      Eos # 0.03      Baso # 0.02      Imm Grans # 0.01     MAGNESIUM - Normal    Magnesium  2.5     CBC W/ AUTO DIFFERENTIAL    Narrative:     The following orders were created for panel order CBC auto differential.  Procedure                               Abnormality         Status                     ---------                               -----------         ------                     CBC with Differential[[id number]]       Abnormal            Final result                 Please view results for these tests on the individual orders.   GREY TOP URINE HOLD    Extra Tube Hold for add-ons.     URINALYSIS MICROSCOPIC    RBC, UA 0      WBC, UA 1      Bacteria, UA None      Hyaline Casts, UA 0      Microscopic Comment       TSH   T4, FREE   CK   URIC ACID        All Lab Results:  Results for orders placed or performed during the hospital encounter of 06/19/25   Comprehensive metabolic panel    Collection Time: 06/19/25  2:12 PM   Result Value Ref Range    Sodium 132 (L) 136 - 145 mmol/L    Potassium 2.1 (LL) 3.5 - 5.1 mmol/L    Chloride 92 (L) 95 - 110 mmol/L    CO2 30 (H) 23 - 29 mmol/L    Glucose 92 70 - 110 mg/dL    BUN 27 (H) 8 - 23 mg/dL    Creatinine 2.4 (H) 0.5 - 1.4 mg/dL    Calcium 10.1 8.7 - 10.5 mg/dL    Protein Total 8.2 6.0 - 8.4 gm/dL    Albumin 3.3 (L) 3.5 - 5.2 g/dL    Bilirubin Total 0.7 0.1 - 1.0 mg/dL    ALP 64 40 - 150 unit/L    AST 21 11 - 45 unit/L    ALT 12 10 - 44 unit/L    Anion Gap  10 8 - 16 mmol/L    eGFR 26 (L) >60 mL/min/1.73/m2   Magnesium    Collection Time: 06/19/25  2:12 PM   Result Value Ref Range    Magnesium  2.5 1.6 - 2.6 mg/dL   CBC with Differential    Collection Time: 06/19/25  2:12 PM   Result Value Ref Range    WBC 4.81 3.90 - 12.70 K/uL    RBC 3.73 (L) 4.60 - 6.20 M/uL    HGB 11.8 (L) 14.0 - 18.0 gm/dL    HCT 34.9 (L) 40.0 - 54.0 %    MCV 94 82 - 98 fL    MCH 31.6 (H) 27.0 - 31.0 pg    MCHC 33.8 32.0 - 36.0 g/dL    RDW 15.5 (H) 11.5 - 14.5 %    Platelet Count 171 150 - 450 K/uL    MPV 10.9 9.2 - 12.9 fL    Nucleated RBC 0 <=0 /100 WBC    Neut % 44.1 38 - 73 %    Lymph % 37.2 18 - 48 %    Mono % 17.5 (H) 4 - 15 %    Eos % 0.6 <=8 %    Basophil % 0.4 <=1.9 %    Imm Grans % 0.2 0.0 - 0.5 %    Neut # 2.12 1.8 - 7.7 K/uL    Lymph # 1.79 1 - 4.8 K/uL    Mono # 0.84 0.3 - 1 K/uL    Eos # 0.03 <=0.5 K/uL    Baso # 0.02 <=0.2 K/uL    Imm Grans # 0.01 0.00 - 0.04 K/uL   Urinalysis, Reflex to Urine Culture Urine, Clean Catch    Collection Time: 06/19/25  2:21 PM    Specimen: Urine, Clean Catch   Result Value Ref Range    Color, UA Yellow Straw, Fawn, Yellow, Light-Orange    Appearance, UA Clear Clear    pH, UA 8.0 5.0 - 8.0    Spec Grav UA 1.010 1.005 - 1.030    Protein, UA 1+ (A) Negative    Glucose, UA Negative Negative    Ketones, UA Negative Negative    Bilirubin, UA Negative Negative    Blood, UA 1+ (A) Negative    Nitrites, UA Negative Negative    Urobilinogen, UA Negative <2.0 EU/dL    Leukocyte Esterase, UA Negative Negative   GREY TOP URINE HOLD    Collection Time: 06/19/25  2:21 PM   Result Value Ref Range    Extra Tube Hold for add-ons.    Urinalysis Microscopic    Collection Time: 06/19/25  2:21 PM   Result Value Ref Range    RBC, UA 0 0 - 4 /HPF    WBC, UA 1 0 - 5 /HPF    Bacteria, UA None None, Rare, Occasional /HPF    Hyaline Casts, UA 0 0 - 1 /LPF    Microscopic Comment     EKG 12-lead    Collection Time: 06/19/25  3:43 PM   Result Value Ref Range    QRS Duration 122 ms     OHS QTC Calculation 627 ms       Imaging Results:  Imaging Results    None          The EKG was ordered, reviewed, and independently interpreted by the ED provider.  Interpretation time: 15:43  Rate: 54 BPM  Rhythm: sinus bradycardia  Interpretation: Nonspecific intraventricular conduction delay. No STEMI.           The Emergency Provider reviewed the vital signs and test results, which are outlined above.     ED Discussion                 Medical Decision Making  Amount and/or Complexity of Data Reviewed  External Data Reviewed: labs and notes.     Details: Pt saw Dr. Martinez on 6/9/25; he has good virologic and immunologic control of HIV; on biktarvy. Had routine blood work done which was noted to have worsening renal function. Sent here to have repeat labs and repletion.   Labs: ordered. Decision-making details documented in ED Course.  ECG/medicine tests: ordered.  Discussion of management or test interpretation with external provider(s):   3:41 PM: Discussed case with Kala Toledo NP ([facility]). Dr. Garcia agrees with current care and management of pt and accepts admission.   Admitting Service: [facility]  Admitting Physician: Dr. Garcia  Admit to:  OBS med/tele    3:41 PM: Re-evaluated pt.  I have discussed test results, shared treatment plan, and the need for admission with patient/family/caretaker at bedside. Pt and/or family/caretaker express understanding at this time and agree with all information. All questions answered. Pt/caretaker/family member(s) have no further questions or concerns at this time. Pt is ready for admit.      Risk  Prescription drug management.  Decision regarding hospitalization.  Risk Details: Differential diagnosis: Dehydration, electrolyte abnormality, arrhythmia, infection                  ED Medication(s):  Medications   aspirin EC tablet 81 mg (has no administration in time range)   vjuvhfqce-ltejztnx-fzxijbt ala -25 mg (25 kg or greater) 1 tablet (has  no administration in time range)   metoprolol succinate (TOPROL-XL) 24 hr tablet 25 mg (has no administration in time range)   vitamin D 1000 units tablet 1,000 Units (has no administration in time range)   sodium chloride 0.9% flush 10 mL (has no administration in time range)   albuterol-ipratropium 2.5 mg-0.5 mg/3 mL nebulizer solution 3 mL (has no administration in time range)   melatonin tablet 6 mg (has no administration in time range)   ondansetron injection 4 mg (has no administration in time range)   prochlorperazine injection Soln 5 mg (has no administration in time range)   polyethylene glycol packet 17 g (has no administration in time range)   senna-docusate 8.6-50 mg per tablet 1 tablet (has no administration in time range)   simethicone chewable tablet 80 mg (has no administration in time range)   aluminum-magnesium hydroxide-simethicone 200-200-20 mg/5 mL suspension 30 mL (has no administration in time range)   acetaminophen tablet 650 mg (has no administration in time range)   naloxone 0.4 mg/mL injection 0.02 mg (has no administration in time range)   glucose chewable tablet 16 g (has no administration in time range)   glucose chewable tablet 24 g (has no administration in time range)   dextrose 50% injection 12.5 g (has no administration in time range)   dextrose 50% injection 25 g (has no administration in time range)   glucagon (human recombinant) injection 1 mg (has no administration in time range)   heparin (porcine) injection 5,000 Units (has no administration in time range)   potassium chloride 10 mEq in 100 mL IVPB (0 mEq Intravenous Stopped 6/19/25 1643)   potassium chloride SA CR tablet 40 mEq (40 mEq Oral Given 6/19/25 1539)       Current Discharge Medication List                  Scribe Attestation:   Scribe #1: I performed the above scribed service and the documentation accurately describes the services I performed. I attest to the accuracy of the note.     Attending:   Physician  Attestation Statement for Scribe #1: I, Charley Cevallos MD, personally performed the services described in this documentation, as scribed by Karol Wang, in my presence, and it is both accurate and complete.           Clinical Impression       ICD-10-CM ICD-9-CM   1. Hypokalemia  E87.6 276.8   2. Chest pain  R07.9 786.50       Disposition:   Disposition: Placed in Observation  Condition: Fair       Charley Cevallos MD  06/19/25 4271

## 2025-06-19 NOTE — PROGRESS NOTES
Pharmacist Renal Dose Adjustment Note    Rocael Perez is a 85 y.o. male being treated with the medication famotidine.     Patient Data:    Vital Signs (Most Recent):  Temp: 98.9 °F (37.2 °C) (06/19/25 1314)  Pulse: (!) 55 (06/19/25 1600)  Resp: 20 (06/19/25 1600)  BP: 112/69 (06/19/25 1600)  SpO2: 100 % (06/19/25 1600) Vital Signs (72h Range):  Temp:  [98.9 °F (37.2 °C)]   Pulse:  [55-61]   Resp:  [16-20]   BP: (107-114)/(59-72)   SpO2:  [98 %-100 %]      Recent Labs   Lab 06/19/25  1412   CREATININE 2.4*     Serum creatinine: 2.4 mg/dL (H) 06/19/25 1412  Estimated creatinine clearance: 22.5 mL/min (A)    Per protocol for enteral Pepcid & CrCl < 30 ml/min, dose will be reduced from 20 mg PO once daily to 20 mg PO every other day.     Pharmacist's Name: Katherine E Mcardle  Pharmacist's Extension: [phone]

## 2025-06-19 NOTE — SUBJECTIVE & OBJECTIVE
Past Medical History:   Diagnosis Date    CHF (congestive heart failure)     CLL (chronic lymphocytic leukemia)     HIV (human immunodeficiency virus infection)     Hyperlipidemia     MGUS (monoclonal gammopathy of unknown significance)        Past Surgical History:   Procedure Laterality Date    HERNIA REPAIR      SHOULDER SURGERY Right        Review of patient's allergies indicates:   Allergen Reactions    Seasonale [levonorgestrel-ethinyl estrad] Other (See Comments)       No current facility-administered medications on file prior to encounter.     Current Outpatient Medications on File Prior to Encounter   Medication Sig    aspirin (ECOTRIN) 81 MG EC tablet Take 81 mg by mouth once daily.    pmsqmyrdr-auklrunu-plalcpb ala (BIKTARVY) -25 mg (25 kg or greater) Take 1 tablet by mouth once daily.    diclofenac sodium (VOLTAREN) 1 % Gel Apply 2-4 g topically 4 (four) times daily as needed (knee pain).    furosemide (LASIX) 40 MG tablet Take 40 mg by mouth once daily.    metOLazone (ZAROXOLYN) 2.5 MG tablet Take 2.5 mg by mouth once daily.    metoprolol succinate (TOPROL-XL) 25 MG 24 hr tablet Take 25 mg by mouth once daily.    potassium chloride (KLOR-CON) 10 MEQ TbSR Take 10 mEq by mouth once.    potassium chloride SA (K-DUR,KLOR-CON) 20 MEQ tablet Take 2 tablets (40 mEq total) by mouth once. for 1 dose    rosuvastatin (CRESTOR) 40 MG Tab TAKE 1 TABLET BY MOUTH EVERY DAY IN THE EVENING    vibegron (GEMTESA) 75 mg Tab TAKE 1 TABLET BY MOUTH EVERY DAY    vitamin D (VITAMIN D3) 1000 units Tab Take 1,000 Units by mouth once daily.     Family History       Problem Relation (Age of Onset)    Diabetes Mother          Tobacco Use    Smoking status: Former     Types: Cigarettes     Passive exposure: Past    Smokeless tobacco: Never   Substance and Sexual Activity    Alcohol use: Not Currently    Drug use: Never    Sexual activity: Not Currently     Review of Systems   Constitutional:  Positive for activity change,  appetite change, fatigue and unexpected weight change.   HENT:  Positive for trouble swallowing.    Genitourinary:         Incontinent of urine    Musculoskeletal:  Positive for arthralgias (chronic right knee pain) and gait problem (walks household distance with walker).   Neurological:  Positive for weakness.   All other systems reviewed and are negative.    Objective:     Vital Signs (Most Recent):  Temp: 98.9 °F (37.2 °C) (06/19/25 1314)  Pulse: (!) 55 (06/19/25 1600)  Resp: 20 (06/19/25 1600)  BP: 112/69 (06/19/25 1600)  SpO2: 100 % (06/19/25 1600) Vital Signs (24h Range):  Temp:  [98.9 °F (37.2 °C)] 98.9 °F (37.2 °C)  Pulse:  [55-61] 55  Resp:  [16-20] 20  SpO2:  [98 %-100 %] 100 %  BP: (107-114)/(59-72) 112/69     Weight: 71.1 kg (156 lb 12.8 oz)  Body mass index is 23.16 kg/m².     Physical Exam  Vitals and nursing note reviewed.   Constitutional:       General: He is not in acute distress.     Appearance: He is underweight. He is not diaphoretic.   HENT:      Head: Normocephalic and atraumatic.      Nose: Nose normal.   Eyes:      General: No scleral icterus.     Conjunctiva/sclera: Conjunctivae normal.   Cardiovascular:      Rate and Rhythm: Normal rate and regular rhythm.      Heart sounds: Murmur heard.      No friction rub. No gallop.   Pulmonary:      Effort: Pulmonary effort is normal. No respiratory distress.      Breath sounds: Normal breath sounds. No stridor. No wheezing or rales.   Chest:      Chest wall: No tenderness.   Abdominal:      General: Bowel sounds are normal. There is no distension.      Palpations: Abdomen is soft.      Tenderness: There is no abdominal tenderness. There is no guarding or rebound.   Musculoskeletal:         General: No tenderness or deformity. Normal range of motion.      Cervical back: Normal range of motion and neck supple.   Skin:     General: Skin is warm and dry.      Coloration: Skin is not pale.      Findings: No erythema or rash.   Neurological:      General:  No focal deficit present.      Mental Status: He is alert and oriented to person, place, and time.      Motor: Weakness (generalized) present.   Psychiatric:         Behavior: Behavior normal.         Thought Content: Thought content normal.                Significant Labs: All pertinent labs within the past 24 hours have been reviewed.    Significant Imaging: I have reviewed all pertinent imaging results/findings within the past 24 hours.

## 2025-06-19 NOTE — ASSESSMENT & PLAN NOTE
Patient's most recent potassium results are listed below.   Recent Labs     06/19/25  1412   K 2.1*     Plan  - Replete potassium per protocol  - Monitor potassium Daily  - Patient's hypokalemia is worsening. Will adjust treatment as follows: replete  - monitor

## 2025-06-19 NOTE — FIRST PROVIDER EVALUATION
Medical screening examination initiated.  I have conducted a focused provider triage encounter, findings are as follows:    Brief history of present illness:  Patient is sent by Infectious Disease, for low magnesium potassium and creatinine function    Vitals:    06/19/25 1314   BP: (!) 114/59   BP Location: Right arm   Pulse: (!) 59   Resp: 16   Temp: 98.9 °F (37.2 °C)   TempSrc: Oral   SpO2: 98%   Weight: 71.1 kg (156 lb 12.8 oz)       Pertinent physical exam:  NAD, frail appearing    Brief workup plan:  Labs    Preliminary workup initiated; this workup will be continued and followed by the physician or advanced practice provider that is assigned to the patient when roomed.

## 2025-06-19 NOTE — H&P
[facility] Medicine  History & Physical    Patient Name: Rocael Perez  MRN: [id number]  Patient Class: OP- Observation  Admission Date: 6/19/2025  Attending Physician: Duane Garcia DO   Primary Care Provider: Landry Ferguson MD         Patient information was obtained from patient and ER records.     Subjective:     Principal Problem:Acute renal failure superimposed on stage 3 chronic kidney disease    Chief Complaint:   Chief Complaint   Patient presents with    Abnormal Lab     Pt had labs drawn by Dr. Martinez last week.  He was called today and told to go to the ED due to low potassium, low magnesium, and decreased kidney function.        HPI: The patient is  84 yo male with HIV, Diastolic CHF, CLL, MGUS, HLD, and CKD3 who was advised to go to ED by Dr. Martinez for hypokalemia. The patient was seen for routine follow up in ID clinic on 6/9/25. F/U labwork showed K 2.9 and worsening renal function with sCr 2.5. The patient's spouse reports poor intake due to trouble swallowing. He is on lasix and Metolazone and feels like he is weak and dehydrated. Pt is on supplemental potassium 10meq which he takes on Monday and Friday. Pt reports dysphagia for several months that is worsening. Spouse reports a 30 lb weight loss in last several months due to not eating.   Pt was recently seen by ENT 3/20/25 or his dysphagia. Laryngoscopy exam showed No masses or lesions in the nose, nasopharynx, oropharynx, hypopharynx, or larynx. Vocal fold abduction and adduction is normal. No pooling of secretions in the piriform sinuses, penetration, or aspiration.   According to ENT note,   GI evaluation 2020  IMPRESSION: intermittent nonprogressive dysphagia with solids probably due to mild esophageal stenosis related to chronic reflux esophagitis or from a Schatzki's ring. Had a long discussion with the patient and his wife and all of us agree that his symptoms do not warrant further investigation or  treatment at this time.  RECOMMENDATIONS: I had a long discussion with the patient and his wife about dietary measures to control bolus size. He will return if his symptoms persist despite him being diligent about his bite size and if he does then will refer him for EGD with esophageal dilation.  FL esophogram 2020  The esophagus shows normal distensibility, course, caliber and contour. No focal lesion. No extrinsic mass effect. Normal transit of contrast through the esophagus with normal emptying into stomach. The upper and lower esophageal sphincters opened normally. Small sliding hiatal hernia. Spontaneous gastroesophageal reflux to the thoracic inlet    In the ED, BP low normal. Afebrile, Labs revealed Na 132, K 2.1, Cr 2.4. (Baseline 1.5- 1.8), UTI normal.              Past Medical History:   Diagnosis Date    CHF (congestive heart failure)     CLL (chronic lymphocytic leukemia)     HIV (human immunodeficiency virus infection)     Hyperlipidemia     MGUS (monoclonal gammopathy of unknown significance)        Past Surgical History:   Procedure Laterality Date    HERNIA REPAIR      SHOULDER SURGERY Right        Review of patient's allergies indicates:   Allergen Reactions    Seasonale [levonorgestrel-ethinyl estrad] Other (See Comments)       No current facility-administered medications on file prior to encounter.     Current Outpatient Medications on File Prior to Encounter   Medication Sig    aspirin (ECOTRIN) 81 MG EC tablet Take 81 mg by mouth once daily.    lgbxmsjto-ebpmxaix-lqoxsfp ala (BIKTARVY) -25 mg (25 kg or greater) Take 1 tablet by mouth once daily.    diclofenac sodium (VOLTAREN) 1 % Gel Apply 2-4 g topically 4 (four) times daily as needed (knee pain).    furosemide (LASIX) 40 MG tablet Take 40 mg by mouth once daily.    metOLazone (ZAROXOLYN) 2.5 MG tablet Take 2.5 mg by mouth once daily.    metoprolol succinate (TOPROL-XL) 25 MG 24 hr tablet Take 25 mg by mouth once daily.    potassium  chloride (KLOR-CON) 10 MEQ TbSR Take 10 mEq by mouth once.    potassium chloride SA (K-DUR,KLOR-CON) 20 MEQ tablet Take 2 tablets (40 mEq total) by mouth once. for 1 dose    rosuvastatin (CRESTOR) 40 MG Tab TAKE 1 TABLET BY MOUTH EVERY DAY IN THE EVENING    vibegron (GEMTESA) 75 mg Tab TAKE 1 TABLET BY MOUTH EVERY DAY    vitamin D (VITAMIN D3) 1000 units Tab Take 1,000 Units by mouth once daily.     Family History       Problem Relation (Age of Onset)    Diabetes Mother          Tobacco Use    Smoking status: Former     Types: Cigarettes     Passive exposure: Past    Smokeless tobacco: Never   Substance and Sexual Activity    Alcohol use: Not Currently    Drug use: Never    Sexual activity: Not Currently     Review of Systems   Constitutional:  Positive for activity change, appetite change, fatigue and unexpected weight change.   HENT:  Positive for trouble swallowing.    Genitourinary:         Incontinent of urine    Musculoskeletal:  Positive for arthralgias (chronic right knee pain) and gait problem (walks household distance with walker).   Neurological:  Positive for weakness.   All other systems reviewed and are negative.    Objective:     Vital Signs (Most Recent):  Temp: 98.9 °F (37.2 °C) (06/19/25 1314)  Pulse: (!) 55 (06/19/25 1600)  Resp: 20 (06/19/25 1600)  BP: 112/69 (06/19/25 1600)  SpO2: 100 % (06/19/25 1600) Vital Signs (24h Range):  Temp:  [98.9 °F (37.2 °C)] 98.9 °F (37.2 °C)  Pulse:  [55-61] 55  Resp:  [16-20] 20  SpO2:  [98 %-100 %] 100 %  BP: (107-114)/(59-72) 112/69     Weight: 71.1 kg (156 lb 12.8 oz)  Body mass index is 23.16 kg/m².     Physical Exam  Vitals and nursing note reviewed.   Constitutional:       General: He is not in acute distress.     Appearance: He is underweight. He is not diaphoretic.   HENT:      Head: Normocephalic and atraumatic.      Nose: Nose normal.   Eyes:      General: No scleral icterus.     Conjunctiva/sclera: Conjunctivae normal.   Cardiovascular:      Rate and  Rhythm: Normal rate and regular rhythm.      Heart sounds: Murmur heard.      No friction rub. No gallop.   Pulmonary:      Effort: Pulmonary effort is normal. No respiratory distress.      Breath sounds: Normal breath sounds. No stridor. No wheezing or rales.   Chest:      Chest wall: No tenderness.   Abdominal:      General: Bowel sounds are normal. There is no distension.      Palpations: Abdomen is soft.      Tenderness: There is no abdominal tenderness. There is no guarding or rebound.   Musculoskeletal:         General: No tenderness or deformity. Normal range of motion.      Cervical back: Normal range of motion and neck supple.   Skin:     General: Skin is warm and dry.      Coloration: Skin is not pale.      Findings: No erythema or rash.   Neurological:      General: No focal deficit present.      Mental Status: He is alert and oriented to person, place, and time.      Motor: Weakness (generalized) present.   Psychiatric:         Behavior: Behavior normal.         Thought Content: Thought content normal.                Significant Labs: All pertinent labs within the past 24 hours have been reviewed.    Significant Imaging: I have reviewed all pertinent imaging results/findings within the past 24 hours.  Assessment/Plan:     Assessment & Plan  Acute renal failure superimposed on stage 3 chronic kidney disease  ROSA ELENA is likely due to pre-renal azotemia due to dehydration. Baseline creatinine is 1.4- 1.8. Most recent creatinine and eGFR are listed below.  Recent Labs     06/19/25  1412   CREATININE 2.4*   EGFRNORACEVR 26*      Plan  - ROSA ELENA is worsening.   - Avoid nephrotoxins and renally dose meds for GFR listed above  - Monitor urine output, serial BMP, and adjust therapy as needed  - Hold Lasix and Metolazone   -gentle hydration with IVF  -check urine sodium and creatine  Check CPK and Uric acid   -Check renal U/S   Hypokalemia  Patient's most recent potassium results are listed below.   Recent Labs      "06/19/25  1412   K 2.1*     Plan  - Replete potassium per protocol  - Monitor potassium Daily  - Patient's hypokalemia is worsening. Will adjust treatment as follows: replete  - monitor   Dysphagia  Dysphagia and 30lb weight loss   According to recent ENT visit:   Pt was recently seen by ENT 3/20/25 or his dysphagia. Laryngoscopy exam showed No masses or lesions in the nose, nasopharynx, oropharynx, hypopharynx, or larynx. Vocal fold abduction and adduction is normal. No pooling of secretions in the piriform sinuses, penetration, or aspiration.   GI evaluation 2020  IMPRESSION: intermittent nonprogressive dysphagia with solids probably due to mild esophageal stenosis related to chronic reflux esophagitis or from a Schatzki's ring. Had a long discussion with the patient and his wife and all of us agree that his symptoms do not warrant further investigation or treatment at this time.  RECOMMENDATIONS: I had a long discussion with the patient and his wife about dietary measures to control bolus size. He will return if his symptoms persist despite him being diligent about his bite size and if he does then will refer him for EGD with esophageal dilation.  FL esophogram 2020  The esophagus shows normal distensibility, course, caliber and contour. No focal lesion. No extrinsic mass effect. Normal transit of contrast through the esophagus with normal emptying into stomach. The upper and lower esophageal sphincters opened normally. Small sliding hiatal hernia. Spontaneous gastroesophageal reflux to the thoracic inlet    Will consult speech, MBSS ordered   Consider GI eval for EGD    Chronic diastolic congestive heart failure  Patient has Diastolic (HFpEF) heart failure that is Chronic. On presentation their CHF was well compensated. Most recent BNP and echo results are listed below.  No results for input(s): "BNP" in the last 72 hours.  Latest ECHO  Results for orders placed during the hospital encounter of " 06/24/21    Echo    Interpretation Summary  · Concentric remodeling and normal systolic function.  · The estimated ejection fraction is 60%.  · Grade II left ventricular diastolic dysfunction.  · With normal right ventricular systolic function.  · There is mild aortic valve stenosis.  · Aortic valve area is 1.44 cm2; peak velocity is 2.22 m/s; mean gradient is 11 mmHg.  · Mild to moderate tricuspid regurgitation.  · Severe left atrial enlargement.  · Mild to moderate pulmonic regurgitation.  · Moderate right atrial enlargement.  · Mild mitral regurgitation.  · Normal central venous pressure (3 mmHg).  · The estimated PA systolic pressure is 44 mmHg.  · There is pulmonary hypertension.    Current Heart Failure Medications  metoprolol succinate (TOPROL-XL) 24 hr tablet 25 mg, Daily, Oral    Plan  - Monitor strict I&Os and daily weights.    - Place on telemetry  - Low sodium diet  - Place on fluid restriction of 1.5 L.   - Cardiology has not been consulted  - The patient's volume status is at their baseline  - appears dry   Will hold diuretics   Cont BB  Provide gentle IV hydration   Check BNP and Echo           CLL (chronic lymphocytic leukemia)  Stable   Followed by heme/Onc for surveillance     HIV positive  Followed by ID   Controlled   Cont Biktarvy     HLD (hyperlipidemia)  Cont Statin     VTE Risk Mitigation (From admission, onward)           Ordered     heparin (porcine) injection 5,000 Units  Every 8 hours         06/19/25 1546     IP VTE HIGH RISK PATIENT  Once         06/19/25 1546     Place sequential compression device  Until discontinued         06/19/25 1546                                     On 06/19/2025, patient should be placed in hospital observation services under my care in collaboration with Dr Garcia .        Pharmacist Renal Dose Adjustment Note    Rocael Perez is a 85 y.o. male being treated with the medication famotidine.     Patient Data:    Vital Signs (Most Recent):  Temp: 98.9 °F  (37.2 °C) (06/19/25 1314)  Pulse: (!) 55 (06/19/25 1600)  Resp: 20 (06/19/25 1600)  BP: 112/69 (06/19/25 1600)  SpO2: 100 % (06/19/25 1600) Vital Signs (72h Range):  Temp:  [98.9 °F (37.2 °C)]   Pulse:  [55-61]   Resp:  [16-20]   BP: (107-114)/(59-72)   SpO2:  [98 %-100 %]      Recent Labs   Lab 06/19/25  1412   CREATININE 2.4*     Serum creatinine: 2.4 mg/dL (H) 06/19/25 1412  Estimated creatinine clearance: 22.5 mL/min (A)    Per protocol for enteral Pepcid & CrCl < 30 ml/min, dose will be reduced from 20 mg PO once daily to 20 mg PO every other day.     Pharmacist's Name: Katherine E Mcardle  Pharmacist's Extension: [phone]    Kala Toledo NP  Department of Hospital Medicine  '[place] - Telemetry ([facility])

## 2025-06-19 NOTE — ASSESSMENT & PLAN NOTE
"Patient has Diastolic (HFpEF) heart failure that is Chronic. On presentation their CHF was well compensated. Most recent BNP and echo results are listed below.  No results for input(s): "BNP" in the last 72 hours.  Latest ECHO  Results for orders placed during the hospital encounter of 06/24/21    Echo    Interpretation Summary  · Concentric remodeling and normal systolic function.  · The estimated ejection fraction is 60%.  · Grade II left ventricular diastolic dysfunction.  · With normal right ventricular systolic function.  · There is mild aortic valve stenosis.  · Aortic valve area is 1.44 cm2; peak velocity is 2.22 m/s; mean gradient is 11 mmHg.  · Mild to moderate tricuspid regurgitation.  · Severe left atrial enlargement.  · Mild to moderate pulmonic regurgitation.  · Moderate right atrial enlargement.  · Mild mitral regurgitation.  · Normal central venous pressure (3 mmHg).  · The estimated PA systolic pressure is 44 mmHg.  · There is pulmonary hypertension.    Current Heart Failure Medications  metoprolol succinate (TOPROL-XL) 24 hr tablet 25 mg, Daily, Oral    Plan  - Monitor strict I&Os and daily weights.    - Place on telemetry  - Low sodium diet  - Place on fluid restriction of 1.5 L.   - Cardiology has not been consulted  - The patient's volume status is at their baseline  - appears dry   Will hold diuretics   Cont BB  Provide gentle IV hydration   Check BNP and Echo           "

## 2025-06-19 NOTE — ASSESSMENT & PLAN NOTE
Dysphagia and 30lb weight loss   According to recent ENT visit:   Pt was recently seen by ENT 3/20/25 or his dysphagia. Laryngoscopy exam showed No masses or lesions in the nose, nasopharynx, oropharynx, hypopharynx, or larynx. Vocal fold abduction and adduction is normal. No pooling of secretions in the piriform sinuses, penetration, or aspiration.   GI evaluation 2020  IMPRESSION: intermittent nonprogressive dysphagia with solids probably due to mild esophageal stenosis related to chronic reflux esophagitis or from a Schatzki's ring. Had a long discussion with the patient and his wife and all of us agree that his symptoms do not warrant further investigation or treatment at this time.  RECOMMENDATIONS: I had a long discussion with the patient and his wife about dietary measures to control bolus size. He will return if his symptoms persist despite him being diligent about his bite size and if he does then will refer him for EGD with esophageal dilation.  FL esophogram 2020  The esophagus shows normal distensibility, course, caliber and contour. No focal lesion. No extrinsic mass effect. Normal transit of contrast through the esophagus with normal emptying into stomach. The upper and lower esophageal sphincters opened normally. Small sliding hiatal hernia. Spontaneous gastroesophageal reflux to the thoracic inlet    Will consult speech, MBSS ordered   Consider GI eval for EGD

## 2025-06-19 NOTE — TELEPHONE ENCOUNTER
----- Message from Hermes Martinez MD, SAI sent at 6/19/2025  6:50 AM CDT -----  This one is urgent- needs replacement   ----- Message -----  From: Lab, Background User  Sent: 6/9/2025   9:34 PM CDT  To: Hermes Martinez MD, SAI

## 2025-06-19 NOTE — HPI
The patient is  84 yo male with HIV, Diastolic CHF, CLL, MGUS, HLD, and CKD3 who was advised to go to ED by Dr. Martinez for hypokalemia. The patient was seen for routine follow up in ID clinic on 6/9/25. F/U labwork showed K 2.9 and worsening renal function with sCr 2.5. The patient's spouse reports poor intake due to trouble swallowing. He is on lasix and Metolazone and feels like he is weak and dehydrated. Pt is on supplemental potassium 10meq which he takes on Monday and Friday. Pt reports dysphagia for several months that is worsening. Spouse reports a 30 lb weight loss in last several months due to not eating.   Pt was recently seen by ENT 3/20/25 or his dysphagia. Laryngoscopy exam showed No masses or lesions in the nose, nasopharynx, oropharynx, hypopharynx, or larynx. Vocal fold abduction and adduction is normal. No pooling of secretions in the piriform sinuses, penetration, or aspiration.   According to ENT note,   GI evaluation 2020  IMPRESSION: intermittent nonprogressive dysphagia with solids probably due to mild esophageal stenosis related to chronic reflux esophagitis or from a Schatzki's ring. Had a long discussion with the patient and his wife and all of us agree that his symptoms do not warrant further investigation or treatment at this time.  RECOMMENDATIONS: I had a long discussion with the patient and his wife about dietary measures to control bolus size. He will return if his symptoms persist despite him being diligent about his bite size and if he does then will refer him for EGD with esophageal dilation.  FL esophogram 2020  The esophagus shows normal distensibility, course, caliber and contour. No focal lesion. No extrinsic mass effect. Normal transit of contrast through the esophagus with normal emptying into stomach. The upper and lower esophageal sphincters opened normally. Small sliding hiatal hernia. Spontaneous gastroesophageal reflux to the thoracic inlet    In the ED, BP low normal.  Afebrile, Labs revealed Na 132, K 2.1, Cr 2.4. (Baseline 1.5- 1.8), UTI normal.

## 2025-06-20 LAB
ABSOLUTE EOSINOPHIL (OHS): 0.07 K/UL
ABSOLUTE MONOCYTE (OHS): 0.53 K/UL (ref 0.3–1)
ABSOLUTE NEUTROPHIL COUNT (OHS): 1.42 K/UL (ref 1.8–7.7)
ALBUMIN SERPL BCP-MCNC: 2.9 G/DL (ref 3.5–5.2)
ALP SERPL-CCNC: 56 UNIT/L (ref 40–150)
ALT SERPL W/O P-5'-P-CCNC: 11 UNIT/L (ref 10–44)
ANION GAP (OHS): 10 MMOL/L (ref 8–16)
AORTIC ROOT ANNULUS: 3.1 CM
AORTIC SIZE INDEX: 1.8 CM/M2
ASCENDING AORTA: 3.2 CM
AST SERPL-CCNC: 21 UNIT/L (ref 11–45)
AV INDEX (PROSTH): 0.32
AV MEAN GRADIENT: 18 MMHG
AV PEAK GRADIENT: 27 MMHG
AV VALVE AREA BY VELOCITY RATIO: 1.1 CM²
AV VALVE AREA: 1 CM²
AV VELOCITY RATIO: 0.35
BASOPHILS # BLD AUTO: 0.03 K/UL
BASOPHILS NFR BLD AUTO: 0.8 %
BILIRUB SERPL-MCNC: 0.5 MG/DL (ref 0.1–1)
BSA FOR ECHO PROCEDURE: 1.81 M2
BUN SERPL-MCNC: 24 MG/DL (ref 8–23)
CALCIUM SERPL-MCNC: 9.3 MG/DL (ref 8.7–10.5)
CHLORIDE SERPL-SCNC: 100 MMOL/L (ref 95–110)
CO2 SERPL-SCNC: 26 MMOL/L (ref 23–29)
CREAT SERPL-MCNC: 2 MG/DL (ref 0.5–1.4)
CV ECHO LV RWT: 0.57 CM
DOP CALC AO PEAK VEL: 2.6 M/S
DOP CALC AO VTI: 65.7 CM
DOP CALC LVOT AREA: 3.1 CM2
DOP CALC LVOT DIAMETER: 2 CM
DOP CALC LVOT PEAK VEL: 0.9 M/S
DOP CALC LVOT STROKE VOLUME: 66.6 CM3
DOP CALC MV VTI: 31.5 CM
DOP CALC RVOT PEAK VEL: 0.65 M/S
DOP CALC RVOT VTI: 13.1 CM
DOP CALCLVOT PEAK VEL VTI: 21.2 CM
E WAVE DECELERATION TIME: 189 MSEC
E/A RATIO: 1.75
E/E' RATIO: 7 M/S
ECHO LV POSTERIOR WALL: 1 CM (ref 0.6–1.1)
ERYTHROCYTE [DISTWIDTH] IN BLOOD BY AUTOMATED COUNT: 15.9 % (ref 11.5–14.5)
FRACTIONAL SHORTENING: 28.6 % (ref 28–44)
GFR SERPLBLD CREATININE-BSD FMLA CKD-EPI: 32 ML/MIN/1.73/M2
GLUCOSE SERPL-MCNC: 93 MG/DL (ref 70–110)
HCT VFR BLD AUTO: 31.7 % (ref 40–54)
HGB BLD-MCNC: 10.6 GM/DL (ref 14–18)
IMM GRANULOCYTES # BLD AUTO: 0.01 K/UL (ref 0–0.04)
IMM GRANULOCYTES NFR BLD AUTO: 0.3 % (ref 0–0.5)
INTERVENTRICULAR SEPTUM: 1.2 CM (ref 0.6–1.1)
IVC DIAMETER: 1.4 CM
IVRT: 51 MSEC
LA MAJOR: 5.6 CM
LA MINOR: 5.9 CM
LA WIDTH: 3.5 CM
LEFT ATRIUM SIZE: 3.9 CM
LEFT ATRIUM VOLUME INDEX: 37 ML/M2
LEFT ATRIUM VOLUME: 67 CM3
LEFT INTERNAL DIMENSION IN SYSTOLE: 2.5 CM (ref 2.1–4)
LEFT VENTRICLE DIASTOLIC VOLUME INDEX: 28.02 ML/M2
LEFT VENTRICLE DIASTOLIC VOLUME: 51 ML
LEFT VENTRICLE MASS INDEX: 65.4 G/M2
LEFT VENTRICLE SYSTOLIC VOLUME INDEX: 11.5 ML/M2
LEFT VENTRICLE SYSTOLIC VOLUME: 21 ML
LEFT VENTRICULAR INTERNAL DIMENSION IN DIASTOLE: 3.5 CM (ref 3.5–6)
LEFT VENTRICULAR MASS: 119 G
LV LATERAL E/E' RATIO: 6.3 M/S
LV SEPTAL E/E' RATIO: 9 M/S
LVED V (TEICH): 50.66 ML
LVES V (TEICH): 21.12 ML
LVOT MG: 2.59 MMHG
LVOT MV: 0.8 CM/S
LYMPHOCYTES # BLD AUTO: 1.6 K/UL (ref 1–4.8)
MAGNESIUM SERPL-MCNC: 2.5 MG/DL (ref 1.6–2.6)
MCH RBC QN AUTO: 31.7 PG (ref 27–31)
MCHC RBC AUTO-ENTMCNC: 33.4 G/DL (ref 32–36)
MCV RBC AUTO: 95 FL (ref 82–98)
MV MEAN GRADIENT: 1 MMHG
MV PEAK A VEL: 0.36 M/S
MV PEAK E VEL: 0.63 M/S
MV PEAK GRADIENT: 3 MMHG
MV STENOSIS PRESSURE HALF TIME: 54.91 MS
MV VALVE AREA BY CONTINUITY EQUATION: 2.11 CM2
MV VALVE AREA P 1/2 METHOD: 4.01 CM2
NUCLEATED RBC (/100WBC) (OHS): 0 /100 WBC
PHOSPHATE SERPL-MCNC: 1.7 MG/DL (ref 2.7–4.5)
PISA MRMAX VEL: 4.62 M/S
PISA TR MAX VEL: 2.8 M/S
PLATELET # BLD AUTO: 152 K/UL (ref 150–450)
PMV BLD AUTO: 10.2 FL (ref 9.2–12.9)
POTASSIUM SERPL-SCNC: 2.7 MMOL/L (ref 3.5–5.1)
PROT SERPL-MCNC: 7.1 GM/DL (ref 6–8.4)
PV MEAN GRADIENT: 1 MMHG
RA MAJOR: 4.59 CM
RA PRESSURE ESTIMATED: 3 MMHG
RA WIDTH: 3 CM
RBC # BLD AUTO: 3.34 M/UL (ref 4.6–6.2)
RELATIVE EOSINOPHIL (OHS): 1.9 %
RELATIVE LYMPHOCYTE (OHS): 43.7 % (ref 18–48)
RELATIVE MONOCYTE (OHS): 14.5 % (ref 4–15)
RELATIVE NEUTROPHIL (OHS): 38.8 % (ref 38–73)
RV TB RVSP: 6 MMHG
SODIUM SERPL-SCNC: 136 MMOL/L (ref 136–145)
STJ: 3.5 CM
TDI LATERAL: 0.1 M/S
TDI SEPTAL: 0.07 M/S
TDI: 0.09 M/S
TR MAX PG: 31 MMHG
TR MEAN GRADIENT: 26 MMHG
TRICUSPID ANNULAR PLANE SYSTOLIC EXCURSION: 2.3 CM
TV REST PULMONARY ARTERY PRESSURE: 34 MMHG
WBC # BLD AUTO: 3.66 K/UL (ref 3.9–12.7)
Z-SCORE OF LEFT VENTRICULAR DIMENSION IN END DIASTOLE: -3.64
Z-SCORE OF LEFT VENTRICULAR DIMENSION IN END SYSTOLE: -1.73

## 2025-06-20 PROCEDURE — 25000003 PHARM REV CODE 250: Mod: HCNC | Performed by: NURSE PRACTITIONER

## 2025-06-20 PROCEDURE — 96372 THER/PROPH/DIAG INJ SC/IM: CPT | Performed by: NURSE PRACTITIONER

## 2025-06-20 PROCEDURE — 83735 ASSAY OF MAGNESIUM: CPT | Mod: HCNC | Performed by: NURSE PRACTITIONER

## 2025-06-20 PROCEDURE — 25000003 PHARM REV CODE 250: Mod: HCNC | Performed by: INTERNAL MEDICINE

## 2025-06-20 PROCEDURE — 36415 COLL VENOUS BLD VENIPUNCTURE: CPT | Mod: HCNC | Performed by: NURSE PRACTITIONER

## 2025-06-20 PROCEDURE — 97535 SELF CARE MNGMENT TRAINING: CPT | Mod: HCNC

## 2025-06-20 PROCEDURE — 97162 PT EVAL MOD COMPLEX 30 MIN: CPT | Mod: HCNC

## 2025-06-20 PROCEDURE — 84100 ASSAY OF PHOSPHORUS: CPT | Mod: HCNC | Performed by: NURSE PRACTITIONER

## 2025-06-20 PROCEDURE — 97116 GAIT TRAINING THERAPY: CPT | Mod: HCNC

## 2025-06-20 PROCEDURE — 63600175 PHARM REV CODE 636 W HCPCS: Mod: HCNC | Performed by: STUDENT IN AN ORGANIZED HEALTH CARE EDUCATION/TRAINING PROGRAM

## 2025-06-20 PROCEDURE — 80053 COMPREHEN METABOLIC PANEL: CPT | Mod: HCNC | Performed by: NURSE PRACTITIONER

## 2025-06-20 PROCEDURE — 92610 EVALUATE SWALLOWING FUNCTION: CPT | Mod: HCNC

## 2025-06-20 PROCEDURE — 63600175 PHARM REV CODE 636 W HCPCS: Mod: HCNC | Performed by: NURSE PRACTITIONER

## 2025-06-20 PROCEDURE — A9698 NON-RAD CONTRAST MATERIALNOC: HCPCS | Mod: HCNC | Performed by: STUDENT IN AN ORGANIZED HEALTH CARE EDUCATION/TRAINING PROGRAM

## 2025-06-20 PROCEDURE — 25500020 PHARM REV CODE 255: Mod: HCNC | Performed by: STUDENT IN AN ORGANIZED HEALTH CARE EDUCATION/TRAINING PROGRAM

## 2025-06-20 PROCEDURE — 92611 MOTION FLUOROSCOPY/SWALLOW: CPT | Mod: HCNC

## 2025-06-20 PROCEDURE — 63600175 PHARM REV CODE 636 W HCPCS: Mod: HCNC | Performed by: INTERNAL MEDICINE

## 2025-06-20 PROCEDURE — 21400001 HC TELEMETRY ROOM: Mod: HCNC

## 2025-06-20 PROCEDURE — 85025 COMPLETE CBC W/AUTO DIFF WBC: CPT | Mod: HCNC | Performed by: NURSE PRACTITIONER

## 2025-06-20 PROCEDURE — 25000003 PHARM REV CODE 250: Mod: HCNC | Performed by: STUDENT IN AN ORGANIZED HEALTH CARE EDUCATION/TRAINING PROGRAM

## 2025-06-20 RX ORDER — SODIUM,POTASSIUM PHOSPHATES 280-250MG
2 POWDER IN PACKET (EA) ORAL
Status: COMPLETED | OUTPATIENT
Start: 2025-06-20 | End: 2025-06-20

## 2025-06-20 RX ORDER — POTASSIUM CHLORIDE 7.45 MG/ML
10 INJECTION INTRAVENOUS
Status: COMPLETED | OUTPATIENT
Start: 2025-06-20 | End: 2025-06-20

## 2025-06-20 RX ORDER — POTASSIUM CHLORIDE 20 MEQ/1
40 TABLET, EXTENDED RELEASE ORAL ONCE
Status: DISCONTINUED | OUTPATIENT
Start: 2025-06-20 | End: 2025-06-20

## 2025-06-20 RX ADMIN — HEPARIN SODIUM 5000 UNITS: 5000 INJECTION INTRAVENOUS; SUBCUTANEOUS at 02:06

## 2025-06-20 RX ADMIN — SODIUM CHLORIDE 500 ML: 9 INJECTION, SOLUTION INTRAVENOUS at 11:06

## 2025-06-20 RX ADMIN — Medication 2 PACKET: at 11:06

## 2025-06-20 RX ADMIN — BARIUM SULFATE 20 ML: 0.81 POWDER, FOR SUSPENSION ORAL at 02:06

## 2025-06-20 RX ADMIN — Medication 1000 UNITS: at 08:06

## 2025-06-20 RX ADMIN — POTASSIUM CHLORIDE 10 MEQ: 7.46 INJECTION, SOLUTION INTRAVENOUS at 10:06

## 2025-06-20 RX ADMIN — POTASSIUM CHLORIDE 10 MEQ: 7.46 INJECTION, SOLUTION INTRAVENOUS at 11:06

## 2025-06-20 RX ADMIN — FAMOTIDINE 20 MG: 20 TABLET, FILM COATED ORAL at 08:06

## 2025-06-20 RX ADMIN — SENNOSIDES AND DOCUSATE SODIUM 1 TABLET: 50; 8.6 TABLET ORAL at 09:06

## 2025-06-20 RX ADMIN — POTASSIUM PHOSPHATE, MONOBASIC POTASSIUM PHOSPHATE, DIBASIC INJECTION, 20 MMOL: 236; 224 SOLUTION, CONCENTRATE INTRAVENOUS at 08:06

## 2025-06-20 RX ADMIN — HEPARIN SODIUM 5000 UNITS: 5000 INJECTION INTRAVENOUS; SUBCUTANEOUS at 09:06

## 2025-06-20 RX ADMIN — Medication 2 PACKET: at 02:06

## 2025-06-20 RX ADMIN — POTASSIUM CHLORIDE 10 MEQ: 7.46 INJECTION, SOLUTION INTRAVENOUS at 02:06

## 2025-06-20 RX ADMIN — POTASSIUM CHLORIDE 10 MEQ: 7.46 INJECTION, SOLUTION INTRAVENOUS at 03:06

## 2025-06-20 RX ADMIN — POTASSIUM CHLORIDE 10 MEQ: 7.46 INJECTION, SOLUTION INTRAVENOUS at 12:06

## 2025-06-20 RX ADMIN — BICTEGRAVIR SODIUM, EMTRICITABINE, AND TENOFOVIR ALAFENAMIDE FUMARATE 1 TABLET: 50; 200; 25 TABLET ORAL at 08:06

## 2025-06-20 RX ADMIN — Medication 2 PACKET: at 04:06

## 2025-06-20 RX ADMIN — HEPARIN SODIUM 5000 UNITS: 5000 INJECTION INTRAVENOUS; SUBCUTANEOUS at 06:06

## 2025-06-20 RX ADMIN — ASPIRIN 81 MG: 81 TABLET, COATED ORAL at 08:06

## 2025-06-20 RX ADMIN — SENNOSIDES AND DOCUSATE SODIUM 1 TABLET: 50; 8.6 TABLET ORAL at 08:06

## 2025-06-20 RX ADMIN — POTASSIUM BICARBONATE 20 MEQ: 391 TABLET, EFFERVESCENT ORAL at 10:06

## 2025-06-20 NOTE — PLAN OF CARE
Initial PT eval completed. Patient Min A with bed mobility and Mod A with transfer and gait x10 feet with RW. Recommend moderate intensity therapy.

## 2025-06-20 NOTE — PLAN OF CARE
..Plan of care reviewed with patient with verbal understanding. Chart and orders reviewed Pt remains free from falls this shift, Safety precautions in place. Pt currently resting comfortably in bed. Purposeful rounding with bed in lowest position, side rails up, call light in reach. Will continue to monitor until end of shift.     Problem: Adult Inpatient Plan of Care  Goal: Plan of Care Review  Outcome: Progressing  Goal: Patient-Specific Goal (Individualized)  Outcome: Progressing  Goal: Absence of Hospital-Acquired Illness or Injury  Outcome: Progressing  Goal: Optimal Comfort and Wellbeing  Outcome: Progressing  Goal: Readiness for Transition of Care  Outcome: Progressing     Problem: Infection  Goal: Absence of Infection Signs and Symptoms  Outcome: Progressing     Problem: Diabetes Comorbidity  Goal: Blood Glucose Level Within Targeted Range  Outcome: Progressing     Problem: Acute Kidney Injury/Impairment  Goal: Fluid and Electrolyte Balance  Outcome: Progressing  Goal: Improved Oral Intake  Outcome: Progressing  Goal: Effective Renal Function  Outcome: Progressing     Problem: Fall Injury Risk  Goal: Absence of Fall and Fall-Related Injury  Outcome: Progressing     Problem: Skin Injury Risk Increased  Goal: Skin Health and Integrity  Outcome: Progressing

## 2025-06-20 NOTE — PT/OT/SLP EVAL
Physical Therapy Evaluation    Patient Name:  Rocael Perez   MRN:  [id number]    Recommendations:     Discharge Recommendations: Moderate Intensity Therapy   Discharge Equipment Recommendations: none   Barriers to discharge: Decreased caregiver support    Assessment:     Rocael Perez is a 85 y.o. male admitted with a medical diagnosis of Acute renal failure superimposed on stage 3 chronic kidney disease.  He presents with the following impairments/functional limitations: impaired functional mobility, weakness, decreased safety awareness, impaired endurance, gait instability, decreased coordination, impaired balance, decreased upper extremity function, impaired self care skills, decreased lower extremity function, decreased ROM.    Rehab Prognosis: Good; patient would benefit from acute skilled PT services to address these deficits and reach maximum level of function.    Recent Surgery: * No surgery found *      Plan:     During this hospitalization, patient to be seen 3 x/week to address the identified rehab impairments via therapeutic exercises, therapeutic activities, gait training and progress toward the following goals:    Plan of Care Expires:  07/04/25    Subjective     Chief Complaint: Bilateral knee pain  Patient/Family Comments/goals: Get better and return home  Pain/Comfort:  Pain Rating 1: 3/10  Location - Side 1: Bilateral  Location - Orientation 1: generalized  Location 1: knee  Pain Addressed 1: Distraction, Reposition    Patients cultural, spiritual, Pentec[employer]alism conflicts given the current situation: no    Living Environment:    Patient lives with his sister in a two story home with all needs on first floor. Home has no steps to enter.   Prior to admission, patients level of function was ambulating with a RW. Mod I with ADLs. Standing at sink for a sponge bath due to not feeling safe with getting in the tub.  Equipment used at home: walker, rolling.  DME owned (not currently used): none.  Upon  discharge, patient will have assistance from unknown.    Objective:     Communicated with the RN prior to session.  Patient found supine with peripheral IV, telemetry, bed alarm  upon PT entry to room.    General Precautions: Standard, fall  Orthopedic Precautions:N/A   Braces: N/A  Respiratory Status: Room air    Exams:  Cognitive Exam:  Patient is oriented to Person and Place  RLE ROM: WFL  RLE Strength: WFL  LLE ROM: WFL  LLE Strength: WFL    Functional Mobility:  Bed Mobility:     Supine to Sit: minimum assistance  Transfers:     Sit to Stand:  moderate assistance with rolling walker  Gait: 10 feet, Mod A with RW      AM-PAC 6 CLICK MOBILITY  Total Score:13       Treatment & Education:    Patient found supine in bed upon PT arrival to room. PT provided education on role of PT and POC.    Patient completed:     Sup>sit: Min A with cues to utilize bed rail for support with transfer    STS: Mod A with cues for hand placement on RW for safety with transfer    Gait: 10 feet Mod A with RW. Cues given to push through upper body to bring chest up. Patient ambulated with decreased stride length and dandre with one slight loss of balance with turning to get to bedside chair, requiring Max A to correct.    Chair tx: Mod A with cues to reach back for arm rest of chair to safely lower body into chair    Patient left up in chair with all lines intact, call button in reach, and RN notified.    GOALS:   Multidisciplinary Problems       Physical Therapy Goals          Problem: Physical Therapy    Goal Priority Disciplines Outcome Interventions   Physical Therapy Goal     PT, PT/OT     Description: Patient will be seen a minimal of 3 out of 7 days a week.    LTG to be met by 07/04/25:    Bed mobility: CGA  Transfers: Min A with RW  Gait: Min A with RW x50 feet                        DME Justifications:  No DME recommended requiring DME justifications    History:     Past Medical History:   Diagnosis Date    CHF (congestive heart  failure)     CLL (chronic lymphocytic leukemia)     HIV (human immunodeficiency virus infection)     Hyperlipidemia     MGUS (monoclonal gammopathy of unknown significance)        Past Surgical History:   Procedure Laterality Date    HERNIA REPAIR      SHOULDER SURGERY Right        Time Tracking:     PT Received On: 06/20/25  PT Start Time: 1530     PT Stop Time: 1555  PT Total Time (min): 25 min     Billable Minutes: Evaluation 12 and Gait Training 13      06/20/2025

## 2025-06-20 NOTE — PROGRESS NOTES
ST evaluation attempted multiple times this a.m. to assess swallow; however, multiple procedures being completed (echo, US).  Pt with extensive GI/esophageal dysphagia hx dx 2020. Recent ENT laryngoscopy WNL.  ST will f/u for bedside swallow prior to proceeding with MBSS given hx with further recommendations pending CSE.

## 2025-06-20 NOTE — PLAN OF CARE
[facility] - Telemetry (Hospital)  Initial Discharge Assessment       Primary Care Provider: Landry Ferguson MD    Admission Diagnosis: Hypokalemia [E87.6]  Chest pain [R07.9]    Admission Date: 6/19/2025  Expected Discharge Date: 6/23/2025    Transition of Care Barriers: (P) None    Payor: [employer] MANAGED MEDICARE / Plan: [employer] SNP HMO PPO SPECIAL NEEDS / Product Type: Medicare Advantage /     Extended Emergency Contact Information  Primary Emergency Contact: Debora Hummel  Home Phone: [phone]  Work Phone: [phone]  Mobile Phone: [phone]  Relation: Sister  Preferred language: English   needed? No    Discharge Plan A: (P) Home  Discharge Plan B: (P) Home      [id number] [place] - [address] [facility]  [address] [place] [address]  Phone: [phone] Fax: [phone]      Initial Assessment (most recent)       Adult Discharge Assessment - 06/20/25 1236          Discharge Assessment    Assessment Type Discharge Planning Assessment (P)      Confirmed/corrected address, phone number and insurance Yes (P)      Confirmed Demographics Correct on Facesheet (P)      Source of Information patient (P)      Communicated YANCY with patient/caregiver No (P)      People in Home sibling(s) (P)      Name(s) of People in Home Debora Hummel- sibling [phone] (P)      Facility Arrived From: Home (P)      Do you expect to return to your current living situation? Yes (P)      Do you have help at home or someone to help you manage your care at home? Yes (P)      Who are your caregiver(s) and their phone number(s)? Debora Hummel- abdirahman [phone] (P)      Prior to hospitilization cognitive status: Alert/Oriented (P)      Current cognitive status: Alert/Oriented (P)      Walking or Climbing Stairs Difficulty yes (P)      Walking or Climbing Stairs ambulation difficulty, requires equipment (P)      Home Accessibility wheelchair accessible (P)      Home Layout  Able to live on 1st floor (P)      Equipment Currently Used at Home walker rolling (P)      Readmission within 30 days? No (P)      Patient currently being followed by outpatient case management? No (P)      Do you currently have service(s) that help you manage your care at home? No (P)      Do you take prescription medications? Yes (P)      Do you have prescription coverage? Yes (P)      Do you have any problems affording any of your prescribed medications? No (P)      Is the patient taking medications as prescribed? yes (P)      Who is going to help you get [facility] at discharge[phone] (P)      How do you get to doctors appointments? family or friend will provide (P)      Are you on dialysis? No (P)      Do you take coumadin? No (P)      Discharge Plan A Home (P)      Discharge Plan B Home (P)      DME Needed Upon Discharge  none (P)      Discharge Plan discussed with: Patient (P)      Transition of Care Barriers None (P)         Physical Activity    On average, how many days per week do you engage in moderate to strenuous exercise (like a brisk walk)? 0 days (P)      On average, how many minutes do you engage in exercise at this level? 0 min (P)         Financial Resource Strain    How hard is it for you to pay for the very basics like food, housing, medical care, and heating? Not very hard (P)         Housing Stability    In the last 12 months, was there a time when you were not able to pay the mortgage or rent on time? No (P)      At any time in the past 12 months, were you homeless or living in a shelter (including now)? No (P)         Transportation Needs    In the past 12 months, has lack of transportation kept you from medical appointments or from getting medications? No (P)      In the past 12 months, has lack of transportation kept you from meetings, work, or from getting things needed for daily living? No (P)         Food Insecurity    Within the past 12 months, you worried  that your food would run out before you got the money to buy more. Never true (P)      Within the past 12 months, the food you bought just didn't last and you didn't have money to get more. Never true (P)         Stress    Do you feel stress - tense, restless, nervous, or anxious, or unable to sleep at night because your mind is troubled all the time - these days? Not at all (P)         Social Isolation    How often do you feel lonely or isolated from those around you?  Never (P)         Alcohol Use    Q1: How often do you have a drink containing alcohol? Never (P)      Q2: How many drinks containing alcohol do you have on a typical day when you are drinking? Patient does not drink (P)      Q3: How often do you have six or more drinks on one occasion? Never (P)         Utilities    In the past 12 months has the electric, gas, oil, or water company threatened to shut off services in your home? No (P)         Health Literacy    How often do you need to have someone help you when you read instructions, pamphlets, or other written material from your doctor or pharmacy? Never (P)                       CM spoke with patient to explain role and discuss discharge planning. Patient lives at the correct address on face sheet.     Level of function: Functioning daily ADLs with assistance from his rolling walker.     PCP: Landry Ferguson MD    Anticipated DC Dispo: Home     Patient does not receive dialysis. Patient is not taking coumadin.    Needs will depend on hospital progress; CM following for needs

## 2025-06-20 NOTE — PT/OT/SLP PROGRESS
Occupational Therapy      Patient Name:  Rocael Perez   MRN:  [id number]    OT eval orders received. Chart review completed. Presented to room at 0935 and patient with PCT followed by other staff for testing. Will continue efforts.    Francy Rivera OT  6/20/2025

## 2025-06-21 PROBLEM — D64.9 ANEMIA: Status: ACTIVE | Noted: 2025-06-21

## 2025-06-21 PROBLEM — E83.39 HYPOPHOSPHATEMIA: Status: ACTIVE | Noted: 2025-06-21

## 2025-06-21 PROBLEM — E87.1 HYPONATREMIA: Status: ACTIVE | Noted: 2025-06-21

## 2025-06-21 LAB
ABSOLUTE EOSINOPHIL (OHS): 0.1 K/UL
ABSOLUTE MONOCYTE (OHS): 0.54 K/UL (ref 0.3–1)
ABSOLUTE NEUTROPHIL COUNT (OHS): 1.37 K/UL (ref 1.8–7.7)
ALBUMIN SERPL BCP-MCNC: 2.9 G/DL (ref 3.5–5.2)
ALP SERPL-CCNC: 56 UNIT/L (ref 40–150)
ALT SERPL W/O P-5'-P-CCNC: 12 UNIT/L (ref 10–44)
ANION GAP (OHS): 12 MMOL/L (ref 8–16)
AST SERPL-CCNC: 23 UNIT/L (ref 11–45)
BASOPHILS # BLD AUTO: 0.03 K/UL
BASOPHILS NFR BLD AUTO: 0.9 %
BILIRUB SERPL-MCNC: 0.7 MG/DL (ref 0.1–1)
BUN SERPL-MCNC: 18 MG/DL (ref 8–23)
CALCIUM SERPL-MCNC: 9.1 MG/DL (ref 8.7–10.5)
CHLORIDE SERPL-SCNC: 98 MMOL/L (ref 95–110)
CO2 SERPL-SCNC: 24 MMOL/L (ref 23–29)
CREAT SERPL-MCNC: 1.7 MG/DL (ref 0.5–1.4)
ERYTHROCYTE [DISTWIDTH] IN BLOOD BY AUTOMATED COUNT: 16.1 % (ref 11.5–14.5)
GFR SERPLBLD CREATININE-BSD FMLA CKD-EPI: 39 ML/MIN/1.73/M2
GLUCOSE SERPL-MCNC: 88 MG/DL (ref 70–110)
HCT VFR BLD AUTO: 33.6 % (ref 40–54)
HGB BLD-MCNC: 11.2 GM/DL (ref 14–18)
IMM GRANULOCYTES # BLD AUTO: 0.01 K/UL (ref 0–0.04)
IMM GRANULOCYTES NFR BLD AUTO: 0.3 % (ref 0–0.5)
LYMPHOCYTES # BLD AUTO: 1.43 K/UL (ref 1–4.8)
MAGNESIUM SERPL-MCNC: 2.2 MG/DL (ref 1.6–2.6)
MCH RBC QN AUTO: 31.8 PG (ref 27–31)
MCHC RBC AUTO-ENTMCNC: 33.3 G/DL (ref 32–36)
MCV RBC AUTO: 96 FL (ref 82–98)
NUCLEATED RBC (/100WBC) (OHS): 0 /100 WBC
PHOSPHATE SERPL-MCNC: 2.7 MG/DL (ref 2.7–4.5)
PLATELET # BLD AUTO: 166 K/UL (ref 150–450)
PMV BLD AUTO: 11.1 FL (ref 9.2–12.9)
POTASSIUM SERPL-SCNC: 3.1 MMOL/L (ref 3.5–5.1)
PROT SERPL-MCNC: 7.3 GM/DL (ref 6–8.4)
RBC # BLD AUTO: 3.52 M/UL (ref 4.6–6.2)
RELATIVE EOSINOPHIL (OHS): 2.9 %
RELATIVE LYMPHOCYTE (OHS): 41.1 % (ref 18–48)
RELATIVE MONOCYTE (OHS): 15.5 % (ref 4–15)
RELATIVE NEUTROPHIL (OHS): 39.3 % (ref 38–73)
SODIUM SERPL-SCNC: 134 MMOL/L (ref 136–145)
WBC # BLD AUTO: 3.48 K/UL (ref 3.9–12.7)

## 2025-06-21 PROCEDURE — 25000003 PHARM REV CODE 250: Mod: HCNC | Performed by: NURSE PRACTITIONER

## 2025-06-21 PROCEDURE — 36415 COLL VENOUS BLD VENIPUNCTURE: CPT | Mod: HCNC | Performed by: NURSE PRACTITIONER

## 2025-06-21 PROCEDURE — 21400001 HC TELEMETRY ROOM: Mod: HCNC

## 2025-06-21 PROCEDURE — 83735 ASSAY OF MAGNESIUM: CPT | Mod: HCNC | Performed by: NURSE PRACTITIONER

## 2025-06-21 PROCEDURE — 85025 COMPLETE CBC W/AUTO DIFF WBC: CPT | Mod: HCNC | Performed by: NURSE PRACTITIONER

## 2025-06-21 PROCEDURE — 25000003 PHARM REV CODE 250: Mod: HCNC | Performed by: HOSPITALIST

## 2025-06-21 PROCEDURE — 84100 ASSAY OF PHOSPHORUS: CPT | Mod: HCNC | Performed by: NURSE PRACTITIONER

## 2025-06-21 PROCEDURE — 63600175 PHARM REV CODE 636 W HCPCS: Mod: HCNC | Performed by: NURSE PRACTITIONER

## 2025-06-21 PROCEDURE — 80053 COMPREHEN METABOLIC PANEL: CPT | Mod: HCNC | Performed by: NURSE PRACTITIONER

## 2025-06-21 RX ORDER — SODIUM CHLORIDE 9 MG/ML
INJECTION, SOLUTION INTRAVENOUS CONTINUOUS
Status: DISCONTINUED | OUTPATIENT
Start: 2025-06-21 | End: 2025-06-25

## 2025-06-21 RX ORDER — FAMOTIDINE 20 MG/1
20 TABLET, FILM COATED ORAL DAILY
Status: DISCONTINUED | OUTPATIENT
Start: 2025-06-21 | End: 2025-06-25 | Stop reason: HOSPADM

## 2025-06-21 RX ORDER — POTASSIUM CHLORIDE 20 MEQ/1
20 TABLET, EXTENDED RELEASE ORAL ONCE
Status: COMPLETED | OUTPATIENT
Start: 2025-06-21 | End: 2025-06-21

## 2025-06-21 RX ADMIN — HEPARIN SODIUM 5000 UNITS: 5000 INJECTION INTRAVENOUS; SUBCUTANEOUS at 05:06

## 2025-06-21 RX ADMIN — ASPIRIN 81 MG: 81 TABLET, COATED ORAL at 08:06

## 2025-06-21 RX ADMIN — POTASSIUM CHLORIDE 20 MEQ: 1500 TABLET, EXTENDED RELEASE ORAL at 08:06

## 2025-06-21 RX ADMIN — HEPARIN SODIUM 5000 UNITS: 5000 INJECTION INTRAVENOUS; SUBCUTANEOUS at 02:06

## 2025-06-21 RX ADMIN — SENNOSIDES AND DOCUSATE SODIUM 1 TABLET: 50; 8.6 TABLET ORAL at 08:06

## 2025-06-21 RX ADMIN — HEPARIN SODIUM 5000 UNITS: 5000 INJECTION INTRAVENOUS; SUBCUTANEOUS at 09:06

## 2025-06-21 RX ADMIN — FAMOTIDINE 20 MG: 20 TABLET, FILM COATED ORAL at 11:06

## 2025-06-21 RX ADMIN — SODIUM CHLORIDE: 9 INJECTION, SOLUTION INTRAVENOUS at 02:06

## 2025-06-21 RX ADMIN — Medication 1000 UNITS: at 08:06

## 2025-06-21 RX ADMIN — BICTEGRAVIR SODIUM, EMTRICITABINE, AND TENOFOVIR ALAFENAMIDE FUMARATE 1 TABLET: 50; 200; 25 TABLET ORAL at 08:06

## 2025-06-21 NOTE — PROGRESS NOTES
[facility] Medicine  Progress Note    Patient Name: Rocael Perez  MRN: [id number]  Patient Class: IP- Inpatient   Admission Date: 6/19/2025  Length of Stay: 1 days  Attending Physician: Duane Garcia DO  Primary Care Provider: Landry Ferguson MD        Subjective     Principal Problem:Acute renal failure superimposed on stage 3 chronic kidney disease        HPI:  The patient is  86 yo male with HIV, Diastolic CHF, CLL, MGUS, HLD, and CKD3 who was advised to go to ED by Dr. Martinez for hypokalemia. The patient was seen for routine follow up in ID clinic on 6/9/25. F/U labwork showed K 2.9 and worsening renal function with sCr 2.5. The patient's spouse reports poor intake due to trouble swallowing. He is on lasix and Metolazone and feels like he is weak and dehydrated. Pt is on supplemental potassium 10meq which he takes on Monday and Friday. Pt reports dysphagia for several months that is worsening. Spouse reports a 30 lb weight loss in last several months due to not eating.   Pt was recently seen by ENT 3/20/25 or his dysphagia. Laryngoscopy exam showed No masses or lesions in the nose, nasopharynx, oropharynx, hypopharynx, or larynx. Vocal fold abduction and adduction is normal. No pooling of secretions in the piriform sinuses, penetration, or aspiration.   According to ENT note,   GI evaluation 2020  IMPRESSION: intermittent nonprogressive dysphagia with solids probably due to mild esophageal stenosis related to chronic reflux esophagitis or from a Schatzki's ring. Had a long discussion with the patient and his wife and all of us agree that his symptoms do not warrant further investigation or treatment at this time.  RECOMMENDATIONS: I had a long discussion with the patient and his wife about dietary measures to control bolus size. He will return if his symptoms persist despite him being diligent about his bite size and if he does then will refer him for EGD with esophageal  dilation.  FL esophogram 2020  The esophagus shows normal distensibility, course, caliber and contour. No focal lesion. No extrinsic mass effect. Normal transit of contrast through the esophagus with normal emptying into stomach. The upper and lower esophageal sphincters opened normally. Small sliding hiatal hernia. Spontaneous gastroesophageal reflux to the thoracic inlet    In the ED, BP low normal. Afebrile, Labs revealed Na 132, K 2.1, Cr 2.4. (Baseline 1.5- 1.8), UTI normal.              Overview/Hospital Course:  06/19 Admitted to Hospital Medicine for evaluation of ROSA ELENA and hypokalemia concerns.  06/20:  SLP consulted for swallowing concerns, with diet recommendations for soft and bite sized diet with aspiration precautions.  Recommendations for future GI evaluation.  Renal function with some mild improvement with fluid resuscitation.  Persistent electrolyte deficiencies, repletions administered.  PT recs for moderate intensity therapy    Interval History: SLP consulted for swallowing concerns, with diet recommendations for soft and bite sized diet with aspiration precautions.  Recommendations for future GI evaluation.  Renal function with some mild improvement with fluid resuscitation.  Persistent electrolyte deficiencies, repletions administered.  PT recs for moderate intensity therapy     Objective:     Vital Signs (Most Recent):  Temp: 97.5 °F (36.4 °C) (06/20/25 2341)  Pulse: (!) 56 (06/20/25 2341)  Resp: 20 (06/20/25 2341)  BP: (!) 101/57 (06/20/25 2341)  SpO2: 98 % (06/20/25 2341) Vital Signs (24h Range):  Temp:  [97.4 °F (36.3 °C)-98.2 °F (36.8 °C)] 97.5 °F (36.4 °C)  Pulse:  [45-65] 56  Resp:  [17-20] 20  SpO2:  [97 %-99 %] 98 %  BP: ()/(50-58) 101/57     Weight: 67.9 kg (149 lb 11.1 oz)  Body mass index is 22.11 kg/m².    Intake/Output Summary (Last 24 hours) at 6/21/2025 0111  Last data filed at 6/20/2025 1836  Gross per 24 hour   Intake 2549.22 ml   Output --   Net 2549.22 ml         Physical  Exam  Vitals and nursing note reviewed.   Constitutional:       General: He is not in acute distress.     Appearance: He is ill-appearing. He is not toxic-appearing or diaphoretic.   HENT:      Head: Normocephalic and atraumatic.      Mouth/Throat:      Mouth: Mucous membranes are moist.   Eyes:      General: No scleral icterus.        Right eye: No discharge.         Left eye: No discharge.   Cardiovascular:      Rate and Rhythm: Normal rate and regular rhythm.      Heart sounds: Murmur heard.   Pulmonary:      Effort: Pulmonary effort is normal. No respiratory distress.   Abdominal:      General: Bowel sounds are normal. There is no distension.      Tenderness: There is no abdominal tenderness.   Musculoskeletal:      Cervical back: No rigidity.      Right lower leg: No edema.      Left lower leg: No edema.   Skin:     General: Skin is warm and dry.      Coloration: Skin is not jaundiced.   Neurological:      Mental Status: He is alert and oriented to person, place, and time. Mental status is at baseline.      Motor: Weakness (Generalized) present.   Psychiatric:         Mood and Affect: Mood normal.         Behavior: Behavior normal.               Significant Labs: All pertinent labs within the past 24 hours have been reviewed.   Recent Labs   Lab 06/19/25  1412 06/19/25 2213 06/20/25  0504   * 136 136   K 2.1* 2.5* 2.7*   CL 92* 96 100   CO2 30* 26 26   BUN 27* 25* 24*   CREATININE 2.4* 2.2* 2.0*   GLU 92 85 93   ANIONGAP 10 14 10     Recent Labs   Lab 06/19/25  1412 06/19/25  2213 06/20/25  0504   MG 2.5 2.5 2.5   PHOS  --   --  1.7*     Recent Labs   Lab 06/19/25  1412 06/20/25  0504   AST 21 21   ALT 12 11   ALKPHOS 64 56   BILITOT 0.7 0.5   ALBUMIN 3.3* 2.9*     Recent Labs   Lab 06/19/25  1412 06/20/25  0504   WBC 4.81 3.66*   HGB 11.8* 10.6*   HCT 34.9* 31.7*    152                    Significant Imaging:  I have reviewed all pertinent imaging results/findings within the past 24 hours.   Fl  Modified Barium Swallow Speech   Final Result      See speech pathologist report.         Electronically signed by: Ahsan Aldrich MD   Date:    06/20/2025   Time:    14:42      US Kidney   Final Result      No significant abnormality         Electronically signed by: Ahsan Aldrich MD   Date:    06/20/2025   Time:    11:06          Inpatient Medications:       Scheduled Meds:   aspirin  81 mg Oral Daily    qwmpejpna-tmevxndn-cheyylk ala  1 tablet Oral Daily    famotidine  20 mg Oral Every other day    heparin (porcine)  5,000 Units Subcutaneous Q8H    metoprolol succinate  25 mg Oral Daily    senna-docusate  1 tablet Oral BID    vitamin D  1,000 Units Oral Daily       PRN Meds:  Current Facility-Administered Medications:     acetaminophen, 650 mg, Oral, Q4H PRN    albuterol-ipratropium, 3 mL, Nebulization, Q6H PRN    aluminum-magnesium hydroxide-simethicone, 30 mL, Oral, QID PRN    dextrose 50%, 12.5 g, Intravenous, PRN    dextrose 50%, 25 g, Intravenous, PRN    glucagon (human recombinant), 1 mg, Intramuscular, PRN    glucose, 16 g, Oral, PRN    glucose, 24 g, Oral, PRN    melatonin, 6 mg, Oral, Nightly PRN    naloxone, 0.02 mg, Intravenous, PRN    ondansetron, 4 mg, Intravenous, Q6H PRN    polyethylene glycol, 17 g, Oral, BID PRN    prochlorperazine, 5 mg, Intravenous, Q6H PRN    simethicone, 1 tablet, Oral, QID PRN    sodium chloride 0.9%, 10 mL, Intravenous, Q8H PRN            Assessment & Plan  Acute renal failure superimposed on stage 3 chronic kidney disease  ROSA ELENA is likely due to pre-renal azotemia due to dehydration. Baseline creatinine is 1.4- 1.8. Most recent creatinine and eGFR are listed below.  Recent Labs     06/19/25  1412 06/19/25  2213 06/20/25  0504   CREATININE 2.4* 2.2* 2.0*   EGFRNORACEVR 26* 29* 32*      Plan  - ROSA ELENA is improving.   - Avoid nephrotoxins and renally dose meds for GFR listed above  - Monitor urine output, serial BMP, and adjust therapy as needed  - Hold Lasix and Metolazone    -gentle hydration with IVF    Hypokalemia  Patient's most recent potassium results are listed below.   Recent Labs     06/19/25  1412 06/19/25  2213 06/20/25  0504   K 2.1* 2.5* 2.7*     Plan  - Replete potassium per protocol  - Monitor potassium Daily  - Patient's hypokalemia is improving  - monitor   Dysphagia  Dysphagia and 30lb weight loss   According to recent ENT visit:   Pt was recently seen by ENT 3/20/25 or his dysphagia. Laryngoscopy exam showed No masses or lesions in the nose, nasopharynx, oropharynx, hypopharynx, or larynx. Vocal fold abduction and adduction is normal. No pooling of secretions in the piriform sinuses, penetration, or aspiration.   GI evaluation 2020  IMPRESSION: intermittent nonprogressive dysphagia with solids probably due to mild esophageal stenosis related to chronic reflux esophagitis or from a Schatzki's ring. Had a long discussion with the patient and his wife and all of us agree that his symptoms do not warrant further investigation or treatment at this time.  RECOMMENDATIONS: I had a long discussion with the patient and his wife about dietary measures to control bolus size. He will return if his symptoms persist despite him being diligent about his bite size and if he does then will refer him for EGD with esophageal dilation.  FL esophogram 2020  The esophagus shows normal distensibility, course, caliber and contour. No focal lesion. No extrinsic mass effect. Normal transit of contrast through the esophagus with normal emptying into stomach. The upper and lower esophageal sphincters opened normally. Small sliding hiatal hernia. Spontaneous gastroesophageal reflux to the thoracic inlet    PLAN:   SLP consulted for swallowing concerns, with diet recommendations for soft and bite sized diet with aspiration precautions.  Recommendations for future GI evaluation.  Chronic diastolic congestive heart failure  Patient has Diastolic (HFpEF) heart failure that is Chronic. On  presentation their CHF was well compensated. Most recent BNP and echo results are listed below.  Recent Labs     06/19/25  1728   *     Latest ECHO  Results for orders placed during the hospital encounter of 06/19/25    Echo    Interpretation Summary    Left Ventricle: The left ventricle is normal in size. Mildly increased wall thickness. There is mild concentric hypertrophy. There is normal systolic function with a visually estimated ejection fraction of 60 - 65%. Grade II diastolic dysfunction.    Right Ventricle: The right ventricle is normal in size Wall thickness is normal. Systolic function is normal.    Left Atrium: The left atrium is mildly dilated    Aortic Valve: Moderately calcified cusps. There is moderate annular calcification present. Moderately restricted motion. There is moderate stenosis. Aortic valve area by VTI is 1.0 cm². Aortic valve peak velocity is 2.6 m/s. Mean gradient is 18 mmHg. The dimensionless index is 0.32.    Mitral Valve: There is bileaflet sclerosis. Mildly thickened leaflets. There is mild mitral annular calcification. There is mild to moderate regurgitation.    Tricuspid Valve: There is mild to moderate regurgitation.    Pulmonic Valve: There is mild regurgitation.    Pulmonary Artery: The estimated pulmonary artery systolic pressure is 34 mmHg.    IVC/SVC: Normal venous pressure at 3 mmHg.      Current Heart Failure Medications  metoprolol succinate (TOPROL-XL) 24 hr tablet 25 mg, Daily, Oral    Plan  - Monitor strict I&Os and daily weights.    - Place on telemetry  - Low sodium diet  - Place on fluid restriction of 1.5 L.   - Cardiology has not been consulted  - The patient's volume status is at their baseline  - appears dry   -Will hold diuretics   -Cont BB  CLL (chronic lymphocytic leukemia)  Stable   Followed by heme/Onc for surveillance     HIV positive  Followed by ID   Controlled   Cont Biktarvy     HLD (hyperlipidemia)  Cont Statin     VTE Risk Mitigation (From  admission, onward)           Ordered     heparin (porcine) injection 5,000 Units  Every 8 hours         06/19/25 1546     IP VTE HIGH RISK PATIENT  Once         06/19/25 1546     Place sequential compression device  Until discontinued         06/19/25 1546                    Discharge Planning   YANCY: 6/23/2025     Code Status: Full Code   Medical Readiness for Discharge Date:   Discharge Plan A: Home                 Duane Garcia DO  Department of Hospital Medicine   [facility] Telemetry ([facility])       Voice recognition software was used in the creation of this note/communication and any sound-alike/typographical errors which may have occurred despite initial review prior to signing should be taken in context when interpreting.  If such errors prevent a clear understanding of the note/communication, please contact the provider/office for clarification.

## 2025-06-21 NOTE — ASSESSMENT & PLAN NOTE
Dysphagia and 30lb weight loss   According to recent ENT visit:   Pt was recently seen by ENT 3/20/25 or his dysphagia. Laryngoscopy exam showed No masses or lesions in the nose, nasopharynx, oropharynx, hypopharynx, or larynx. Vocal fold abduction and adduction is normal. No pooling of secretions in the piriform sinuses, penetration, or aspiration.   GI evaluation 2020  IMPRESSION: intermittent nonprogressive dysphagia with solids probably due to mild esophageal stenosis related to chronic reflux esophagitis or from a Schatzki's ring. Had a long discussion with the patient and his wife and all of us agree that his symptoms do not warrant further investigation or treatment at this time.  RECOMMENDATIONS: I had a long discussion with the patient and his wife about dietary measures to control bolus size. He will return if his symptoms persist despite him being diligent about his bite size and if he does then will refer him for EGD with esophageal dilation.  FL esophogram 2020  The esophagus shows normal distensibility, course, caliber and contour. No focal lesion. No extrinsic mass effect. Normal transit of contrast through the esophagus with normal emptying into stomach. The upper and lower esophageal sphincters opened normally. Small sliding hiatal hernia. Spontaneous gastroesophageal reflux to the thoracic inlet    PLAN:   SLP consulted for swallowing concerns, with diet recommendations for soft and bite sized diet with aspiration precautions.  Recommendations for future GI evaluation.

## 2025-06-21 NOTE — ASSESSMENT & PLAN NOTE
Patient's most recent potassium results are listed below.   Recent Labs     06/19/25  2213 06/20/25  0504 06/21/25  0500   K 2.5* 2.7* 3.1*     Plan  - Replete potassium per protocol  - Monitor potassium Daily  - Patient's hypokalemia is improving  - monitor

## 2025-06-21 NOTE — PROGRESS NOTES
Pharmacist Renal Dose Adjustment Note    Rocael Perez is a 85 y.o. male being treated with the medication famotidine.    Patient Data:    Vital Signs (Most Recent):  Temp: 97.8 °F (36.6 °C) (06/21/25 0818)  Pulse: 81 (06/21/25 0818)  Resp: 18 (06/21/25 0404)  BP: (!) 103/55 (06/21/25 0935)  SpO2: 99 % (06/21/25 0818) Vital Signs (72h Range):  Temp:  [97.4 °F (36.3 °C)-98.9 °F (37.2 °C)]   Pulse:  [45-81]   Resp:  [16-20]   BP: ()/(50-72)   SpO2:  [97 %-100 %]      Recent Labs   Lab 06/19/25  2213 06/20/25  0504 06/21/25  0500   CREATININE 2.2* 2.0* 1.7*     Serum creatinine: 1.7 mg/dL (H) 06/21/25 0500  Estimated creatinine clearance: 30.5 mL/min (A)    Famotidine 20 mg PO every other day will be changed to famotidine 20 mg PO daily per pharmacy renal dosing protocol for CrCl > 30 mL/min.    Pharmacist's Name: Radha Hunter

## 2025-06-21 NOTE — ASSESSMENT & PLAN NOTE
Patient has Diastolic (HFpEF) heart failure that is Chronic. On presentation their CHF was well compensated. Most recent BNP and echo results are listed below.  Recent Labs     06/19/25  1728   *     Latest ECHO  Results for orders placed during the hospital encounter of 06/19/25    Echo    Interpretation Summary    Left Ventricle: The left ventricle is normal in size. Mildly increased wall thickness. There is mild concentric hypertrophy. There is normal systolic function with a visually estimated ejection fraction of 60 - 65%. Grade II diastolic dysfunction.    Right Ventricle: The right ventricle is normal in size Wall thickness is normal. Systolic function is normal.    Left Atrium: The left atrium is mildly dilated    Aortic Valve: Moderately calcified cusps. There is moderate annular calcification present. Moderately restricted motion. There is moderate stenosis. Aortic valve area by VTI is 1.0 cm². Aortic valve peak velocity is 2.6 m/s. Mean gradient is 18 mmHg. The dimensionless index is 0.32.    Mitral Valve: There is bileaflet sclerosis. Mildly thickened leaflets. There is mild mitral annular calcification. There is mild to moderate regurgitation.    Tricuspid Valve: There is mild to moderate regurgitation.    Pulmonic Valve: There is mild regurgitation.    Pulmonary Artery: The estimated pulmonary artery systolic pressure is 34 mmHg.    IVC/SVC: Normal venous pressure at 3 mmHg.      Current Heart Failure Medications  metoprolol succinate (TOPROL-XL) 24 hr tablet 25 mg, Daily, Oral    Plan  - Monitor strict I&Os and daily weights.    - Place on telemetry  - Low sodium diet  - Place on fluid restriction of 1.5 L.   - Cardiology has not been consulted  - The patient's volume status is at their baseline  - appears dry   -Will hold diuretics   -Cont BB

## 2025-06-21 NOTE — HOSPITAL COURSE
06/19 Admitted to Hospital Medicine for evaluation of ROSA ELENA and hypokalemia concerns.  06/20:  SLP consulted for swallowing concerns, with diet recommendations for soft and bite sized diet with aspiration precautions.  Recommendations for future GI evaluation.  Renal function with some mild improvement with fluid resuscitation.  Persistent electrolyte deficiencies, repletions administered.  PT recs for moderate intensity therapy    6/21: Renal function improving with IV fluids, continue another 24 hours  Plan for outpatient GI referral for further evaluation of dysphagia    6/22: SHERRY, patient reports fatigue  PT/OT recc [facility], patient and family interested in pursing SNF  Consult  for assistance  BP marginal, increase NS to 100 cc/hr  Cr trending down 1.5  AM labs    6/23-25: SHERRY, patient sitting in chair, denies complaints  Cr trending down 1.2, s/p IV fluid course  BP stable, low-normal range, systolic ranging 's  Stop diuretics and BB on discharge  TTE noted grade 2 DD, elevated PASP, LVEF 60-65%  SNF placement pending  F/U with PCP for further management

## 2025-06-21 NOTE — PROGRESS NOTES
[facility] Medicine  Progress Note    Patient Name: Rocael Perez  MRN: [id number]  Patient Class: IP- Inpatient   Admission Date: 6/19/2025  Length of Stay: 1 days  Attending Physician: Michael Peraza MD  Primary Care Provider: Landry Ferguson MD        Subjective     Principal Problem:Acute renal failure superimposed on stage 3 chronic kidney disease        HPI:  The patient is  86 yo male with HIV, Diastolic CHF, CLL, MGUS, HLD, and CKD3 who was advised to go to ED by Dr. Martinez for hypokalemia. The patient was seen for routine follow up in ID clinic on 6/9/25. F/U labwork showed K 2.9 and worsening renal function with sCr 2.5. The patient's spouse reports poor intake due to trouble swallowing. He is on lasix and Metolazone and feels like he is weak and dehydrated. Pt is on supplemental potassium 10meq which he takes on Monday and Friday. Pt reports dysphagia for several months that is worsening. Spouse reports a 30 lb weight loss in last several months due to not eating.   Pt was recently seen by ENT 3/20/25 or his dysphagia. Laryngoscopy exam showed No masses or lesions in the nose, nasopharynx, oropharynx, hypopharynx, or larynx. Vocal fold abduction and adduction is normal. No pooling of secretions in the piriform sinuses, penetration, or aspiration.   According to ENT note,   GI evaluation 2020  IMPRESSION: intermittent nonprogressive dysphagia with solids probably due to mild esophageal stenosis related to chronic reflux esophagitis or from a Schatzki's ring. Had a long discussion with the patient and his wife and all of us agree that his symptoms do not warrant further investigation or treatment at this time.  RECOMMENDATIONS: I had a long discussion with the patient and his wife about dietary measures to control bolus size. He will return if his symptoms persist despite him being diligent about his bite size and if he does then will refer him for EGD with esophageal  dilation.  FL esophogram 2020  The esophagus shows normal distensibility, course, caliber and contour. No focal lesion. No extrinsic mass effect. Normal transit of contrast through the esophagus with normal emptying into stomach. The upper and lower esophageal sphincters opened normally. Small sliding hiatal hernia. Spontaneous gastroesophageal reflux to the thoracic inlet    In the ED, BP low normal. Afebrile, Labs revealed Na 132, K 2.1, Cr 2.4. (Baseline 1.5- 1.8), UTI normal.              Overview/Hospital Course:  06/19 Admitted to Hospital Medicine for evaluation of ROSA ELENA and hypokalemia concerns.  06/20:  SLP consulted for swallowing concerns, with diet recommendations for soft and bite sized diet with aspiration precautions.  Recommendations for future GI evaluation.  Renal function with some mild improvement with fluid resuscitation.  Persistent electrolyte deficiencies, repletions administered.  PT recs for moderate intensity therapy    6/21: Renal function improving with IV fluids, continue another 24 hours  Plan for outpatient GI referral for further evaluation of dysphagia  Monitor overnight, AM labs, possible d/c in AM      Review of Systems   All other systems reviewed and are negative.    Objective:     Vital Signs (Most Recent):  Temp: 98.1 °F (36.7 °C) (06/21/25 1516)  Pulse: 60 (06/21/25 1516)  Resp: 18 (06/21/25 1516)  BP: (!) 102/53 (06/21/25 1629)  SpO2: (!) 94 % (06/21/25 1516) Vital Signs (24h Range):  Temp:  [97.5 °F (36.4 °C)-98.3 °F (36.8 °C)] 98.1 °F (36.7 °C)  Pulse:  [52-81] 60  Resp:  [18-20] 18  SpO2:  [94 %-100 %] 94 %  BP: ()/(53-57) 102/53     Weight: 67.9 kg (149 lb 11.1 oz)  Body mass index is 22.11 kg/m².    Intake/Output Summary (Last 24 hours) at 6/21/2025 1738  Last data filed at 6/21/2025 1420  Gross per 24 hour   Intake 2159.22 ml   Output --   Net 2159.22 ml         Physical Exam  Vitals and nursing note reviewed.   Constitutional:       General: He is not in acute  distress.     Appearance: Normal appearance.   Cardiovascular:      Rate and Rhythm: Normal rate and regular rhythm.      Heart sounds: No murmur heard.  Pulmonary:      Effort: Pulmonary effort is normal. No respiratory distress.      Breath sounds: Normal breath sounds. No wheezing.   Abdominal:      General: There is no distension.      Palpations: Abdomen is soft.      Tenderness: There is no abdominal tenderness.   Neurological:      Mental Status: He is alert.               Significant Labs: All pertinent labs within the past 24 hours have been reviewed.  Recent Lab Results         06/21/25  0500        Albumin 2.9       ALP 56       ALT 12       Anion Gap 12       AST 23       Baso # 0.03       Basophil % 0.9       BILIRUBIN TOTAL 0.7  Comment: For infants and newborns, interpretation of results should be based   on gestational age, weight and in agreement with clinical   observations.    Premature Infant recommended reference ranges:   0-24 hours:  <8.0 mg/dL   24-48 hours: <12.0 mg/dL   3-5 days:    <15.0 mg/dL   6-29 days:   <15.0 mg/dL       BUN 18       Calcium 9.1       Chloride 98       CO2 24       Creatinine 1.7       eGFR 39  Comment: Estimated GFR calculated using the CKD-EPI creatinine (2021) equation.       Eos # 0.10       Eos % 2.9       Glucose 88       Gran # (ANC) 1.37       Hematocrit 33.6       Hemoglobin 11.2       Immature Grans (Abs) 0.01  Comment: Mild elevation in immature granulocytes is non specific and can be seen in a variety of conditions including stress response, acute inflammation, trauma and pregnancy. Correlation with other laboratory and clinical findings is essential.       Immature Granulocytes 0.3       Lymph # 1.43       Lymph % 41.1       Magnesium  2.2       MCH 31.8       MCHC 33.3       MCV 96       Mono # 0.54       Mono % 15.5       MPV 11.1       Neut % 39.3       nRBC 0       Phosphorus Level 2.7       Platelet Count 166       Potassium 3.1       PROTEIN TOTAL  7.3       RBC 3.52       RDW 16.1       Sodium 134       WBC 3.48               Significant Imaging: I have reviewed all pertinent imaging results/findings within the past 24 hours.    Fl Modified Barium Swallow Speech   Final Result      See speech pathologist report.         Electronically signed by: Ahsan Aldrich MD   Date:    06/20/2025   Time:    14:42      US Kidney   Final Result      No significant abnormality         Electronically signed by: Ahsan Aldrich MD   Date:    06/20/2025   Time:    11:06              Assessment & Plan  Acute renal failure superimposed on stage 3 chronic kidney disease  ROSA ELENA is likely due to pre-renal azotemia due to dehydration. Baseline creatinine is 1.4- 1.8. Most recent creatinine and eGFR are listed below.  Recent Labs     06/19/25 2213 06/20/25  0504 06/21/25  0500   CREATININE 2.2* 2.0* 1.7*   EGFRNORACEVR 29* 32* 39*      Plan  - ROSA ELENA is improving.   - Avoid nephrotoxins and renally dose meds for GFR listed above  - Monitor urine output, serial BMP, and adjust therapy as needed  - Hold Lasix and Metolazone   -gentle hydration with IVF    Hypokalemia  Patient's most recent potassium results are listed below.   Recent Labs     06/19/25 2213 06/20/25  0504 06/21/25  0500   K 2.5* 2.7* 3.1*     Plan  - Replete potassium per protocol  - Monitor potassium Daily  - Patient's hypokalemia is improving  - monitor   Dysphagia  Dysphagia and 30lb weight loss   According to recent ENT visit:   Pt was recently seen by ENT 3/20/25 or his dysphagia. Laryngoscopy exam showed No masses or lesions in the nose, nasopharynx, oropharynx, hypopharynx, or larynx. Vocal fold abduction and adduction is normal. No pooling of secretions in the piriform sinuses, penetration, or aspiration.   GI evaluation 2020  IMPRESSION: intermittent nonprogressive dysphagia with solids probably due to mild esophageal stenosis related to chronic reflux esophagitis or from a Schatzki's ring. Had a long discussion with  the patient and his wife and all of us agree that his symptoms do not warrant further investigation or treatment at this time.  RECOMMENDATIONS: I had a long discussion with the patient and his wife about dietary measures to control bolus size. He will return if his symptoms persist despite him being diligent about his bite size and if he does then will refer him for EGD with esophageal dilation.  FL esophogram 2020  The esophagus shows normal distensibility, course, caliber and contour. No focal lesion. No extrinsic mass effect. Normal transit of contrast through the esophagus with normal emptying into stomach. The upper and lower esophageal sphincters opened normally. Small sliding hiatal hernia. Spontaneous gastroesophageal reflux to the thoracic inlet    PLAN:   SLP consulted for swallowing concerns, with diet recommendations for soft and bite sized diet with aspiration precautions.  Recommendations for future GI evaluation.  Chronic diastolic congestive heart failure  Patient has Diastolic (HFpEF) heart failure that is Chronic. On presentation their CHF was well compensated. Most recent BNP and echo results are listed below.  Recent Labs     06/19/25  1728   *     Latest ECHO  Results for orders placed during the hospital encounter of 06/19/25    Echo    Interpretation Summary    Left Ventricle: The left ventricle is normal in size. Mildly increased wall thickness. There is mild concentric hypertrophy. There is normal systolic function with a visually estimated ejection fraction of 60 - 65%. Grade II diastolic dysfunction.    Right Ventricle: The right ventricle is normal in size Wall thickness is normal. Systolic function is normal.    Left Atrium: The left atrium is mildly dilated    Aortic Valve: Moderately calcified cusps. There is moderate annular calcification present. Moderately restricted motion. There is moderate stenosis. Aortic valve area by VTI is 1.0 cm². Aortic valve peak velocity is 2.6  m/s. Mean gradient is 18 mmHg. The dimensionless index is 0.32.    Mitral Valve: There is bileaflet sclerosis. Mildly thickened leaflets. There is mild mitral annular calcification. There is mild to moderate regurgitation.    Tricuspid Valve: There is mild to moderate regurgitation.    Pulmonic Valve: There is mild regurgitation.    Pulmonary Artery: The estimated pulmonary artery systolic pressure is 34 mmHg.    IVC/SVC: Normal venous pressure at 3 mmHg.      Current Heart Failure Medications  metoprolol succinate (TOPROL-XL) 24 hr tablet 25 mg, Daily, Oral    Plan  - Monitor strict I&Os and daily weights.    - Place on telemetry  - Low sodium diet  - Place on fluid restriction of 1.5 L.   - Cardiology has not been consulted  - The patient's volume status is at their baseline  - appears dry   -Will hold diuretics   -Cont BB  CLL (chronic lymphocytic leukemia)  Stable   Followed by heme/Onc for surveillance     HIV positive  Followed by ID   Controlled   Cont Biktarvy     HLD (hyperlipidemia)  Cont Statin     Anemia  Most recent hemoglobin and hematocrit are listed below.  Recent Labs     06/19/25  1412 06/20/25  0504 06/21/25  0500   HGB 11.8* 10.6* 11.2*   HCT 34.9* 31.7* 33.6*     Plan  - Monitor serial CBC: Daily  - Transfuse PRBC if patient becomes hemodynamically unstable, symptomatic or H/H drops below 7/21.  - Patient has not received any PRBC transfusions to date  - Patient's anemia is currently stable    Hyponatremia  The patient's most recent sodium results are listed below.  Recent Labs     06/19/25  2213 06/20/25  0504 06/21/25  0500    136 134*     Plan  - Correct the sodium by 4-6mEq in 24 hours.   - Monitor sodium Daily.   - Patient hyponatremia is stable    Hypophosphatemia  Patient's most recent phosphorus results are listed below.   Recent Labs     06/20/25  0504 06/21/25  0500   PHOS 1.7* 2.7     Plan  - Patient's hypophosphatemia is improving    VTE Risk Mitigation (From admission, onward)            Ordered     heparin (porcine) injection 5,000 Units  Every 8 hours         06/19/25 1546     IP VTE HIGH RISK PATIENT  Once         06/19/25 1546     Place sequential compression device  Until discontinued         06/19/25 1546                    Discharge Planning   YANCY: 6/23/2025     Code Status: Full Code   Medical Readiness for Discharge Date:   Discharge Plan A: Home                  Please place Justification for DME      Michael Peraza MD  Department of Hospital Medicine   [facility] - Telemetry ([facility])

## 2025-06-21 NOTE — PLAN OF CARE
A215/A215 UNRULY Perez is a 85 y.o.male admitted on 6/19/2025 for Acute renal failure superimposed on stage 3 chronic kidney disease   Code Status: Full Code MRN: [id number]   Review of patient's allergies indicates:   Allergen Reactions    Seasonale [levonorgestrel-ethinyl estrad] Other (See Comments)     Past Medical History:   Diagnosis Date    CHF (congestive heart failure)     CLL (chronic lymphocytic leukemia)     HIV (human immunodeficiency virus infection)     Hyperlipidemia     MGUS (monoclonal gammopathy of unknown significance)       PRN meds    acetaminophen, 650 mg, Q4H PRN  albuterol-ipratropium, 3 mL, Q6H PRN  aluminum-magnesium hydroxide-simethicone, 30 mL, QID PRN  dextrose 50%, 12.5 g, PRN  dextrose 50%, 25 g, PRN  glucagon (human recombinant), 1 mg, PRN  glucose, 16 g, PRN  glucose, 24 g, PRN  melatonin, 6 mg, Nightly PRN  naloxone, 0.02 mg, PRN  ondansetron, 4 mg, Q6H PRN  polyethylene glycol, 17 g, BID PRN  prochlorperazine, 5 mg, Q6H PRN  simethicone, 1 tablet, QID PRN  sodium chloride 0.9%, 10 mL, Q8H PRN      Chart check completed. Will continue plan of care.      Orientation: disoriented to, situation, time  Hancock Coma Scale Score: 14     Lead Monitored: Lead II Rhythm: sinus bradycardia Frequency/Ectopy: PACs  Cardiac/Telemetry Box Number: [id number]  VTE Core Measure: Pharmacological prophylaxis initiated/maintained Last Bowel Movement: 06/20/25  Diet Heart Healthy Renal Non-Dialysis, Soft & Bite Sized (IDDSI Level 6); Standard Tray  Voiding Characteristics: voids spontaneously without difficulty  Mauro Score: 18  Fall Risk Score: 15  Accucheck []   Freq?      Lines/Drains/Airways       Peripheral Intravenous Line  Duration             Peripheral IV Single Lumen 06/21/25 0217 20 G Anterior;Left Forearm <1 day

## 2025-06-21 NOTE — ASSESSMENT & PLAN NOTE
Patient's most recent potassium results are listed below.   Recent Labs     06/19/25  1412 06/19/25  2213 06/20/25  0504   K 2.1* 2.5* 2.7*     Plan  - Replete potassium per protocol  - Monitor potassium Daily  - Patient's hypokalemia is improving  - monitor

## 2025-06-21 NOTE — SUBJECTIVE & OBJECTIVE
Interval History: SLP consulted for swallowing concerns, with diet recommendations for soft and bite sized diet with aspiration precautions.  Recommendations for future GI evaluation.  Renal function with some mild improvement with fluid resuscitation.  Persistent electrolyte deficiencies, repletions administered.  PT recs for moderate intensity therapy     Objective:     Vital Signs (Most Recent):  Temp: 97.5 °F (36.4 °C) (06/20/25 2341)  Pulse: (!) 56 (06/20/25 2341)  Resp: 20 (06/20/25 2341)  BP: (!) 101/57 (06/20/25 2341)  SpO2: 98 % (06/20/25 2341) Vital Signs (24h Range):  Temp:  [97.4 °F (36.3 °C)-98.2 °F (36.8 °C)] 97.5 °F (36.4 °C)  Pulse:  [45-65] 56  Resp:  [17-20] 20  SpO2:  [97 %-99 %] 98 %  BP: ()/(50-58) 101/57     Weight: 67.9 kg (149 lb 11.1 oz)  Body mass index is 22.11 kg/m².    Intake/Output Summary (Last 24 hours) at 6/21/2025 0111  Last data filed at 6/20/2025 1836  Gross per 24 hour   Intake 2549.22 ml   Output --   Net 2549.22 ml         Physical Exam  Vitals and nursing note reviewed.   Constitutional:       General: He is not in acute distress.     Appearance: He is ill-appearing. He is not toxic-appearing or diaphoretic.   HENT:      Head: Normocephalic and atraumatic.      Mouth/Throat:      Mouth: Mucous membranes are moist.   Eyes:      General: No scleral icterus.        Right eye: No discharge.         Left eye: No discharge.   Cardiovascular:      Rate and Rhythm: Normal rate and regular rhythm.      Heart sounds: Murmur heard.   Pulmonary:      Effort: Pulmonary effort is normal. No respiratory distress.   Abdominal:      General: Bowel sounds are normal. There is no distension.      Tenderness: There is no abdominal tenderness.   Musculoskeletal:      Cervical back: No rigidity.      Right lower leg: No edema.      Left lower leg: No edema.   Skin:     General: Skin is warm and dry.      Coloration: Skin is not jaundiced.   Neurological:      Mental Status: He is alert and  oriented to person, place, and time. Mental status is at baseline.      Motor: Weakness (Generalized) present.   Psychiatric:         Mood and Affect: Mood normal.         Behavior: Behavior normal.               Significant Labs: All pertinent labs within the past 24 hours have been reviewed.   Recent Labs   Lab 06/19/25 1412 06/19/25 2213 06/20/25  0504   * 136 136   K 2.1* 2.5* 2.7*   CL 92* 96 100   CO2 30* 26 26   BUN 27* 25* 24*   CREATININE 2.4* 2.2* 2.0*   GLU 92 85 93   ANIONGAP 10 14 10     Recent Labs   Lab 06/19/25 1412 06/19/25 2213 06/20/25  0504   MG 2.5 2.5 2.5   PHOS  --   --  1.7*     Recent Labs   Lab 06/19/25 1412 06/20/25  0504   AST 21 21   ALT 12 11   ALKPHOS 64 56   BILITOT 0.7 0.5   ALBUMIN 3.3* 2.9*     Recent Labs   Lab 06/19/25 1412 06/20/25  0504   WBC 4.81 3.66*   HGB 11.8* 10.6*   HCT 34.9* 31.7*    152                    Significant Imaging:  I have reviewed all pertinent imaging results/findings within the past 24 hours.   Fl Modified Barium Swallow Speech   Final Result      See speech pathologist report.         Electronically signed by: Ahsan Aldrich MD   Date:    06/20/2025   Time:    14:42       Kidney   Final Result      No significant abnormality         Electronically signed by: Ahsan Aldrich MD   Date:    06/20/2025   Time:    11:06          Inpatient Medications:       Scheduled Meds:   aspirin  81 mg Oral Daily    ysjhdsatc-oyxieyyi-oaryuki ala  1 tablet Oral Daily    famotidine  20 mg Oral Every other day    heparin (porcine)  5,000 Units Subcutaneous Q8H    metoprolol succinate  25 mg Oral Daily    senna-docusate  1 tablet Oral BID    vitamin D  1,000 Units Oral Daily       PRN Meds:  Current Facility-Administered Medications:     acetaminophen, 650 mg, Oral, Q4H PRN    albuterol-ipratropium, 3 mL, Nebulization, Q6H PRN    aluminum-magnesium hydroxide-simethicone, 30 mL, Oral, QID PRN    dextrose 50%, 12.5 g, Intravenous, PRN    dextrose 50%, 25 g,  Intravenous, PRN    glucagon (human recombinant), 1 mg, Intramuscular, PRN    glucose, 16 g, Oral, PRN    glucose, 24 g, Oral, PRN    melatonin, 6 mg, Oral, Nightly PRN    naloxone, 0.02 mg, Intravenous, PRN    ondansetron, 4 mg, Intravenous, Q6H PRN    polyethylene glycol, 17 g, Oral, BID PRN    prochlorperazine, 5 mg, Intravenous, Q6H PRN    simethicone, 1 tablet, Oral, QID PRN    sodium chloride 0.9%, 10 mL, Intravenous, Q8H PRN

## 2025-06-21 NOTE — ASSESSMENT & PLAN NOTE
The patient's most recent sodium results are listed below.  Recent Labs     06/19/25  2213 06/20/25  0504 06/21/25  0500    136 134*     Plan  - Correct the sodium by 4-6mEq in 24 hours.   - Monitor sodium Daily.   - Patient hyponatremia is stable

## 2025-06-21 NOTE — ASSESSMENT & PLAN NOTE
ROSA ELENA is likely due to pre-renal azotemia due to dehydration. Baseline creatinine is 1.4- 1.8. Most recent creatinine and eGFR are listed below.  Recent Labs     06/19/25  1412 06/19/25  2213 06/20/25  0504   CREATININE 2.4* 2.2* 2.0*   EGFRNORACEVR 26* 29* 32*      Plan  - ROSA ELENA is improving.   - Avoid nephrotoxins and renally dose meds for GFR listed above  - Monitor urine output, serial BMP, and adjust therapy as needed  - Hold Lasix and Metolazone   -gentle hydration with IVF

## 2025-06-21 NOTE — SUBJECTIVE & OBJECTIVE
Review of Systems   All other systems reviewed and are negative.    Objective:     Vital Signs (Most Recent):  Temp: 98.1 °F (36.7 °C) (06/21/25 1516)  Pulse: 60 (06/21/25 1516)  Resp: 18 (06/21/25 1516)  BP: (!) 102/53 (06/21/25 1629)  SpO2: (!) 94 % (06/21/25 1516) Vital Signs (24h Range):  Temp:  [97.5 °F (36.4 °C)-98.3 °F (36.8 °C)] 98.1 °F (36.7 °C)  Pulse:  [52-81] 60  Resp:  [18-20] 18  SpO2:  [94 %-100 %] 94 %  BP: ()/(53-57) 102/53     Weight: 67.9 kg (149 lb 11.1 oz)  Body mass index is 22.11 kg/m².    Intake/Output Summary (Last 24 hours) at 6/21/2025 1738  Last data filed at 6/21/2025 1420  Gross per 24 hour   Intake 2159.22 ml   Output --   Net 2159.22 ml         Physical Exam  Vitals and nursing note reviewed.   Constitutional:       General: He is not in acute distress.     Appearance: Normal appearance.   Cardiovascular:      Rate and Rhythm: Normal rate and regular rhythm.      Heart sounds: No murmur heard.  Pulmonary:      Effort: Pulmonary effort is normal. No respiratory distress.      Breath sounds: Normal breath sounds. No wheezing.   Abdominal:      General: There is no distension.      Palpations: Abdomen is soft.      Tenderness: There is no abdominal tenderness.   Neurological:      Mental Status: He is alert.               Significant Labs: All pertinent labs within the past 24 hours have been reviewed.  Recent Lab Results         06/21/25  0500        Albumin 2.9       ALP 56       ALT 12       Anion Gap 12       AST 23       Baso # 0.03       Basophil % 0.9       BILIRUBIN TOTAL 0.7  Comment: For infants and newborns, interpretation of results should be based   on gestational age, weight and in agreement with clinical   observations.    Premature Infant recommended reference ranges:   0-24 hours:  <8.0 mg/dL   24-48 hours: <12.0 mg/dL   3-5 days:    <15.0 mg/dL   6-29 days:   <15.0 mg/dL       BUN 18       Calcium 9.1       Chloride 98       CO2 24       Creatinine 1.7        eGFR 39  Comment: Estimated GFR calculated using the CKD-EPI creatinine (2021) equation.       Eos # 0.10       Eos % 2.9       Glucose 88       Gran # (ANC) 1.37       Hematocrit 33.6       Hemoglobin 11.2       Immature Grans (Abs) 0.01  Comment: Mild elevation in immature granulocytes is non specific and can be seen in a variety of conditions including stress response, acute inflammation, trauma and pregnancy. Correlation with other laboratory and clinical findings is essential.       Immature Granulocytes 0.3       Lymph # 1.43       Lymph % 41.1       Magnesium  2.2       MCH 31.8       MCHC 33.3       MCV 96       Mono # 0.54       Mono % 15.5       MPV 11.1       Neut % 39.3       nRBC 0       Phosphorus Level 2.7       Platelet Count 166       Potassium 3.1       PROTEIN TOTAL 7.3       RBC 3.52       RDW 16.1       Sodium 134       WBC 3.48               Significant Imaging: I have reviewed all pertinent imaging results/findings within the past 24 hours.    Fl Modified Barium Swallow Speech   Final Result      See speech pathologist report.         Electronically signed by: Ahsan Aldrich MD   Date:    06/20/2025   Time:    14:42      US Kidney   Final Result      No significant abnormality         Electronically signed by: Ahsan Aldrich MD   Date:    06/20/2025   Time:    11:06

## 2025-06-21 NOTE — PT/OT/SLP EVAL
"Speech Language Pathology Evaluation  Bedside Swallow & Modified Barium Swallow Study (MBSS)    Patient Name:  Rocael Perez   MRN:  [id number]  Admitting Diagnosis: Acute renal failure superimposed on stage 3 chronic kidney disease    Recommendations:                 General Recommendations:  OP GI referral s/t chronic esophageal dysphagia with persistent dysphagia complaints (see previous GI consult 2020)  Diet recommendations:  Soft & Bite Sized Diet - IDDSI Level 6, Thin liquids - IDDSI Level 0   Aspiration Precautions: GERD/Behavioral reflux precautions, Alternating bites/sips, Double swallow with each bite/sip, Feed only when awake/alert, Frequent oral care, HOB to 90 degrees, Meds whole buried in puree, Monitor for s/s of aspiration, Small bites/sips, and Standard aspiration precautions   General Precautions: Standard, aspiration  Communication strategies:  provide increased time to answer  Discharge recommendations:  Moderate Intensity Therapy   Barriers to Discharge:  None    Assessment:     Rocael Perez is an 85 y.o. male admitted to [facility] acute with dx acute renal failure superimposed on CKD, hypokalemia and HLD with chronic hx of dysphagia followed by OP GI 2020 with reports of progressive weight loss, reduced po intake and claims he "can still taste his food after eating." Esophagram in 2020 revealed hiatal hernia and reflux. He presents with adequate CARLOS and ability to communicate acute needs; however, suspected underlying cognitive/memory deficits.  Pt was recently seen by ENT 3/20/25 for c/o cough and dysphagia. Direct Laryngoscopy was WNL (see report below).      Bedside CSE completed 6/20/25 and pt without dysphagia complaints and consuming 100% of meals during this hospitalization.  He also denied GI symptoms.  Noted delayed coughing throughout meal and also in the absence of po intake.  Reduced oral efficiency noted s/t generalized weakness and missing/poor dentition.    MBSS completed 6/20/25 " and oropharyngeal swallow deemed WFL; however, aerophagia, esophageal dysmotility and stasis noted within distal esophagus.  Pt notably Coughing after the swallow during bedside CSE and during the MBSS, in the absence of laryngeal penetration or aspiration.  He is recommended for IDDSI 6-soft & bite sized solids with IDDSI 0-thin liquids, following aspiration/behavioral reflux precautions, oral care/hygiene and OP GI referral.  No further ST intervention indicated at this time. Please re-consult if need.         GI evaluation 2020  IMPRESSION: intermittent nonprogressive dysphagia with solids probably due to mild esophageal stenosis related to chronic reflux esophagitis or from a Schatzki's ring. Had a long discussion with the patient and his wife and all of us agree that his symptoms do not warrant further investigation or treatment at this time.  RECOMMENDATIONS: I had a long discussion with the patient and his wife about dietary measures to control bolus size. He will return if his symptoms persist despite him being diligent about his bite size and if he does then will refer him for EGD with esophageal dilation.    FL esophogram 2020  The esophagus shows normal distensibility, course, caliber and contour. No focal lesion. No extrinsic mass effect. Normal transit of contrast through the esophagus with normal emptying into stomach. The upper and lower esophageal sphincters opened normally. Small sliding hiatal hernia. Spontaneous gastroesophageal reflux to the thoracic inlet    History:     Past Medical History:   Diagnosis Date    CHF (congestive heart failure)     CLL (chronic lymphocytic leukemia)     HIV (human immunodeficiency virus infection)     Hyperlipidemia     MGUS (monoclonal gammopathy of unknown significance)        Past Surgical History:   Procedure Laterality Date    HERNIA REPAIR      SHOULDER SURGERY Right        Social History: Patient lives with his sister at home in [place], [place].  Ambulates  with RW.  ?Cognitive deficits at baseline?    Prior Intubation HX:  N/A    Prior diet: Pt consumes a regular diet; however, sister at bedside reported pt with reduced po intake at home.    ENT visit 3/20/2025 Procedure -Transnasal fiberoptic laryngoscopy      Surgeon: Julio Butterfield M.D. .       Anesthesia: topical 0.05% oxymetazoline with 4% lidocaine       Complications: None.      Description of Procedure: With the patient in the sitting position, topical lidocaine and oxymetazoline was applied to the nose. The scope was passed through the nose. Examination was carried out of the nose, nasopharynx, oropharynx, hypopharynx, and larynx with findings as noted above. Scope was removed. The patient tolerated the procedure well.       Findings: No masses or lesions in the nose, nasopharynx, oropharynx, hypopharynx, or larynx. Vocal fold abduction and adduction is normal. No pooling of secretions in the piriform sinuses, penetration, or aspiration.    3/16/2020 [place] MODIFIED BARIUM SWALLOW SPEECH STUDY at [facility]    Impression  Transient penetration of thin barium during consecutive sips without aspiration.    Thank you for the opportunity to assist in the care of CEDRIC MEZA    MANINDER, Ancelmo Neri MD, have reviewed the images and report and concur with these findings.  Narrative  PHARYNGOGRAM    CLINICAL HISTORY:dysphagia    Fluoroscopic evaluation of the pharynx was performed by Nicola Santana, speech therapist, with the assistance of Dayron Gutierrez PA-C and Dr. Ancelmo Neri as the attending physician. Fluoroscopic evaluation is available for review on video recording.    The patient was examined in the lateral and AP projection. Cervical spondylosis is noted. The patient was given trials of thin, pudding, fruit cocktail, cracker and barium tablet.  There is transient penetration of thin barium without aspiration during consecutive sips. No penetration or aspiration of pudding, fruit cocktail or cracker. There is no delay  in transit of barium tablet. A single contrast esophageal follow-through was performed in the lateral projection per speech pathology and ENT swallowing assessment protocol. This is not a diagnostic esophagram and if clinical concern for esophageal pathology exists, then a diagnostic esophagram is recommended.    Please see speech pathology report for further recommendations.    Fluoroscopy time: 3.7 minutes. 2187.1 uGym^2  Exam End: 03/16/20 10:11 Last Resulted: 03/16/20 15:57   [facility]  Result Received: 09/26/23 13:19         3/16/2020 [place] ESOPHAGRAM COMPLETE at [facility]    Impression  Small hiatal hernia, gastroesophageal reflux.  Narrative  XR ESOPHAGRAM.      HISTORY: R13.10:Dysphagia, unspecified .    COMPARISON: No prior study.    TECHNIQUE:    Exam was performed in a routine fashion with single and double contrast. Assistance is provided by Dayron Whitehead PA-C . 39 images, 1.0 minutes fluoro time.      FINDINGS:    The esophagus shows normal distensibility, course, caliber and contour. No focal lesion. No extrinsic mass effect. Normal transit of contrast through the esophagus with normal emptying into stomach. The upper and lower esophageal sphincters opened normally.    Small sliding hiatal hernia. Spontaneous gastroesophageal reflux to the thoracic inlet..    The stomach appears within normal limits.  Exam End: 03/16/20 10:11 Last Resulted: 03/16/20 13:58   Received From: [facility]  Result Received: 09/26/23 13:19        Subjective     Pt seen bedside for ST.  No c/o pain.  Pt's sister present.  Patient goals: To improve strength     Pain/Comfort:  Pain Rating 1: 0/10    Respiratory Status: Room air    Objective:     Oral Musculature Evaluation  Oral Musculature: general weakness (?right sided weakness/asymmetry)  Dentition: scattered  dentition  Secretion Management: adequate  Mucosal Quality: adequate  Mandibular Strength and Mobility: impaired  Oral Labial Strength and Mobility: WFL  Lingual Strength and Mobility: impaired strength  Velar Elevation: WFL  Buccal Strength and Mobility: WFL  Volitional Cough: present  Volitional Swallow: present  Voice Prior to PO Intake: WFL    Bedside Swallow Eval:   [place] Swallow Protocol:  [place] Swallow Protocol dictates patient remain NPO if fail screener (Eddier et al. 2014).  The [place] Swallow Protocol was administered. The patient was alert and provided the instructions prior to the beginning of the protocol. The patient consumed 3 oz before putting the cup down. Patient drank with consecutive swallows. No overt s/s of pharyngeal dysphagia or aspiration were observed during today's assessment.     Clinical Swallow Examination:   Of note, patient self-fed throughout evaluation. Patient presented with:     CONSISTENCY  NOTES   THIN (IDDSI 0) 3 oz water challenge cup and straw   No overt s/s of aspiration following 3 oz water protocol; however, noted delayed coughing at end of assessment and in the absence of po intake.   PUREE (IDDSI 4/Extremely Thick)   TSP/TBSP bites of pudding/applesauce  No overt s/s of aspiration; however, noted delayed coughing at end of assessment and in the absence of po intake.   SOLID (IDDSI 7/Regular) Bite of Leena Doone cookie    No overt s/s of aspiration; however, noted delayed coughing at end of assessment and in the absence of po intake.     Thickened liquids were not used in this assessment. Pau (2018) reported that thickened liquids have no sound evidence at reducing the risk of pneumonia in patients with dysphagia and can cause harm by increasing their risk of dehydration. It also presents an increased risk of UTI, electrolyte imbalance, constipation, fecal impaction, cognitive impairment, functional decline and even death (Langmore, 2002; Emiliana, 2016).  Thickened  liquids are associated with risks including dehydration, increased pharyngeal residue, potential interference with medication absorption, and decreased quality of life (Tyler, 2013). Thickened liquids are also more likely to be silently aspirated than thin liquids (River et al., 2018). This supports the assertion that we should confirm a patient requires thickened liquids with an instrumental swallow study prior to recommending them.    References:   Tyler TOLLIVER (2013). Thickening agents used for dysphagia management: Effect on bioavailability of water, medication and feelings of satiety. Nutrition Journal, 12, 54. https://doi.org/10.1186/1865-2758-85-54    UNRULY Holman, ROXANA Mcginnis, LEROY Rivas, & UNRULY Cramer (2018). Cough response to aspiration in thin and thick fluids during FEES in hospitalized inpatients. International journal of language & communication disorders, 53(5), 909-918. https://doi.org/10.1111/5989-7768.43185    INTERPRETATION AND RISK ASSESSMENT:  Clinical swallow evaluation (CSE) revealed oral phase characterized by lingual, labial, and buccal strength and range of motion reduced for lip closure, bolus preparation and propulsion. The patient had no anterior loss of the bolus with complete closure of the lips around the utensils. No residue remained in the oral cavity following the swallow. Patient without overt clinical signs/symptoms of aspiration on any PO trials given; however, delayed coughing noted intermittently throughout meal and in the absence of PO intake.  Patient presents with a possibly inefficient swallow as indicated by generalized weakness and missing dentition.  Contributing risk factors for dysphagia include Hx GERD, hiatal hernia.     Goals: Pt will complete MBSS as comprehensive swallow assessment as ordered.    Objective     Modified Barium Swallow Study  Purpose: to evaluate anatomy and physiology of the oropharyngeal swallow, to determine effectiveness of rehabilitation  "strategies, and to determine diet consistency and intervention recommendations. The study was performed using the "Gold Standard" of 30 fps with as low as reasonably achievable (ALARA) exposure.     The patient was seen in radiology seated in High Azevedo's position in a video imaging chair for lateral views of the larynx and an A/P view. The study was conducted using Varibar thin liquid (IDDSI 0), Varibar pudding (IDDSI 4), Peaches mixed with Varibar thin liquid (IDDSI 6/0), and solid coated in Varibar pudding (IDDSI 7). Mr. Perez tolerated the procedure well.    Consistency  Presentation  Findings Strategy Attempted Rosenbeck's Penetration/Aspiration Scale (PAS)   Thin (IDDSI 0) Method: Self-fed    Volume: cup sips, straw sequential sips    Projection: lateral view; AP view  Oral phase: WFL    Pharyngeal phase: Transient laryngeal penetration x1 during the swallow; otherwise, no laryngeal penetration, aspiration of significant pharyngeal residue.    Esophageal screen: Aerophagia, dysmotility  Best: (1) Material does not enter the airway    Worst: (2) Material enters the airway, remains above the vocal folds, and is ejected from the airway  *Transient laryngeal penetration x1, which is not abnormal in pt's older than 65 years of age.     Puree (extremely thick/ IDDSI 4) Method: Self-fed    Volume: bites, tsp    Projection: lateral view, AP view Oral phase: WFL    Pharyngeal phase: No laryngeal penetration, aspiration or significant residue.        Best: (1) Material does not enter the airway    Worst: (1) Material does not enter the airway     Solid (regular/ IDDSI 7) Method: Self-fed    Volume: bites of lornadoone cookie    Projection: lateral view Oral phase: Slowed manipulation, mastication and transit s/t generalized weakness/advanced age and missing/poor dentition.    Pharyngeal phase: No laryngeal penetration, aspiration or significant residue post-deglutition.    Esophageal screen: Dysmotility with stasis " within distal esophagus.  Best: (1) Material does not enter the airway    Worst: (1) Material does not enter the airway     Mixed consistency (thin/ IDDSI 0 + soft and bite sized/ IDDSI 6) Method: Self-fed    Volume: bite of fruit cocktail via tsp    Projection: lateral view Oral phase: Slowed manipulation, mastication and transit s/t generalized weakness/advanced age and missing/poor dentition.    Pharyngeal phase: No laryngeal penetration, aspiration or significant pharyngeal residue.  Best: (1) Material does not enter the airway    Worst: (1) Material does not enter the airway     Barium tablet  Method: Self-fed    Volume: single tablet with water bolus(es)    Projection: lateral view; unable to visualize entire esophagus during AP view. Impaired oral transit of barium tablet. Difficulty coordinating pill and liquid via cup.  Improved transit with use of straw.  Cleared proximal esophagus; however, unable to visualize complete distal portion or transit via GE junction.         Treatment   Treatment Time In: 1400  Treatment Time Out: 1430  Total Treatment Time: 30 minutes  Patient educated regarding results and recommendations of the evaluation. See the recommendations section below.    Education: Plan of Care, aspiration/behavioral reflux precautions and anatomy and physiology of swallow mechanism as it relates to MBSS findings and recommendations were discussed with the patient. Patient with questionable carryover/cognitive deficits.     Assessment     Mr. Rocael Perez was referred for Modified Barium Swallow Study with a medical diagnosis of acute renal failure/CKD and hypokalemia. The patient presents with functional oropharyngeal swallow as determined by the Dysphagia Outcome and Severity Scale (ROSALIE). Level 6: WFL/ Modified [place].    Modified Barium Swallow Study (MBSS) revealed oral phase characterized by reduced lingual and labial strength and range of motion for tongue control, bolus preparation  and transport of solids. Lip closure was adequate with no labial escape. Bolus prep and mastication was prolonged with complete recollection.  Lingual motion was delayed. There was trace oral residue which was reduced with sequential swallow (independent). The swallow was initiated when the head of the bolus entered the vallecula.    Pharyngeal phase characterized by timely initiation of swallow across consistencies. The soft palate elevated for complete closure of the velopharyngeal port. During pharyngeal swallow, adequate base of tongue retraction, anterior hyoid excursion, laryngeal elevation, and pharyngeal stripping wave resulted in complete epiglottic inversion and UES opening.  One incident of transient laryngeal penetration (PAS 2) of thin liquids; however, this is not indicative of swallowing dysfunction in patients over 65 years old (Jazzy et al, 2006).  Abnormal curvature of spine with multilevel cervical osteophytes present; however, findings are NOT obstructing bolus flow or impacting efficiency during the study.  Pt intermittently coughing throughout the study (after the swallow) in the absence of significant laryngeal penetration or aspiration.    Reference:   SERA Purcell., BETTY Hassan., SERA Ramirez., & ZEB Santacruz. (2006). Laryngeal penetration during deglutition in normal subjects of various ages. Dysphagia, 21(4), 270-274. https://doi.org/10.1007/b96957-332-5471-6     Robust Esophageal Screening Test (REST) was used to screen esophageal phase of swallow (Cortez et al, 2021) in today's study, completed in anterior/posterior view, with intake of barium coated solid, large self-regulated sips of liquid, and barium tablet. Screening significant for dysmotility with disordered peristalsis resulting in retrograde flow/retention of bolus >1 minute for solid or liquid and aerophagia noted. Further esophageal imaging including barium esophagram as well as follow-up with GI is recommended.        Lateral  Esophageal sweep following one tsp pureed post-deglutition, dysmotility-distal esophagus    Impressions: Patient presents with a functional oropharyngeal swallow; however, visualized aerophagia, esophageal dysmotility with stasis within the distal esophagus>solids during esophageal sweep.   In consideration of the Dynamic Imaging Grade of Swallowing Toxicity (DIGEST) (Nahed et al, 2017), patient presents with preserved safety of swallow and preserved efficiency of swallow. Patient appears to be at low risk for aspiration related PNA in consideration of three pillars of aspiration pneumonia (Jeff, 2005) including  oral health status, overall health/immune status, and laryngeal vestibule closure/severity of dysphagia; However, unable to assess risk related to aspiration pneumonia caused by the aspiration of gastric content. Patient is not recommended for behavioral swallow rehabilitation.     Functional Oral Intake Scale (FOIS)  The Functional Oral Intake Scale (FOIS) is an ordinal scale that is used to assess the current status and meaningful change in the oral intake. FOIS levels include:    TUBE DEPENDENT (levels 1-3) 1. No oral intake  2. Tube dependent with minimal/inconsistent oral intake  3. Tube supplements with consistent oral intake      TOTAL ORAL INTAKE (levels 4-7) 4. Total oral intake of a single consistency  5. Total oral intake of multiple consistencies requiring special preparation  6. Total oral intake with no special preparation, but must avoid specific foods or liquid items  7. Total oral intake with no restrictions     Patient is currently judged to be at FOIS level 6.      References:  ANTELMO Aguilar (2005, March). Pneumonia: Factors Beyond Aspiration. Perspectives in Swallowing and Swallowing Disorders (Dysphagia), 14, 10-16.  Va KA, Quin MP, Sen DA, Frankie HERNÁNDEZK, Deborah HY, Kael RS, Maggie CD, Arturo SY, Sanjeev CP, Gwen J, Lazarus CL, May A, Naldo J, Zackery JW, Maryan HM, Geraldine  JANAK. Dynamic Imaging Grade of Swallowing Toxicity (DIGEST): Scale development and validation. Cancer. 2017 Jan 1;123(1):62-70. doi: 10.1002/cncr.36155. Epub 2016 Aug 26. PMID: 05137701; PMCID: [id number].  LEROY Cortez, ANTELMO Denis, UNRULY Abdi, & ANTELMO Horne (2021). Diagnostic Accuracy of an Esophageal Screening Protocol Interpreted by the Speech-Language Pathologist. Dysphagia, 36(6), 0438-6181. https://doi.org/10.1007/[email or web address]    Recommendations:     Consistency Recommendations: Thin liquids (IDDSI 0) and Soft & bite sized solid consistencies (IDDSI 6).  Medications should be taken whole in puree.   Risk Management/Swallow Guidelines: use good oral hygiene , sit upright for all PO intake, increase physical mobility as tolerated, behavioral reflux precautions, feed only when wake and alert, alternate bites and sips, small bites and sips, multiple swallows per bolus, allow extra time for meals, remain upright for at least 1-3 hours following any PO intake, and eat small meals throughout the day to reduce discomfort associated with delayed emptying of the esophagus  Specialist Referrals: GI  Ancillary Tests: Consider EGD   Therapy: Dysphagia therapy is not recommended at this time.  Follow-up exam: Follow up swallow study is not indicated at this time.      Plan:     Plan of Care reviewed with:  patient, sibling/sister   SLP Follow-Up:  No       Time Tracking:     SLP Treatment Date:   06/20/25  Speech Start Time:  1145  Speech Stop Time:  1215     Speech Total Time (min):  30 min bedside CSE, 30 minutes MBSS    Billable Minutes: Eval Swallow and Oral Function 15 minutes and Self Care/Home Management Training 15 minutes; MBSS 30 minutes    06/20/2025

## 2025-06-21 NOTE — PT/OT/SLP PROGRESS
Occupational Therapy      Patient Name:  Rocael Perez   MRN:  [id number]    Patient not seen today secondary to therapist unavailable d/t significant influx of therapy orders. Will follow-up tomorrow morning.    6/21/2025

## 2025-06-21 NOTE — ASSESSMENT & PLAN NOTE
Patient's most recent phosphorus results are listed below.   Recent Labs     06/20/25  0504 06/21/25  0500   PHOS 1.7* 2.7     Plan  - Patient's hypophosphatemia is improving

## 2025-06-21 NOTE — ASSESSMENT & PLAN NOTE
ROSA ELENA is likely due to pre-renal azotemia due to dehydration. Baseline creatinine is 1.4- 1.8. Most recent creatinine and eGFR are listed below.  Recent Labs     06/19/25  2213 06/20/25  0504 06/21/25  0500   CREATININE 2.2* 2.0* 1.7*   EGFRNORACEVR 29* 32* 39*      Plan  - ROSA ELENA is improving.   - Avoid nephrotoxins and renally dose meds for GFR listed above  - Monitor urine output, serial BMP, and adjust therapy as needed  - Hold Lasix and Metolazone   -gentle hydration with IVF

## 2025-06-21 NOTE — ASSESSMENT & PLAN NOTE
Most recent hemoglobin and hematocrit are listed below.  Recent Labs     06/19/25  1412 06/20/25  0504 06/21/25  0500   HGB 11.8* 10.6* 11.2*   HCT 34.9* 31.7* 33.6*     Plan  - Monitor serial CBC: Daily  - Transfuse PRBC if patient becomes hemodynamically unstable, symptomatic or H/H drops below 7/21.  - Patient has not received any PRBC transfusions to date  - Patient's anemia is currently stable

## 2025-06-22 PROBLEM — E87.1 HYPONATREMIA: Status: RESOLVED | Noted: 2025-06-21 | Resolved: 2025-06-22

## 2025-06-22 LAB
ABSOLUTE EOSINOPHIL (OHS): 0.11 K/UL
ABSOLUTE MONOCYTE (OHS): 0.51 K/UL (ref 0.3–1)
ABSOLUTE NEUTROPHIL COUNT (OHS): 1.32 K/UL (ref 1.8–7.7)
ALBUMIN SERPL BCP-MCNC: 2.9 G/DL (ref 3.5–5.2)
ALP SERPL-CCNC: 60 UNIT/L (ref 40–150)
ALT SERPL W/O P-5'-P-CCNC: 12 UNIT/L (ref 10–44)
ANION GAP (OHS): 9 MMOL/L (ref 8–16)
AST SERPL-CCNC: 21 UNIT/L (ref 11–45)
BASOPHILS # BLD AUTO: 0.03 K/UL
BASOPHILS NFR BLD AUTO: 0.9 %
BILIRUB SERPL-MCNC: 0.7 MG/DL (ref 0.1–1)
BUN SERPL-MCNC: 15 MG/DL (ref 8–23)
CALCIUM SERPL-MCNC: 9.2 MG/DL (ref 8.7–10.5)
CHLORIDE SERPL-SCNC: 103 MMOL/L (ref 95–110)
CO2 SERPL-SCNC: 24 MMOL/L (ref 23–29)
CREAT SERPL-MCNC: 1.5 MG/DL (ref 0.5–1.4)
ERYTHROCYTE [DISTWIDTH] IN BLOOD BY AUTOMATED COUNT: 16.6 % (ref 11.5–14.5)
GFR SERPLBLD CREATININE-BSD FMLA CKD-EPI: 45 ML/MIN/1.73/M2
GLUCOSE SERPL-MCNC: 80 MG/DL (ref 70–110)
HCT VFR BLD AUTO: 34.1 % (ref 40–54)
HGB BLD-MCNC: 11.3 GM/DL (ref 14–18)
IMM GRANULOCYTES # BLD AUTO: 0 K/UL (ref 0–0.04)
IMM GRANULOCYTES NFR BLD AUTO: 0 % (ref 0–0.5)
LYMPHOCYTES # BLD AUTO: 1.53 K/UL (ref 1–4.8)
MAGNESIUM SERPL-MCNC: 2.3 MG/DL (ref 1.6–2.6)
MCH RBC QN AUTO: 31.8 PG (ref 27–31)
MCHC RBC AUTO-ENTMCNC: 33.1 G/DL (ref 32–36)
MCV RBC AUTO: 96 FL (ref 82–98)
NUCLEATED RBC (/100WBC) (OHS): 0 /100 WBC
PHOSPHATE SERPL-MCNC: 2.3 MG/DL (ref 2.7–4.5)
PLATELET # BLD AUTO: 177 K/UL (ref 150–450)
PMV BLD AUTO: 10.9 FL (ref 9.2–12.9)
POTASSIUM SERPL-SCNC: 3.5 MMOL/L (ref 3.5–5.1)
PROT SERPL-MCNC: 7.2 GM/DL (ref 6–8.4)
RBC # BLD AUTO: 3.55 M/UL (ref 4.6–6.2)
RELATIVE EOSINOPHIL (OHS): 3.1 %
RELATIVE LYMPHOCYTE (OHS): 43.7 % (ref 18–48)
RELATIVE MONOCYTE (OHS): 14.6 % (ref 4–15)
RELATIVE NEUTROPHIL (OHS): 37.7 % (ref 38–73)
SODIUM SERPL-SCNC: 136 MMOL/L (ref 136–145)
WBC # BLD AUTO: 3.5 K/UL (ref 3.9–12.7)

## 2025-06-22 PROCEDURE — 97530 THERAPEUTIC ACTIVITIES: CPT | Mod: HCNC,CQ

## 2025-06-22 PROCEDURE — 97166 OT EVAL MOD COMPLEX 45 MIN: CPT | Mod: HCNC

## 2025-06-22 PROCEDURE — 21400001 HC TELEMETRY ROOM: Mod: HCNC

## 2025-06-22 PROCEDURE — 63600175 PHARM REV CODE 636 W HCPCS: Mod: HCNC | Performed by: NURSE PRACTITIONER

## 2025-06-22 PROCEDURE — 84100 ASSAY OF PHOSPHORUS: CPT | Mod: HCNC | Performed by: NURSE PRACTITIONER

## 2025-06-22 PROCEDURE — 36415 COLL VENOUS BLD VENIPUNCTURE: CPT | Mod: HCNC | Performed by: NURSE PRACTITIONER

## 2025-06-22 PROCEDURE — 83735 ASSAY OF MAGNESIUM: CPT | Mod: HCNC | Performed by: NURSE PRACTITIONER

## 2025-06-22 PROCEDURE — 97530 THERAPEUTIC ACTIVITIES: CPT | Mod: HCNC

## 2025-06-22 PROCEDURE — 84295 ASSAY OF SERUM SODIUM: CPT | Mod: HCNC | Performed by: NURSE PRACTITIONER

## 2025-06-22 PROCEDURE — 25000003 PHARM REV CODE 250: Mod: HCNC | Performed by: HOSPITALIST

## 2025-06-22 PROCEDURE — 25000003 PHARM REV CODE 250: Mod: HCNC | Performed by: NURSE PRACTITIONER

## 2025-06-22 PROCEDURE — 97110 THERAPEUTIC EXERCISES: CPT | Mod: HCNC,CQ

## 2025-06-22 PROCEDURE — 85025 COMPLETE CBC W/AUTO DIFF WBC: CPT | Mod: HCNC | Performed by: NURSE PRACTITIONER

## 2025-06-22 RX ADMIN — BICTEGRAVIR SODIUM, EMTRICITABINE, AND TENOFOVIR ALAFENAMIDE FUMARATE 1 TABLET: 50; 200; 25 TABLET ORAL at 08:06

## 2025-06-22 RX ADMIN — FAMOTIDINE 20 MG: 20 TABLET, FILM COATED ORAL at 08:06

## 2025-06-22 RX ADMIN — HEPARIN SODIUM 5000 UNITS: 5000 INJECTION INTRAVENOUS; SUBCUTANEOUS at 05:06

## 2025-06-22 RX ADMIN — SENNOSIDES AND DOCUSATE SODIUM 1 TABLET: 50; 8.6 TABLET ORAL at 09:06

## 2025-06-22 RX ADMIN — HEPARIN SODIUM 5000 UNITS: 5000 INJECTION INTRAVENOUS; SUBCUTANEOUS at 01:06

## 2025-06-22 RX ADMIN — ASPIRIN 81 MG: 81 TABLET, COATED ORAL at 08:06

## 2025-06-22 RX ADMIN — SODIUM CHLORIDE: 9 INJECTION, SOLUTION INTRAVENOUS at 08:06

## 2025-06-22 RX ADMIN — Medication 1000 UNITS: at 08:06

## 2025-06-22 RX ADMIN — HEPARIN SODIUM 5000 UNITS: 5000 INJECTION INTRAVENOUS; SUBCUTANEOUS at 09:06

## 2025-06-22 NOTE — DISCHARGE INSTRUCTIONS
Our goal at [facility] is to always give you outstanding care and exceptional service. You may receive a survey from [employer] by mail, text or e-mail in the next 24-48 hours asking about the care you received with us. The survey should only take 5-10 minutes to complete and is very important to us.     Your feedback provides us with a way to recognize our staff who work tirelessly to provide the best care! Also, your responses help us learn how to improve when your experience was below our aspiration of excellence. We are always looking for ways to improve your stay. We WILL use your feedback to continue making improvements to help us provide the highest quality care. We keep your personal information and feedback confidential. We appreciate your time completing this survey and can't wait to hear from you!!!    We look forward to your continued care with us! Thanks so much for choosing [facility] for your healthcare needs!

## 2025-06-22 NOTE — PLAN OF CARE
Problem: Adult Inpatient Plan of Care  Goal: Plan of Care Review  Outcome: Progressing  Goal: Patient-Specific Goal (Individualized)  Outcome: Progressing  Goal: Absence of Hospital-Acquired Illness or Injury  Outcome: Progressing     Problem: Infection  Goal: Absence of Infection Signs and Symptoms  Outcome: Progressing     Problem: Diabetes Comorbidity  Goal: Blood Glucose Level Within Targeted Range  Outcome: Progressing

## 2025-06-22 NOTE — PLAN OF CARE
A215/A215 UNRULY Perez is a 85 y.o.male admitted on 6/19/2025 for Acute renal failure superimposed on stage 3 chronic kidney disease   Code Status: Full Code MRN: [id number]   Review of patient's allergies indicates:   Allergen Reactions    Seasonale [levonorgestrel-ethinyl estrad] Other (See Comments)     Past Medical History:   Diagnosis Date    CHF (congestive heart failure)     CLL (chronic lymphocytic leukemia)     HIV (human immunodeficiency virus infection)     Hyperlipidemia     MGUS (monoclonal gammopathy of unknown significance)       PRN meds    acetaminophen, 650 mg, Q4H PRN  albuterol-ipratropium, 3 mL, Q6H PRN  aluminum-magnesium hydroxide-simethicone, 30 mL, QID PRN  dextrose 50%, 12.5 g, PRN  dextrose 50%, 25 g, PRN  glucagon (human recombinant), 1 mg, PRN  glucose, 16 g, PRN  glucose, 24 g, PRN  melatonin, 6 mg, Nightly PRN  naloxone, 0.02 mg, PRN  ondansetron, 4 mg, Q6H PRN  polyethylene glycol, 17 g, BID PRN  prochlorperazine, 5 mg, Q6H PRN  simethicone, 1 tablet, QID PRN  sodium chloride 0.9%, 10 mL, Q8H PRN      Chart check completed. Will continue plan of care.      Orientation: disoriented to, situation, time  Bunn Coma Scale Score: 14     Lead Monitored: Lead II Rhythm: normal sinus rhythm Frequency/Ectopy: PACs  Cardiac/Telemetry Box Number: [id number]  VTE Core Measure: Pharmacological prophylaxis initiated/maintained Last Bowel Movement: 06/21/25  Diet Heart Healthy Renal Non-Dialysis, Soft & Bite Sized (IDDSI Level 6); Standard Tray  Voiding Characteristics: voids spontaneously without difficulty  Mauro Score: 18  Fall Risk Score: 15  Accucheck []   Freq?      Lines/Drains/Airways       Peripheral Intravenous Line  Duration             Peripheral IV Single Lumen 06/21/25 0217 20 G Anterior;Left Forearm 1 day

## 2025-06-22 NOTE — SUBJECTIVE & OBJECTIVE
Review of Systems   All other systems reviewed and are negative.    Objective:     Vital Signs (Most Recent):  Temp: 97.4 °F (36.3 °C) (06/22/25 1140)  Pulse: (!) 59 (06/22/25 1342)  Resp: 16 (06/22/25 1140)  BP: (!) 95/52 (06/22/25 1142)  SpO2: 99 % (06/22/25 1140) Vital Signs (24h Range):  Temp:  [97.4 °F (36.3 °C)-98.1 °F (36.7 °C)] 97.4 °F (36.3 °C)  Pulse:  [52-75] 59  Resp:  [16-20] 16  SpO2:  [92 %-100 %] 99 %  BP: ()/(52-64) 95/52     Weight: 70 kg (154 lb 5.2 oz)  Body mass index is 22.79 kg/m².    Intake/Output Summary (Last 24 hours) at 6/22/2025 1352  Last data filed at 6/22/2025 1230  Gross per 24 hour   Intake 1832.96 ml   Output 1 ml   Net 1831.96 ml         Physical Exam  Vitals and nursing note reviewed.   Constitutional:       General: He is not in acute distress.     Appearance: Normal appearance.   Cardiovascular:      Rate and Rhythm: Normal rate and regular rhythm.      Heart sounds: No murmur heard.  Pulmonary:      Effort: Pulmonary effort is normal. No respiratory distress.      Breath sounds: Normal breath sounds. No wheezing.   Abdominal:      General: There is no distension.      Palpations: Abdomen is soft.      Tenderness: There is no abdominal tenderness.   Neurological:      Mental Status: He is alert.               Significant Labs: All pertinent labs within the past 24 hours have been reviewed.  Recent Lab Results         06/22/25  0443        Albumin 2.9       ALP 60       ALT 12       Anion Gap 9       AST 21       Baso # 0.03       Basophil % 0.9       BILIRUBIN TOTAL 0.7  Comment: For infants and newborns, interpretation of results should be based   on gestational age, weight and in agreement with clinical   observations.    Premature Infant recommended reference ranges:   0-24 hours:  <8.0 mg/dL   24-48 hours: <12.0 mg/dL   3-5 days:    <15.0 mg/dL   6-29 days:   <15.0 mg/dL       BUN 15       Calcium 9.2       Chloride 103       CO2 24       Creatinine 1.5       eGFR  45  Comment: Estimated GFR calculated using the CKD-EPI creatinine (2021) equation.       Eos # 0.11       Eos % 3.1       Glucose 80       Gran # (ANC) 1.32       Hematocrit 34.1       Hemoglobin 11.3       Immature Grans (Abs) 0.00       Immature Granulocytes 0.0       Lymph # 1.53       Lymph % 43.7       Magnesium  2.3       MCH 31.8       MCHC 33.1       MCV 96       Mono # 0.51       Mono % 14.6       MPV 10.9       Neut % 37.7       nRBC 0       Phosphorus Level 2.3       Platelet Count 177       Potassium 3.5       PROTEIN TOTAL 7.2       RBC 3.55       RDW 16.6       Sodium 136       WBC 3.50               Significant Imaging: I have reviewed all pertinent imaging results/findings within the past 24 hours.    Fl Modified Barium Swallow Speech   Final Result      See speech pathologist report.         Electronically signed by: Ahsan Aldrich MD   Date:    06/20/2025   Time:    14:42      US Kidney   Final Result      No significant abnormality         Electronically signed by: Ahsan Aldrich MD   Date:    06/20/2025   Time:    11:06

## 2025-06-22 NOTE — ASSESSMENT & PLAN NOTE
Patient's most recent potassium results are listed below.   Recent Labs     06/20/25  0504 06/21/25  0500 06/22/25  0443   K 2.7* 3.1* 3.5     Plan  - Replete potassium per protocol  - Monitor potassium Daily  - Patient's hypokalemia is improving  - monitor

## 2025-06-22 NOTE — PROGRESS NOTES
[facility]  Progress Note    Patient Name: Rocael Perez  MRN: [id number]  Patient Class: IP- Inpatient   Admission Date: 6/19/2025  Length of Stay: 2 days  Attending Physician: Michael Peraza MD  Primary Care Provider: Landry Ferguson MD        Subjective     Principal Problem:Acute renal failure superimposed on stage 3 chronic kidney disease        HPI:  The patient is  86 yo male with HIV, Diastolic CHF, CLL, MGUS, HLD, and CKD3 who was advised to go to ED by Dr. Martinez for hypokalemia. The patient was seen for routine follow up in ID clinic on 6/9/25. F/U labwork showed K 2.9 and worsening renal function with sCr 2.5. The patient's spouse reports poor intake due to trouble swallowing. He is on lasix and Metolazone and feels like he is weak and dehydrated. Pt is on supplemental potassium 10meq which he takes on Monday and Friday. Pt reports dysphagia for several months that is worsening. Spouse reports a 30 lb weight loss in last several months due to not eating.   Pt was recently seen by ENT 3/20/25 or his dysphagia. Laryngoscopy exam showed No masses or lesions in the nose, nasopharynx, oropharynx, hypopharynx, or larynx. Vocal fold abduction and adduction is normal. No pooling of secretions in the piriform sinuses, penetration, or aspiration.   According to ENT note,   GI evaluation 2020  IMPRESSION: intermittent nonprogressive dysphagia with solids probably due to mild esophageal stenosis related to chronic reflux esophagitis or from a Schatzki's ring. Had a long discussion with the patient and his wife and all of us agree that his symptoms do not warrant further investigation or treatment at this time.  RECOMMENDATIONS: I had a long discussion with the patient and his wife about dietary measures to control bolus size. He will return if his symptoms persist despite him being diligent about his bite size and if he does then will refer him for EGD with esophageal  dilation.  FL esophogram 2020  The esophagus shows normal distensibility, course, caliber and contour. No focal lesion. No extrinsic mass effect. Normal transit of contrast through the esophagus with normal emptying into stomach. The upper and lower esophageal sphincters opened normally. Small sliding hiatal hernia. Spontaneous gastroesophageal reflux to the thoracic inlet    In the ED, BP low normal. Afebrile, Labs revealed Na 132, K 2.1, Cr 2.4. (Baseline 1.5- 1.8), UTI normal.              Overview/Hospital Course:  06/19 Admitted to Hospital Medicine for evaluation of ROSA ELENA and hypokalemia concerns.  06/20:  SLP consulted for swallowing concerns, with diet recommendations for soft and bite sized diet with aspiration precautions.  Recommendations for future GI evaluation.  Renal function with some mild improvement with fluid resuscitation.  Persistent electrolyte deficiencies, repletions administered.  PT recs for moderate intensity therapy    6/21: Renal function improving with IV fluids, continue another 24 hours  Plan for outpatient GI referral for further evaluation of dysphagia    6/22: SHERRY, patient reports fatigue  PT/OT recc [facility], patient and family interested in pursing SNF  Consult  for assistance  BP marginal, increase NS to 100 cc/hr  Cr trending down 1.5  AM labs      Review of Systems   All other systems reviewed and are negative.    Objective:     Vital Signs (Most Recent):  Temp: 97.4 °F (36.3 °C) (06/22/25 1140)  Pulse: (!) 59 (06/22/25 1342)  Resp: 16 (06/22/25 1140)  BP: (!) 95/52 (06/22/25 1142)  SpO2: 99 % (06/22/25 1140) Vital Signs (24h Range):  Temp:  [97.4 °F (36.3 °C)-98.1 °F (36.7 °C)] 97.4 °F (36.3 °C)  Pulse:  [52-75] 59  Resp:  [16-20] 16  SpO2:  [92 %-100 %] 99 %  BP: ()/(52-64) 95/52     Weight: 70 kg (154 lb 5.2 oz)  Body mass index is 22.79 kg/m².    Intake/Output Summary (Last 24 hours) at 6/22/2025 1352  Last data filed at 6/22/2025 1230  Gross per 24 hour   Intake  1832.96 ml   Output 1 ml   Net 1831.96 ml         Physical Exam  Vitals and nursing note reviewed.   Constitutional:       General: He is not in acute distress.     Appearance: Normal appearance.   Cardiovascular:      Rate and Rhythm: Normal rate and regular rhythm.      Heart sounds: No murmur heard.  Pulmonary:      Effort: Pulmonary effort is normal. No respiratory distress.      Breath sounds: Normal breath sounds. No wheezing.   Abdominal:      General: There is no distension.      Palpations: Abdomen is soft.      Tenderness: There is no abdominal tenderness.   Neurological:      Mental Status: He is alert.               Significant Labs: All pertinent labs within the past 24 hours have been reviewed.  Recent Lab Results         06/22/25  0443        Albumin 2.9       ALP 60       ALT 12       Anion Gap 9       AST 21       Baso # 0.03       Basophil % 0.9       BILIRUBIN TOTAL 0.7  Comment: For infants and newborns, interpretation of results should be based   on gestational age, weight and in agreement with clinical   observations.    Premature Infant recommended reference ranges:   0-24 hours:  <8.0 mg/dL   24-48 hours: <12.0 mg/dL   3-5 days:    <15.0 mg/dL   6-29 days:   <15.0 mg/dL       BUN 15       Calcium 9.2       Chloride 103       CO2 24       Creatinine 1.5       eGFR 45  Comment: Estimated GFR calculated using the CKD-EPI creatinine (2021) equation.       Eos # 0.11       Eos % 3.1       Glucose 80       Gran # (ANC) 1.32       Hematocrit 34.1       Hemoglobin 11.3       Immature Grans (Abs) 0.00       Immature Granulocytes 0.0       Lymph # 1.53       Lymph % 43.7       Magnesium  2.3       MCH 31.8       MCHC 33.1       MCV 96       Mono # 0.51       Mono % 14.6       MPV 10.9       Neut % 37.7       nRBC 0       Phosphorus Level 2.3       Platelet Count 177       Potassium 3.5       PROTEIN TOTAL 7.2       RBC 3.55       RDW 16.6       Sodium 136       WBC 3.50               Significant  Imaging: I have reviewed all pertinent imaging results/findings within the past 24 hours.    Fl Modified Barium Swallow Speech   Final Result      See speech pathologist report.         Electronically signed by: Ahsan Aldrich MD   Date:    06/20/2025   Time:    14:42      US Kidney   Final Result      No significant abnormality         Electronically signed by: Ahsan Aldrich MD   Date:    06/20/2025   Time:    11:06              Assessment & Plan  Acute renal failure superimposed on stage 3 chronic kidney disease  ROSA ELENA is likely due to pre-renal azotemia due to dehydration. Baseline creatinine is 1.4- 1.8. Most recent creatinine and eGFR are listed below.  Recent Labs     06/20/25  0504 06/21/25  0500 06/22/25  0443   CREATININE 2.0* 1.7* 1.5*   EGFRNORACEVR 32* 39* 45*      Plan  - ROSA ELENA is improving.   - Avoid nephrotoxins and renally dose meds for GFR listed above  - Monitor urine output, serial BMP, and adjust therapy as needed  - Hold Lasix and Metolazone   -gentle hydration with IVF    Hypokalemia  Patient's most recent potassium results are listed below.   Recent Labs     06/20/25  0504 06/21/25  0500 06/22/25  0443   K 2.7* 3.1* 3.5     Plan  - Replete potassium per protocol  - Monitor potassium Daily  - Patient's hypokalemia is improving  - monitor   Dysphagia  Dysphagia and 30lb weight loss   According to recent ENT visit:   Pt was recently seen by ENT 3/20/25 or his dysphagia. Laryngoscopy exam showed No masses or lesions in the nose, nasopharynx, oropharynx, hypopharynx, or larynx. Vocal fold abduction and adduction is normal. No pooling of secretions in the piriform sinuses, penetration, or aspiration.   GI evaluation 2020  IMPRESSION: intermittent nonprogressive dysphagia with solids probably due to mild esophageal stenosis related to chronic reflux esophagitis or from a Schatzki's ring. Had a long discussion with the patient and his wife and all of us agree that his symptoms do not warrant further  investigation or treatment at this time.  RECOMMENDATIONS: I had a long discussion with the patient and his wife about dietary measures to control bolus size. He will return if his symptoms persist despite him being diligent about his bite size and if he does then will refer him for EGD with esophageal dilation.  FL esophogram 2020  The esophagus shows normal distensibility, course, caliber and contour. No focal lesion. No extrinsic mass effect. Normal transit of contrast through the esophagus with normal emptying into stomach. The upper and lower esophageal sphincters opened normally. Small sliding hiatal hernia. Spontaneous gastroesophageal reflux to the thoracic inlet    PLAN:   SLP consulted for swallowing concerns, with diet recommendations for soft and bite sized diet with aspiration precautions.  Recommendations for soft diet and outpatient GI evaluation.  Chronic diastolic congestive heart failure  Patient has Diastolic (HFpEF) heart failure that is Chronic. On presentation their CHF was well compensated. Most recent BNP and echo results are listed below.  Recent Labs     06/19/25  1728   *     Latest ECHO  Results for orders placed during the hospital encounter of 06/19/25    Echo    Interpretation Summary    Left Ventricle: The left ventricle is normal in size. Mildly increased wall thickness. There is mild concentric hypertrophy. There is normal systolic function with a visually estimated ejection fraction of 60 - 65%. Grade II diastolic dysfunction.    Right Ventricle: The right ventricle is normal in size Wall thickness is normal. Systolic function is normal.    Left Atrium: The left atrium is mildly dilated    Aortic Valve: Moderately calcified cusps. There is moderate annular calcification present. Moderately restricted motion. There is moderate stenosis. Aortic valve area by VTI is 1.0 cm². Aortic valve peak velocity is 2.6 m/s. Mean gradient is 18 mmHg. The dimensionless index is 0.32.     Mitral Valve: There is bileaflet sclerosis. Mildly thickened leaflets. There is mild mitral annular calcification. There is mild to moderate regurgitation.    Tricuspid Valve: There is mild to moderate regurgitation.    Pulmonic Valve: There is mild regurgitation.    Pulmonary Artery: The estimated pulmonary artery systolic pressure is 34 mmHg.    IVC/SVC: Normal venous pressure at 3 mmHg.      Current Heart Failure Medications  metoprolol succinate (TOPROL-XL) 24 hr tablet 25 mg, Daily, Oral    Plan  - Monitor strict I&Os and daily weights.    - Place on telemetry  - Low sodium diet  - Place on fluid restriction of 1.5 L.   - Cardiology has not been consulted  - The patient's volume status is at their baseline  - appears dry   -Will hold diuretics   -Cont BB  CLL (chronic lymphocytic leukemia)  Stable   Followed by heme/Onc for surveillance     HIV positive  Followed by ID   Controlled   Cont Biktarvy     HLD (hyperlipidemia)  Cont Statin     Anemia  Most recent hemoglobin and hematocrit are listed below.  Recent Labs     06/20/25  0504 06/21/25  0500 06/22/25  0443   HGB 10.6* 11.2* 11.3*   HCT 31.7* 33.6* 34.1*     Plan  - Monitor serial CBC: Daily  - Transfuse PRBC if patient becomes hemodynamically unstable, symptomatic or H/H drops below 7/21.  - Patient has not received any PRBC transfusions to date  - Patient's anemia is currently stable    Hyponatremia (Resolved: 6/22/2025)  The patient's most recent sodium results are listed below.  Recent Labs     06/20/25  0504 06/21/25  0500 06/22/25  0443    134* 136     Plan  - Correct the sodium by 4-6mEq in 24 hours.   - Monitor sodium Daily.   - Patient hyponatremia is stable    Hypophosphatemia  Patient's most recent phosphorus results are listed below.   Recent Labs     06/20/25  0504 06/21/25  0500 06/22/25  0443   PHOS 1.7* 2.7 2.3*     Plan  - Patient's hypophosphatemia is improving    VTE Risk Mitigation (From admission, onward)           Ordered      heparin (porcine) injection 5,000 Units  Every 8 hours         06/19/25 1546     IP VTE HIGH RISK PATIENT  Once         06/19/25 1546     Place sequential compression device  Until discontinued         06/19/25 1546                    Discharge Planning   YANCY: 6/22/2025     Code Status: Full Code   Medical Readiness for Discharge Date: 6/22/2025  Discharge Plan A: Home                  Please place Justification for DME      Michael Peraza MD  Department of Hospital Medicine   [facility] - Telemetry ([facility])

## 2025-06-22 NOTE — ASSESSMENT & PLAN NOTE
Patient's most recent phosphorus results are listed below.   Recent Labs     06/20/25  0504 06/21/25  0500 06/22/25  0443   PHOS 1.7* 2.7 2.3*     Plan  - Patient's hypophosphatemia is improving

## 2025-06-22 NOTE — ASSESSMENT & PLAN NOTE
Most recent hemoglobin and hematocrit are listed below.  Recent Labs     06/20/25  0504 06/21/25  0500 06/22/25  0443   HGB 10.6* 11.2* 11.3*   HCT 31.7* 33.6* 34.1*     Plan  - Monitor serial CBC: Daily  - Transfuse PRBC if patient becomes hemodynamically unstable, symptomatic or H/H drops below 7/21.  - Patient has not received any PRBC transfusions to date  - Patient's anemia is currently stable

## 2025-06-22 NOTE — PT/OT/SLP PROGRESS
Physical Therapy  Treatment    Rocael Perez   MRN: [id number]   Admitting Diagnosis: Acute renal failure superimposed on stage 3 chronic kidney disease    PT Received On: 06/22/25  PT Start Time: 1020     PT Stop Time: 1045    PT Total Time (min): 25 min       Billable Minutes:  Therapeutic Activity 15 and Therapeutic Exercise 10    Treatment Type: Treatment  PT/PTA: PTA     Number of PTA visits since last PT visit: 1       General Precautions: Standard, fall  Orthopedic Precautions: N/A  Braces: N/A  Respiratory Status: Room air    Spiritual, Cultural Beliefs, Faith Practices, Values that Affect Care: no    Subjective:  Communicated with nurse Carpenter and completed Epic chart review prior to session. Pt agreed to session.    Pain/Comfort  Pain Rating 1: 0/10    Objective:   Patient found with: bed alarm, peripheral IV, telemetry    Rolling left: Min A  Supine > Sit: Mod A  Scoot towards EOB: Min A  Pt tolerated sitting EOB for ~6 mins focusing on tolerance to upright and core and trunk stability.  STS from EOB: Mod A with RW. Demonstrates fwd flexed posture and (B) flexed knees. Pt unable to extend knees.     Pt ambulated 5' with RW with Mod A. Stand Pivot to chair Mod A with RW      Performed BLE AROM TE x15ea: AP, LAQ, HIP FLEX. Intermittent cues for correct form.      Treatment and Education:  Educated pt on benefits of increasing time spent OOB. Encouraged pt to sit up for all meals and for a min of 2hr 3x/day. Encouraged pt to perform therex throughout the day to regain strength. Reviewed call don't fall policy and to call for all OOB activities. Pt agreed to safety request.      AM-PAC 6 CLICK MOBILITY  How much help from another person does this patient currently need?   1 = Unable, Total/Dependent Assistance  2 = A lot, Maximum/Moderate Assistance  3 = A little, Minimum/Contact Guard/Supervision  4 = None, Modified [place]/Independent    Turning over in bed (including adjusting bedclothes, sheets  and blankets)?: 3  Sitting down on and standing up from a chair with arms (e.g., wheelchair, bedside commode, etc.): 2  Moving from lying on back to sitting on the side of the bed?: 2  Moving to and from a bed to a chair (including a wheelchair)?: 2  Need to walk in hospital room?: 2  Climbing 3-5 steps with a railing?: 1  Basic Mobility Total Score: 12    AM-PAC Raw Score CMS G-Code Modifier Level of Impairment Assistance   6 % Total / Unable   7 - 9 CM 80 - 100% Maximal Assist   10 - 14 CL 60 - 80% Moderate Assist   15 - 19 CK 40 - 60% Moderate Assist   20 - 22 CJ 20 - 40% Minimal Assist   23 CI 1-20% SBA / CGA   24 CH 0% Independent/ Mod I     Patient left up in chair with all lines intact, call button in reach, nurse notified, and spouse present.    Assessment:  Rocael Perez is a 85 y.o. male with a medical diagnosis of Acute renal failure superimposed on stage 3 chronic kidney disease and presents with the following functional limitations. Pt will continue to benefit from skilled PT in order to address the below deficits to reach maximum level of function.    Rehab identified problem list/impairments: impaired functional mobility, weakness, decreased safety awareness, impaired endurance, gait instability, decreased coordination, impaired balance, decreased upper extremity function, impaired self care skills, decreased lower extremity function, decreased ROM    Rehab potential is fair.    Activity tolerance: Fair    Discharge recommendations: Moderate Intensity Therapy      Barriers to discharge:      Equipment recommendations: none     GOALS:   Multidisciplinary Problems       Physical Therapy Goals          Problem: Physical Therapy    Goal Priority Disciplines Outcome Interventions   Physical Therapy Goal     PT, PT/OT     Description: Patient will be seen a minimal of 3 out of 7 days a week.    LTG to be met by 07/04/25:    Bed mobility: CGA  Transfers: Min A with RW  Gait: Min A with RW x50 feet                         DME Justifications:  No DME recommended requiring DME justifications    PLAN:    Patient to be seen 3 x/week to address the above listed problems via gait training, therapeutic activities, therapeutic exercises  Plan of Care expires: 07/04/25  Plan of Care reviewed with: patient         06/22/2025

## 2025-06-22 NOTE — ASSESSMENT & PLAN NOTE
ROSA ELENA is likely due to pre-renal azotemia due to dehydration. Baseline creatinine is 1.4- 1.8. Most recent creatinine and eGFR are listed below.  Recent Labs     06/20/25  0504 06/21/25  0500 06/22/25  0443   CREATININE 2.0* 1.7* 1.5*   EGFRNORACEVR 32* 39* 45*      Plan  - ROSA ELENA is improving.   - Avoid nephrotoxins and renally dose meds for GFR listed above  - Monitor urine output, serial BMP, and adjust therapy as needed  - Hold Lasix and Metolazone   -gentle hydration with IVF

## 2025-06-22 NOTE — ASSESSMENT & PLAN NOTE
Dysphagia and 30lb weight loss   According to recent ENT visit:   Pt was recently seen by ENT 3/20/25 or his dysphagia. Laryngoscopy exam showed No masses or lesions in the nose, nasopharynx, oropharynx, hypopharynx, or larynx. Vocal fold abduction and adduction is normal. No pooling of secretions in the piriform sinuses, penetration, or aspiration.   GI evaluation 2020  IMPRESSION: intermittent nonprogressive dysphagia with solids probably due to mild esophageal stenosis related to chronic reflux esophagitis or from a Schatzki's ring. Had a long discussion with the patient and his wife and all of us agree that his symptoms do not warrant further investigation or treatment at this time.  RECOMMENDATIONS: I had a long discussion with the patient and his wife about dietary measures to control bolus size. He will return if his symptoms persist despite him being diligent about his bite size and if he does then will refer him for EGD with esophageal dilation.  [place] esophogram 2020  The esophagus shows normal distensibility, course, caliber and contour. No focal lesion. No extrinsic mass effect. Normal transit of contrast through the esophagus with normal emptying into stomach. The upper and lower esophageal sphincters opened normally. Small sliding hiatal hernia. Spontaneous gastroesophageal reflux to the thoracic inlet    PLAN:   SLP consulted for swallowing concerns, with diet recommendations for soft and bite sized diet with aspiration precautions.  Recommendations for soft diet and outpatient GI evaluation.

## 2025-06-22 NOTE — ASSESSMENT & PLAN NOTE
The patient's most recent sodium results are listed below.  Recent Labs     06/20/25  0504 06/21/25  0500 06/22/25  0443    134* 136     Plan  - Correct the sodium by 4-6mEq in 24 hours.   - Monitor sodium Daily.   - Patient hyponatremia is stable

## 2025-06-23 LAB
ABSOLUTE EOSINOPHIL (OHS): 0.08 K/UL
ABSOLUTE MONOCYTE (OHS): 0.53 K/UL (ref 0.3–1)
ABSOLUTE NEUTROPHIL COUNT (OHS): 1.13 K/UL (ref 1.8–7.7)
ANION GAP (OHS): 8 MMOL/L (ref 8–16)
BASOPHILS # BLD AUTO: 0.03 K/UL
BASOPHILS NFR BLD AUTO: 1 %
BUN SERPL-MCNC: 13 MG/DL (ref 8–23)
CALCIUM SERPL-MCNC: 8.4 MG/DL (ref 8.7–10.5)
CHLORIDE SERPL-SCNC: 108 MMOL/L (ref 95–110)
CO2 SERPL-SCNC: 22 MMOL/L (ref 23–29)
CREAT SERPL-MCNC: 1.4 MG/DL (ref 0.5–1.4)
ERYTHROCYTE [DISTWIDTH] IN BLOOD BY AUTOMATED COUNT: 16.7 % (ref 11.5–14.5)
GFR SERPLBLD CREATININE-BSD FMLA CKD-EPI: 49 ML/MIN/1.73/M2
GLUCOSE SERPL-MCNC: 79 MG/DL (ref 70–110)
HCT VFR BLD AUTO: 31.7 % (ref 40–54)
HGB BLD-MCNC: 10.2 GM/DL (ref 14–18)
IMM GRANULOCYTES # BLD AUTO: 0 K/UL (ref 0–0.04)
IMM GRANULOCYTES NFR BLD AUTO: 0 % (ref 0–0.5)
LYMPHOCYTES # BLD AUTO: 1.28 K/UL (ref 1–4.8)
MCH RBC QN AUTO: 31.5 PG (ref 27–31)
MCHC RBC AUTO-ENTMCNC: 32.2 G/DL (ref 32–36)
MCV RBC AUTO: 98 FL (ref 82–98)
NUCLEATED RBC (/100WBC) (OHS): 0 /100 WBC
PLATELET # BLD AUTO: 155 K/UL (ref 150–450)
PMV BLD AUTO: 10.8 FL (ref 9.2–12.9)
POTASSIUM SERPL-SCNC: 3.3 MMOL/L (ref 3.5–5.1)
RBC # BLD AUTO: 3.24 M/UL (ref 4.6–6.2)
RELATIVE EOSINOPHIL (OHS): 2.6 %
RELATIVE LYMPHOCYTE (OHS): 42 % (ref 18–48)
RELATIVE MONOCYTE (OHS): 17.4 % (ref 4–15)
RELATIVE NEUTROPHIL (OHS): 37 % (ref 38–73)
SODIUM SERPL-SCNC: 138 MMOL/L (ref 136–145)
WBC # BLD AUTO: 3.05 K/UL (ref 3.9–12.7)

## 2025-06-23 PROCEDURE — 97535 SELF CARE MNGMENT TRAINING: CPT | Mod: HCNC

## 2025-06-23 PROCEDURE — 85025 COMPLETE CBC W/AUTO DIFF WBC: CPT | Mod: HCNC | Performed by: HOSPITALIST

## 2025-06-23 PROCEDURE — 36415 COLL VENOUS BLD VENIPUNCTURE: CPT | Mod: HCNC | Performed by: HOSPITALIST

## 2025-06-23 PROCEDURE — 21400001 HC TELEMETRY ROOM: Mod: HCNC

## 2025-06-23 PROCEDURE — 63600175 PHARM REV CODE 636 W HCPCS: Mod: HCNC | Performed by: NURSE PRACTITIONER

## 2025-06-23 PROCEDURE — 25000003 PHARM REV CODE 250: Mod: HCNC | Performed by: NURSE PRACTITIONER

## 2025-06-23 PROCEDURE — 25000003 PHARM REV CODE 250: Mod: HCNC | Performed by: HOSPITALIST

## 2025-06-23 PROCEDURE — P9047 ALBUMIN (HUMAN), 25%, 50ML: HCPCS | Mod: HCNC | Performed by: NURSE PRACTITIONER

## 2025-06-23 PROCEDURE — 80048 BASIC METABOLIC PNL TOTAL CA: CPT | Mod: HCNC | Performed by: HOSPITALIST

## 2025-06-23 PROCEDURE — 97530 THERAPEUTIC ACTIVITIES: CPT | Mod: HCNC

## 2025-06-23 PROCEDURE — 97116 GAIT TRAINING THERAPY: CPT | Mod: HCNC

## 2025-06-23 RX ORDER — ALBUMIN HUMAN 250 G/1000ML
25 SOLUTION INTRAVENOUS ONCE
Status: COMPLETED | OUTPATIENT
Start: 2025-06-23 | End: 2025-06-23

## 2025-06-23 RX ORDER — POTASSIUM CHLORIDE 750 MG/1
30 TABLET, EXTENDED RELEASE ORAL ONCE
Status: COMPLETED | OUTPATIENT
Start: 2025-06-23 | End: 2025-06-23

## 2025-06-23 RX ADMIN — FAMOTIDINE 20 MG: 20 TABLET, FILM COATED ORAL at 09:06

## 2025-06-23 RX ADMIN — SODIUM CHLORIDE: 9 INJECTION, SOLUTION INTRAVENOUS at 09:06

## 2025-06-23 RX ADMIN — BICTEGRAVIR SODIUM, EMTRICITABINE, AND TENOFOVIR ALAFENAMIDE FUMARATE 1 TABLET: 50; 200; 25 TABLET ORAL at 09:06

## 2025-06-23 RX ADMIN — SODIUM CHLORIDE 500 ML: 9 INJECTION, SOLUTION INTRAVENOUS at 12:06

## 2025-06-23 RX ADMIN — SENNOSIDES AND DOCUSATE SODIUM 1 TABLET: 50; 8.6 TABLET ORAL at 09:06

## 2025-06-23 RX ADMIN — HEPARIN SODIUM 5000 UNITS: 5000 INJECTION INTRAVENOUS; SUBCUTANEOUS at 05:06

## 2025-06-23 RX ADMIN — HEPARIN SODIUM 5000 UNITS: 5000 INJECTION INTRAVENOUS; SUBCUTANEOUS at 09:06

## 2025-06-23 RX ADMIN — Medication 1000 UNITS: at 09:06

## 2025-06-23 RX ADMIN — ALBUMIN (HUMAN) 25 G: 5 SOLUTION INTRAVENOUS at 04:06

## 2025-06-23 RX ADMIN — ASPIRIN 81 MG: 81 TABLET, COATED ORAL at 09:06

## 2025-06-23 RX ADMIN — POTASSIUM CHLORIDE 30 MEQ: 750 TABLET, EXTENDED RELEASE ORAL at 09:06

## 2025-06-23 RX ADMIN — HEPARIN SODIUM 5000 UNITS: 5000 INJECTION INTRAVENOUS; SUBCUTANEOUS at 01:06

## 2025-06-23 NOTE — CONSULTS
[facility] - Telemetry [facility])  Wound Care    Patient Name:  Rocael Perez   MRN:  [id number]  Date: 6/23/2025  Diagnosis: Acute renal failure superimposed on stage 3 chronic kidney disease    History:     Past Medical History:   Diagnosis Date    CHF (congestive heart failure)     CLL (chronic lymphocytic leukemia)     HIV (human immunodeficiency virus infection)     Hyperlipidemia     MGUS (monoclonal gammopathy of unknown significance)        Social History[1]    Precautions:     Allergies as of 06/19/2025 - Reviewed 06/19/2025   Allergen Reaction Noted    Seasonale [levonorgestrel-ethinyl estrad] Other (See Comments) 06/13/2022       [facility] Assessment Details/Treatment     Consulted on this 86 y/o male patient for wound to sacrum. Patient was admitted 6/19/25 for acute renal failure superimposed on stage 3 chronic kidney disease. PMH significant for HIV, Diastolic CHF, CLL, MGUS, HLD, and CKD3.     Patient sitting up in chair, wife present at bedside sitting on the couch. Wound care nurse gave introduction and reason for visit, patient agreeable to assessment and treatment.     Assisted patient to stand for sacral assessment. Sacrum intact no discoloration noted, slightly moist. Patient is incontinent, applied clear moisture barrier. Recommend applying BID and PRN for prevention. Assisted patient to sit back down.     No wounds noted. Team will sign off, re-consult as needed.   Will initiate pressure injury prevention protocol.      06/23/25 1235   WOCN Assessment   WOCN Total Time (mins) 30   Visit Date 06/23/25   Visit Time 1235   Consult Type New   WOCN Speciality Wound   Wound At risk for pressure Injury   Intervention assessed;changed;applied;chart review;orders   Teaching on-going   Positioning   Body Position other (see comments)  (up in chair)   Pressure Injury Prevention    Check Moisture Management Pad Done   Sacral Foam Dressing Patient incontinent, barrier cream applied       06/23/2025         [1]    Social History  Socioeconomic History    Marital status: Single   Tobacco Use    Smoking status: Former     Types: Cigarettes     Passive exposure: Past    Smokeless tobacco: Never   Substance and Sexual Activity    Alcohol use: Not Currently    Drug use: Never    Sexual activity: Not Currently     Social Drivers of Health     Financial Resource Strain: Low Risk  (6/20/2025)    Overall Financial Resource Strain (CARDIA)     Difficulty of Paying Living Expenses: Not very hard   Food Insecurity: No Food Insecurity (6/20/2025)    Hunger Vital Sign     Worried About Running Out of Food in the Last Year: Never true     Ran Out of Food in the Last Year: Never true   Transportation Needs: No Transportation Needs (6/20/2025)    PRAPARE - Transportation     Lack of Transportation (Medical): No     Lack of Transportation (Non-Medical): No   Physical Activity: Inactive (6/20/2025)    Exercise Vital Sign     Days of Exercise per Week: 0 days     Minutes of Exercise per Session: 0 min   Stress: No Stress Concern Present (6/20/2025)    [employer] - Occupational Stress Questionnaire     Feeling of Stress : Not at all   Housing Stability: Low Risk  (6/20/2025)    Housing Stability Vital Sign     Unable to Pay for Housing in the Last Year: No     Homeless in the Last Year: No

## 2025-06-23 NOTE — PLAN OF CARE
06/23/25 1441   Post-Acute Status   Post-Acute Authorization Placement   Post-Acute Placement Status Referrals Sent   Discharge Delays None known at this time   Discharge Plan   Discharge Plan A Skilled Nursing Facility       SW meet with Patient and family at bedside to discuss therapy recommendation for Moderate Intense Therapy, via a Skilled Nursing Facility (SNF). SW explained SNF placement process and provided list of in-network SNF's, with CMS star ratings. SW inquired if she could send referrals to facilities, to inquire about bed placement and see if they can accommodate Patient's needs. Patient and family agreeable to SNF referrals being sent. SW stated she would follow up with Patient and family once SNF's gave responses. Patient and family verbalized understanding. SNF referrals sent.

## 2025-06-23 NOTE — PT/OT/SLP PROGRESS
Occupational Therapy   Treatment    Name: Rocael Perez  MRN: [id number]  Admitting Diagnosis:  Acute renal failure superimposed on stage 3 chronic kidney disease       Recommendations:     Discharge Recommendations: Moderate Intensity Therapy  Discharge Equipment Recommendations:  to be determined by next level of care  Barriers to discharge:  None    Assessment:     Rocael Perez is a 85 y.o. male with a medical diagnosis of Acute renal failure superimposed on stage 3 chronic kidney disease.  He presents with performance deficits affecting function are weakness, impaired endurance, impaired self care skills, impaired functional mobility, gait instability, impaired balance, decreased coordination, decreased lower extremity function, decreased upper extremity function, decreased safety awareness, impaired fine motor.     Rehab Prognosis:  Good; patient would benefit from acute skilled OT services to address these deficits and reach maximum level of function.       Plan:     Patient to be seen 2 x/week to address the above listed problems via self-care/home management, therapeutic activities, therapeutic exercises  Plan of Care Expires: 07/06/25  Plan of Care Reviewed with: patient, spouse    Subjective     Chief Complaint: None reported. Pt states he is doing well this AM.   Patient/Family Comments/goals: None reported.   Pain/Comfort:  Pain Rating 1: 0/10    Objective:   EPIC chart review completed prior to session.  Patient found HOB elevated with telemetry, bed alarm, peripheral IV upon OT entry to room.    General Precautions: Standard, fall    Orthopedic Precautions:N/A  Braces: N/A  Respiratory Status: Room air     Occupational Performance:   Bed Mobility:    Patient completed Rolling/Turning to Left with  stand by assistance  Patient completed Scooting/Bridging with stand by assistance  Patient completed Supine to Sit with stand by assistance     Functional Mobility/Transfers:  Patient completed Sit <> Stand  Transfer with moderate assistance  with  rolling walker   Patient completed Bed <> Chair Transfer using Step Transfer technique with moderate assistance with rolling walker  Functional Mobility: Pt ambulated 10' min A x2 /c RW  for increased activity tolerance and ADL completion. Pt required v/cs for posture correction and hand placement for RW. Pt's posture and knees at baseline are in a flexed position so v/c needed for safety.     Activities of Daily Living:  Grooming: minimum assistance Pt required Min A for oral care. Pt required several v/c and instruction simplification due to confusion when completing task. Pt also required background contrast when completing donning white toothpaste onto white toothbrush bristles due to difficulty differentiating between the two.    [facility] 6 Click ADL: 17    Treatment & Education:  Pt educated on OT roles and responsibilities. Pt exhibited delayed processing when engaging in task completion throughout session. Pt educated on importance of ROM exercises and OOB activity for continued strengthening and mobility. Pt educated on call don't fall for safety. Pt agreeable to POC.     Patient left up in chair with all lines intact, call button in reach, and chair alarm on and spouse present.     GOALS:   Multidisciplinary Problems       Occupational Therapy Goals          Problem: Occupational Therapy    Goal Priority Disciplines Outcome Interventions   Occupational Therapy Goal     OT, PT/OT Progressing    Description: Goals to be met by: 7/6/25     Patient will increase functional independence with ADLs by performing:    LE Dressing with [place].  Toileting from toilet with [place] for hygiene and clothing management.   Toilet transfer to toilet with [place].                         DME Justifications:  TBD at next level of care.     Time Tracking:     OT Date of Treatment: 06/23/25  OT Start Time: 1010  OT Stop Time: 1035  OT Total Time (min): 25 min    Billable  Minutes:Self Care/Home Management 10  Therapeutic Activity 15    DILLON Cole  OT/OBINNA: OT     Number of OBINNA visits since last OT visit: 0  6/23/2025

## 2025-06-23 NOTE — NURSING
"Pt doesn't have a sliding scale MD made aware instructed to check blood sugar for hypoglycemia protocols. States " patient hasn't needed any coverage."  "

## 2025-06-23 NOTE — PLAN OF CARE
Patient tolerated therapy session well today. Patient required SBA for bed mobility, MOD A for STS with RW, and MIN A for gait (20' with RW). Patient continues to demonstrate flexed knees and flexed forward posture during ambulation. PT continues to recommend MOD intensity therapy upon d/c.

## 2025-06-23 NOTE — PLAN OF CARE
Problem: Adult Inpatient Plan of Care  Goal: Plan of Care Review  Outcome: Met  Goal: Patient-Specific Goal (Individualized)  Outcome: Met  Goal: Absence of Hospital-Acquired Illness or Injury  Outcome: Met  Goal: Optimal Comfort and Wellbeing  Outcome: Met  Goal: Readiness for Transition of Care  Outcome: Met     Problem: Infection  Goal: Absence of Infection Signs and Symptoms  Outcome: Met     Problem: Diabetes Comorbidity  Goal: Blood Glucose Level Within Targeted Range  Outcome: Met     Problem: Acute Kidney Injury/Impairment  Goal: Fluid and Electrolyte Balance  Outcome: Met  Goal: Improved Oral Intake  Outcome: Met  Goal: Effective Renal Function  Outcome: Met   Discharge instructions received and reviewed with pt and family at bedside.  Pt voiced understanding and all questions answered to satisfaction.  Stressed importance to making and keeping all follow up appointments.  Medications delivered to room and reviewed with patient   Tele monitor removed and brought to monitor tech.  IV d/c'd with tip intact, pressure dressing applied.  Pt transported will be transported to front  hospital via w/c by PCT to be discharged home.

## 2025-06-23 NOTE — PLAN OF CARE
Problem: Adult Inpatient Plan of Care  Goal: Plan of Care Review  Outcome: Met  Goal: Patient-Specific Goal (Individualized)  Outcome: Met  Goal: Absence of Hospital-Acquired Illness or Injury  Outcome: Met  Goal: Optimal Comfort and Wellbeing  Outcome: Met  Goal: Readiness for Transition of Care  Outcome: Met     Problem: Infection  Goal: Absence of Infection Signs and Symptoms  Outcome: Met     Problem: Diabetes Comorbidity  Goal: Blood Glucose Level Within Targeted Range  Outcome: Met     Problem: Acute Kidney Injury/Impairment  Goal: Fluid and Electrolyte Balance  Outcome: Met  Goal: Improved Oral Intake  Outcome: Met  Goal: Effective Renal Function  Outcome: Met   POC reviewed with pt. Pt verbalizes understanding of POC. No questions at this time.  AAOx2. NADN.  NSR on cardiac monitor.  Pt remains free of falls.  No complaints at this time.  Safety measures in place. Will continue to monitor.  Informed pt to call for assistance before getting up. Pt verbalizes understanding.  Hourly rounding and chart check complete.

## 2025-06-23 NOTE — PT/OT/SLP PROGRESS
Physical Therapy Treatment    Patient Name:  Rocael Perez   MRN:  [id number]    Recommendations:     Discharge Recommendations: Moderate Intensity Therapy  Discharge Equipment Recommendations: none  Barriers to discharge: None    Assessment:     Rocael Perez is a 85 y.o. male admitted with a medical diagnosis of Acute renal failure superimposed on stage 3 chronic kidney disease.  He presents with the following impairments/functional limitations: weakness, impaired endurance, impaired self care skills, impaired functional mobility, gait instability, impaired balance, impaired cognition, decreased safety awareness, decreased lower extremity function, decreased ROM, impaired cardiopulmonary response to activity.    Rehab Prognosis: Fair; patient would benefit from acute skilled PT services to address these deficits and reach maximum level of function.    Recent Surgery: * No surgery found *      Plan:     During this hospitalization, patient to be seen 3 x/week to address the identified rehab impairments via gait training, therapeutic activities, therapeutic exercises and progress toward the following goals:    Plan of Care Expires:  07/04/25    Subjective     Chief Complaint: None stated by patient  Patient/Family Comments/goals: Patient is agreeable to therapy session  Pain/Comfort:  Pain Rating 1: 0/10      Objective:     Communicated with [facility] prior to session.  Patient found HOB elevated with telemetry, peripheral IV, bed alarm upon PT entry to room.     General Precautions: Standard, fall  Orthopedic Precautions: N/A  Braces: N/A  Respiratory Status: Room air     Functional Mobility:  Bed Mobility: SBA  Rolling L: SBA  Sup to sit: SBA  Seated scoot: SBA  Transfers: SBA  Sit to stand with RW: MOD A  Patient required v/c for hand placement, anterior weight shift, and upright posture during STS  Patient demonstrated bilateral flexed knees and flexed forward posture. Despite cueing for upright posture and extended  "knees, patient maintained this position during static standing with RW.  Gait:  Patient ambulated 20' with RW and MIN A x 2  Patient continued to demonstrated B flexed knees and flexed forward posture during gait trial  Patient required cueing for upright posture during gait trial  Patient was quick to fatigue, so patient was returned to the chair  Transfer to chair  Patient required v/c for hand placement during transfer to chair  Dynamic sitting balance x 7 min with UE activity  Balance:  Patient demonstrated fair standing/sitting balance   TherEx:  10 repetitions of seated hip flexion, LAQs, heel slides     Throughout today's session, patient demonstrated slowed processing and mild difficult following one step commands.    AM-PAC 6 CLICK MOBILITY  Turning over in bed (including adjusting bedclothes, sheets and blankets)?: 4  Sitting down on and standing up from a chair with arms (e.g., wheelchair, bedside commode, etc.): 2  Moving from lying on back to sitting on the side of the bed?: 4  Moving to and from a bed to a chair (including a wheelchair)?: 2  Need to walk in hospital room?: 2  Climbing 3-5 steps with a railing?: 1 (NT)  Basic Mobility Total Score: 15       Patient Education:  Role of PT and POC in acute care hospital setting  RW use and safety during transfers and gait  Continue provided therapeutic exercises throughout the day to increase tolerance to activity and blood flow: 10 repetitions of seated hip flexion, LAQs, heel slides, and APs  Increase amount of time out of bed and seated in chair to patient tolerance  Patient was educated on risk for falls due to generalized weakness ("Call don't Fall").  Patient was encouraged to call for assistance with all needs, such as transfers or trips to the bathroom.  Patient was agreeable to all safety precautions.    Patient left up in chair with all lines intact, call button in reach, chair alarm on, and wife present.    GOALS:   Multidisciplinary Problems  "      Physical Therapy Goals          Problem: Physical Therapy    Goal Priority Disciplines Outcome Interventions   Physical Therapy Goal     PT, PT/OT Progressing    Description: Patient will be seen a minimal of 3 out of 7 days a week.    LTG to be met by 07/04/25:    Bed mobility: CGA  Transfers: Min A with RW  Gait: Min A with RW x50 feet                        DME Justifications:  No DME recommended requiring DME justifications.    Time Tracking:     PT Received On: 06/23/25  PT Start Time: 1005     PT Stop Time: 1030  PT Total Time (min): 25 min     Billable Minutes: Gait Training 10 and Therapeutic Activity 15    Treatment Type: Treatment  PT/PTA: PT     Number of PTA visits since last PT visit: 0     06/23/2025

## 2025-06-23 NOTE — PLAN OF CARE
Pt tolerated therapy well. Pt completed bed mobility SBA, STS Mod A /c RW, and functional mobility min A x2 /c RW. Recommending moderate intensity intervention at d/c.

## 2025-06-24 LAB
ANION GAP (OHS): 8 MMOL/L (ref 8–16)
BUN SERPL-MCNC: 13 MG/DL (ref 8–23)
CALCIUM SERPL-MCNC: 8.3 MG/DL (ref 8.7–10.5)
CHLORIDE SERPL-SCNC: 111 MMOL/L (ref 95–110)
CO2 SERPL-SCNC: 19 MMOL/L (ref 23–29)
CREAT SERPL-MCNC: 1.2 MG/DL (ref 0.5–1.4)
GFR SERPLBLD CREATININE-BSD FMLA CKD-EPI: 59 ML/MIN/1.73/M2
GLUCOSE SERPL-MCNC: 77 MG/DL (ref 70–110)
MAGNESIUM SERPL-MCNC: 2 MG/DL (ref 1.6–2.6)
POTASSIUM SERPL-SCNC: 3.3 MMOL/L (ref 3.5–5.1)
SODIUM SERPL-SCNC: 138 MMOL/L (ref 136–145)

## 2025-06-24 PROCEDURE — 80048 BASIC METABOLIC PNL TOTAL CA: CPT | Mod: HCNC | Performed by: HOSPITALIST

## 2025-06-24 PROCEDURE — 25000003 PHARM REV CODE 250: Mod: HCNC | Performed by: HOSPITALIST

## 2025-06-24 PROCEDURE — 83735 ASSAY OF MAGNESIUM: CPT | Mod: HCNC | Performed by: HOSPITALIST

## 2025-06-24 PROCEDURE — 25000003 PHARM REV CODE 250: Mod: HCNC | Performed by: NURSE PRACTITIONER

## 2025-06-24 PROCEDURE — 63600175 PHARM REV CODE 636 W HCPCS: Mod: HCNC | Performed by: NURSE PRACTITIONER

## 2025-06-24 PROCEDURE — 97530 THERAPEUTIC ACTIVITIES: CPT | Mod: HCNC

## 2025-06-24 PROCEDURE — 21400001 HC TELEMETRY ROOM: Mod: HCNC

## 2025-06-24 PROCEDURE — 97116 GAIT TRAINING THERAPY: CPT | Mod: HCNC

## 2025-06-24 PROCEDURE — 97110 THERAPEUTIC EXERCISES: CPT | Mod: HCNC

## 2025-06-24 PROCEDURE — 36415 COLL VENOUS BLD VENIPUNCTURE: CPT | Mod: HCNC | Performed by: HOSPITALIST

## 2025-06-24 RX ADMIN — SENNOSIDES AND DOCUSATE SODIUM 1 TABLET: 50; 8.6 TABLET ORAL at 08:06

## 2025-06-24 RX ADMIN — Medication 1000 UNITS: at 08:06

## 2025-06-24 RX ADMIN — HEPARIN SODIUM 5000 UNITS: 5000 INJECTION INTRAVENOUS; SUBCUTANEOUS at 02:06

## 2025-06-24 RX ADMIN — ASPIRIN 81 MG: 81 TABLET, COATED ORAL at 08:06

## 2025-06-24 RX ADMIN — FAMOTIDINE 20 MG: 20 TABLET, FILM COATED ORAL at 08:06

## 2025-06-24 RX ADMIN — HEPARIN SODIUM 5000 UNITS: 5000 INJECTION INTRAVENOUS; SUBCUTANEOUS at 09:06

## 2025-06-24 RX ADMIN — BICTEGRAVIR SODIUM, EMTRICITABINE, AND TENOFOVIR ALAFENAMIDE FUMARATE 1 TABLET: 50; 200; 25 TABLET ORAL at 08:06

## 2025-06-24 RX ADMIN — SENNOSIDES AND DOCUSATE SODIUM 1 TABLET: 50; 8.6 TABLET ORAL at 09:06

## 2025-06-24 RX ADMIN — HEPARIN SODIUM 5000 UNITS: 5000 INJECTION INTRAVENOUS; SUBCUTANEOUS at 06:06

## 2025-06-24 NOTE — CONSULTS
06/24/25 1427   Post-Acute Status   Post-Acute Authorization Placement   Post-Acute Placement Status Pending payor review/awaiting authorization (if required)   Discharge Delays None known at this time   Discharge Plan   Discharge Plan A Skilled Nursing Facility     Patient and family agreeable to placement at [facility]. SW reached out to [place] [facility] to secure bed and notified LOC that they need to submit for insurance authorization.     [facility] will continue to follow and assist as needed.

## 2025-06-24 NOTE — PT/OT/SLP PROGRESS
Occupational Therapy   Treatment    Name: Rocael Perez  MRN: [id number]  Admitting Diagnosis:  Acute renal failure superimposed on stage 3 chronic kidney disease       Recommendations:     Discharge Recommendations: Moderate Intensity Therapy  Discharge Equipment Recommendations:  to be determined by next level of care  Barriers to discharge:  Decreased caregiver support    Assessment:     Rocael Perez is a 85 y.o. male with a medical diagnosis of Acute renal failure superimposed on stage 3 chronic kidney disease.  He tolerated standing exercises fairly well, though quick to fatigue as demonstrated in activity pace and compromised form during progression. Demonstrates poor insight to limitations and unawareness of physical cues (SOB, limited ROM, etc.). Performance deficits affecting function are weakness, impaired endurance, impaired self care skills, impaired functional mobility, gait instability, impaired balance, decreased coordination, decreased lower extremity function, decreased upper extremity function, decreased safety awareness, impaired fine motor.     Rehab Prognosis:  Fair; patient would benefit from acute skilled OT services to address these deficits and reach maximum level of function.       Plan:     Patient to be seen 2 x/week to address the above listed problems via self-care/home management, therapeutic activities, therapeutic exercises  Plan of Care Expires: 07/06/25  Plan of Care Reviewed with: patient, family    Subjective     Chief Complaint: None stated.  Patient/Family Comments/goals: Maximize functional independence and safety.    Pain/Comfort:  Pain Rating 1: 0/10    Objective:     Communicated with: nurse prior to session.  Patient found up in chair with peripheral IV, telemetry, chair check upon OT entry to room.    General Precautions: Standard, fall    Orthopedic Precautions:N/A  Braces: N/A  Respiratory Status: Room air     Occupational Performance:     Bed Mobility:     NT    Functional Mobility/Transfers:  Patient completed Sit <> Stand Transfer with moderate assistance  with  rolling walker   Patient completed Bed <> Chair Transfer using Step Transfer technique with moderate assistance with rolling walker  Functional Mobility: 20ft CGA and RW. Noted inc respiration rate and force.     [facility] 6 Click ADL: 18    Treatment & Education:  Patient tolerated standing endurance exercises x 10 minutes immediately following ambulation. While standing with RW and CGA, he performed 20 x standing marches 10 x Upward reaching with alternating hands (difficulty following commands/understanding task) to inc endurance and balance needed for dressing. He required cuing for seated rest break d/t dec insight/safety awareness. Educated on RPE and pacing. Once seated, he completed 10 x sh flexion AROM > AAROM dt fatigue and poor form.     Patient left up in chair with all lines intact, call button in reach, chair alarm on, and family and MD present    GOALS:   Multidisciplinary Problems       Occupational Therapy Goals          Problem: Occupational Therapy    Goal Priority Disciplines Outcome Interventions   Occupational Therapy Goal     OT, PT/OT Progressing    Description: Goals to be met by: 7/6/25     Patient will increase functional independence with ADLs by performing:    LE Dressing with [place].  Toileting from toilet with [place] for hygiene and clothing management.   Toilet transfer to toilet with [place].                         DME Justifications:  No DME recommended requiring DME justifications    Time Tracking:     OT Date of Treatment: 06/24/25  OT Start Time: 1037  OT Stop Time: 1100  OT Total Time (min): 23 min    Billable Minutes:Therapeutic Activity 8  Therapeutic Exercise 15    OT/OBINNA: OT     Number of OBINNA visits since last OT visit: 0    6/24/2025

## 2025-06-24 NOTE — PLAN OF CARE
SW meet with patient's niece, regarding SNF placement. Patient's niece stated patient's sister, Robina, is going to tour Skilled Nursing Facilities now for a SNF decision.  SW provided a list of SNF's that are accepting Patient for care. SW stated that Patient's sister should focus on touring the accepting facilities in order to give a decision on placement. Patient's niece stated she would call patient's sister and give her the names of the accepting facilities.  SW verbalized understanding and stated she would follow up this afternoon.    14 23 SW meet with patient, Patient's sister (Debora) and niece (Lesia) at bedside regarding SNF placement. Patient's family stated they were agreeable to placement at [facility] for placement.  SW stated she would reach out to liaison to confirm the bed and will have LOC team submit for authorization.     SW will continue to follow and assist as needed.

## 2025-06-24 NOTE — PLAN OF CARE
Patient tolerated therapy session well today. Patient required Mod A for STS with RW and repetitive verbal cues for upright posture and straightening knees. Patient ambulated 20' with RW and CGA. Patient was quick to fatigue. PT continues to recommend MOD intensity therapy following d/c.

## 2025-06-24 NOTE — PLAN OF CARE
Tolerated standing exercises fairly well, though quick to fatigue as demonstrated in activity pace and compromised form during progression. Rec CLAUDIA.

## 2025-06-24 NOTE — PT/OT/SLP PROGRESS
Physical Therapy Treatment    Patient Name:  Rocael Perez   MRN:  [id number]    Recommendations:     Discharge Recommendations: Moderate Intensity Therapy  Discharge Equipment Recommendations: to be determined by next level of care  Barriers to discharge: None    Assessment:     Rocael Perez is a 85 y.o. male admitted with a medical diagnosis of Acute renal failure superimposed on stage 3 chronic kidney disease.  He presents with the following impairments/functional limitations: weakness, impaired endurance, impaired self care skills, impaired functional mobility, gait instability, impaired balance, decreased coordination, decreased upper extremity function, decreased lower extremity function, decreased safety awareness, decreased ROM, impaired cardiopulmonary response to activity.    Rehab Prognosis: Fair; patient would benefit from acute skilled PT services to address these deficits and reach maximum level of function.    Recent Surgery: * No surgery found *      Plan:     During this hospitalization, patient to be seen 3 x/week to address the identified rehab impairments via gait training, therapeutic activities, therapeutic exercises and progress toward the following goals:    Plan of Care Expires:  07/04/25    Subjective     Chief Complaint: None stated by patient  Patient/Family Comments/goals: Patient was agreeable to therapy session  Pain/Comfort:  Pain Rating 1: 0/10      Objective:     Communicated with [facility] prior to session.  Patient found up in chair with telemetry, peripheral IV upon PT entry to room.     General Precautions: Standard, fall  Orthopedic Precautions: N/A  Braces: N/A  Respiratory Status: Room air     Functional Mobility:  Seated scoot forward: SBA  Transfers: MOD A  Sit to stand with RW: MOD A  Patient required verbal cueing for anterior weight shift in order to initiate STS  Patient demonstrated bilateral flexed knees and flexed forward posture. Despite cueing for upright posture and  extended knees, patient maintained this position during static standing with RW.  Gait:  Patient ambulated 20' with RW and CGA  Patient continued to demonstrated B flexed knees and flexed forward posture during gait trial   Patient required repetitive verbal cueing for upright posture and straightening knees during gait trial  Patient was quick to fatigue, so patient was returned to the chair   Transfer to chair: MIN A  Patient required verbal cueing for hand placement when transferring to chair  Balance: Patient demonstrated fair standing/sitting balance       Patient demonstrates slowed processing and mild difficulty following one step commands  Patient also demonstrates poor safety awareness as patient presents with difficulty recognizing endurance limits and level of fatigue.      AM-PAC 6 CLICK MOBILITY  Turning over in bed (including adjusting bedclothes, sheets and blankets)?: 4  Sitting down on and standing up from a chair with arms (e.g., wheelchair, bedside commode, etc.): 2  Moving from lying on back to sitting on the side of the bed?: 4  Moving to and from a bed to a chair (including a wheelchair)?: 2  Need to walk in hospital room?: 3  Climbing 3-5 steps with a railing?: 1 (NT)  Basic Mobility Total Score: 16       Treatment:  20 repetitions of standing marches with RW for UE support  10 repetitions of mini squats with RW for UE support  10 repetitions of seated LAQs with emphasis on slow and controlled movement  Initial exercises performed to patient tolerance in order to increase patient's tolerance to endurance activity    Patient Education:  Role of PT and POC in acute care hospital setting  Importance of energy conservation techniques throughout the day  RW use and safety during transfers and gait  Continue provided therapeutic exercises throughout the day to increase tolerance to activity and blood flow: 10 repetitions of seated hip flexion, LAQs, heel slides, and APs  Increase amount of time out  "of bed and seated in chair to patient tolerance  Patient was educated on risk for falls due to generalized weakness ("Call don't Fall").  Patient was encouraged to call for assistance with all needs, such as transfers or trips to the bathroom.  Patient was agreeable to all safety precautions.       Patient left up in chair with all lines intact, call button in reach, chair alarm on, and doctor present.    GOALS:   Multidisciplinary Problems       Physical Therapy Goals          Problem: Physical Therapy    Goal Priority Disciplines Outcome Interventions   Physical Therapy Goal     PT, PT/OT Progressing    Description: Patient will be seen a minimal of 3 out of 7 days a week.    LTG to be met by 07/04/25:    Bed mobility: CGA  Transfers: Min A with RW  Gait: Min A with RW x50 feet                        DME Justifications:  No DME recommended requiring DME justifications    Time Tracking:     PT Received On: 06/24/25  PT Start Time: 1040     PT Stop Time: 1105  PT Total Time (min): 25 min     Billable Minutes: Gait Training 10 and Therapeutic Activity 15    Treatment Type: Treatment  PT/PTA: PT     Number of PTA visits since last PT visit: 0     06/24/2025  "

## 2025-06-25 VITALS
HEIGHT: 69 IN | TEMPERATURE: 99 F | WEIGHT: 156.5 LBS | SYSTOLIC BLOOD PRESSURE: 108 MMHG | RESPIRATION RATE: 18 BRPM | HEART RATE: 111 BPM | OXYGEN SATURATION: 99 % | DIASTOLIC BLOOD PRESSURE: 66 MMHG | BODY MASS INDEX: 23.18 KG/M2

## 2025-06-25 PROBLEM — N18.30 ACUTE RENAL FAILURE SUPERIMPOSED ON STAGE 3 CHRONIC KIDNEY DISEASE: Status: RESOLVED | Noted: 2025-06-19 | Resolved: 2025-06-25

## 2025-06-25 PROBLEM — N17.9 ACUTE RENAL FAILURE SUPERIMPOSED ON STAGE 3 CHRONIC KIDNEY DISEASE: Status: RESOLVED | Noted: 2025-06-19 | Resolved: 2025-06-25

## 2025-06-25 PROBLEM — E83.39 HYPOPHOSPHATEMIA: Status: RESOLVED | Noted: 2025-06-21 | Resolved: 2025-06-25

## 2025-06-25 PROBLEM — E87.6 HYPOKALEMIA: Status: RESOLVED | Noted: 2025-06-19 | Resolved: 2025-06-25

## 2025-06-25 PROCEDURE — 63600175 PHARM REV CODE 636 W HCPCS: Mod: HCNC | Performed by: NURSE PRACTITIONER

## 2025-06-25 PROCEDURE — 97530 THERAPEUTIC ACTIVITIES: CPT | Mod: HCNC

## 2025-06-25 PROCEDURE — 25000003 PHARM REV CODE 250: Mod: HCNC | Performed by: NURSE PRACTITIONER

## 2025-06-25 PROCEDURE — 97116 GAIT TRAINING THERAPY: CPT | Mod: HCNC,CQ

## 2025-06-25 PROCEDURE — 97110 THERAPEUTIC EXERCISES: CPT | Mod: HCNC

## 2025-06-25 PROCEDURE — 25000003 PHARM REV CODE 250: Mod: HCNC | Performed by: HOSPITALIST

## 2025-06-25 RX ORDER — POTASSIUM CHLORIDE 20 MEQ/1
20 TABLET, EXTENDED RELEASE ORAL DAILY
Status: DISCONTINUED | OUTPATIENT
Start: 2025-06-25 | End: 2025-06-25 | Stop reason: HOSPADM

## 2025-06-25 RX ADMIN — BICTEGRAVIR SODIUM, EMTRICITABINE, AND TENOFOVIR ALAFENAMIDE FUMARATE 1 TABLET: 50; 200; 25 TABLET ORAL at 09:06

## 2025-06-25 RX ADMIN — ASPIRIN 81 MG: 81 TABLET, COATED ORAL at 09:06

## 2025-06-25 RX ADMIN — POTASSIUM CHLORIDE 20 MEQ: 1500 TABLET, EXTENDED RELEASE ORAL at 09:06

## 2025-06-25 RX ADMIN — Medication 1000 UNITS: at 09:06

## 2025-06-25 RX ADMIN — FAMOTIDINE 20 MG: 20 TABLET, FILM COATED ORAL at 09:06

## 2025-06-25 RX ADMIN — SENNOSIDES AND DOCUSATE SODIUM 1 TABLET: 50; 8.6 TABLET ORAL at 09:06

## 2025-06-25 RX ADMIN — HEPARIN SODIUM 5000 UNITS: 5000 INJECTION INTRAVENOUS; SUBCUTANEOUS at 05:06

## 2025-06-25 NOTE — ASSESSMENT & PLAN NOTE
ROSA ELENA is likely due to pre-renal azotemia due to dehydration. Baseline creatinine is 1.4- 1.8. Most recent creatinine and eGFR are listed below.  Recent Labs     06/23/25  0435 06/24/25  0441   CREATININE 1.4 1.2   EGFRNORACEVR 49* 59*      Plan  - ROSA ELENA is improving.   - Avoid nephrotoxins and renally dose meds for GFR listed above  - Monitor urine output, serial BMP, and adjust therapy as needed  - Hold Lasix and Metolazone   -gentle hydration with IVF

## 2025-06-25 NOTE — ASSESSMENT & PLAN NOTE
Most recent hemoglobin and hematocrit are listed below.  Recent Labs     06/23/25  0435   HGB 10.2*   HCT 31.7*     Plan  - Monitor serial CBC: Daily  - Transfuse PRBC if patient becomes hemodynamically unstable, symptomatic or H/H drops below 7/21.  - Patient has not received any PRBC transfusions to date  - Patient's anemia is currently stable

## 2025-06-25 NOTE — ASSESSMENT & PLAN NOTE
Patient's most recent potassium results are listed below.   Recent Labs     06/23/25  0435 06/24/25  0441   K 3.3* 3.3*     Plan  - Replete potassium per protocol  - Monitor potassium Daily  - Patient's hypokalemia is improving  - monitor

## 2025-06-25 NOTE — ASSESSMENT & PLAN NOTE
"Patient's most recent phosphorus results are listed below.   No results for input(s): "PHOS" in the last 72 hours.    Plan  - Patient's hypophosphatemia is improving    "

## 2025-06-25 NOTE — PROGRESS NOTES
[facility] Medicine  Progress Note    Patient Name: Rocael Perez  MRN: [id number]  Patient Class: IP- Inpatient   Admission Date: 6/19/2025  Length of Stay: 5 days  Attending Physician: Michael Peraza MD  Primary Care Provider: Landry Ferguson MD        Subjective     Principal Problem:Acute renal failure superimposed on stage 3 chronic kidney disease        HPI:  The patient is  84 yo male with HIV, Diastolic CHF, CLL, MGUS, HLD, and CKD3 who was advised to go to ED by Dr. Martinez for hypokalemia. The patient was seen for routine follow up in ID clinic on 6/9/25. F/U labwork showed K 2.9 and worsening renal function with sCr 2.5. The patient's spouse reports poor intake due to trouble swallowing. He is on lasix and Metolazone and feels like he is weak and dehydrated. Pt is on supplemental potassium 10meq which he takes on Monday and Friday. Pt reports dysphagia for several months that is worsening. Spouse reports a 30 lb weight loss in last several months due to not eating.   Pt was recently seen by ENT 3/20/25 or his dysphagia. Laryngoscopy exam showed No masses or lesions in the nose, nasopharynx, oropharynx, hypopharynx, or larynx. Vocal fold abduction and adduction is normal. No pooling of secretions in the piriform sinuses, penetration, or aspiration.   According to ENT note,   GI evaluation 2020  IMPRESSION: intermittent nonprogressive dysphagia with solids probably due to mild esophageal stenosis related to chronic reflux esophagitis or from a Schatzki's ring. Had a long discussion with the patient and his wife and all of us agree that his symptoms do not warrant further investigation or treatment at this time.  RECOMMENDATIONS: I had a long discussion with the patient and his wife about dietary measures to control bolus size. He will return if his symptoms persist despite him being diligent about his bite size and if he does then will refer him for EGD with esophageal  dilation.  FL esophogram 2020  The esophagus shows normal distensibility, course, caliber and contour. No focal lesion. No extrinsic mass effect. Normal transit of contrast through the esophagus with normal emptying into stomach. The upper and lower esophageal sphincters opened normally. Small sliding hiatal hernia. Spontaneous gastroesophageal reflux to the thoracic inlet    In the ED, BP low normal. Afebrile, Labs revealed Na 132, K 2.1, Cr 2.4. (Baseline 1.5- 1.8), UTI normal.              Overview/Hospital Course:  06/19 Admitted to Hospital Medicine for evaluation of ROSA ELENA and hypokalemia concerns.  06/20:  SLP consulted for swallowing concerns, with diet recommendations for soft and bite sized diet with aspiration precautions.  Recommendations for future GI evaluation.  Renal function with some mild improvement with fluid resuscitation.  Persistent electrolyte deficiencies, repletions administered.  PT recs for moderate intensity therapy    6/21: Renal function improving with IV fluids, continue another 24 hours  Plan for outpatient GI referral for further evaluation of dysphagia    6/22: SHERRY, patient reports fatigue  PT/OT recc CLAUDIA, patient and family interested in pursing SNF  Consult  for assistance  BP marginal, increase NS to 100 cc/hr  Cr trending down 1.5  AM labs    6/23-24: SHERRY, patient sitting in chair, denies complaints  Cr trending down 1.2, s/p IV fluid course  SNF placement pending      Review of Systems   All other systems reviewed and are negative.    Objective:     Vital Signs (Most Recent):  Temp: 98.1 °F (36.7 °C) (06/25/25 0356)  Pulse: 81 (06/25/25 0404)  Resp: 20 (06/25/25 0356)  BP: (!) 108/59 (06/25/25 0356)  SpO2: 100 % (06/25/25 0356) Vital Signs (24h Range):  Temp:  [97.6 °F (36.4 °C)-98.6 °F (37 °C)] 98.1 °F (36.7 °C)  Pulse:  [60-81] 81  Resp:  [17-20] 20  SpO2:  [99 %-100 %] 100 %  BP: (100-108)/(53-65) 108/59     Weight: 71 kg (156 lb 8.4 oz)  Body mass index is 23.11  kg/m².    Intake/Output Summary (Last 24 hours) at 6/25/2025 0810  Last data filed at 6/24/2025 1927  Gross per 24 hour   Intake 4253.65 ml   Output --   Net 4253.65 ml         Physical Exam  Vitals and nursing note reviewed.   Constitutional:       General: He is not in acute distress.     Appearance: Normal appearance.   Cardiovascular:      Rate and Rhythm: Normal rate and regular rhythm.      Heart sounds: No murmur heard.  Pulmonary:      Effort: Pulmonary effort is normal. No respiratory distress.      Breath sounds: Normal breath sounds. No wheezing.   Abdominal:      General: There is no distension.      Palpations: Abdomen is soft.      Tenderness: There is no abdominal tenderness.   Neurological:      Mental Status: He is alert.               Significant Labs: All pertinent labs within the past 24 hours have been reviewed.  Recent Lab Results       None            Significant Imaging: I have reviewed all pertinent imaging results/findings within the past 24 hours.    Fl Modified Barium Swallow Speech   Final Result      See speech pathologist report.         Electronically signed by: Ahsan Aldrich MD   Date:    06/20/2025   Time:    14:42       Kidney   Final Result      No significant abnormality         Electronically signed by: Ahsan Aldrich MD   Date:    06/20/2025   Time:    11:06              Assessment & Plan  Acute renal failure superimposed on stage 3 chronic kidney disease  ROSA ELENA is likely due to pre-renal azotemia due to dehydration. Baseline creatinine is 1.4- 1.8. Most recent creatinine and eGFR are listed below.  Recent Labs     06/23/25  0435 06/24/25  0441   CREATININE 1.4 1.2   EGFRNORACEVR 49* 59*      Plan  - ROSA ELENA is improving.   - Avoid nephrotoxins and renally dose meds for GFR listed above  - Monitor urine output, serial BMP, and adjust therapy as needed  - Hold Lasix and Metolazone   -gentle hydration with IVF    Hypokalemia  Patient's most recent potassium results are listed below.    Recent Labs     06/23/25  0435 06/24/25  0441   K 3.3* 3.3*     Plan  - Replete potassium per protocol  - Monitor potassium Daily  - Patient's hypokalemia is improving  - monitor   Dysphagia  Dysphagia and 30lb weight loss   According to recent ENT visit:   Pt was recently seen by ENT 3/20/25 or his dysphagia. Laryngoscopy exam showed No masses or lesions in the nose, nasopharynx, oropharynx, hypopharynx, or larynx. Vocal fold abduction and adduction is normal. No pooling of secretions in the piriform sinuses, penetration, or aspiration.   GI evaluation 2020  IMPRESSION: intermittent nonprogressive dysphagia with solids probably due to mild esophageal stenosis related to chronic reflux esophagitis or from a Schatzki's ring. Had a long discussion with the patient and his wife and all of us agree that his symptoms do not warrant further investigation or treatment at this time.  RECOMMENDATIONS: I had a long discussion with the patient and his wife about dietary measures to control bolus size. He will return if his symptoms persist despite him being diligent about his bite size and if he does then will refer him for EGD with esophageal dilation.  FL esophogram 2020  The esophagus shows normal distensibility, course, caliber and contour. No focal lesion. No extrinsic mass effect. Normal transit of contrast through the esophagus with normal emptying into stomach. The upper and lower esophageal sphincters opened normally. Small sliding hiatal hernia. Spontaneous gastroesophageal reflux to the thoracic inlet    PLAN:   SLP consulted for swallowing concerns, with diet recommendations for soft and bite sized diet with aspiration precautions.  Recommendations for soft diet and outpatient GI evaluation.  Chronic diastolic congestive heart failure  Patient has Diastolic (HFpEF) heart failure that is Chronic. On presentation their CHF was well compensated. Most recent BNP and echo results are listed below.  No results for  "input(s): "BNP" in the last 72 hours.    Latest ECHO  Results for orders placed during the hospital encounter of 06/19/25    Echo    Interpretation Summary    Left Ventricle: The left ventricle is normal in size. Mildly increased wall thickness. There is mild concentric hypertrophy. There is normal systolic function with a visually estimated ejection fraction of 60 - 65%. Grade II diastolic dysfunction.    Right Ventricle: The right ventricle is normal in size Wall thickness is normal. Systolic function is normal.    Left Atrium: The left atrium is mildly dilated    Aortic Valve: Moderately calcified cusps. There is moderate annular calcification present. Moderately restricted motion. There is moderate stenosis. Aortic valve area by VTI is 1.0 cm². Aortic valve peak velocity is 2.6 m/s. Mean gradient is 18 mmHg. The dimensionless index is 0.32.    Mitral Valve: There is bileaflet sclerosis. Mildly thickened leaflets. There is mild mitral annular calcification. There is mild to moderate regurgitation.    Tricuspid Valve: There is mild to moderate regurgitation.    Pulmonic Valve: There is mild regurgitation.    Pulmonary Artery: The estimated pulmonary artery systolic pressure is 34 mmHg.    IVC/SVC: Normal venous pressure at 3 mmHg.      Current Heart Failure Medications       Plan  - Monitor strict I&Os and daily weights.    - Place on telemetry  - Low sodium diet  - Place on fluid restriction of 1.5 L.   - Cardiology has not been consulted  - The patient's volume status is at their baseline  - appears dry   -Will hold diuretics   -Cont BB  CLL (chronic lymphocytic leukemia)  Stable   Followed by heme/Onc for surveillance     HIV positive  Followed by ID   Controlled   Cont Biktarvy     HLD (hyperlipidemia)  Cont Statin     Anemia  Most recent hemoglobin and hematocrit are listed below.  Recent Labs     06/23/25  0435   HGB 10.2*   HCT 31.7*     Plan  - Monitor serial CBC: Daily  - Transfuse PRBC if patient becomes " "hemodynamically unstable, symptomatic or H/H drops below 7/21.  - Patient has not received any PRBC transfusions to date  - Patient's anemia is currently stable    Hypophosphatemia  Patient's most recent phosphorus results are listed below.   No results for input(s): "PHOS" in the last 72 hours.    Plan  - Patient's hypophosphatemia is improving    VTE Risk Mitigation (From admission, onward)           Ordered     heparin (porcine) injection 5,000 Units  Every 8 hours         06/19/25 1546     IP VTE HIGH RISK PATIENT  Once         06/19/25 1546     Place sequential compression device  Until discontinued         06/19/25 1546                    Discharge Planning   YANCY: 6/24/2025     Code Status: Full Code   Medical Readiness for Discharge Date: 6/22/2025  Discharge Plan A: Skilled Nursing Facility   Discharge Delays: None known at this time              Please place Justification for LUIS Peraza MD  Department of Hospital Medicine   [facility] - Telemetry ([facility])    "

## 2025-06-25 NOTE — SUBJECTIVE & OBJECTIVE
Review of Systems   All other systems reviewed and are negative.    Objective:     Vital Signs (Most Recent):  Temp: 98.1 °F (36.7 °C) (06/25/25 0356)  Pulse: 81 (06/25/25 0404)  Resp: 20 (06/25/25 0356)  BP: (!) 108/59 (06/25/25 0356)  SpO2: 100 % (06/25/25 0356) Vital Signs (24h Range):  Temp:  [97.6 °F (36.4 °C)-98.6 °F (37 °C)] 98.1 °F (36.7 °C)  Pulse:  [60-81] 81  Resp:  [17-20] 20  SpO2:  [99 %-100 %] 100 %  BP: (100-108)/(53-65) 108/59     Weight: 71 kg (156 lb 8.4 oz)  Body mass index is 23.11 kg/m².    Intake/Output Summary (Last 24 hours) at 6/25/2025 0810  Last data filed at 6/24/2025 1927  Gross per 24 hour   Intake 4253.65 ml   Output --   Net 4253.65 ml         Physical Exam  Vitals and nursing note reviewed.   Constitutional:       General: He is not in acute distress.     Appearance: Normal appearance.   Cardiovascular:      Rate and Rhythm: Normal rate and regular rhythm.      Heart sounds: No murmur heard.  Pulmonary:      Effort: Pulmonary effort is normal. No respiratory distress.      Breath sounds: Normal breath sounds. No wheezing.   Abdominal:      General: There is no distension.      Palpations: Abdomen is soft.      Tenderness: There is no abdominal tenderness.   Neurological:      Mental Status: He is alert.               Significant Labs: All pertinent labs within the past 24 hours have been reviewed.  Recent Lab Results       None            Significant Imaging: I have reviewed all pertinent imaging results/findings within the past 24 hours.    Fl Modified Barium Swallow Speech   Final Result      See speech pathologist report.         Electronically signed by: Ahsan Aldrich MD   Date:    06/20/2025   Time:    14:42      US Kidney   Final Result      No significant abnormality         Electronically signed by: Ahsan Aldrich MD   Date:    06/20/2025   Time:    11:06

## 2025-06-25 NOTE — PLAN OF CARE
06/25/25 0800 06/25/25 0857   Post-Acute Status   Post-Acute Authorization Placement  --    Post-Acute Placement Status Set-up Complete/Auth obtained  --    Discharge Delays None known at this time  --    Discharge Plan   Discharge Plan A Skilled Nursing Facility  --        Per LOC group, Patient has insurance authorization to admit to [facility].   Per Doron, they can accept Patient today and will reach out to family regarding admission.  [facility] sent secure chat to MD, who stated DC would be today.    9 36 AVS and DC orders uploaded.     [facility] will continue to follow and assist as needed.

## 2025-06-25 NOTE — PT/OT/SLP PROGRESS
Occupational Therapy   Treatment    Name: Rocael Perez  MRN: [id number]  Admitting Diagnosis:  Acute renal failure superimposed on stage 3 chronic kidney disease       Recommendations:     Discharge Recommendations: Moderate Intensity Therapy  Discharge Equipment Recommendations:  to be determined by next level of care  Barriers to discharge:  Decreased caregiver support    Assessment:     Rocael Perez is a 85 y.o. male with a medical diagnosis of Acute renal failure superimposed on stage 3 chronic kidney disease.  He presents with the following performance deficits affecting function are weakness, impaired endurance, impaired self care skills, impaired functional mobility, gait instability, decreased lower extremity function, decreased upper extremity function, decreased coordination, impaired balance, decreased safety awareness, decreased ROM, impaired coordination, impaired fine motor.     Rehab Prognosis:  Fair and Poor; patient would benefit from acute skilled OT services to address these deficits and reach maximum level of function.       Plan:     Patient to be seen 2 x/week to address the above listed problems via self-care/home management, therapeutic activities, therapeutic exercises  Plan of Care Expires: 07/06/25  Plan of Care Reviewed with: patient, spouse    Subjective     Chief Complaint: None stated  Patient/Family Comments/goals: Go home  Pain/Comfort:  Pain Rating 1: 0/10  Location - Side 1: Bilateral  Location - Orientation 1: generalized  Location 1: knee  Pain Addressed 1: Distraction, Reposition  Pain Rating Post-Intervention 1: 0/10  Pain Rating 2: 0/10    Objective:     Communicated with: Nurse and EPIC chart review prior to session.  Patient found with bed in chair position with peripheral IV, telemetry, chair check upon OT entry to room.    General Precautions: Standard, fall    Orthopedic Precautions:N/A  Braces: N/A  Respiratory Status: Room air     Occupational Performance:     Bed  Mobility:    Patient completed Rolling/Turning to Left with  contact guard assistance  Patient completed Scooting/Bridging with contact guard assistance  Patient completed Supine to Sit with contact guard assistance     Functional Mobility/Transfers:  Patient completed Sit <> Stand Transfer with moderate assistance  with  rolling walker   Patient completed Bed <> Chair Transfer using Stand Pivot technique with moderate assistance with rolling walker  Functional Mobility: Patient completed x10ft x2 functional mobility CGA w/ RW to increase dynamic standing balance and activity tolerance needed for ADL completion.  Forward flexion  Unable to extend joint  V/C for lowered head  Weak UE      AMPAC 6 Click ADL: 18    Treatment & Education:  Pt completed the following Therex 1x10 in order to increase BM and FM:   Shoulder flexion   Elbow Flexion   Digit Flexion   Knee extension   Marching knees  OT role, plan of care, progression of goals, importance of continued OOB activity, ADL/functional transfer and mobility retraining, call don't fall, safety precautions, fall prevention. Educated patient on importance of increased tolerance to upright position and direct impact on CV endurance and strength. Patient encouraged to sit up in chair/ EOB, for a minimum of 2 consecutive hours, 3x per day. Encouraged patient to perform AROM TE to BLE throughout the day within all available planes of motion. Re enforced importance of utilizing call light to meet needs in room and not attempt to get up without staff assistance. Patient verbalized understanding and agreed to comply.          Patient left up in chair with all lines intact, call button in reach, and chair alarm on    GOALS:   Multidisciplinary Problems       Occupational Therapy Goals          Problem: Occupational Therapy    Goal Priority Disciplines Outcome Interventions   Occupational Therapy Goal     OT, PT/OT Progressing    Description: Goals to be met by: 7/6/25      Patient will increase functional independence with ADLs by performing:    LE Dressing with [place].  Toileting from toilet with [place] for hygiene and clothing management.   Toilet transfer to toilet with [place].                             Time Tracking:     OT Date of Treatment: 06/25/25  OT Start Time: 0900  OT Stop Time: 0926  OT Total Time (min): 26 min    Billable Minutes:Therapeutic Activity 15  Therapeutic Exercise 11    OT/OBINNA: OBINNA     Number of OBINNA visits since last OT visit: 1  OTR/L readily available for conference at the time of the provision of services-  ELSA Rosenberg    6/25/2025

## 2025-06-25 NOTE — DISCHARGE SUMMARY
[facility] Medicine  Discharge Summary      Patient Name: Rocael Perez  MRN: [id number]  KYLAH: [id number]  Patient Class: IP- Inpatient  Admission Date: 6/19/2025  Hospital Length of Stay: 5 days  Discharge Date and Time: 06/25/2025 8:19 AM  Attending Physician: Michael Peraza MD   Discharging Provider: Michael Peraza MD  Primary Care Provider: Landry Ferguson MD    Primary Care Team: Networked reference to record PCT     HPI:   The patient is  84 yo male with HIV, Diastolic CHF, CLL, MGUS, HLD, and CKD3 who was advised to go to ED by Dr. Martinez for hypokalemia. The patient was seen for routine follow up in ID clinic on 6/9/25. F/U labwork showed K 2.9 and worsening renal function with sCr 2.5. The patient's spouse reports poor intake due to trouble swallowing. He is on lasix and Metolazone and feels like he is weak and dehydrated. Pt is on supplemental potassium 10meq which he takes on Monday and Friday. Pt reports dysphagia for several months that is worsening. Spouse reports a 30 lb weight loss in last several months due to not eating.   Pt was recently seen by ENT 3/20/25 or his dysphagia. Laryngoscopy exam showed No masses or lesions in the nose, nasopharynx, oropharynx, hypopharynx, or larynx. Vocal fold abduction and adduction is normal. No pooling of secretions in the piriform sinuses, penetration, or aspiration.   According to ENT note,   GI evaluation 2020  IMPRESSION: intermittent nonprogressive dysphagia with solids probably due to mild esophageal stenosis related to chronic reflux esophagitis or from a Schatzki's ring. Had a long discussion with the patient and his wife and all of us agree that his symptoms do not warrant further investigation or treatment at this time.  RECOMMENDATIONS: I had a long discussion with the patient and his wife about dietary measures to control bolus size. He will return if his symptoms persist despite him being diligent about his bite size  and if he does then will refer him for EGD with esophageal dilation.  FL esophogram 2020  The esophagus shows normal distensibility, course, caliber and contour. No focal lesion. No extrinsic mass effect. Normal transit of contrast through the esophagus with normal emptying into stomach. The upper and lower esophageal sphincters opened normally. Small sliding hiatal hernia. Spontaneous gastroesophageal reflux to the thoracic inlet    In the ED, BP low normal. Afebrile, Labs revealed Na 132, K 2.1, Cr 2.4. (Baseline 1.5- 1.8), UTI normal.              * No surgery found *      Hospital Course:   06/19 Admitted to Hospital Medicine for evaluation of ROSA ELENA and hypokalemia concerns.  06/20:  SLP consulted for swallowing concerns, with diet recommendations for soft and bite sized diet with aspiration precautions.  Recommendations for future GI evaluation.  Renal function with some mild improvement with fluid resuscitation.  Persistent electrolyte deficiencies, repletions administered.  PT recs for moderate intensity therapy    6/21: Renal function improving with IV fluids, continue another 24 hours  Plan for outpatient GI referral for further evaluation of dysphagia    6/22: SHERRY, patient reports fatigue  PT/OT recc [facility], patient and family interested in pursing SNF  Consult SW for assistance  BP marginal, increase NS to 100 cc/hr  Cr trending down 1.5  AM labs    6/23-24: SHERRY, patient sitting in chair, denies complaints  Cr trending down 1.2, s/p IV fluid course  BP stable, low-normal range, systolic ranging 's  Stop diuretics and BB on discharge  TTE noted grade 2 DD, elevated PASP, LVEF 60-65%  SNF placement pending  F/U with PCP for further management     Goals of Care Treatment Preferences:  Code Status: Full Code         Consults:   Consults (From admission, onward)          Status Ordering Provider     Inpatient consult to Social Work  Once        Provider:  (Not yet assigned)    Completed ERIN DAS      "Inpatient consult to Social Work/Case Management  Once        Provider:  (Not yet assigned)    Completed ERIN DAS            Assessment & Plan  Dysphagia  Dysphagia and 30lb weight loss   According to recent ENT visit:   Pt was recently seen by ENT 3/20/25 or his dysphagia. Laryngoscopy exam showed No masses or lesions in the nose, nasopharynx, oropharynx, hypopharynx, or larynx. Vocal fold abduction and adduction is normal. No pooling of secretions in the piriform sinuses, penetration, or aspiration.   GI evaluation 2020  IMPRESSION: intermittent nonprogressive dysphagia with solids probably due to mild esophageal stenosis related to chronic reflux esophagitis or from a Schatzki's ring. Had a long discussion with the patient and his wife and all of us agree that his symptoms do not warrant further investigation or treatment at this time.  RECOMMENDATIONS: I had a long discussion with the patient and his wife about dietary measures to control bolus size. He will return if his symptoms persist despite him being diligent about his bite size and if he does then will refer him for EGD with esophageal dilation.  FL esophogram 2020  The esophagus shows normal distensibility, course, caliber and contour. No focal lesion. No extrinsic mass effect. Normal transit of contrast through the esophagus with normal emptying into stomach. The upper and lower esophageal sphincters opened normally. Small sliding hiatal hernia. Spontaneous gastroesophageal reflux to the thoracic inlet    PLAN:   SLP consulted for swallowing concerns, with diet recommendations for soft and bite sized diet with aspiration precautions.  Recommendations for soft diet and outpatient GI evaluation.  Chronic diastolic congestive heart failure  Patient has Diastolic (HFpEF) heart failure that is Chronic. On presentation their CHF was well compensated. Most recent BNP and echo results are listed below.  No results for input(s): "BNP" in the last 72 " hours.    Latest ECHO  Results for orders placed during the hospital encounter of 06/19/25    Echo    Interpretation Summary    Left Ventricle: The left ventricle is normal in size. Mildly increased wall thickness. There is mild concentric hypertrophy. There is normal systolic function with a visually estimated ejection fraction of 60 - 65%. Grade II diastolic dysfunction.    Right Ventricle: The right ventricle is normal in size Wall thickness is normal. Systolic function is normal.    Left Atrium: The left atrium is mildly dilated    Aortic Valve: Moderately calcified cusps. There is moderate annular calcification present. Moderately restricted motion. There is moderate stenosis. Aortic valve area by VTI is 1.0 cm². Aortic valve peak velocity is 2.6 m/s. Mean gradient is 18 mmHg. The dimensionless index is 0.32.    Mitral Valve: There is bileaflet sclerosis. Mildly thickened leaflets. There is mild mitral annular calcification. There is mild to moderate regurgitation.    Tricuspid Valve: There is mild to moderate regurgitation.    Pulmonic Valve: There is mild regurgitation.    Pulmonary Artery: The estimated pulmonary artery systolic pressure is 34 mmHg.    IVC/SVC: Normal venous pressure at 3 mmHg.      Current Heart Failure Medications       Plan  - Monitor strict I&Os and daily weights.    - Place on telemetry  - Low sodium diet  - Place on fluid restriction of 1.5 L.   - Cardiology has not been consulted  - The patient's volume status is at their baseline  - appears dry   -Will hold diuretics   -Cont BB  CLL (chronic lymphocytic leukemia)  Stable   Followed by heme/Onc for surveillance     HIV positive  Followed by ID   Controlled   Cont Biktarvy     HLD (hyperlipidemia)  Cont Statin     Anemia  Most recent hemoglobin and hematocrit are listed below.  Recent Labs     06/23/25  0435   HGB 10.2*   HCT 31.7*     Plan  - Monitor serial CBC: Daily  - Transfuse PRBC if patient becomes hemodynamically unstable,  symptomatic or H/H drops below 7/21.  - Patient has not received any PRBC transfusions to date  - Patient's anemia is currently stable    Final Active Diagnoses:    Diagnosis Date Noted POA    Chronic diastolic congestive heart failure [I50.32]  Yes    HIV positive [Z21] 01/21/2021 Yes    Dysphagia [R13.10] 06/19/2025 Yes    Anemia [D64.9] 06/21/2025 Yes    HLD (hyperlipidemia) [E78.5] 06/19/2025 Yes    CLL (chronic lymphocytic leukemia) [C91.10] 10/01/2020 Yes      Problems Resolved During this Admission:    Diagnosis Date Noted Date Resolved POA    PRINCIPAL PROBLEM:  Acute renal failure superimposed on stage 3 chronic kidney disease [N17.9, N18.30] 06/19/2025 06/25/2025 Yes    Hypokalemia [E87.6] 06/19/2025 06/25/2025 Yes    Hyponatremia [E87.1] 06/21/2025 06/22/2025 No    Hypophosphatemia [E83.39] 06/21/2025 06/25/2025 Yes       Discharged Condition: stable    Disposition: Skilled Nursing Facility    Follow Up:   Follow-up Information       Landry Ferguson MD. Schedule an appointment as soon as possible for a visit in 1 week(s).    Specialty: Family Medicine  Why: Hospital discharge follow up  Contact information:  [address]  [place] [address] [phone]                           Patient Instructions:      ACCEPT - Ambulatory referral/consult to Heart Failure Transitional [facility]   Standing Status: Future   Referral Priority: Routine Referral Type: Consultation   Referral Reason: Specialty Services Required   Requested Specialty: Cardiology   Number of Visits Requested: 1       Significant Diagnostic Studies: Labs: All labs within the past 24 hours have been reviewed    Pending Diagnostic Studies:       None           Medications:  Reconciled Home Medications:      Medication List        CHANGE how you take these medications      potassium chloride 10 MEQ Tbsr  Commonly known as: KLOR-CON  Take 10 mEq by mouth once. Pt takes only on Mondays and Fridays  What changed: Another medication  with the same name was removed. Continue taking this medication, and follow the directions you see here.            CONTINUE taking these medications      aspirin 81 MG EC tablet  Commonly known as: ECOTRIN  Take 81 mg by mouth once daily.     BIKTARVY -25 mg (25 kg or greater)  Generic drug: rignlypsb-lbqxqbeg-inhztcr ala  Take 1 tablet by mouth once daily.     GEMTESA 75 mg Tab  Generic drug: vibegron  TAKE 1 TABLET BY MOUTH EVERY DAY     rosuvastatin 40 MG Tab  Commonly known as: CRESTOR  TAKE 1 TABLET BY MOUTH EVERY DAY IN THE EVENING     vitamin D 1000 units Tab  Commonly known as: VITAMIN D3  Take 1,000 Units by mouth once daily.            STOP taking these medications      diclofenac sodium 1 % Gel  Commonly known as: VOLTAREN     furosemide 40 MG tablet  Commonly known as: LASIX     metOLazone 2.5 MG tablet  Commonly known as: ZAROXOLYN     metoprolol succinate 25 MG 24 hr tablet  Commonly known as: TOPROL-XL              Indwelling Lines/Drains at time of discharge:   Lines/Drains/Airways       None                       Time spent on the discharge of patient: 40 minutes         Michael Peraza MD  Department of Hospital Medicine  [facility] - Telemetry ([facility])

## 2025-06-25 NOTE — ASSESSMENT & PLAN NOTE
"Patient has Diastolic (HFpEF) heart failure that is Chronic. On presentation their CHF was well compensated. Most recent BNP and echo results are listed below.  No results for input(s): "BNP" in the last 72 hours.    Latest ECHO  Results for orders placed during the hospital encounter of 06/19/25    Echo    Interpretation Summary    Left Ventricle: The left ventricle is normal in size. Mildly increased wall thickness. There is mild concentric hypertrophy. There is normal systolic function with a visually estimated ejection fraction of 60 - 65%. Grade II diastolic dysfunction.    Right Ventricle: The right ventricle is normal in size Wall thickness is normal. Systolic function is normal.    Left Atrium: The left atrium is mildly dilated    Aortic Valve: Moderately calcified cusps. There is moderate annular calcification present. Moderately restricted motion. There is moderate stenosis. Aortic valve area by VTI is 1.0 cm². Aortic valve peak velocity is 2.6 m/s. Mean gradient is 18 mmHg. The dimensionless index is 0.32.    Mitral Valve: There is bileaflet sclerosis. Mildly thickened leaflets. There is mild mitral annular calcification. There is mild to moderate regurgitation.    Tricuspid Valve: There is mild to moderate regurgitation.    Pulmonic Valve: There is mild regurgitation.    Pulmonary Artery: The estimated pulmonary artery systolic pressure is 34 mmHg.    IVC/SVC: Normal venous pressure at 3 mmHg.      Current Heart Failure Medications       Plan  - Monitor strict I&Os and daily weights.    - Place on telemetry  - Low sodium diet  - Place on fluid restriction of 1.5 L.   - Cardiology has not been consulted  - The patient's volume status is at their baseline  - appears dry   -Will hold diuretics   -Cont BB  "

## 2025-06-25 NOTE — PT/OT/SLP PROGRESS
Physical Therapy  Treatment    Rocael Perez   MRN: [id number]   Admitting Diagnosis: Acute renal failure superimposed on stage 3 chronic kidney disease    PT Received On: 06/25/25  PT Start Time: 0903     PT Stop Time: 0919    PT Total Time (min): 16 min       Billable Minutes:  Gait Training 16    Treatment Type: Treatment  PT/PTA: PTA     Number of PTA visits since last PT visit: 1       General Precautions: Standard, fall  Orthopedic Precautions: N/A  Braces: N/A  Respiratory Status: Room air    Spiritual, Cultural Beliefs, Bahai Practices, Values that Affect Care: no    Subjective:  Communicated with patient's nurse Roxanne and conducted Epic chart review prior to session.  Patient agreeable to participate with therapy.         Objective:   Patient found with: peripheral IV, telemetry, chair check    Functional Mobility:  Bed Mobility:    SBA to will all movements to EOB    Transfers:   Mod A x 2 STS     Gait:    20' RW Mod A d/t poor postural control and walker negotiation      Treatment and Education:  Educated patient on importance of increased tolerance to upright position and direct impact on CV endurance and strength. Patient encouraged to sit up in chair/ EOB, for a minimum of 2 consecutive hours, 3x per day. Encouraged patient to perform AROM TE to BLE throughout the day within all available planes of motion. Re enforced importance of utilizing call light to meet needs in room and not attempt to get up without staff assistance. Patient verbalized understanding and agreed to comply.       AM-PAC 6 CLICK MOBILITY  How much help from another person does this patient currently need?   1 = Unable, Total/Dependent Assistance  2 = A lot, Maximum/Moderate Assistance  3 = A little, Minimum/Contact Guard/Supervision  4 = None, Modified [place]/Independent    Turning over in bed (including adjusting bedclothes, sheets and blankets)?: 4  Sitting down on and standing up from a chair with arms (e.g.,  wheelchair, bedside commode, etc.): 2  Moving from lying on back to sitting on the side of the bed?: 4  Moving to and from a bed to a chair (including a wheelchair)?: 2  Need to walk in hospital room?: 2  Climbing 3-5 steps with a railing?: 1 (NT)  Basic Mobility Total Score: 15    AM-PAC Raw Score CMS G-Code Modifier Level of Impairment Assistance   6 % Total / Unable   7 - 9 CM 80 - 100% Maximal Assist   10 - 14 CL 60 - 80% Moderate Assist   15 - 19 CK 40 - 60% Moderate Assist   20 - 22 CJ 20 - 40% Minimal Assist   23 CI 1-20% SBA / CGA   24 CH 0% Independent/ Mod I     Patient left up in chair with all lines intact, call button in reach, and chair alarm on.    Assessment:  Rocael Perez is a 85 y.o. male with a medical diagnosis of Acute renal failure superimposed on stage 3 chronic kidney disease and presents with overall decline in functional mobility. Patient would continue to benefit from skilled PT to address functional limitations listed below in order to return to PLOF/decrease caregiver burden.    Patient remains in a flexed posture during ambulation. Unable to correct to a more upright posture. Verbal and tactile cueing for improved walker negotiation. Patient remains a high fall risk but was able to ambulate without LOB.     Rehab identified problem list/impairments: weakness, impaired functional mobility, decreased safety awareness, impaired endurance, gait instability, decreased coordination, impaired joint extensibility, impaired balance, decreased upper extremity function, impaired self care skills, decreased lower extremity function, decreased ROM    Rehab potential is fair.    Activity tolerance: Fair    Discharge recommendations: Moderate Intensity Therapy      Barriers to discharge:      Equipment recommendations: to be determined by next level of care     GOALS:   Multidisciplinary Problems       Physical Therapy Goals          Problem: Physical Therapy    Goal Priority Disciplines  Outcome Interventions   Physical Therapy Goal     PT, PT/OT Progressing    Description: Patient will be seen a minimal of 3 out of 7 days a week.    LTG to be met by 07/04/25:    Bed mobility: CGA  Transfers: Min A with RW  Gait: Min A with RW x50 feet                        DME Justifications:  No DME recommended requiring DME justifications    PLAN:    Patient to be seen 3 x/week to address the above listed problems via gait training, therapeutic activities, therapeutic exercises  Plan of Care expires: 07/04/25  Plan of Care reviewed with: patient         06/25/2025

## 2025-06-25 NOTE — PROGRESS NOTES
[facility] Medicine  Progress Note    Patient Name: Rocael Perez  MRN: [id number]  Patient Class: IP- Inpatient   Admission Date: 6/19/2025  Length of Stay: 5 days  Attending Physician: Michael Peraza MD  Primary Care Provider: Landry Ferguson MD        Subjective     Principal Problem:Acute renal failure superimposed on stage 3 chronic kidney disease        HPI:  The patient is  86 yo male with HIV, Diastolic CHF, CLL, MGUS, HLD, and CKD3 who was advised to go to ED by Dr. Martinez for hypokalemia. The patient was seen for routine follow up in ID clinic on 6/9/25. F/U labwork showed K 2.9 and worsening renal function with sCr 2.5. The patient's spouse reports poor intake due to trouble swallowing. He is on lasix and Metolazone and feels like he is weak and dehydrated. Pt is on supplemental potassium 10meq which he takes on Monday and Friday. Pt reports dysphagia for several months that is worsening. Spouse reports a 30 lb weight loss in last several months due to not eating.   Pt was recently seen by ENT 3/20/25 or his dysphagia. Laryngoscopy exam showed No masses or lesions in the nose, nasopharynx, oropharynx, hypopharynx, or larynx. Vocal fold abduction and adduction is normal. No pooling of secretions in the piriform sinuses, penetration, or aspiration.   According to ENT note,   GI evaluation 2020  IMPRESSION: intermittent nonprogressive dysphagia with solids probably due to mild esophageal stenosis related to chronic reflux esophagitis or from a Schatzki's ring. Had a long discussion with the patient and his wife and all of us agree that his symptoms do not warrant further investigation or treatment at this time.  RECOMMENDATIONS: I had a long discussion with the patient and his wife about dietary measures to control bolus size. He will return if his symptoms persist despite him being diligent about his bite size and if he does then will refer him for EGD with esophageal  dilation.  FL esophogram 2020  The esophagus shows normal distensibility, course, caliber and contour. No focal lesion. No extrinsic mass effect. Normal transit of contrast through the esophagus with normal emptying into stomach. The upper and lower esophageal sphincters opened normally. Small sliding hiatal hernia. Spontaneous gastroesophageal reflux to the thoracic inlet    In the ED, BP low normal. Afebrile, Labs revealed Na 132, K 2.1, Cr 2.4. (Baseline 1.5- 1.8), UTI normal.              Overview/Hospital Course:  06/19 Admitted to Hospital Medicine for evaluation of ROSA ELENA and hypokalemia concerns.  06/20:  SLP consulted for swallowing concerns, with diet recommendations for soft and bite sized diet with aspiration precautions.  Recommendations for future GI evaluation.  Renal function with some mild improvement with fluid resuscitation.  Persistent electrolyte deficiencies, repletions administered.  PT recs for moderate intensity therapy    6/21: Renal function improving with IV fluids, continue another 24 hours  Plan for outpatient GI referral for further evaluation of dysphagia    6/22: SHERRY, patient reports fatigue  PT/OT recc CLAUDIA, patient and family interested in pursing SNF  Consult  for assistance  BP marginal, increase NS to 100 cc/hr  Cr trending down 1.5  AM labs    6/23: SHERRY, patient sitting in chair, denies complaints  Cr trending down 1.2, s/p IV fluid course  SNF placement pending      Review of Systems   All other systems reviewed and are negative.    Objective:     Vital Signs (Most Recent):  Temp: 98.1 °F (36.7 °C) (06/25/25 0356)  Pulse: 81 (06/25/25 0404)  Resp: 20 (06/25/25 0356)  BP: (!) 108/59 (06/25/25 0356)  SpO2: 100 % (06/25/25 0356) Vital Signs (24h Range):  Temp:  [97.6 °F (36.4 °C)-98.6 °F (37 °C)] 98.1 °F (36.7 °C)  Pulse:  [60-81] 81  Resp:  [17-20] 20  SpO2:  [99 %-100 %] 100 %  BP: (100-108)/(53-65) 108/59     Weight: 71 kg (156 lb 8.4 oz)  Body mass index is 23.11  kg/m².    Intake/Output Summary (Last 24 hours) at 6/25/2025 0809  Last data filed at 6/24/2025 1927  Gross per 24 hour   Intake 4253.65 ml   Output --   Net 4253.65 ml         Physical Exam  Vitals and nursing note reviewed.   Constitutional:       General: He is not in acute distress.     Appearance: Normal appearance.   Cardiovascular:      Rate and Rhythm: Normal rate and regular rhythm.      Heart sounds: No murmur heard.  Pulmonary:      Effort: Pulmonary effort is normal. No respiratory distress.      Breath sounds: Normal breath sounds. No wheezing.   Abdominal:      General: There is no distension.      Palpations: Abdomen is soft.      Tenderness: There is no abdominal tenderness.   Neurological:      Mental Status: He is alert.               Significant Labs: All pertinent labs within the past 24 hours have been reviewed.  Recent Lab Results       None            Significant Imaging: I have reviewed all pertinent imaging results/findings within the past 24 hours.    Fl Modified Barium Swallow Speech   Final Result      See speech pathologist report.         Electronically signed by: Ahsan Aldrich MD   Date:    06/20/2025   Time:    14:42       Kidney   Final Result      No significant abnormality         Electronically signed by: Ahsan Aldrich MD   Date:    06/20/2025   Time:    11:06              Assessment & Plan  Acute renal failure superimposed on stage 3 chronic kidney disease  ROSA ELENA is likely due to pre-renal azotemia due to dehydration. Baseline creatinine is 1.4- 1.8. Most recent creatinine and eGFR are listed below.  Recent Labs     06/23/25  0435 06/24/25  0441   CREATININE 1.4 1.2   EGFRNORACEVR 49* 59*      Plan  - ROSA ELENA is improving.   - Avoid nephrotoxins and renally dose meds for GFR listed above  - Monitor urine output, serial BMP, and adjust therapy as needed  - Hold Lasix and Metolazone   -gentle hydration with IVF    Hypokalemia  Patient's most recent potassium results are listed below.    Recent Labs     06/23/25  0435 06/24/25  0441   K 3.3* 3.3*     Plan  - Replete potassium per protocol  - Monitor potassium Daily  - Patient's hypokalemia is improving  - monitor   Dysphagia  Dysphagia and 30lb weight loss   According to recent ENT visit:   Pt was recently seen by ENT 3/20/25 or his dysphagia. Laryngoscopy exam showed No masses or lesions in the nose, nasopharynx, oropharynx, hypopharynx, or larynx. Vocal fold abduction and adduction is normal. No pooling of secretions in the piriform sinuses, penetration, or aspiration.   GI evaluation 2020  IMPRESSION: intermittent nonprogressive dysphagia with solids probably due to mild esophageal stenosis related to chronic reflux esophagitis or from a Schatzki's ring. Had a long discussion with the patient and his wife and all of us agree that his symptoms do not warrant further investigation or treatment at this time.  RECOMMENDATIONS: I had a long discussion with the patient and his wife about dietary measures to control bolus size. He will return if his symptoms persist despite him being diligent about his bite size and if he does then will refer him for EGD with esophageal dilation.  FL esophogram 2020  The esophagus shows normal distensibility, course, caliber and contour. No focal lesion. No extrinsic mass effect. Normal transit of contrast through the esophagus with normal emptying into stomach. The upper and lower esophageal sphincters opened normally. Small sliding hiatal hernia. Spontaneous gastroesophageal reflux to the thoracic inlet    PLAN:   SLP consulted for swallowing concerns, with diet recommendations for soft and bite sized diet with aspiration precautions.  Recommendations for soft diet and outpatient GI evaluation.  Chronic diastolic congestive heart failure  Patient has Diastolic (HFpEF) heart failure that is Chronic. On presentation their CHF was well compensated. Most recent BNP and echo results are listed below.  No results for  "input(s): "BNP" in the last 72 hours.    Latest ECHO  Results for orders placed during the hospital encounter of 06/19/25    Echo    Interpretation Summary    Left Ventricle: The left ventricle is normal in size. Mildly increased wall thickness. There is mild concentric hypertrophy. There is normal systolic function with a visually estimated ejection fraction of 60 - 65%. Grade II diastolic dysfunction.    Right Ventricle: The right ventricle is normal in size Wall thickness is normal. Systolic function is normal.    Left Atrium: The left atrium is mildly dilated    Aortic Valve: Moderately calcified cusps. There is moderate annular calcification present. Moderately restricted motion. There is moderate stenosis. Aortic valve area by VTI is 1.0 cm². Aortic valve peak velocity is 2.6 m/s. Mean gradient is 18 mmHg. The dimensionless index is 0.32.    Mitral Valve: There is bileaflet sclerosis. Mildly thickened leaflets. There is mild mitral annular calcification. There is mild to moderate regurgitation.    Tricuspid Valve: There is mild to moderate regurgitation.    Pulmonic Valve: There is mild regurgitation.    Pulmonary Artery: The estimated pulmonary artery systolic pressure is 34 mmHg.    IVC/SVC: Normal venous pressure at 3 mmHg.      Current Heart Failure Medications       Plan  - Monitor strict I&Os and daily weights.    - Place on telemetry  - Low sodium diet  - Place on fluid restriction of 1.5 L.   - Cardiology has not been consulted  - The patient's volume status is at their baseline  - appears dry   -Will hold diuretics   -Cont BB  CLL (chronic lymphocytic leukemia)  Stable   Followed by heme/Onc for surveillance     HIV positive  Followed by ID   Controlled   Cont Biktarvy     HLD (hyperlipidemia)  Cont Statin     Anemia  Most recent hemoglobin and hematocrit are listed below.  Recent Labs     06/23/25  0435   HGB 10.2*   HCT 31.7*     Plan  - Monitor serial CBC: Daily  - Transfuse PRBC if patient becomes " "hemodynamically unstable, symptomatic or H/H drops below 7/21.  - Patient has not received any PRBC transfusions to date  - Patient's anemia is currently stable    Hypophosphatemia  Patient's most recent phosphorus results are listed below.   No results for input(s): "PHOS" in the last 72 hours.    Plan  - Patient's hypophosphatemia is improving    VTE Risk Mitigation (From admission, onward)           Ordered     heparin (porcine) injection 5,000 Units  Every 8 hours         06/19/25 1546     IP VTE HIGH RISK PATIENT  Once         06/19/25 1546     Place sequential compression device  Until discontinued         06/19/25 1546                    Discharge Planning   YANCY: 6/24/2025     Code Status: Full Code   Medical Readiness for Discharge Date: 6/22/2025  Discharge Plan A: Skilled Nursing Facility   Discharge Delays: None known at this time              Please place Justification for LUIS Peraza MD  Department of Hospital Medicine   [facility] - Telemetry ([facility])    "

## 2025-06-25 NOTE — PLAN OF CARE
Problem: Adult Inpatient Plan of Care  Goal: Plan of Care Review  Outcome: Progressing  Goal: Patient-Specific Goal (Individualized)  Outcome: Progressing  Goal: Absence of Hospital-Acquired Illness or Injury  Outcome: Progressing  Goal: Optimal Comfort and Wellbeing  Outcome: Progressing  Goal: Readiness for Transition of Care  Outcome: Progressing   POC reviewed with pt. Pt verbalizes understanding of POC. No questions at this time.  AAOx2. NADN.  NSR on cardiac monitor.  Pt remains free of falls.  No complaints at this time.  Safety measures in place. Will continue to monitor.  Informed pt to call for assistance before getting up. Pt verbalizes understanding.  Hourly rounding and chart check complete.

## 2025-06-25 NOTE — SUBJECTIVE & OBJECTIVE
Review of Systems   All other systems reviewed and are negative.    Objective:     Vital Signs (Most Recent):  Temp: 98.1 °F (36.7 °C) (06/25/25 0356)  Pulse: 81 (06/25/25 0404)  Resp: 20 (06/25/25 0356)  BP: (!) 108/59 (06/25/25 0356)  SpO2: 100 % (06/25/25 0356) Vital Signs (24h Range):  Temp:  [97.6 °F (36.4 °C)-98.6 °F (37 °C)] 98.1 °F (36.7 °C)  Pulse:  [60-81] 81  Resp:  [17-20] 20  SpO2:  [99 %-100 %] 100 %  BP: (100-108)/(53-65) 108/59     Weight: 71 kg (156 lb 8.4 oz)  Body mass index is 23.11 kg/m².    Intake/Output Summary (Last 24 hours) at 6/25/2025 0809  Last data filed at 6/24/2025 1927  Gross per 24 hour   Intake 4253.65 ml   Output --   Net 4253.65 ml         Physical Exam  Vitals and nursing note reviewed.   Constitutional:       General: He is not in acute distress.     Appearance: Normal appearance.   Cardiovascular:      Rate and Rhythm: Normal rate and regular rhythm.      Heart sounds: No murmur heard.  Pulmonary:      Effort: Pulmonary effort is normal. No respiratory distress.      Breath sounds: Normal breath sounds. No wheezing.   Abdominal:      General: There is no distension.      Palpations: Abdomen is soft.      Tenderness: There is no abdominal tenderness.   Neurological:      Mental Status: He is alert.               Significant Labs: All pertinent labs within the past 24 hours have been reviewed.  Recent Lab Results       None            Significant Imaging: I have reviewed all pertinent imaging results/findings within the past 24 hours.    Fl Modified Barium Swallow Speech   Final Result      See speech pathologist report.         Electronically signed by: Ahsan Aldrich MD   Date:    06/20/2025   Time:    14:42      US Kidney   Final Result      No significant abnormality         Electronically signed by: Ahsan Aldrich MD   Date:    06/20/2025   Time:    11:06

## 2025-06-25 NOTE — PLAN OF CARE
[facility] - Tele[facility] (Hospital)  Discharge Final Note    Primary Care Provider: Landry Ferguson MD    Expected Discharge Date: 6/25/2025    Final Discharge Note (most recent)       Final Note - 06/25/25 1117          Final Note    Assessment Type Final Discharge Note     Anticipated Discharge Disposition Skilled Nursing Facility        Post-Acute Status    Post-Acute Authorization Placement     Post-Acute Placement Status Set-up Complete/Auth obtained     Discharge Delays None known at this time                     Important Message from Medicare              Follow-up providers       Landry Ferguson MD   Specialty: Family Medicine   Relationship: PCP - General    [address]  [place] [address]   Phone: [phone]       Next Steps: Schedule an appointment as soon as possible for a visit in 1 week(s)    Instructions: Hospital discharge follow up              After-discharge care                Destination       [facility]   Service: Skilled Nursing    [address] [facility] [address]   Phone: [phone]                             DC Disposition: [facility], SNF  Family Notified: Patient and spouse at bedside  Transportation: [facility], pending scheduling    Patient needed SNF placement upon DC. Patient and family decided on [facility] for placement. [facility] sent referral for review and was accepted. LOC group received approval and [facility]WELL notified Doron, with [facility]. Doron gave [facility] number for nurse report and [facility] messages nurse with number for report. [facility] is working with Doron, to get ETA for transportation. [facility] will update bedside nurse if/ when transportation time is given.

## 2025-07-08 ENCOUNTER — HOSPITAL ENCOUNTER (INPATIENT)
Facility: HOSPITAL | Age: 85
LOS: 2 days | Discharge: NURSING, REHAB OR LONG-TERM CARE | DRG: 557 | End: 2025-07-11
Attending: EMERGENCY MEDICINE | Admitting: HOSPITALIST
Payer: MEDICARE

## 2025-07-08 DIAGNOSIS — R41.82 ALTERED MENTAL STATUS, UNSPECIFIED ALTERED MENTAL STATUS TYPE: Primary | ICD-10-CM

## 2025-07-08 DIAGNOSIS — R07.9 CHEST PAIN: ICD-10-CM

## 2025-07-08 DIAGNOSIS — I48.91 A-FIB: ICD-10-CM

## 2025-07-08 DIAGNOSIS — M62.82 NON-TRAUMATIC RHABDOMYOLYSIS: ICD-10-CM

## 2025-07-08 DIAGNOSIS — R53.1 WEAKNESS: ICD-10-CM

## 2025-07-08 DIAGNOSIS — I50.32 CHRONIC DIASTOLIC CONGESTIVE HEART FAILURE: ICD-10-CM

## 2025-07-08 DIAGNOSIS — N17.9 AKI (ACUTE KIDNEY INJURY): ICD-10-CM

## 2025-07-08 PROBLEM — E44.0 MALNUTRITION OF MODERATE DEGREE: Status: ACTIVE | Noted: 2025-07-08

## 2025-07-08 PROBLEM — R63.0 DECREASED APPETITE: Status: ACTIVE | Noted: 2025-07-08

## 2025-07-08 PROBLEM — G93.41 ENCEPHALOPATHY, METABOLIC: Status: ACTIVE | Noted: 2025-07-08

## 2025-07-08 LAB
ABSOLUTE EOSINOPHIL (OHS): 0.04 K/UL
ABSOLUTE MONOCYTE (OHS): 0.62 K/UL (ref 0.3–1)
ABSOLUTE NEUTROPHIL COUNT (OHS): 2.71 K/UL (ref 1.8–7.7)
ALBUMIN SERPL BCP-MCNC: 2.4 G/DL (ref 3.5–5.2)
ALP SERPL-CCNC: 60 UNIT/L (ref 40–150)
ALT SERPL W/O P-5'-P-CCNC: 28 UNIT/L (ref 10–44)
AMPHET UR QL SCN: NEGATIVE
ANION GAP (OHS): 9 MMOL/L (ref 8–16)
AST SERPL-CCNC: 67 UNIT/L (ref 11–45)
BACTERIA #/AREA URNS AUTO: NORMAL /HPF
BARBITURATE SCN PRESENT UR: NEGATIVE
BASOPHILS # BLD AUTO: 0.01 K/UL
BASOPHILS NFR BLD AUTO: 0.2 %
BENZODIAZ UR QL SCN: NEGATIVE
BILIRUB SERPL-MCNC: 0.5 MG/DL (ref 0.1–1)
BILIRUB UR QL STRIP.AUTO: NEGATIVE
BNP SERPL-MCNC: 309 PG/ML (ref 0–99)
BUN SERPL-MCNC: 19 MG/DL (ref 8–23)
CALCIUM SERPL-MCNC: 9.4 MG/DL (ref 8.7–10.5)
CANNABINOIDS UR QL SCN: NEGATIVE
CHLORIDE SERPL-SCNC: 99 MMOL/L (ref 95–110)
CK SERPL-CCNC: 1280 U/L (ref 20–200)
CLARITY UR: CLEAR
CO2 SERPL-SCNC: 23 MMOL/L (ref 23–29)
COCAINE UR QL SCN: NEGATIVE
COLOR UR AUTO: YELLOW
CREAT SERPL-MCNC: 1.5 MG/DL (ref 0.5–1.4)
CREAT UR-MCNC: 56.8 MG/DL (ref 23–375)
ERYTHROCYTE [DISTWIDTH] IN BLOOD BY AUTOMATED COUNT: 15.8 % (ref 11.5–14.5)
ETHANOL SERPL-MCNC: <10 MG/DL
GFR SERPLBLD CREATININE-BSD FMLA CKD-EPI: 45 ML/MIN/1.73/M2
GLUCOSE SERPL-MCNC: 113 MG/DL (ref 70–110)
GLUCOSE UR QL STRIP: NEGATIVE
HCT VFR BLD AUTO: 28.2 % (ref 40–54)
HCV AB SERPL QL IA: NEGATIVE
HGB BLD-MCNC: 9.7 GM/DL (ref 14–18)
HGB UR QL STRIP: ABNORMAL
HYALINE CASTS UR QL AUTO: 0 /LPF (ref 0–1)
IMM GRANULOCYTES # BLD AUTO: 0.01 K/UL (ref 0–0.04)
IMM GRANULOCYTES NFR BLD AUTO: 0.2 % (ref 0–0.5)
KETONES UR QL STRIP: NEGATIVE
LEUKOCYTE ESTERASE UR QL STRIP: NEGATIVE
LYMPHOCYTES # BLD AUTO: 0.71 K/UL (ref 1–4.8)
MAGNESIUM SERPL-MCNC: 1.9 MG/DL (ref 1.6–2.6)
MCH RBC QN AUTO: 31.9 PG (ref 27–31)
MCHC RBC AUTO-ENTMCNC: 34.4 G/DL (ref 32–36)
MCV RBC AUTO: 93 FL (ref 82–98)
METHADONE UR QL SCN: NEGATIVE
MICROSCOPIC COMMENT: NORMAL
NITRITE UR QL STRIP: NEGATIVE
NUCLEATED RBC (/100WBC) (OHS): 0 /100 WBC
OPIATES UR QL SCN: NEGATIVE
PCP UR QL: NEGATIVE
PH UR STRIP: 8 [PH]
PLATELET # BLD AUTO: 264 K/UL (ref 150–450)
PMV BLD AUTO: 10 FL (ref 9.2–12.9)
POTASSIUM SERPL-SCNC: 3 MMOL/L (ref 3.5–5.1)
PROT SERPL-MCNC: 7 GM/DL (ref 6–8.4)
PROT UR QL STRIP: ABNORMAL
RBC # BLD AUTO: 3.04 M/UL (ref 4.6–6.2)
RBC #/AREA URNS AUTO: 1 /HPF (ref 0–4)
RELATIVE EOSINOPHIL (OHS): 1 %
RELATIVE LYMPHOCYTE (OHS): 17.3 % (ref 18–48)
RELATIVE MONOCYTE (OHS): 15.1 % (ref 4–15)
RELATIVE NEUTROPHIL (OHS): 66.2 % (ref 38–73)
SODIUM SERPL-SCNC: 131 MMOL/L (ref 136–145)
SP GR UR STRIP: 1.01
TROPONIN I SERPL DL<=0.01 NG/ML-MCNC: 0.07 NG/ML
TSH SERPL-ACNC: 2.07 UIU/ML (ref 0.4–4)
UROBILINOGEN UR STRIP-ACNC: NEGATIVE EU/DL
WBC # BLD AUTO: 4.1 K/UL (ref 3.9–12.7)
WBC #/AREA URNS AUTO: 3 /HPF (ref 0–5)
YEAST UR QL AUTO: NORMAL /HPF

## 2025-07-08 PROCEDURE — 25000003 PHARM REV CODE 250: Mod: HCNC | Performed by: NURSE PRACTITIONER

## 2025-07-08 PROCEDURE — 99285 EMERGENCY DEPT VISIT HI MDM: CPT | Mod: 25,HCNC

## 2025-07-08 PROCEDURE — 25000003 PHARM REV CODE 250: Mod: HCNC | Performed by: EMERGENCY MEDICINE

## 2025-07-08 PROCEDURE — 84484 ASSAY OF TROPONIN QUANT: CPT | Mod: HCNC | Performed by: EMERGENCY MEDICINE

## 2025-07-08 PROCEDURE — 86803 HEPATITIS C AB TEST: CPT | Mod: HCNC | Performed by: EMERGENCY MEDICINE

## 2025-07-08 PROCEDURE — 80307 DRUG TEST PRSMV CHEM ANLYZR: CPT | Mod: HCNC | Performed by: EMERGENCY MEDICINE

## 2025-07-08 PROCEDURE — 82077 ASSAY SPEC XCP UR&BREATH IA: CPT | Mod: HCNC | Performed by: EMERGENCY MEDICINE

## 2025-07-08 PROCEDURE — 96372 THER/PROPH/DIAG INJ SC/IM: CPT | Performed by: NURSE PRACTITIONER

## 2025-07-08 PROCEDURE — G0378 HOSPITAL OBSERVATION PER HR: HCPCS | Mod: HCNC

## 2025-07-08 PROCEDURE — 84443 ASSAY THYROID STIM HORMONE: CPT | Mod: HCNC | Performed by: EMERGENCY MEDICINE

## 2025-07-08 PROCEDURE — 93005 ELECTROCARDIOGRAM TRACING: CPT | Mod: HCNC

## 2025-07-08 PROCEDURE — 85025 COMPLETE CBC W/AUTO DIFF WBC: CPT | Mod: HCNC | Performed by: EMERGENCY MEDICINE

## 2025-07-08 PROCEDURE — 63600175 PHARM REV CODE 636 W HCPCS: Mod: HCNC | Performed by: NURSE PRACTITIONER

## 2025-07-08 PROCEDURE — 82550 ASSAY OF CK (CPK): CPT | Mod: HCNC | Performed by: EMERGENCY MEDICINE

## 2025-07-08 PROCEDURE — 82040 ASSAY OF SERUM ALBUMIN: CPT | Mod: HCNC | Performed by: EMERGENCY MEDICINE

## 2025-07-08 PROCEDURE — 83880 ASSAY OF NATRIURETIC PEPTIDE: CPT | Mod: HCNC | Performed by: EMERGENCY MEDICINE

## 2025-07-08 PROCEDURE — 93010 ELECTROCARDIOGRAM REPORT: CPT | Mod: HCNC,,, | Performed by: INTERNAL MEDICINE

## 2025-07-08 PROCEDURE — 81003 URINALYSIS AUTO W/O SCOPE: CPT | Mod: HCNC | Performed by: EMERGENCY MEDICINE

## 2025-07-08 PROCEDURE — 83735 ASSAY OF MAGNESIUM: CPT | Mod: HCNC | Performed by: NURSE PRACTITIONER

## 2025-07-08 PROCEDURE — 96361 HYDRATE IV INFUSION ADD-ON: CPT | Mod: HCNC

## 2025-07-08 RX ORDER — IBUPROFEN 200 MG
16 TABLET ORAL
Status: DISCONTINUED | OUTPATIENT
Start: 2025-07-08 | End: 2025-07-11 | Stop reason: HOSPADM

## 2025-07-08 RX ORDER — ACETAMINOPHEN 325 MG/1
650 TABLET ORAL EVERY 4 HOURS PRN
Status: DISCONTINUED | OUTPATIENT
Start: 2025-07-08 | End: 2025-07-11 | Stop reason: HOSPADM

## 2025-07-08 RX ORDER — ENOXAPARIN SODIUM 100 MG/ML
40 INJECTION SUBCUTANEOUS EVERY 24 HOURS
Status: DISCONTINUED | OUTPATIENT
Start: 2025-07-08 | End: 2025-07-11 | Stop reason: HOSPADM

## 2025-07-08 RX ORDER — ENOXAPARIN SODIUM 100 MG/ML
40 INJECTION SUBCUTANEOUS EVERY 24 HOURS
Status: DISCONTINUED | OUTPATIENT
Start: 2025-07-08 | End: 2025-07-08 | Stop reason: SDUPTHER

## 2025-07-08 RX ORDER — TALC
6 POWDER (GRAM) TOPICAL NIGHTLY PRN
Status: DISCONTINUED | OUTPATIENT
Start: 2025-07-08 | End: 2025-07-11 | Stop reason: HOSPADM

## 2025-07-08 RX ORDER — NALOXONE HCL 0.4 MG/ML
0.02 VIAL (ML) INJECTION
Status: DISCONTINUED | OUTPATIENT
Start: 2025-07-08 | End: 2025-07-11 | Stop reason: HOSPADM

## 2025-07-08 RX ORDER — ONDANSETRON HYDROCHLORIDE 2 MG/ML
8 INJECTION, SOLUTION INTRAVENOUS EVERY 8 HOURS PRN
Status: DISCONTINUED | OUTPATIENT
Start: 2025-07-08 | End: 2025-07-11 | Stop reason: HOSPADM

## 2025-07-08 RX ORDER — GLUCAGON 1 MG
1 KIT INJECTION
Status: DISCONTINUED | OUTPATIENT
Start: 2025-07-08 | End: 2025-07-11 | Stop reason: HOSPADM

## 2025-07-08 RX ORDER — IPRATROPIUM BROMIDE AND ALBUTEROL SULFATE 2.5; .5 MG/3ML; MG/3ML
3 SOLUTION RESPIRATORY (INHALATION) EVERY 4 HOURS PRN
Status: DISCONTINUED | OUTPATIENT
Start: 2025-07-08 | End: 2025-07-11 | Stop reason: HOSPADM

## 2025-07-08 RX ORDER — SIMETHICONE 80 MG
1 TABLET,CHEWABLE ORAL 4 TIMES DAILY PRN
Status: DISCONTINUED | OUTPATIENT
Start: 2025-07-08 | End: 2025-07-11 | Stop reason: HOSPADM

## 2025-07-08 RX ORDER — ASPIRIN 81 MG/1
81 TABLET ORAL DAILY
Status: DISCONTINUED | OUTPATIENT
Start: 2025-07-08 | End: 2025-07-11 | Stop reason: HOSPADM

## 2025-07-08 RX ORDER — IBUPROFEN 200 MG
24 TABLET ORAL
Status: DISCONTINUED | OUTPATIENT
Start: 2025-07-08 | End: 2025-07-11 | Stop reason: HOSPADM

## 2025-07-08 RX ORDER — AMOXICILLIN 250 MG
1 CAPSULE ORAL 2 TIMES DAILY PRN
Status: DISCONTINUED | OUTPATIENT
Start: 2025-07-08 | End: 2025-07-11 | Stop reason: HOSPADM

## 2025-07-08 RX ORDER — ALUMINUM HYDROXIDE, MAGNESIUM HYDROXIDE, AND SIMETHICONE 1200; 120; 1200 MG/30ML; MG/30ML; MG/30ML
30 SUSPENSION ORAL 4 TIMES DAILY PRN
Status: DISCONTINUED | OUTPATIENT
Start: 2025-07-08 | End: 2025-07-11 | Stop reason: HOSPADM

## 2025-07-08 RX ADMIN — SODIUM CHLORIDE 1000 ML: 9 INJECTION, SOLUTION INTRAVENOUS at 12:07

## 2025-07-08 RX ADMIN — Medication 6 MG: at 11:07

## 2025-07-08 RX ADMIN — BICTEGRAVIR SODIUM, EMTRICITABINE, AND TENOFOVIR ALAFENAMIDE FUMARATE 1 TABLET: 50; 200; 25 TABLET ORAL at 11:07

## 2025-07-08 RX ADMIN — ENOXAPARIN SODIUM 40 MG: 40 INJECTION SUBCUTANEOUS at 11:07

## 2025-07-08 RX ADMIN — ACETAMINOPHEN 650 MG: 325 TABLET ORAL at 11:07

## 2025-07-08 NOTE — ASSESSMENT & PLAN NOTE
Patient's most recent potassium results are listed below.   Recent Labs     07/08/25  1137   K 3.0*     Plan  - Replete potassium per protocol  - Monitor potassium Daily  - Patient's hypokalemia is lower than previous  - Check magnesium

## 2025-07-08 NOTE — ASSESSMENT & PLAN NOTE
Nutrition consulted. Most recent weight and BMI monitored-     Measurements:  Wt Readings from Last 1 Encounters:   06/25/25 71 kg (156 lb 8.4 oz)   There is no height or weight on file to calculate BMI.    Patient has been screened and assessed by RD.    Malnutrition Type:  Context:    Level:      Malnutrition Characteristic Summary:       Interventions/Recommendations (treatment strategy):

## 2025-07-08 NOTE — ASSESSMENT & PLAN NOTE
Lab Results   Component Value Date    CPK 1,280 (H) 07/08/2025   Creatinine slightly higher than previous but within baseline and encephalopathic.   Orders placed for 2L in ED. Monitor response

## 2025-07-08 NOTE — SUBJECTIVE & OBJECTIVE
Past Medical History:   Diagnosis Date    CHF (congestive heart failure)     CLL (chronic lymphocytic leukemia)     HIV (human immunodeficiency virus infection)     Hyperlipidemia     MGUS (monoclonal gammopathy of unknown significance)        Past Surgical History:   Procedure Laterality Date    HERNIA REPAIR      SHOULDER SURGERY Right        Review of patient's allergies indicates:   Allergen Reactions    Seasonale [levonorgestrel-ethinyl estrad] Other (See Comments)       No current facility-administered medications on file prior to encounter.     Current Outpatient Medications on File Prior to Encounter   Medication Sig    aspirin (ECOTRIN) 81 MG EC tablet Take 81 mg by mouth once daily.    ubnyrhkdn-hyneomwc-mldgfuw ala (BIKTARVY) -25 mg (25 kg or greater) Take 1 tablet by mouth once daily.    potassium chloride (KLOR-CON) 10 MEQ TbSR Take 10 mEq by mouth once. Pt takes only on Mondays and Fridays    rosuvastatin (CRESTOR) 40 MG Tab TAKE 1 TABLET BY MOUTH EVERY DAY IN THE EVENING    vibegron (GEMTESA) 75 mg Tab TAKE 1 TABLET BY MOUTH EVERY DAY    vitamin D (VITAMIN D3) 1000 units Tab Take 1,000 Units by mouth once daily.     Family History       Problem Relation (Age of Onset)    Diabetes Mother          Tobacco Use    Smoking status: Former     Types: Cigarettes     Passive exposure: Past    Smokeless tobacco: Never   Substance and Sexual Activity    Alcohol use: Not Currently    Drug use: Never    Sexual activity: Not Currently     Review of Systems   Unable to perform ROS: Mental status change     Objective:     Vital Signs (Most Recent):  Temp: 98 °F (36.7 °C) (07/08/25 1039)  Pulse: 103 (07/08/25 1230)  Resp: (!) 21 (07/08/25 1230)  BP: 110/77 (07/08/25 1230)  SpO2: 96 % (07/08/25 1230) Vital Signs (24h Range):  Temp:  [98 °F (36.7 °C)] 98 °F (36.7 °C)  Pulse:  [] 103  Resp:  [16-21] 21  SpO2:  [95 %-98 %] 96 %  BP: (102-140)/(64-90) 110/77     Weight: (S)  (Needs bed weight)  There is no  height or weight on file to calculate BMI.     Physical Exam  Vitals and nursing note reviewed.   Constitutional:       General: He is not in acute distress.     Appearance: He is well-developed.      Comments: Appears chronically ill     HENT:      Head: Normocephalic and atraumatic.   Cardiovascular:      Rate and Rhythm: Normal rate and regular rhythm.      Heart sounds: Normal heart sounds.   Pulmonary:      Effort: Pulmonary effort is normal. No respiratory distress.      Breath sounds: Normal breath sounds.   Abdominal:      General: Bowel sounds are normal. There is no distension.      Palpations: Abdomen is soft.      Tenderness: There is no abdominal tenderness.   Musculoskeletal:      Cervical back: Normal range of motion and neck supple. No edema.      Right lower leg: Edema (+1) present.      Left lower leg: Edema (+1) present.   Skin:     General: Skin is dry.      Comments: Buttock wound noted   Neurological:      Mental Status: He is alert and oriented to person, place, and time.   Psychiatric:         Attention and Perception: He is inattentive. He perceives visual hallucinations.         Cognition and Memory: Cognition is impaired.      Comments: Can follow simple commands. Fluctuating unintelligible speech                Significant Labs: All pertinent labs within the past 24 hours have been reviewed.  CBC:   Recent Labs   Lab 07/08/25  1137   WBC 4.10   HGB 9.7*   HCT 28.2*        CMP:   Recent Labs   Lab 07/08/25  1137   *   K 3.0*   CL 99   CO2 23   *   BUN 19   CREATININE 1.5*   CALCIUM 9.4   PROT 7.0   ALBUMIN 2.4*   BILITOT 0.5   ALKPHOS 60   AST 67*   ALT 28   ANIONGAP 9       Significant Imaging:   Imaging Results              X-Ray Chest AP Portable (Final result)  Result time 07/08/25 12:00:54      Final result by VIANNEY Yeh Sr., MD (07/08/25 12:00:54)                   Impression:      1. There is no focal pulmonary infiltrate visualized.  2. There is mild  cardiomegaly.  .      Electronically signed by: Reyes Yeh MD  Date:    07/08/2025  Time:    12:00               Narrative:    EXAMINATION:  XR CHEST AP PORTABLE    CLINICAL HISTORY:  weakness;    COMPARISON:  None    FINDINGS:  The size of the heart is prominent.  There is no focal pulmonary infiltrate visualized.  There is no pneumothorax.  The costophrenic angles are sharp.  There is a ligamentous anchor in the right humeral head.                                       CT Head Without Contrast (Final result)  Result time 07/08/25 11:33:50      Final result by Brendon Taylor MD (07/08/25 11:33:50)                   Impression:      1.  Negative for acute intracranial process, considering there is motion artifact on a number of images and subtle abnormalities could be overlooked.  Negative for hemorrhage, or skull fracture.    2.  Cerebral atrophy noted.  Intracranial atherosclerotic disease noted.  Small vessel ischemic changes noted.  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    3.  Nonemergent findings as described above.    All CT scans at this facility are performed  using dose modulation techniques as appropriate to performed exam including the following:  automated exposure control; adjustment of mA and/or kV according to the patients size (this includes techniques or standardized protocols for targeted exams where dose is matched to indication/reason for exam: i.e. extremities or head);  iterative reconstruction technique.      Electronically signed by: Brendon Taylor MD  Date:    07/08/2025  Time:    11:33               Narrative:    EXAMINATION:  CT HEAD WITHOUT CONTRAST    CLINICAL HISTORY:  Mental status change, unknown cause;    TECHNIQUE:  Axial images through the brain and posterior fossa were obtained without the use of IV contrast.  Sagittal and coronal reconstructions are provided for review.  Motion artifact is present on a number of  images.  Subtle abnormalities could be overlooked.    COMPARISON:  No comparison studies are available.    FINDINGS:  The ventricles are midline and the CSF spaces are prominent.  The gray-white matter junction is well preserved. Negative for intracranial vascular abnormalities. Negative for mass, mass effect, cerebral edema, hemorrhage or abnormal fluid collections.  Intracranial atherosclerotic disease noted.  Small vessel ischemic changes noted.  Left frontal lobe periventricular white matter encephalomalacia and anterior limb lacunar infarcts noted.  Left basal ganglia calcifications.    The skull and scalp are  intact.  Hypoplastic frontal sinuses.  Mild debris within the left ear canal.  The rest of the paranasal sinuses, mastoid air cells, middle ears and ear canals are clear. The globes are intact.                                  \

## 2025-07-08 NOTE — ASSESSMENT & PLAN NOTE
CT head negative.   Recently started on Wellbutrin  Elevated CPK  Urine studies are pending.  Neuro checks Q 4 hours

## 2025-07-08 NOTE — ASSESSMENT & PLAN NOTE
Patient has Diastolic (HFpEF) heart failure that is Chronic. On presentation their CHF was well compensated. Most recent BNP and echo results are listed below.  Recent Labs     07/08/25  1137   *     Latest ECHO  Results for orders placed during the hospital encounter of 06/19/25    Echo    Interpretation Summary    Left Ventricle: The left ventricle is normal in size. Mildly increased wall thickness. There is mild concentric hypertrophy. There is normal systolic function with a visually estimated ejection fraction of 60 - 65%. Grade II diastolic dysfunction.    Right Ventricle: The right ventricle is normal in size Wall thickness is normal. Systolic function is normal.    Left Atrium: The left atrium is mildly dilated    Aortic Valve: Moderately calcified cusps. There is moderate annular calcification present. Moderately restricted motion. There is moderate stenosis. Aortic valve area by VTI is 1.0 cm². Aortic valve peak velocity is 2.6 m/s. Mean gradient is 18 mmHg. The dimensionless index is 0.32.    Mitral Valve: There is bileaflet sclerosis. Mildly thickened leaflets. There is mild mitral annular calcification. There is mild to moderate regurgitation.    Tricuspid Valve: There is mild to moderate regurgitation.    Pulmonic Valve: There is mild regurgitation.    Pulmonary Artery: The estimated pulmonary artery systolic pressure is 34 mmHg.    IVC/SVC: Normal venous pressure at 3 mmHg.    Current Heart Failure Medications       Plan  - Monitor strict I&Os and daily weights.    - Place on telemetry  - Low sodium diet  - Place on fluid restriction of 1.5 L.   - Cardiology has not been consulted  - The patient's volume status is stable

## 2025-07-08 NOTE — H&P
[facility]  History & Physical    Patient Name: Rocael Perez  MRN: [id number]  Patient Class: OP- Observation  Admission Date: 7/8/2025  Attending Physician: Michael Peraza MD   Primary Care Provider: Landry Ferguson MD         Patient information was obtained from patient and ER records.     Subjective:     Principal Problem:Encephalopathy, metabolic    Chief Complaint:   Chief Complaint   Patient presents with    Altered Mental Status     Pt. Was sent to the ED for decreased appetite, ROSA ELENA, and hallucinations for 2 days. Pt. Started Wellbutrin on Friday.         HPI: Rocael Perez is a 85 year old male with history of CHF, CLL, HIV, CKD stage III, and HLD who presented to ED for further evaluation of altered mental status that began Sunday. Symptoms are described as hallucinations, confusion, and lethargy with decreased appetite. Sister was at bedside in ED, but no longer present at bedside. He is oriented to person only. According to medical record, although he is a poor historian he is not at usual baseline. It is noted, patient was started on Wellbutrin 7/4/25.     In ED, CT head negative. CXR shows mild cardiomegaly but otherwise unremarkable. Labs consistent with CKD, but demonstrated hypokalemia, hyponatremia, and elevated CPK.  . Initial troponin 0.070. Urine studies pending. Hospital medicine consulted for admission.     Past Medical History:   Diagnosis Date    CHF (congestive heart failure)     CLL (chronic lymphocytic leukemia)     HIV (human immunodeficiency virus infection)     Hyperlipidemia     MGUS (monoclonal gammopathy of unknown significance)        Past Surgical History:   Procedure Laterality Date    HERNIA REPAIR      SHOULDER SURGERY Right        Review of patient's allergies indicates:   Allergen Reactions    Seasonale [levonorgestrel-ethinyl estrad] Other (See Comments)       No current facility-administered medications on file prior to  encounter.     Current Outpatient Medications on File Prior to Encounter   Medication Sig    aspirin (ECOTRIN) 81 MG EC tablet Take 81 mg by mouth once daily.    mjlcnzkfz-mgahgzyq-aknwqyh ala (BIKTARVY) -25 mg (25 kg or greater) Take 1 tablet by mouth once daily.    potassium chloride (KLOR-CON) 10 MEQ TbSR Take 10 mEq by mouth once. Pt takes only on Mondays and Fridays    rosuvastatin (CRESTOR) 40 MG Tab TAKE 1 TABLET BY MOUTH EVERY DAY IN THE EVENING    vibegron (GEMTESA) 75 mg Tab TAKE 1 TABLET BY MOUTH EVERY DAY    vitamin D (VITAMIN D3) 1000 units Tab Take 1,000 Units by mouth once daily.     Family History       Problem Relation (Age of Onset)    Diabetes Mother          Tobacco Use    Smoking status: Former     Types: Cigarettes     Passive exposure: Past    Smokeless tobacco: Never   Substance and Sexual Activity    Alcohol use: Not Currently    Drug use: Never    Sexual activity: Not Currently     Review of Systems   Unable to perform ROS: Mental status change     Objective:     Vital Signs (Most Recent):  Temp: 98 °F (36.7 °C) (07/08/25 1039)  Pulse: 103 (07/08/25 1230)  Resp: (!) 21 (07/08/25 1230)  BP: 110/77 (07/08/25 1230)  SpO2: 96 % (07/08/25 1230) Vital Signs (24h Range):  Temp:  [98 °F (36.7 °C)] 98 °F (36.7 °C)  Pulse:  [] 103  Resp:  [16-21] 21  SpO2:  [95 %-98 %] 96 %  BP: (102-140)/(64-90) 110/77     Weight: (S)  (Needs bed weight)  There is no height or weight on file to calculate BMI.     Physical Exam  Vitals and nursing note reviewed.   Constitutional:       General: He is not in acute distress.     Appearance: He is well-developed.      Comments: Appears chronically ill     HENT:      Head: Normocephalic and atraumatic.   Cardiovascular:      Rate and Rhythm: Normal rate and regular rhythm.      Heart sounds: Normal heart sounds.   Pulmonary:      Effort: Pulmonary effort is normal. No respiratory distress.      Breath sounds: Normal breath sounds.   Abdominal:      General:  Bowel sounds are normal. There is no distension.      Palpations: Abdomen is soft.      Tenderness: There is no abdominal tenderness.   Musculoskeletal:      Cervical back: Normal range of motion and neck supple. No edema.      Right lower leg: Edema (+1) present.      Left lower leg: Edema (+1) present.   Skin:     General: Skin is dry.      Comments: Buttock wound noted   Neurological:      Mental Status: He is alert and oriented to person, place, and time.   Psychiatric:         Attention and Perception: He is inattentive. He perceives visual hallucinations.         Cognition and Memory: Cognition is impaired.      Comments: Can follow simple commands. Fluctuating unintelligible speech                Significant Labs: All pertinent labs within the past 24 hours have been reviewed.  CBC:   Recent Labs   Lab 07/08/25  1137   WBC 4.10   HGB 9.7*   HCT 28.2*        CMP:   Recent Labs   Lab 07/08/25  1137   *   K 3.0*   CL 99   CO2 23   *   BUN 19   CREATININE 1.5*   CALCIUM 9.4   PROT 7.0   ALBUMIN 2.4*   BILITOT 0.5   ALKPHOS 60   AST 67*   ALT 28   ANIONGAP 9       Significant Imaging:   Imaging Results              X-Ray Chest AP Portable (Final result)  Result time 07/08/25 12:00:54      Final result by VIANNEY Yeh Sr., MD (07/08/25 12:00:54)                   Impression:      1. There is no focal pulmonary infiltrate visualized.  2. There is mild cardiomegaly.  .      Electronically signed by: Reyes Yeh MD  Date:    07/08/2025  Time:    12:00               Narrative:    EXAMINATION:  XR CHEST AP PORTABLE    CLINICAL HISTORY:  weakness;    COMPARISON:  None    FINDINGS:  The size of the heart is prominent.  There is no focal pulmonary infiltrate visualized.  There is no pneumothorax.  The costophrenic angles are sharp.  There is a ligamentous anchor in the right humeral head.                                       CT Head Without Contrast (Final result)  Result time 07/08/25  11:33:50      Final result by Brendon Taylor MD (07/08/25 11:33:50)                   Impression:      1.  Negative for acute intracranial process, considering there is motion artifact on a number of images and subtle abnormalities could be overlooked.  Negative for hemorrhage, or skull fracture.    2.  Cerebral atrophy noted.  Intracranial atherosclerotic disease noted.  Small vessel ischemic changes noted.  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    3.  Nonemergent findings as described above.    All CT scans at this facility are performed  using dose modulation techniques as appropriate to performed exam including the following:  automated exposure control; adjustment of mA and/or kV according to the patients size (this includes techniques or standardized protocols for targeted exams where dose is matched to indication/reason for exam: i.e. extremities or head);  iterative reconstruction technique.      Electronically signed by: Brendon Taylor MD  Date:    07/08/2025  Time:    11:33               Narrative:    EXAMINATION:  CT HEAD WITHOUT CONTRAST    CLINICAL HISTORY:  Mental status change, unknown cause;    TECHNIQUE:  Axial images through the brain and posterior fossa were obtained without the use of IV contrast.  Sagittal and coronal reconstructions are provided for review.  Motion artifact is present on a number of images.  Subtle abnormalities could be overlooked.    COMPARISON:  No comparison studies are available.    FINDINGS:  The ventricles are midline and the CSF spaces are prominent.  The gray-white matter junction is well preserved. Negative for intracranial vascular abnormalities. Negative for mass, mass effect, cerebral edema, hemorrhage or abnormal fluid collections.  Intracranial atherosclerotic disease noted.  Small vessel ischemic changes noted.  Left frontal lobe periventricular white matter encephalomalacia and anterior limb lacunar  infarcts noted.  Left basal ganglia calcifications.    The skull and scalp are  intact.  Hypoplastic frontal sinuses.  Mild debris within the left ear canal.  The rest of the paranasal sinuses, mastoid air cells, middle ears and ear canals are clear. The globes are intact.                                  \  Assessment/Plan:     Assessment & Plan  Encephalopathy, metabolic  CT head negative.   Recently started on Wellbutrin  Elevated CPK  Urine studies are pending.  Neuro checks Q 4 hours    Chronic diastolic congestive heart failure  Patient has Diastolic (HFpEF) heart failure that is Chronic. On presentation their CHF was well compensated. Most recent BNP and echo results are listed below.  Recent Labs     07/08/25  1137   *     Latest ECHO  Results for orders placed during the hospital encounter of 06/19/25    Echo    Interpretation Summary    Left Ventricle: The left ventricle is normal in size. Mildly increased wall thickness. There is mild concentric hypertrophy. There is normal systolic function with a visually estimated ejection fraction of 60 - 65%. Grade II diastolic dysfunction.    Right Ventricle: The right ventricle is normal in size Wall thickness is normal. Systolic function is normal.    Left Atrium: The left atrium is mildly dilated    Aortic Valve: Moderately calcified cusps. There is moderate annular calcification present. Moderately restricted motion. There is moderate stenosis. Aortic valve area by VTI is 1.0 cm². Aortic valve peak velocity is 2.6 m/s. Mean gradient is 18 mmHg. The dimensionless index is 0.32.    Mitral Valve: There is bileaflet sclerosis. Mildly thickened leaflets. There is mild mitral annular calcification. There is mild to moderate regurgitation.    Tricuspid Valve: There is mild to moderate regurgitation.    Pulmonic Valve: There is mild regurgitation.    Pulmonary Artery: The estimated pulmonary artery systolic pressure is 34 mmHg.    IVC/SVC: Normal venous pressure  at 3 mmHg.    Current Heart Failure Medications       Plan  - Monitor strict I&Os and daily weights.    - Place on telemetry  - Low sodium diet  - Place on fluid restriction of 1.5 L.   - Cardiology has not been consulted  - The patient's volume status is stable  CLL (chronic lymphocytic leukemia)  Stable   Followed by heme/Onc for surveillance     HIV positive  --followed by Dr. Martinez  --resume Biktarvy  Stage 3b chronic kidney disease  Creatine stable for now. BMP reviewed- noted CrCl cannot be calculated (Unknown ideal weight.). according to latest data. Based on current GFR, CKD stage is stage 3 - GFR 30-59.  Monitor UOP and serial BMP and adjust therapy as needed. Renally dose meds. Avoid nephrotoxic medications and procedures.  Hypokalemia  Patient's most recent potassium results are listed below.   Recent Labs     07/08/25  1137   K 3.0*     Plan  - Replete potassium per protocol  - Monitor potassium Daily  - Patient's hypokalemia is lower than previous  - Check magnesium    Decreased appetite  Patient has a history of reduced eating last admission. Chart review shows he has lost 30 lbs over last several months due to decreased intake. He has intermittent nonprogressive dysphagia  with solids likely due to mild esophageal stenosis relate to chronic reflux esophagitis  --reconsult SLP to assess for progression    Malnutrition of moderate degree  Nutrition consulted. Most recent weight and BMI monitored-     Measurements:  Wt Readings from Last 1 Encounters:   06/25/25 71 kg (156 lb 8.4 oz)   There is no height or weight on file to calculate BMI.    Patient has been screened and assessed by RD.    Malnutrition Type:  Context:    Level:      Malnutrition Characteristic Summary:       Interventions/Recommendations (treatment strategy):       Rhabdomyolysis  Lab Results   Component Value Date    CPK 1,280 (H) 07/08/2025   Creatinine slightly higher than previous but within baseline and encephalopathic.   Orders  placed for 2L in ED. Monitor response    VTE Risk Mitigation (From admission, onward)           Ordered     enoxaparin injection 40 mg  Daily         07/08/25 1444     IP VTE HIGH RISK PATIENT  Once         07/08/25 1444     Place sequential compression device  Until discontinued         07/08/25 1444                    On 07/08/2025, patient should be placed in hospital observation services under my care in collaboration with Dr. Peraza.           Crystal Nuñez NP  Department of Hospital Medicine  [facility] - Telemetry ([facility])

## 2025-07-08 NOTE — ASSESSMENT & PLAN NOTE
Patient has a history of reduced eating last admission. Chart review shows he has lost 30 lbs over last several months due to decreased intake. He has intermittent nonprogressive dysphagia  with solids likely due to mild esophageal stenosis relate to chronic reflux esophagitis  --reconsult SLP to assess for progression

## 2025-07-08 NOTE — HPI
Rocael Perez is a 85 year old male with history of CHF, CLL, HIV, CKD stage III, and HLD who presented to ED for further evaluation of altered mental status that began Sunday. Symptoms are described as hallucinations, confusion, and lethargy with decreased appetite. Sister was at bedside in ED, but no longer present at bedside. He is oriented to person only. According to medical record, although he is a poor historian he is not at usual baseline. It is noted, patient was started on Wellbutrin 7/4/25.     In ED, CT head negative. CXR shows mild cardiomegaly but otherwise unremarkable. Labs consistent with CKD, but demonstrated hypokalemia, hyponatremia, and elevated CPK.  . Initial troponin 0.070. Urine studies pending. Hospital medicine consulted for admission.

## 2025-07-08 NOTE — NURSING TRANSFER
Nursing Transfer Note      7/8/2025   1:20 PM    Nurse giving handoff:ED nurse  Nurse receiving handoff:Negar    Reason patient is being transferred: AMS    Transfer To: Med/tele 2 240    Transfer via stretcher    Transfer with cardiac monitoring    Transported by transport staff    Transfer Vital Signs:  /77   Pulse (!) 114   Temp 98 °F (36.7 °C) (Oral)   Resp (!) 21   SpO2 96%       Telemetry: Box Number [id number]  Order for Tele Monitor? Yes    Medicines sent: 0.9% NS 2000 ml bolus      Patient belongings transferred with patient: Yes    Chart send with patient: Yes    Notified: spouse    Upon arrival to floor: cardiac monitor applied, call bell in reach, and bed in lowest position

## 2025-07-08 NOTE — ED PROVIDER NOTES
SCRIBE #1 NOTE: I, Shaylee Lanza, am scribing for, and in the presence of, Taras Melendez MD. I have scribed the entire note.       History     Chief Complaint   Patient presents with    Altered Mental Status     Pt. Was sent to the ED for decreased appetite, ROSA ELENA, and hallucinations for 2 days. Pt. Started Wellbutrin on Friday.      Review of patient's allergies indicates:   Allergen Reactions    Seasonale [levonorgestrel-ethinyl estrad] Other (See Comments)         History of Present Illness     HPI    7/8/2025, 10:56 AM  History obtained from the patient, medical records, sister, and EMS      History of Present Illness: Rocael Perez is a 85 y.o. male patient with a PMHx of HLD, CHF, HIV, CLL, and MGUS who presents to the Emergency Department for evaluation of AMS which began Sunday. Per EMS, pt was sent to the ER from his nursing home. Pt was in rehab therapy. Per EMS, pt's vitals were good and CBG greater than 90. Pt's sister at bedside states this is not her baseline. On Sunday, pt was able to recall sister's full name and know his location, not pt cannot remember his sister. Pt started Wellbutrin on Friday. Symptoms are constant and moderate in severity. No mitigating or exacerbating factors reported. Associated sxs include confusion, weakness, hallucination, and appetite change (decrease). No prior Tx specified. No further complaints or concerns at this time.       Arrival mode: Ambulance Service    PCP: Landry Ferguson MD        Past Medical History:  Past Medical History:   Diagnosis Date    CHF (congestive heart failure)     CLL (chronic lymphocytic leukemia)     HIV (human immunodeficiency virus infection)     Hyperlipidemia     MGUS (monoclonal gammopathy of unknown significance)        Past Surgical History:  Past Surgical History:   Procedure Laterality Date    HERNIA REPAIR      SHOULDER SURGERY Right          Family History:  Family History   Problem Relation Name Age of Onset     Diabetes Mother         Social History:  Social History     Tobacco Use    Smoking status: Former     Types: Cigarettes     Passive exposure: Past    Smokeless tobacco: Never   Substance and Sexual Activity    Alcohol use: Not Currently    Drug use: Never    Sexual activity: Not Currently        Review of Systems     Review of Systems   Unable to perform ROS: Mental status change   Constitutional:  Positive for appetite change (Decrease).   Neurological:  Positive for weakness.   Psychiatric/Behavioral:  Positive for confusion and hallucinations.       Physical Exam     Initial Vitals [07/08/25 1039]   BP Pulse Resp Temp SpO2   (!) 136/90 76 16 98 °F (36.7 °C) 95 %      MAP       --          Physical Exam  Nursing Notes and Vital Signs Reviewed.  Constitutional: Patient is in no apparent distress. Elderly and frail.  Head: Atraumatic. Normocephalic.  Eyes: PERRL. EOM intact. Conjunctivae are not pale. No scleral icterus.  ENT: Mucous membranes are moist. Oropharynx is clear and symmetric.    Neck: Supple. Full ROM. No lymphadenopathy.  Cardiovascular: Regular rate. Regular rhythm. No murmurs, rubs, or gallops. Distal pulses are 2+ and symmetric.  Pulmonary/Chest: No respiratory distress. Clear to auscultation bilaterally. No wheezing or rales.  Abdominal: Soft and non-distended. There is no tenderness.  No rebound, guarding, or rigidity. Good bowel sounds.  Genitourinary: No CVA tenderness.  Musculoskeletal: Moves all extremities. No obvious deformities. No edema. No calf tenderness.  Skin: Warm and dry.  Neurological:  Alert, awake, and appropriate.  Normal speech.  No acute focal neurological deficits are appreciated.  Psychiatric: Confused.     ED Course   Critical Care    Date/Time: 7/8/2025 12:38 PM    Performed by: Taras Melendez MD  Authorized by: Taars Melendez MD  Direct patient critical care time: 20 minutes  Additional history critical care time: 10 minutes  Ordering / reviewing critical care  time: 10 minutes  Documentation critical care time: 10 minutes  Consulting other physicians critical care time: 10 minutes  Total critical care time (exclusive of procedural time) : 60 minutes  Critical care time was exclusive of separately billable procedures and treating other patients and teaching time.  Critical care was necessary to treat or prevent imminent or life-threatening deterioration of the following conditions: ROSA ELENA and rhabdomyolysis.  Critical care was time spent personally by me on the following activities: blood draw for specimens, development of treatment plan with patient or surrogate, gastric intubation, interpretation of cardiac output measurements, evaluation of patient's response to treatment, examination of patient, obtaining history from patient or surrogate, ordering and performing treatments and interventions, ordering and review of laboratory studies, ordering and review of radiographic studies, pulse oximetry, re-evaluation of patient's condition and review of old charts.        ED Vital Signs:  Vitals:    07/08/25 1039 07/08/25 1100 07/08/25 1200 07/08/25 1230   BP: (!) 136/90 (!) 140/74 102/64 110/77   Pulse: 76 105 96 103   Resp: 16 20 20 (!) 21   Temp: 98 °F (36.7 °C)      TempSrc: Oral      SpO2: 95% 96% 98% 96%       Abnormal Lab Results:  Labs Reviewed   COMPREHENSIVE METABOLIC PANEL - Abnormal       Result Value    Sodium 131 (*)     Potassium 3.0 (*)     Chloride 99      CO2 23      Glucose 113 (*)     BUN 19      Creatinine 1.5 (*)     Calcium 9.4      Protein Total 7.0      Albumin 2.4 (*)     Bilirubin Total 0.5      ALP 60      AST 67 (*)     ALT 28      Anion Gap 9      eGFR 45 (*)    B-TYPE NATRIURETIC PEPTIDE - Abnormal     (*)    CK - Abnormal    CPK 1,280 (*)    TROPONIN I - Abnormal    Troponin-I 0.070 (*)    CBC WITH DIFFERENTIAL - Abnormal    WBC 4.10      RBC 3.04 (*)     HGB 9.7 (*)     HCT 28.2 (*)     MCV 93      MCH 31.9 (*)     MCHC 34.4      RDW 15.8  (*)     Platelet Count 264      MPV 10.0      Nucleated RBC 0      Neut % 66.2      Lymph % 17.3 (*)     Mono % 15.1 (*)     Eos % 1.0      Basophil % 0.2      Imm Grans % 0.2      Neut # 2.71      Lymph # 0.71 (*)     Mono # 0.62      Eos # 0.04      Baso # 0.01      Imm Grans # 0.01     TSH - Normal    TSH 2.067     ALCOHOL,MEDICAL (ETHANOL) - Normal    Alcohol, Serum <10     HEPATITIS C ANTIBODY - Normal    Hep C Ab Interp Negative     CBC W/ AUTO DIFFERENTIAL    Narrative:     The following orders were created for panel order CBC Auto Differential.  Procedure                               Abnormality         Status                     ---------                               -----------         ------                     CBC with Differential[[id number]]       Abnormal            Final result                 Please view results for these tests on the individual orders.   URINALYSIS, REFLEX TO URINE CULTURE   DRUG SCREEN PANEL, URINE EMERGENCY   HEP C VIRUS HOLD SPECIMEN        All Lab Results:  Results for orders placed or performed during the hospital encounter of 07/08/25   Comprehensive Metabolic Panel    Collection Time: 07/08/25 11:37 AM   Result Value Ref Range    Sodium 131 (L) 136 - 145 mmol/L    Potassium 3.0 (L) 3.5 - 5.1 mmol/L    Chloride 99 95 - 110 mmol/L    CO2 23 23 - 29 mmol/L    Glucose 113 (H) 70 - 110 mg/dL    BUN 19 8 - 23 mg/dL    Creatinine 1.5 (H) 0.5 - 1.4 mg/dL    Calcium 9.4 8.7 - 10.5 mg/dL    Protein Total 7.0 6.0 - 8.4 gm/dL    Albumin 2.4 (L) 3.5 - 5.2 g/dL    Bilirubin Total 0.5 0.1 - 1.0 mg/dL    ALP 60 40 - 150 unit/L    AST 67 (H) 11 - 45 unit/L    ALT 28 10 - 44 unit/L    Anion Gap 9 8 - 16 mmol/L    eGFR 45 (L) >60 mL/min/1.73/m2   BNP    Collection Time: 07/08/25 11:37 AM   Result Value Ref Range     (H) 0 - 99 pg/mL   CK    Collection Time: 07/08/25 11:37 AM   Result Value Ref Range    CPK 1,280 (H) 20 - 200 U/L   Troponin I    Collection Time: 07/08/25 11:37 AM    Result Value Ref Range    Troponin-I 0.070 (H) <=0.026 ng/mL   TSH    Collection Time: 07/08/25 11:37 AM   Result Value Ref Range    TSH 2.067 0.400 - 4.000 uIU/mL   Ethanol    Collection Time: 07/08/25 11:37 AM   Result Value Ref Range    Alcohol, Serum <10 <10 mg/dL   CBC with Differential    Collection Time: 07/08/25 11:37 AM   Result Value Ref Range    WBC 4.10 3.90 - 12.70 K/uL    RBC 3.04 (L) 4.60 - 6.20 M/uL    HGB 9.7 (L) 14.0 - 18.0 gm/dL    HCT 28.2 (L) 40.0 - 54.0 %    MCV 93 82 - 98 fL    MCH 31.9 (H) 27.0 - 31.0 pg    MCHC 34.4 32.0 - 36.0 g/dL    RDW 15.8 (H) 11.5 - 14.5 %    Platelet Count 264 150 - 450 K/uL    MPV 10.0 9.2 - 12.9 fL    Nucleated RBC 0 <=0 /100 WBC    Neut % 66.2 38 - 73 %    Lymph % 17.3 (L) 18 - 48 %    Mono % 15.1 (H) 4 - 15 %    Eos % 1.0 <=8 %    Basophil % 0.2 <=1.9 %    Imm Grans % 0.2 0.0 - 0.5 %    Neut # 2.71 1.8 - 7.7 K/uL    Lymph # 0.71 (L) 1 - 4.8 K/uL    Mono # 0.62 0.3 - 1 K/uL    Eos # 0.04 <=0.5 K/uL    Baso # 0.01 <=0.2 K/uL    Imm Grans # 0.01 0.00 - 0.04 K/uL   Hepatitis C Antibody    Collection Time: 07/08/25 11:37 AM   Result Value Ref Range    Hep C Ab Interp Negative Negative       Imaging Results:  Imaging Results              X-Ray Chest AP Portable (Final result)  Result time 07/08/25 12:00:54      Final result by VIANNEY Yeh Sr., MD (07/08/25 12:00:54)                   Impression:      1. There is no focal pulmonary infiltrate visualized.  2. There is mild cardiomegaly.  .      Electronically signed by: Reyes Yeh MD  Date:    07/08/2025  Time:    12:00               Narrative:    EXAMINATION:  XR CHEST AP PORTABLE    CLINICAL HISTORY:  weakness;    COMPARISON:  None    FINDINGS:  The size of the heart is prominent.  There is no focal pulmonary infiltrate visualized.  There is no pneumothorax.  The costophrenic angles are sharp.  There is a ligamentous anchor in the right humeral head.                                       CT Head Without  Contrast (Final result)  Result time 07/08/25 11:33:50      Final result by Brendon Taylor MD (07/08/25 11:33:50)                   Impression:      1.  Negative for acute intracranial process, considering there is motion artifact on a number of images and subtle abnormalities could be overlooked.  Negative for hemorrhage, or skull fracture.    2.  Cerebral atrophy noted.  Intracranial atherosclerotic disease noted.  Small vessel ischemic changes noted.  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    3.  Nonemergent findings as described above.    All CT scans at this facility are performed  using dose modulation techniques as appropriate to performed exam including the following:  automated exposure control; adjustment of mA and/or kV according to the patients size (this includes techniques or standardized protocols for targeted exams where dose is matched to indication/reason for exam: i.e. extremities or head);  iterative reconstruction technique.      Electronically signed by: Brendon Taylor MD  Date:    07/08/2025  Time:    11:33               Narrative:    EXAMINATION:  CT HEAD WITHOUT CONTRAST    CLINICAL HISTORY:  Mental status change, unknown cause;    TECHNIQUE:  Axial images through the brain and posterior fossa were obtained without the use of IV contrast.  Sagittal and coronal reconstructions are provided for review.  Motion artifact is present on a number of images.  Subtle abnormalities could be overlooked.    COMPARISON:  No comparison studies are available.    FINDINGS:  The ventricles are midline and the CSF spaces are prominent.  The gray-white matter junction is well preserved. Negative for intracranial vascular abnormalities. Negative for mass, mass effect, cerebral edema, hemorrhage or abnormal fluid collections.  Intracranial atherosclerotic disease noted.  Small vessel ischemic changes noted.  Left frontal lobe periventricular white matter  encephalomalacia and anterior limb lacunar infarcts noted.  Left basal ganglia calcifications.    The skull and scalp are  intact.  Hypoplastic frontal sinuses.  Mild debris within the left ear canal.  The rest of the paranasal sinuses, mastoid air cells, middle ears and ear canals are clear. The globes are intact.                                       The EKG was ordered, reviewed, and independently interpreted by the ED provider.  Interpretation time: 11:26  Rate: 100 BPM  Rhythm: normal sinus rhythm  Interpretation: No acute ST changes. No STEMI.         The Emergency Provider reviewed the vital signs and test results, which are outlined above.     ED Discussion     12:35 PM: Discussed case with Dr. Michael Peraza MD ([facility]). Dr. Peraza agrees with current care and management of pt and accepts admission.   Admitting Service: [facility]  Admitting Physician: Dr. Peraza  Admit to: Med/Tele Obs    12:35 PM: Re-evaluated pt.  I have discussed test results, shared treatment plan, and the need for admission with patient/family/caretaker at bedside. Pt and/or family/caretaker express understanding at this time and agree with all information. All questions answered. Pt/caretaker/family member(s) have no further questions or concerns at this time. Pt is ready for admit.         Medical Decision Making  DDx: ams, albert    Amount and/or Complexity of Data Reviewed  Labs: ordered. Decision-making details documented in ED Course.  Radiology: ordered. Decision-making details documented in ED Course.  ECG/medicine tests: ordered and independent interpretation performed. Decision-making details documented in ED Course.    Critical Care  Total time providing critical care: 60 minutes                ED Medication(s):  Medications   sodium chloride 0.9% bolus 1,000 mL 1,000 mL ( Intravenous Restarted 7/8/25 1349)   sodium chloride 0.9% bolus 1,000 mL 1,000 mL (1,000 mLs Intravenous New Bag 7/8/25 1249)       Current  Discharge Medication List                  Scribe Attestation:   Scribe #1: I performed the above scribed service and the documentation accurately describes the services I performed. I attest to the accuracy of the note.     Attending:   Physician Attestation Statement for Scribe #1: I, Taras Melendez MD, personally performed the services described in this documentation, as scribed by Shaylee Lanza, in my presence, and it is both accurate and complete.           Clinical Impression       ICD-10-CM ICD-9-CM   1. Altered mental status, unspecified altered mental status type  R41.82 780.97   2. Weakness  R53.1 780.79   3. Non-traumatic rhabdomyolysis  M62.82 728.88   4. ROSA ELENA (acute kidney injury)  N17.9 584.9       Disposition:   Disposition: Placed in Observation  Condition: Stable         Taras Melendez MD  07/08/25 3886

## 2025-07-09 PROBLEM — E87.1 HYPONATREMIA: Status: ACTIVE | Noted: 2025-07-09

## 2025-07-09 LAB
ABSOLUTE EOSINOPHIL (OHS): 0.03 K/UL
ABSOLUTE MONOCYTE (OHS): 0.62 K/UL (ref 0.3–1)
ABSOLUTE NEUTROPHIL COUNT (OHS): 2.05 K/UL (ref 1.8–7.7)
ALBUMIN SERPL BCP-MCNC: 2.2 G/DL (ref 3.5–5.2)
ALP SERPL-CCNC: 54 UNIT/L (ref 40–150)
ALT SERPL W/O P-5'-P-CCNC: 24 UNIT/L (ref 10–44)
ANION GAP (OHS): 7 MMOL/L (ref 8–16)
AST SERPL-CCNC: 56 UNIT/L (ref 11–45)
BASOPHILS # BLD AUTO: 0.02 K/UL
BASOPHILS NFR BLD AUTO: 0.5 %
BILIRUB SERPL-MCNC: 0.4 MG/DL (ref 0.1–1)
BUN SERPL-MCNC: 17 MG/DL (ref 8–23)
CALCIUM SERPL-MCNC: 8.7 MG/DL (ref 8.7–10.5)
CHLORIDE SERPL-SCNC: 101 MMOL/L (ref 95–110)
CK SERPL-CCNC: 834 U/L (ref 20–200)
CO2 SERPL-SCNC: 25 MMOL/L (ref 23–29)
CREAT SERPL-MCNC: 1.3 MG/DL (ref 0.5–1.4)
ERYTHROCYTE [DISTWIDTH] IN BLOOD BY AUTOMATED COUNT: 15.9 % (ref 11.5–14.5)
GFR SERPLBLD CREATININE-BSD FMLA CKD-EPI: 54 ML/MIN/1.73/M2
GLUCOSE SERPL-MCNC: 87 MG/DL (ref 70–110)
HCT VFR BLD AUTO: 25.3 % (ref 40–54)
HGB BLD-MCNC: 8.5 GM/DL (ref 14–18)
HOLD SPECIMEN: NORMAL
IMM GRANULOCYTES # BLD AUTO: 0.01 K/UL (ref 0–0.04)
IMM GRANULOCYTES NFR BLD AUTO: 0.3 % (ref 0–0.5)
LYMPHOCYTES # BLD AUTO: 1.14 K/UL (ref 1–4.8)
MAGNESIUM SERPL-MCNC: 1.9 MG/DL (ref 1.6–2.6)
MCH RBC QN AUTO: 31.4 PG (ref 27–31)
MCHC RBC AUTO-ENTMCNC: 33.6 G/DL (ref 32–36)
MCV RBC AUTO: 93 FL (ref 82–98)
NUCLEATED RBC (/100WBC) (OHS): 0 /100 WBC
PLATELET # BLD AUTO: 242 K/UL (ref 150–450)
PMV BLD AUTO: 10.3 FL (ref 9.2–12.9)
POTASSIUM SERPL-SCNC: 2.9 MMOL/L (ref 3.5–5.1)
PROT SERPL-MCNC: 6.1 GM/DL (ref 6–8.4)
RBC # BLD AUTO: 2.71 M/UL (ref 4.6–6.2)
RELATIVE EOSINOPHIL (OHS): 0.8 %
RELATIVE LYMPHOCYTE (OHS): 29.5 % (ref 18–48)
RELATIVE MONOCYTE (OHS): 16 % (ref 4–15)
RELATIVE NEUTROPHIL (OHS): 52.9 % (ref 38–73)
SODIUM SERPL-SCNC: 133 MMOL/L (ref 136–145)
WBC # BLD AUTO: 3.87 K/UL (ref 3.9–12.7)

## 2025-07-09 PROCEDURE — 96361 HYDRATE IV INFUSION ADD-ON: CPT

## 2025-07-09 PROCEDURE — 63600175 PHARM REV CODE 636 W HCPCS: Mod: HCNC | Performed by: HOSPITALIST

## 2025-07-09 PROCEDURE — 80053 COMPREHEN METABOLIC PANEL: CPT | Mod: HCNC | Performed by: HOSPITALIST

## 2025-07-09 PROCEDURE — 97530 THERAPEUTIC ACTIVITIES: CPT | Mod: HCNC

## 2025-07-09 PROCEDURE — 25000003 PHARM REV CODE 250: Mod: HCNC | Performed by: HOSPITALIST

## 2025-07-09 PROCEDURE — 36415 COLL VENOUS BLD VENIPUNCTURE: CPT | Mod: HCNC | Performed by: HOSPITALIST

## 2025-07-09 PROCEDURE — 97162 PT EVAL MOD COMPLEX 30 MIN: CPT | Mod: HCNC

## 2025-07-09 PROCEDURE — 96376 TX/PRO/DX INJ SAME DRUG ADON: CPT

## 2025-07-09 PROCEDURE — 92611 MOTION FLUOROSCOPY/SWALLOW: CPT | Mod: HCNC

## 2025-07-09 PROCEDURE — 92610 EVALUATE SWALLOWING FUNCTION: CPT | Mod: HCNC

## 2025-07-09 PROCEDURE — 25500020 PHARM REV CODE 255: Mod: HCNC | Performed by: HOSPITALIST

## 2025-07-09 PROCEDURE — 21400001 HC TELEMETRY ROOM: Mod: HCNC

## 2025-07-09 PROCEDURE — 97535 SELF CARE MNGMENT TRAINING: CPT | Mod: HCNC

## 2025-07-09 PROCEDURE — 97166 OT EVAL MOD COMPLEX 45 MIN: CPT | Mod: HCNC

## 2025-07-09 PROCEDURE — 85025 COMPLETE CBC W/AUTO DIFF WBC: CPT | Mod: HCNC | Performed by: HOSPITALIST

## 2025-07-09 PROCEDURE — 82550 ASSAY OF CK (CPK): CPT | Mod: HCNC | Performed by: NURSE PRACTITIONER

## 2025-07-09 PROCEDURE — 83735 ASSAY OF MAGNESIUM: CPT | Mod: HCNC | Performed by: HOSPITALIST

## 2025-07-09 PROCEDURE — 63600175 PHARM REV CODE 636 W HCPCS: Mod: HCNC | Performed by: NURSE PRACTITIONER

## 2025-07-09 PROCEDURE — 96374 THER/PROPH/DIAG INJ IV PUSH: CPT

## 2025-07-09 PROCEDURE — A9698 NON-RAD CONTRAST MATERIALNOC: HCPCS | Mod: HCNC | Performed by: HOSPITALIST

## 2025-07-09 PROCEDURE — 25000003 PHARM REV CODE 250: Mod: HCNC | Performed by: NURSE PRACTITIONER

## 2025-07-09 RX ORDER — POTASSIUM CHLORIDE 7.45 MG/ML
10 INJECTION INTRAVENOUS
Status: COMPLETED | OUTPATIENT
Start: 2025-07-09 | End: 2025-07-09

## 2025-07-09 RX ORDER — OXYBUTYNIN CHLORIDE 5 MG/1
5 TABLET, EXTENDED RELEASE ORAL DAILY
Status: DISCONTINUED | OUTPATIENT
Start: 2025-07-09 | End: 2025-07-11 | Stop reason: HOSPADM

## 2025-07-09 RX ORDER — SODIUM CHLORIDE 9 MG/ML
INJECTION, SOLUTION INTRAVENOUS CONTINUOUS
Status: ACTIVE | OUTPATIENT
Start: 2025-07-09 | End: 2025-07-10

## 2025-07-09 RX ADMIN — ENOXAPARIN SODIUM 40 MG: 40 INJECTION SUBCUTANEOUS at 04:07

## 2025-07-09 RX ADMIN — POTASSIUM CHLORIDE 10 MEQ: 7.46 INJECTION, SOLUTION INTRAVENOUS at 02:07

## 2025-07-09 RX ADMIN — POTASSIUM CHLORIDE 10 MEQ: 7.46 INJECTION, SOLUTION INTRAVENOUS at 04:07

## 2025-07-09 RX ADMIN — POTASSIUM CHLORIDE 10 MEQ: 7.46 INJECTION, SOLUTION INTRAVENOUS at 03:07

## 2025-07-09 RX ADMIN — SODIUM CHLORIDE: 9 INJECTION, SOLUTION INTRAVENOUS at 02:07

## 2025-07-09 RX ADMIN — ASPIRIN 81 MG: 81 TABLET, COATED ORAL at 10:07

## 2025-07-09 RX ADMIN — BICTEGRAVIR SODIUM, EMTRICITABINE, AND TENOFOVIR ALAFENAMIDE FUMARATE 1 TABLET: 50; 200; 25 TABLET ORAL at 10:07

## 2025-07-09 RX ADMIN — OXYBUTYNIN CHLORIDE 5 MG: 5 TABLET, EXTENDED RELEASE ORAL at 10:07

## 2025-07-09 RX ADMIN — BARIUM SULFATE 30 ML: 0.81 POWDER, FOR SUSPENSION ORAL at 12:07

## 2025-07-09 NOTE — ASSESSMENT & PLAN NOTE
Nutrition consulted. Most recent weight and BMI monitored-     Measurements:  Wt Readings from Last 1 Encounters:   07/09/25 80.2 kg (176 lb 14 oz)   Body mass index is 26.12 kg/m².    Patient has been screened and assessed by RD.    Malnutrition Type:  Context:    Level:      Malnutrition Characteristic Summary:       Interventions/Recommendations (treatment strategy):

## 2025-07-09 NOTE — ASSESSMENT & PLAN NOTE
Patient's most recent potassium results are listed below.   Recent Labs     07/08/25  1137 07/09/25  0501   K 3.0* 2.9*     Plan  - Replete potassium per protocol  - Monitor potassium Daily  - Patient's hypokalemia is lower than previous  - Check magnesium

## 2025-07-09 NOTE — PT/OT/SLP EVAL
Physical Therapy Evaluation and Treatment    Patient Name: Rocael Perez   MRN: [id number]  Recent Surgery: * No surgery found *      Recommendations:     Discharge Recommendations: Moderate Intensity Therapy   Discharge Equipment Recommendations: to be determined by next level of care   Barriers to discharge: Decreased caregiver support    Assessment:     Rocael Perez is a 85 y.o. male admitted with a medical diagnosis of Encephalopathy, metabolic. He presents with the following impairments/functional limitations: weakness, impaired endurance, impaired functional mobility, gait instability, pain, impaired balance, decreased safety awareness, decreased lower extremity function, decreased ROM, impaired cognition, decreased coordination, decreased upper extremity function.    Patient currently demonstrates a need for moderate intensity therapy on a daily basis secondary to an acute decline from prior level of function.    Rehab Prognosis: Fair; patient would benefit from acute PT services to address these deficits and reach maximum level of function.    Plan:     During this hospitalization, patient to be seen 3 x/week to address the above listed problems via gait training, therapeutic activities, therapeutic exercises    Plan of Care Expires: 07/23/25    Subjective     Chief Complaint: Pt is motivated to participate  Patient Comments/Goals: none stated  Pain/Comfort:  Pain Rating 1: 7/10  Location - Side 1: Bilateral  Location - Orientation 1: generalized  Location 1: knee  Pain Addressed 1: Reposition, Distraction  Pain Rating Post-Intervention 1: 7/10  Pain Rating 2: 7/10  Location - Side 2: Bilateral  Location - Orientation 2: generalized  Location 2: shoulder  Pain Addressed 2: Reposition, Distraction  Pain Rating Post-Intervention 2: 7/10    Social History:  Living Environment: Patient lives with their sister in a 2 story home with number of outside stair(s): 0 and bed/bath on 1st floor. Prior to this  admission, pt was at SNF.   Prior Level of Function: Prior to previous admission, patient was modified independent, retired, and ambulated household distances using rolling walker. Pt was d/c to SNF on 6/25/25, while at SNF family reports pt standing with therapy but unable to ambulate away from the bed. Reports pt required assistance with bathing and dressing.   Equipment Used at Home: walker, rolling  DME owned (not currently used): none  Assistance Upon Discharge: family (limited) and facility staff    Objective:     Communicated with nurse EMMA and epic chart review prior to session. Patient found HOB elevated with peripheral IV, telemetry upon PT entry to room.    General Precautions: Standard, fall   Orthopedic Precautions: N/A   Braces: N/A    Respiratory Status: Room air    Exams:  Cognition: Patient is oriented to Person  RLE ROM: Grossly limited, unable to fully extend/flex at knee  RLE Strength: 4/5 at hip, 3+/5 at knee and ankle  LLE ROM: Grossly limited, unable to fully extend/flex at knee  LLE Strength: 4/5 at hip, 3+/5 at knee and ankle  Sensation:    -       Intact  Skin Integrity/Edema:     -       Skin integrity: Visible skin intact  Impaired gross coordination R>L    Functional Mobility:  Gait belt applied - Yes  Bed Mobility  Rolling Right: minimum assistance  Forward and Lateral Seated Scooting: maximal assistance  Supine to Sit: minimum assistance for LE management and trunk management  Sit to Supine: minimum assistance for LE management and trunk management  Supine Scooting: maximum assistance  Transfers  Sit to Stand: maximal assistance with rolling walker  Attempted from raised bed height. 2x attempts unsuccessful, 1x attempt successful with but pt unable to fully extend LE, attempted to facilitate by guarding at knee but did not improve, remained forward flexed, unable to stand fully upright, unable to attempt steps.   Bed to Chair: attempted to progress but pt unable due to fatigue and poor  "standing tolerance  Gait  Unable to progress at this time  Balance  Sitting: minimum assistance  Standing: maximal assistance  Increased time needed for all mobility, verbal cuing for safety    Therapeutic Activities and Exercises:   Pt and family educated on role of PT in acute care and POC. Educated on importance of OOB activities, activity pacing, and HEP (marching/hip flex, hip abd, heel slides/LAQ, quad sets, ankle pumps) in order to maintain/regain strength. Encouraged to sit up for all meals. Educated on proper use of RW for safety and to reduce risk of falling. Educated on "call don't fall" policy and increased risk of falling due to weakness, instructed to utilize call bell for assistance with all transfers. Pt agreeable to all requests.    AM-PAC 6 CLICK MOBILITY  Total Score:11    Patient left with bed in chair position with all lines intact, call button in reach, bed alarm on, and family present.    GOALS:   Multidisciplinary Problems       Physical Therapy Goals          Problem: Physical Therapy    Goal Priority Disciplines Outcome Interventions   Physical Therapy Goal     PT, PT/OT     Description: Goals to be met by 7/23/25.  1. Pt will complete bed mobility SBA.  2. Pt will complete sit to stand MIN A.  3. Pt will ambulate 20ft MIN A using RW.  4. Pt will increase AMPAC score by 2 points to progress functional mobility.                       DME Justifications:  No DME recommended requiring DME justifications    History:     Past Medical History:   Diagnosis Date    CHF (congestive heart failure)     CLL (chronic lymphocytic leukemia)     HIV (human immunodeficiency virus infection)     Hyperlipidemia     MGUS (monoclonal gammopathy of unknown significance)        Past Surgical History:   Procedure Laterality Date    HERNIA REPAIR      SHOULDER SURGERY Right        Time Tracking:     PT Received On: 07/09/25  PT Start Time: 1015  PT Stop Time: 1040  PT Total Time (min): 25 min     Billable Minutes: " Evaluation 15min and Therapeutic Activity 10min    7/9/2025

## 2025-07-09 NOTE — PLAN OF CARE
Problem: Adult Inpatient Plan of Care  Goal: Plan of Care Review  Outcome: Progressing  Goal: Patient-Specific Goal (Individualized)  Outcome: Progressing  Goal: Absence of Hospital-Acquired Illness or Injury  Outcome: Progressing  Goal: Optimal Comfort and Wellbeing  Outcome: Progressing  Goal: Readiness for Transition of Care  Outcome: Progressing     Problem: Diabetes Comorbidity  Goal: Blood Glucose Level Within Targeted Range  Outcome: Progressing   POC reviewed with pt. Pt verbalizes understanding of POC. No questions at this time.  AAOx1. NADN.  Atrial Flutter on cardiac monitor.  Pt remains free of falls.  No complaints at this time.  Safety measures in place. Will continue to monitor.  Informed pt to call for assistance before getting up. Pt verbalizes understanding.  Hourly rounding and chart check complete.

## 2025-07-09 NOTE — PLAN OF CARE
Inpatient Upgrade Note    Rocael Perez has warranted treatment spanning two or more midnights of hospital level care for the management of Encephalopathy, metabolic. He continues to require IV fluids, daily labs, further testing/imaging, medication adjustments, and further evaluation by consultants. His condition is also complicated by the following comorbidities: Immunosuppression, Chronic kidney disease, and Heart failure.

## 2025-07-09 NOTE — ASSESSMENT & PLAN NOTE
Hyponatremia is likely due to Dehydration/hypovolemia. The patient's most recent sodium results are listed below.  Recent Labs     07/08/25  1137 07/09/25  0501   * 133*     Plan  - Correct the sodium by 4-6mEq in 24 hours.   - Will treat the hyponatremia with IV fluids as follows: NS @ 75 cc/hr  - Monitor sodium Daily.   - Patient hyponatremia is improving

## 2025-07-09 NOTE — PT/OT/SLP EVAL
Occupational Therapy   Evaluation    Name: Rocael Perez  MRN: [id number]  Admitting Diagnosis: Encephalopathy, metabolic  Recent Surgery: * No surgery found *      Recommendations:     Discharge Recommendations: Moderate Intensity Therapy  Discharge Equipment Recommendations:  to be determined by next level of care  Barriers to discharge:       Assessment:     Rocael Perez is a 85 y.o. male with a medical diagnosis of Encephalopathy, metabolic.  He presents with THE FOLLOWING deficits affecting function: weakness, impaired functional mobility, decreased safety awareness, impaired endurance, gait instability, impaired balance, decreased upper extremity function, impaired self care skills, decreased lower extremity function.      Rehab Prognosis: Good; patient would benefit from acute skilled OT services to address these deficits and reach maximum level of function.       Plan:     Patient to be seen 2 x/week to address the above listed problems via self-care/home management, therapeutic activities, therapeutic exercises  Plan of Care Expires: 07/23/25  Plan of Care Reviewed with: patient, family    Subjective     Chief Complaint: DEBILITY AND GENERALIZED WEAKNESS  Patient/Family Comments/goals: GET STRONGER    Occupational Profile:  Living Environment: CURRENTLY RESIDES IN SNF  Previous level of function:  WITH TOTAL/ MAX A WITH ADL'S AND MOBILITY  :     Pain/Comfort:  Pain Rating 1:  (UNRATED)  Location - Side 1: Bilateral  Location - Orientation 1: generalized  Location 1: knee  Pain Addressed 1: Distraction, Reposition  Pain Rating Post-Intervention 1:  (UNRATED)    Patients cultural, spiritual, Mormon conflicts given the current situation:      Objective:     Communicated with: NURSE AND EPIC CHART REVIEW prior to session.  Patient found HOB elevated with peripheral IV, telemetry upon OT entry to room.    General Precautions: Standard, fall  Orthopedic Precautions: N/A  Braces: N/A  Respiratory Status:  Room air    Occupational Performance:    Bed Mobility:    Patient completed Rolling/Turning to Left with  moderate assistance  Patient completed Rolling/Turning to Right with moderate assistance  Patient completed Scooting/Bridging with moderate assistance  Patient completed Supine to Sit with moderate assistance  Patient completed Sit to Supine with moderate assistance    Functional Mobility/Transfers:  Patient completed Sit <> Stand Transfer with maximal assistance  with  hand-held assist       Activities of Daily Living:  Upper Body Dressing: maximal assistance .  Lower Body Dressing: total assistance .  Toileting: total assistance .    Cognitive/Visual Perceptual:  Cognitive/Psychosocial Skills:     -       Oriented to: Person   -       Follows Commands/attention:Follows one-step commands  -       Memory: UNABLE TO ACCURATELY DETERMINE  -       Safety awareness/insight to disability: impaired     Physical Exam:  Upper Extremity Range of Motion:     -       Right Upper Extremity: WFL  -       Left Upper Extremity: WFL  Upper Extremity Strength:    -       Right Upper Extremity: MMT: 2/5 GROSSLY  -       Left Upper Extremity: MMT: 2/5 GROSSLY   Strength:    -       Right Upper Extremity: MMT: 3/5 GROSSLY  -       Left Upper Extremity: MMT: 3/5 GROSSLY    AMPAC 6 Click ADL:  AMPAC Total Score: 10    Treatment & Education:  Educated patient on importance of increased tolerance to upright position and direct impact on CV endurance and strength. Patient encouraged to sit up in chair, for a minimum of 2 consecutive hours including for all meals.. Encouraged patient to perform AROM TE to BUE throughout the day within all available planes of motion. Re enforced importance of utilizing call light to meet needs in room and not attempt to get up without staff assistance. Patient verbalized understanding and agreed to comply.           Patient left up in chair with all lines intact, call button in reach, chair alarm on,  NURSE notified, and FAMILY present    GOALS:   Multidisciplinary Problems       Occupational Therapy Goals          Problem: Occupational Therapy    Goal Priority Disciplines Outcome Interventions   Occupational Therapy Goal     OT, PT/OT     Description: O.T. GOALS TO BE MET BY 7-23-25  PT WILL TOLERATE 1 SE X 12 REPS B UE ROM EXERCISE  PT WILL BE MOD A WITH BEDSIDE CHAIR TRANSFER  MOD A WITH UE DRESSING                         DME Justifications:   Rocael's mobility limitation cannot be sufficiently resolved by the use of a cane. His functional mobility deficit can be sufficiently resolved with the use of a Rolling Walker. Patient's mobility limitation significantly impairs their ability to participate in one of more activities of daily living.  The use of a RW will significantly improve the patient's ability to participate in MRADLS and the patient will use it on regular basis in the home.    History:     Past Medical History:   Diagnosis Date    CHF (congestive heart failure)     CLL (chronic lymphocytic leukemia)     HIV (human immunodeficiency virus infection)     Hyperlipidemia     MGUS (monoclonal gammopathy of unknown significance)          Past Surgical History:   Procedure Laterality Date    HERNIA REPAIR      SHOULDER SURGERY Right        Time Tracking:     OT Date of Treatment: 07/09/25  OT Start Time: 1130  OT Stop Time: 1155  OT Total Time (min): 25 min    Billable Minutes:Evaluation 15 MINUTES  Therapeutic Activity 10 MINUTES    7/9/2025

## 2025-07-09 NOTE — PROGRESS NOTES
[facility]  Progress Note    Patient Name: Rocael Perez  MRN: [id number]  Patient Class: IP- Inpatient   Admission Date: 7/8/2025  Length of Stay: 0 days  Attending Physician: Michael Peraza MD  Primary Care Provider: Landry Ferguson MD        Subjective     Principal Problem:Rhabdomyolysis        HPI:  Rocael Perez is a 85 year old male with history of CHF, CLL, HIV, CKD stage III, and HLD who presented to ED for further evaluation of altered mental status that began Sunday. Symptoms are described as hallucinations, confusion, and lethargy with decreased appetite. Sister was at bedside in ED, but no longer present at bedside. He is oriented to person only. According to medical record, although he is a poor historian he is not at usual baseline. It is noted, patient was started on Wellbutrin 7/4/25.     In ED, CT head negative. CXR shows mild cardiomegaly but otherwise unremarkable. Labs consistent with CKD, but demonstrated hypokalemia, hyponatremia, and elevated CPK.  . Initial troponin 0.070. Urine studies pending. Hospital medicine consulted for admission.     Overview/Hospital Course:    7/9/25  NAEON, patient resting in bed, denies complaints  Updated family members at bedside  Admitted for non-traumatic rhabdo, possibly due to wellbutryin  K 2.9, replete and monitor  CPK 1280 --> 834, continue IV NS @ 75 cc/hr x 1 day  Recent TTE reviewed grade 2 DD  Monitor volume status closely, currently stable on RA        Review of Systems   All other systems reviewed and are negative.    Objective:     Vital Signs (Most Recent):  Temp: 98.1 °F (36.7 °C) (07/09/25 1641)  Pulse: 88 (07/09/25 1641)  Resp: 18 (07/09/25 1641)  BP: (!) 109/56 (07/09/25 1641)  SpO2: 97 % (07/09/25 1641) Vital Signs (24h Range):  Temp:  [97.8 °F (36.6 °C)-98.3 °F (36.8 °C)] 98.1 °F (36.7 °C)  Pulse:  [] 88  Resp:  [16-18] 18  SpO2:  [94 %-99 %] 97 %  BP: ()/(51-76) 109/56      Weight: 80.2 kg (176 lb 14 oz)  Body mass index is 26.12 kg/m².  No intake or output data in the 24 hours ending 07/09/25 1827      Physical Exam  Constitutional:       General: He is not in acute distress.     Appearance: Normal appearance. He is ill-appearing.   Cardiovascular:      Rate and Rhythm: Regular rhythm. Tachycardia present.      Heart sounds: No murmur heard.  Pulmonary:      Effort: Pulmonary effort is normal. No respiratory distress.      Breath sounds: Normal breath sounds. No wheezing.   Abdominal:      General: There is no distension.      Palpations: Abdomen is soft.      Tenderness: There is no abdominal tenderness.   Neurological:      Mental Status: He is alert.               Significant Labs: All pertinent labs within the past 24 hours have been reviewed.  Recent Lab Results         07/09/25  0501        Albumin 2.2       ALP 54       ALT 24       Anion Gap 7       AST 56       Baso # 0.02       Basophil % 0.5       BILIRUBIN TOTAL 0.4  Comment: For infants and newborns, interpretation of results should be based   on gestational age, weight and in agreement with clinical   observations.    Premature Infant recommended reference ranges:   0-24 hours:  <8.0 mg/dL   24-48 hours: <12.0 mg/dL   3-5 days:    <15.0 mg/dL   6-29 days:   <15.0 mg/dL       BUN 17       Calcium 8.7       Chloride 101       CO2 25              Creatinine 1.3       eGFR 54  Comment: Estimated GFR calculated using the CKD-EPI creatinine (2021) equation.       Eos # 0.03       Eos % 0.8       Glucose 87       Gran # (ANC) 2.05       Hematocrit 25.3       Hemoglobin 8.5       Immature Grans (Abs) 0.01  Comment: Mild elevation in immature granulocytes is non specific and can be seen in a variety of conditions including stress response, acute inflammation, trauma and pregnancy. Correlation with other laboratory and clinical findings is essential.       Immature Granulocytes 0.3       Lymph # 1.14       Lymph %  29.5       Magnesium  1.9       MCH 31.4       MCHC 33.6       MCV 93       Mono # 0.62       Mono % 16.0       MPV 10.3       Neut % 52.9       nRBC 0       Platelet Count 242       Potassium 2.9       PROTEIN TOTAL 6.1       RBC 2.71       RDW 15.9       Sodium 133       WBC 3.87               Significant Imaging: I have reviewed all pertinent imaging results/findings within the past 24 hours.    Fl Modified Barium Swallow Speech   Final Result      See speech pathologist report.         Electronically signed by: Ahsan Aldrich MD   Date:    07/09/2025   Time:    12:54      X-Ray Chest AP Portable   Final Result      1. There is no focal pulmonary infiltrate visualized.   2. There is mild cardiomegaly.   .         Electronically signed by: Reyes Yeh MD   Date:    07/08/2025   Time:    12:00      CT Head Without Contrast   Final Result      1.  Negative for acute intracranial process, considering there is motion artifact on a number of images and subtle abnormalities could be overlooked.  Negative for hemorrhage, or skull fracture.      2.  Cerebral atrophy noted.  Intracranial atherosclerotic disease noted.  Small vessel ischemic changes noted.  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      3.  Nonemergent findings as described above.      All CT scans at this facility are performed  using dose modulation techniques as appropriate to performed exam including the following:  automated exposure control; adjustment of mA and/or kV according to the patients size (this includes techniques or standardized protocols for targeted exams where dose is matched to indication/reason for exam: i.e. extremities or head);  iterative reconstruction technique.         Electronically signed by: Brendon Taylor MD   Date:    07/08/2025   Time:    11:33              Assessment & Plan  Encephalopathy, metabolic  CT head negative.   Recently started on Wellbutrin  Elevated  CPK  Urine studies are pending.  Neuro checks Q 4 hours    7/9/25  Awake, alert, oriented  Mental status improved, back to baseline      Chronic diastolic congestive heart failure  Patient has Diastolic (HFpEF) heart failure that is Chronic. On presentation their CHF was well compensated. Most recent BNP and echo results are listed below.  Recent Labs     07/08/25  1137   *     Latest ECHO  Results for orders placed during the hospital encounter of 06/19/25    Echo    Interpretation Summary    Left Ventricle: The left ventricle is normal in size. Mildly increased wall thickness. There is mild concentric hypertrophy. There is normal systolic function with a visually estimated ejection fraction of 60 - 65%. Grade II diastolic dysfunction.    Right Ventricle: The right ventricle is normal in size Wall thickness is normal. Systolic function is normal.    Left Atrium: The left atrium is mildly dilated    Aortic Valve: Moderately calcified cusps. There is moderate annular calcification present. Moderately restricted motion. There is moderate stenosis. Aortic valve area by VTI is 1.0 cm². Aortic valve peak velocity is 2.6 m/s. Mean gradient is 18 mmHg. The dimensionless index is 0.32.    Mitral Valve: There is bileaflet sclerosis. Mildly thickened leaflets. There is mild mitral annular calcification. There is mild to moderate regurgitation.    Tricuspid Valve: There is mild to moderate regurgitation.    Pulmonic Valve: There is mild regurgitation.    Pulmonary Artery: The estimated pulmonary artery systolic pressure is 34 mmHg.    IVC/SVC: Normal venous pressure at 3 mmHg.    Current Heart Failure Medications       Plan  - Monitor strict I&Os and daily weights.    - Place on telemetry  - Low sodium diet  - Place on fluid restriction of 1.5 L.   - Cardiology has not been consulted  - The patient's volume status is stable  CLL (chronic lymphocytic leukemia)  Stable   Followed by heme/Onc for surveillance     HIV  positive  --followed by Dr. Martinez  --resume Biktarvy  Stage 3b chronic kidney disease  Creatine stable for now. BMP reviewed- noted Estimated Creatinine Clearance: 41.5 mL/min (based on SCr of 1.3 mg/dL). according to latest data. Based on current GFR, CKD stage is stage 3 - GFR 30-59.  Monitor UOP and serial BMP and adjust therapy as needed. Renally dose meds. Avoid nephrotoxic medications and procedures.  Hypokalemia  Patient's most recent potassium results are listed below.   Recent Labs     07/08/25  1137 07/09/25  0501   K 3.0* 2.9*     Plan  - Replete potassium per protocol  - Monitor potassium Daily  - Patient's hypokalemia is lower than previous  - Check magnesium    Decreased appetite  Patient has a history of reduced eating last admission. Chart review shows he has lost 30 lbs over last several months due to decreased intake. He has intermittent nonprogressive dysphagia  with solids likely due to mild esophageal stenosis relate to chronic reflux esophagitis  --reconsult SLP to assess for progression    On minced/moist diet  Aspiration precautions  ST following, MBBS pending    Malnutrition of moderate degree  Nutrition consulted. Most recent weight and BMI monitored-     Measurements:  Wt Readings from Last 1 Encounters:   07/09/25 80.2 kg (176 lb 14 oz)   Body mass index is 26.12 kg/m².    Patient has been screened and assessed by RD.    Malnutrition Type:  Context:    Level:      Malnutrition Characteristic Summary:       Interventions/Recommendations (treatment strategy):       Rhabdomyolysis  Lab Results   Component Value Date     (H) 07/09/2025   Creatinine slightly higher than previous but within baseline and encephalopathic.   Orders placed for 2L in ED. Monitor response    Hyponatremia  Hyponatremia is likely due to Dehydration/hypovolemia. The patient's most recent sodium results are listed below.  Recent Labs     07/08/25  1137 07/09/25  0501   * 133*     Plan  - Correct the sodium  by 4-6mEq in 24 hours.   - Will treat the hyponatremia with IV fluids as follows: NS @ 75 cc/hr  - Monitor sodium Daily.   - Patient hyponatremia is improving    VTE Risk Mitigation (From admission, onward)           Ordered     enoxaparin injection 40 mg  Daily         07/08/25 2145     IP VTE HIGH RISK PATIENT  Once         07/08/25 1444     Place sequential compression device  Until discontinued         07/08/25 1444                    Discharge Planning   YANCY:      Code Status: Full Code   Medical Readiness for Discharge Date:   Discharge Plan A: Skilled Nursing Facility                  Please place Justification for DME      Michael Peraza MD  Department of Hospital Medicine   [facility] - Telemetry ([facility])

## 2025-07-09 NOTE — ASSESSMENT & PLAN NOTE
Patient has a history of reduced eating last admission. Chart review shows he has lost 30 lbs over last several months due to decreased intake. He has intermittent nonprogressive dysphagia  with solids likely due to mild esophageal stenosis relate to chronic reflux esophagitis  --reconsult SLP to assess for progression    On minced/moist diet  Aspiration precautions  ST izzy, MBYAMINI pending

## 2025-07-09 NOTE — HOSPITAL COURSE
7/9/25  NAEON, patient resting in bed, denies complaints  Updated family members at bedside  Admitted for non-traumatic rhabdo, possibly due to wellbutryin  K 2.9, replete and monitor  CPK 1280 --> 834, continue IV NS @ 75 cc/hr x 1 day  Recent TTE reviewed grade 2 DD  Monitor volume status closely, currently stable on RA    7/10/25  NAEON, patient in bed, denies complaints  Wife at bedside  Replete K  CK level trending down  Stable for discharge to SNF  Insurance authorization pending per     7/11/25  NAEON  Labs reviewed, CK trending down, K improved  Stable for discharge to SNF  F/U with PCP as needed

## 2025-07-09 NOTE — PT/OT/SLP EVAL
Speech Language Pathology Evaluation  Bedside Swallow    Patient Name:  Rocael Perez   MRN:  [id number]  Admitting Diagnosis: Encephalopathy, metabolic    Recommendations:                 General Recommendations:  Dysphagia therapy and Modified barium swallow study  Diet recommendations:  Minced & Moist Diet - IDDSI Level 5, Thin liquids - IDDSI Level 0   Aspiration Precautions: Assistance with meals, Feed only when awake/alert, Frequent oral care, HOB to 90 degrees, Meds whole 1 at a time, Monitor for s/s of aspiration, Small bites/sips, and Standard aspiration precautions   General Precautions: Standard, aspiration  Communication strategies:  provide increased time to answer  Discharge recommendations:  Moderate Intensity Therapy   Barriers to Discharge:  None    Assessment:     Rocael Perez is an 85 y.o. male admitted to [facility] acute with dx metabolic encephalopathy, hyponatremia, malnutrition and rhabdomyolysis. Symptoms upon admission were described as hallucinations, confusion and lethargy with decreased appetite.      He presents with improved mentation; however, baseline cognitive deficits present.  He is able to verbalize needs and engage in basic conversation; although intelligibility is reduced.  Functional decline noted this admission in comparison to recent evaluation approximately two weeks ago with difficulty performing basic ADL's and worsening mobility. See PT/OT evaluations.  Nonproductive cough present with and without intake warranting repeat MBSS.  Current CxR revealing clear lungs. ACP and goals of care conversation is indicated s/t pt advanced age, recurrent admission and notable functional decline, despite current SNF admission.    MBSS completed again today, 7/9/25 with notable decline in swallow safety/efficiency compared to recent MBSS  6/20/25.  He is recommended for IDDSI 5-minced/moist solids and IDDSI 0-thin liquids, following aspiration precautions and meal assist.  See MBSS report  "for details.  Pt would benefit from dysphagia intervention; however, prognosis is guarded.    History:     Past Medical History:   Diagnosis Date    CHF (congestive heart failure)     CLL (chronic lymphocytic leukemia)     HIV (human immunodeficiency virus infection)     Hyperlipidemia     MGUS (monoclonal gammopathy of unknown significance)        Past Surgical History:   Procedure Laterality Date    HERNIA REPAIR      SHOULDER SURGERY Right        Social History: Patient recently [place] [facility] to [facility].  Pt reported functional decline since SNF admission, which is notable during tx evaluations today.    Prior Intubation HX:  N/A    MBSS 6/20/25: "Oropharyngeal swallow deemed WFL; however, aerophagia, esophageal dysmotility and stasis noted within distal esophagus.  Pt notably Coughing after the swallow during bedside CSE and during the MBSS, in the absence of laryngeal penetration or aspiration.  He is recommended for IDDSI 6-soft & bite sized solids with IDDSI 0-thin liquids, following aspiration/behavioral reflux precautions, oral care/hygiene and OP GI referral.  No further ST intervention indicated at this time. Please re-consult if need."        ENT visit 3/20/2025 Procedure -Transnasal fiberoptic laryngoscopy      Surgeon: Julio Butterfield M.D. .       Anesthesia: topical 0.05% oxymetazoline with 4% lidocaine       Complications: None.      Description of Procedure: With the patient in the sitting position, topical lidocaine and oxymetazoline was applied to the nose. The scope was passed through the nose. Examination was carried out of the nose, nasopharynx, oropharynx, hypopharynx, and larynx with findings as noted above. Scope was removed. The patient tolerated the procedure well.       Findings: No masses or lesions in the nose, nasopharynx, oropharynx, hypopharynx, or larynx. Vocal fold abduction and adduction is normal. No pooling of secretions in the piriform sinuses, " penetration, or aspiration.     GI evaluation 2020  IMPRESSION: intermittent nonprogressive dysphagia with solids probably due to mild esophageal stenosis related to chronic reflux esophagitis or from a Schatzki's ring. Had a long discussion with the patient and his wife and all of us agree that his symptoms do not warrant further investigation or treatment at this time.  RECOMMENDATIONS: I had a long discussion with the patient and his wife about dietary measures to control bolus size. He will return if his symptoms persist despite him being diligent about his bite size and if he does then will refer him for EGD with esophageal dilation.    FL esophogram 2020  The esophagus shows normal distensibility, course, caliber and contour. No focal lesion. No extrinsic mass effect. Normal transit of contrast through the esophagus with normal emptying into stomach. The upper and lower esophageal sphincters opened normally. Small sliding hiatal hernia. Spontaneous gastroesophageal reflux to the thoracic inlet    XR CHEST AP PORTABLE     CLINICAL HISTORY:  weakness;     COMPARISON:  None     FINDINGS:  The size of the heart is prominent.  There is no focal pulmonary infiltrate visualized.  There is no pneumothorax.  The costophrenic angles are sharp.  There is a ligamentous anchor in the right humeral head.     Impression:     1. There is no focal pulmonary infiltrate visualized.  2. There is mild cardiomegaly.  .        Electronically signed by:Reyes Yeh MD  Date:                                            07/08/2025  Time:                                           12:00    CT HEAD WITHOUT CONTRAST     CLINICAL HISTORY:  Mental status change, unknown cause;     TECHNIQUE:  Axial images through the brain and posterior fossa were obtained without the use of IV contrast.  Sagittal and coronal reconstructions are provided for review.  Motion artifact is present on a number of images.  Subtle abnormalities could be  overlooked.     COMPARISON:  No comparison studies are available.     FINDINGS:  The ventricles are midline and the CSF spaces are prominent.  The gray-white matter junction is well preserved. Negative for intracranial vascular abnormalities. Negative for mass, mass effect, cerebral edema, hemorrhage or abnormal fluid collections.  Intracranial atherosclerotic disease noted.  Small vessel ischemic changes noted.  Left frontal lobe periventricular white matter encephalomalacia and anterior limb lacunar infarcts noted.  Left basal ganglia calcifications.     The skull and scalp are  intact.  Hypoplastic frontal sinuses.  Mild debris within the left ear canal.  The rest of the paranasal sinuses, mastoid air cells, middle ears and ear canals are clear. The globes are intact.     Impression:     1.  Negative for acute intracranial process, considering there is motion artifact on a number of images and subtle abnormalities could be overlooked.  Negative for hemorrhage, or skull fracture.     2.  Cerebral atrophy noted.  Intracranial atherosclerotic disease noted.  Small vessel ischemic changes noted.  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     3.  Nonemergent findings as described above.     All CT scans at this facility are performed  using dose modulation techniques as appropriate to performed exam including the following:  automated exposure control; adjustment of mA and/or kV according to the patients size (this includes techniques or standardized protocols for targeted exams where dose is matched to indication/reason for exam: i.e. extremities or head);  iterative reconstruction technique.        Electronically signed by:Brendon Taylor MD  Date:                                            07/08/2025  Time:                                           11:33       Prior diet: Pt previously recommended IDDSI 6-soft & bite sized solids with IDDSI 0-thin liquids  following MBSS 6/20/25.    Subjective     Pt seen bedside for ST evaluation. Sister at bedside. No c/o pain.  Patient goals: to feel better     Pain/Comfort:  Pain Rating 1: 0/10  Pain Rating Post-Intervention 1: 0/10  Pain Rating 2: 0/10  Pain Rating Post-Intervention 2: 0/10    Respiratory Status: Room air    Objective:     Oral Musculature Evaluation  Oral Musculature: general weakness (?mild right facial asymmetry)  Dentition: scattered dentition  Secretion Management: coughing on secretions  Mucosal Quality: good  Mandibular Strength and Mobility: impaired  Oral Labial Strength and Mobility: WFL  Lingual Strength and Mobility: impaired strength  Velar Elevation: WFL  Buccal Strength and Mobility: decreased tone  Volitional Cough: present, nonproductive  Volitional Swallow: present  Voice Prior to PO Intake: hoarse quality, hyponasality    Bedside Swallow Eval:   Clinical Swallow Examination:   Of note, patient self-fed throughout evaluation. Patient presented with:      CONSISTENCY   NOTES   THIN (IDDSI 0) 3 oz water challenge cup and straw    Impulsive with large volume intake. Nonproductive cough present with and without po intake.   PUREE (IDDSI 4/Extremely Thick)    TSP/TBSP bites of pudding/applesauce  Nonproductive cough present with and without po intake.   SOLID (IDDSI 7/Regular) Bite of Leena Doone cookie     Noted reduced efficiency compared to recent ST evaluation 6/20/2025---prolonged mastication with OM weakness.  Nonproductive cough present with and without po intake.      Thickened liquids were not used in this assessment. Pau (2018) reported that thickened liquids have no sound evidence at reducing the risk of pneumonia in patients with dysphagia and can cause harm by increasing their risk of dehydration. It also presents an increased risk of UTI, electrolyte imbalance, constipation, fecal impaction, cognitive impairment, functional decline and even death (Gino, 2002; Emiliana, 2016).   Thickened liquids are associated with risks including dehydration, increased pharyngeal residue, potential interference with medication absorption, and decreased quality of life (Tyler, 2013). Thickened liquids are also more likely to be silently aspirated than thin liquids (River et al., 2018). This supports the assertion that we should confirm a patient requires thickened liquids with an instrumental swallow study prior to recommending them.     References:   Tyler TOLLIVER (2013). Thickening agents used for dysphagia management: Effect on bioavailability of water, medication and feelings of satiety. Nutrition Journal, 12, 54. https://doi.org/10.1186/9935-6336-06-54     UNRULY Holman, ROXANA Mcginnis, LEROY Rivas, & UNRULY Cramer (2018). Cough response to aspiration in thin and thick fluids during FEES in hospitalized inpatients. International journal of language & communication disorders, 53(5), 909-918. https://doi.org/10.1111/0674-8006.53175     INTERPRETATION AND RISK ASSESSMENT:  Clinical swallow evaluation (CSE) revealed oral phase characterized by lingual, labial, and buccal strength and range of motion reduced for lip closure, bolus preparation and propulsion. The patient had no anterior loss of the bolus with complete closure of the lips around the utensils. No residue remained in the oral cavity following the swallow. Patient with overt clinical signs/symptoms of dysphagia during bedside CSE with nonproductive coughing with and without PO intake.  Patient presents with a possibly inefficient swallow as indicated by generalized weakness and missing dentition.  Contributing risk factors for dysphagia include Hx GERD, hiatal hernia and CVAs.      Goals: Pt will consume the least restrictive PO diet without overt s/s of aspiration.    Goals:   Multidisciplinary Problems       SLP Goals          Problem: SLP    Goal Priority Disciplines Outcome   SLP Goal     SLP    Description: 1.  Pt will consume the least  restrictive PO diet without overt s/s of aspiration.    2. Pt will utilize compensatory swallow strategies during po intake and complete behavioral swallow exercises including effortful swallows and mendelsohn maneuver as tolerated.                       Plan:     Patient to be seen:  2 x/week, 3 x/week   Plan of Care expires:  07/16/25  Plan of Care reviewed with:  patient, sibling (sister)   SLP Follow-Up:  Yes       Time Tracking:     SLP Treatment Date:   07/09/25  Speech Start Time:  0830  Speech Stop Time:  0900     Speech Total Time (min):  30 min    Billable Minutes: Eval Swallow and Oral Function 15 minutes and Self Care/Home Management Training 15 minutes    07/09/2025

## 2025-07-09 NOTE — PROCEDURES
Modified Barium Swallow    Patient Name:  Rocael Perez   MRN:  [id number]      Recommendations:     Recommendations:                General Recommendations:  Dysphagia therapy  Diet recommendations:  Minced & Moist Diet - IDDSI Level 5, Thin liquids - IDDSI Level 0   Aspiration Precautions: 1 bite/sip at a time, Assistance with meals, Avoid talking while eating, Check for pocketing/oral residue, Double swallow with each bite/sip, Eliminate distractions, Feed only when awake/alert, Frequent oral care, HOB to 90 degrees, Meds whole 1 at a time, Monitor for s/s of aspiration, Remain upright 30 minutes post meal, Small bites/sips, and Standard aspiration precautions   General Precautions: Standard, aspiration  Communication strategies:  provide increased time to answer    Referral     Reason for Referral  Patient was referred for a Modified Barium Swallow Study to assess the efficiency of his/her swallow function, rule out aspiration and make recommendations regarding safe dietary consistencies, effective compensatory strategies, and safe eating environment.     Diagnosis: Encephalopathy, metabolic       History:     Past Medical History:   Diagnosis Date    CHF (congestive heart failure)     CLL (chronic lymphocytic leukemia)     HIV (human immunodeficiency virus infection)     Hyperlipidemia     MGUS (monoclonal gammopathy of unknown significance)        Objective:     Current Respiratory Status: 07/09/25    Alert: yes    Cooperative: yes    Follows Directions: yes    Visualization  Patient was seen in the lateral view  Multi level cervical osteophytes present, but not impacting swallow efficiency    Oral Peripheral Examination  Oral Musculature: general weakness (?mild right facial asymmetry)  Dentition: scattered dentition  Secretion Management: coughing on secretions  Mucosal Quality: good  Mandibular Strength and Mobility: impaired  Oral Labial Strength and Mobility: WFL  Lingual Strength and Mobility: impaired  "strength  Velar Elevation: WFL  Buccal Strength and Mobility: decreased tone  Volitional Cough: present, nonproductive  Volitional Swallow: present  Voice Prior to PO Intake: hoarse quality, hyponasality      Objective     Modified Barium Swallow Study  Purpose: to evaluate anatomy and physiology of the oropharyngeal swallow, to determine effectiveness of rehabilitation strategies, and to determine diet consistency and intervention recommendations. The study was performed using the "Gold Standard" of 30 fps with as low as reasonably achievable (ALARA) exposure.     The patient was seen in radiology seated in High Azevedo's position in a video imaging chair for lateral views of the larynx. The study was conducted using Varibar thin liquid (IDDSI 0), Varibar pudding (IDDSI 4), Peaches mixed with Varibar thin liquid (IDDSI 6/0), and solid coated in Varibar pudding (IDDSI 7).Mr. Perez tolerated the procedure well.    Consistency  Presentation  Findings Strategy Attempted Rosenbeck's Penetration/Aspiration Scale (PAS)   Thin (IDDSI 0) Method: ST fed    Volume: tsp, cup sip, straw, large volume/sequential sips    Projection: lateral view Oral phase: Premature spillage to valleculae with piecemeal deglutition.    Pharyngeal phase: Incident of trace posterior silent aspiration of thin liquid via tsp during initial trial.  No significant aspiration with continued liquid intake; however, consistent deep/flash posterior laryngeal penetration noted with large volume consumption/sequential swallows.  No laryngeal penetration or aspiration with small volume consumption via cup or straw.  Trace-mild residue.    Esophageal screen: Aerophagia, dysmotility  Small sip strategy improved safety with elimination of laryngeal penetration/aspiration. Best: (1) Material does not enter the airway    Worst: (8) Material enters the airway, passes below the vocal folds, and no effort is made to eject  *Incident of posterior aspiration " (silent) during initial tsp trial, x1.     Puree (extremely thick/ IDDSI 4) Method: ST fed    Volume: tsp bites    Projection: lateral view Oral phase: Slowed manipulation and transit    Pharyngeal phase: No laryngeal penetration, aspiration. Trace-mild residue.    Esophageal screen:  Dysmotility with stasis within distal esophagus.     Best: (1) Material does not enter the airway    Worst: (1) Material does not enter the airway     Solid (regular/ IDDSI 7) Method: ST fed    Volume: tsp bites    Projection: lateral view Oral phase: Prolonged mastication. Piecemeal deglutition.    Pharyngeal phase: No laryngeal penetration, aspiration. Trace-mild residue.      Best: (1) Material does not enter the airway    Worst: (1) Material does not enter the airway     Mixed consistency (thin/ IDDSI 0 + soft and bite sized/ IDDSI 6) Method: ST fed    Volume: tsp bites    Projection: lateral view Oral phase: Premature spillage of liquid to valleculae with piecemeal deglutition.    Pharyngeal phase: Transient laryngeal penetration of liquid during the swallow. No aspiration. Trace-mild residue.  Best: (1) Material does not enter the airway    Worst: (2) Material enters the airway, remains above the vocal folds, and is ejected from the airway     Barium tablet  Method: ST fed    Volume: single tablet with water bolus(es)    Projection: Lateral view with esophageal sweep Timely and efficient oropharyngeal clearance.  Delayed esophageal transit. Barium tablet lodged in distal esophagus and eventually cleared via GE junction within one-minute.         Treatment   Treatment Time In: 1130  Treatment Time Out: 1200  Total Treatment Time: 30 minutes   Patient educated regarding results and recommendations of the evaluation. See the recommendations section below.    Education: Plan of Care, aspiration precautions and anatomy and physiology of swallow mechanism as it relates to MBSS findings and recommendations were discussed with the  patient. Patient expressed understanding. All questions were answered.     Assessment     Mr. Rocael Perez was referred for Modified Barium Swallow Study with a medical diagnosis of metabolic encephalopathy. The patient presents with mild-moderate oropharyngeal dysphagia as determined by the Dysphagia Outcome and Severity Scale (ROSALIE). Level 4: Mild-Moderate Dysphagia.  Suspected chronic esophageal dysfunction with dysmotility/stasis, aerophagia and possible narrowing within distal esophagus.    Modified Barium Swallow Study (MBSS) revealed oral phase characterized by reduced lingual and labial strength and range of motion for tongue control, bolus preparation and transport. Lip closure was adequate with no labial escape. Bolus prep and mastication was prolonged with complete recollection.  Lingual motion was delayed. There was mild oral residue which was reduced with piecemeal deglutition. The swallow was initiated when the head of the bolus entered the vallecula.    Pharyngeal phase characterized by delayed initiation of swallow across consistencies. The soft palate elevated for complete closure of the velopharyngeal port. During pharyngeal swallow, reduced base of tongue retraction, anterior hyoid excursion, laryngeal elevation, and pharyngeal stripping wave resulted in incomplete epiglottic inversion with consistent deep/posterior laryngeal penetration during large volume/sequential swallows of thin liquids, incident x1 of silent posterior aspiration of thin liquids and trace-mild residue at the base of tongue, valleculae, and posterior pharyngeal wall which was cleared with spontaneous sequential swallow (delayed). No significant aspiration was observed in today's study. Nonproductive coughing present throughout the study, in the absence of aspiration.    Esophageal phase of swallow  was screened in lateral view with intake of pureed barium, large self-regulated sips of liquid, and barium tablet. Screening  significant for dysmotility with retention of barium tablet, disordered peristalsis resulting in retention of solid bolus >1 minute and aerophagia noted. Further esophageal imaging including esophagogastroduodenoscopy (EGD) as well as follow-up with GI is recommended.      Lateral Esophageal sweep post-deglutition of pureed solids.      Lateral Esophageal sweep post-deglutition of Barium tablet.    Impressions: Patient presents with mild-moderate oropharyngeal dysphagia, likely acute on chronic and related to advanced age, hx CVAs and functional decline. In consideration of the Dynamic Imaging Grade of Swallowing Toxicity (DIGEST) (Nahed et al, 2017), patient presents with moderate safety impairment and mild efficiency impairment. Patient appears to be at moderate risk for aspiration related PNA in consideration of three pillars of aspiration pneumonia (Jeff, 2005) including oral health status, overall health/immune status, and laryngeal vestibule closure/severity of dysphagia; However, unable to assess risk related to aspiration pneumonia caused by the aspiration of gastric content. Patient appears to be a fair candidate for behavioral swallow rehabilitation.     Functional Oral Intake Scale (FOIS)  The Functional Oral Intake Scale (FOIS) is an ordinal scale that is used to assess the current status and meaningful change in the oral intake. FOIS levels include:    TUBE DEPENDENT (levels 1-3) 1. No oral intake  2. Tube dependent with minimal/inconsistent oral intake  3. Tube supplements with consistent oral intake      TOTAL ORAL INTAKE (levels 4-7) 4. Total oral intake of a single consistency  5. Total oral intake of multiple consistencies requiring special preparation  6. Total oral intake with no special preparation, but must avoid specific foods or liquid items  7. Total oral intake with no restrictions     Patient is currently judged to be at FOIS level 5.      References:  ANTELMO Aguilar (2005, March).  Pneumonia: Factors Beyond Aspiration. Perspectives in Swallowing and Swallowing Disorders (Dysphagia), 14, 10-16.  Va KA, Quin MP, Sen DA, Frankie JK, Deborah HY, Kael RS, Maggie CD, Arturo SY, Sanjeev CP, Gwen J, Lazarus CL, May A, Naldo J, Zackery JW, Maryan HM, Geraldine JS. Dynamic Imaging Grade of Swallowing Toxicity (DIGEST): Scale development and validation. Cancer. 2017 Jan 1;123(1):62-70. doi: 10.1002/cncr.64538. Epub 2016 Aug 26. PMID: 03221866; PMCID: [id number].  BELINDA Cortez., BETTY Denis., UNRULY Abdi, & BETTY Horne. (2021). Diagnostic Accuracy of an Esophageal Screening Protocol Interpreted by the Speech-Language Pathologist. Dysphagia, 36(6), 7754-4537. [email or web address]    Recommendations:     Consistency Recommendations: Thin liquids (IDDSI 0) and Minced/moist solid consistencies (IDDSI 5).  Medications should be taken Whole in thin liquids.   Risk Management/Swallow Guidelines: use good oral hygiene , sit upright for all PO intake, increase physical mobility as tolerated, behavioral reflux precautions, feed only when wake and alert, small bites and sips, multiple swallows per bolus, allow extra time for meals, remain upright for at least 1-3 hours following any PO intake, and 1:1 feeding assist  Specialist Referrals: GI, PT consult to improve physical mobility, OT consult to improve independent feeding and ADL's, and Dietician consult secondary to risk for malnutrition and dehydration.  Ancillary Tests: Consider EGD   Therapy: Dysphagia therapy is recommended including Mendelsohn and effortful swallows.  Follow-up exam: Follow up swallow study is not indicated at this time.      Goals:   Multidisciplinary Problems       SLP Goals          Problem: SLP    Goal Priority Disciplines Outcome   SLP Goal     SLP    Description: 1.  Pt will consume the least restrictive PO diet without overt s/s of aspiration.    2. Pt will utilize compensatory swallow strategies during po intake and  complete behavioral swallow exercises including effortful swallows and mendelsohn maneuver as tolerated.                       Plan:   Patient to be seen:  Therapy Frequency: 2 x/week, 3 x/week   Plan of Care expires:  07/16/25  Plan of Care reviewed with:  patient, sibling (sister)        Discharge recommendations:  Moderate Intensity Therapy   Barriers to Discharge:  None    Time Tracking:   SLP Treatment Date:   07/09/25  Speech Start Time:  1130  Speech Stop Time:  1200     Speech Total Time (min):  30 min    07/09/2025

## 2025-07-09 NOTE — ASSESSMENT & PLAN NOTE
CT head negative.   Recently started on Wellbutrin  Elevated CPK  Urine studies are pending.  Neuro checks Q 4 hours    7/9/25  Awake, alert, oriented  Mental status improved, back to baseline

## 2025-07-09 NOTE — CONSULTS
[facility] - Telemetry ([facility])  Wound Care    Patient Name:  Rocael Perez   MRN:  [id number]  Date: 7/9/2025  Diagnosis: Encephalopathy, metabolic    History:     Past Medical History:   Diagnosis Date    CHF (congestive heart failure)     CLL (chronic lymphocytic leukemia)     HIV (human immunodeficiency virus infection)     Hyperlipidemia     MGUS (monoclonal gammopathy of unknown significance)        Social History[1]    Precautions:     Allergies as of 07/08/2025 - Reviewed 07/08/2025   Allergen Reaction Noted    Seasonale [levonorgestrel-ethinyl estrad] Other (See Comments) 06/13/2022       [facility] Assessment Details/Treatment     Consulted on this 86 y/o male patient for wound to sacrum. Patient was admitted 7/8/25 for metabolic encephalopathy, PMH significant for CHF, CLL, HIV, CKD stage III, and HLD.     Patient resting in bed, awake and alert family present at bedside. Wound care nurse gave introduction and reason for visit.     Gently positioned patient onto left side. Gently peeled back sacral foam. Cleansed sacrum with bath wipes. Patted dry. Skin appears intact with some slight blanchable redness. No open areas present only a few flakes of dried skin that were gently removed. No wounds present. Applied moisture barrier and reapplied sacral foam.         Recommend continued pressure injury preventions.   No further wound care needs noted at this time, team will sign off re-consult as needed.      07/09/25 1056   WOCN Assessment   WOCN Total Time (mins) 45   Visit Date 07/09/25   Visit Time 1056   Consult Type New   WOCN Speciality Wound   Wound At risk for pressure Injury   Intervention assessed;changed;applied;chart review;orders   Teaching on-going   Skin Interventions   Device Skin Pressure Protection absorbent pad utilized/changed   Pressure Reduction Devices positioning supports utilized   Pressure Reduction Techniques frequent weight shift encouraged;weight shift assistance provided   Skin Protection  incontinence pads utilized   Positioning   Body Position sitting up in bed   Head of Bed (HOB) Positioning HOB at 45 degrees   Positioning/Transfer Devices pillows;in use   Pressure Injury Prevention    Check Moisture Management Pad Done   Sacral Foam Dressing Patient incontinent, barrier cream applied       07/09/2025         [1]   Social History  Socioeconomic History    Marital status: Single   Tobacco Use    Smoking status: Former     Types: Cigarettes     Passive exposure: Past    Smokeless tobacco: Never   Substance and Sexual Activity    Alcohol use: Not Currently    Drug use: Never    Sexual activity: Not Currently     Social Drivers of Health     Financial Resource Strain: Low Risk  (6/20/2025)    Overall Financial Resource Strain (CARDIA)     Difficulty of Paying Living Expenses: Not very hard   Food Insecurity: No Food Insecurity (6/20/2025)    Hunger Vital Sign     Worried About Running Out of Food in the Last Year: Never true     Ran Out of Food in the Last Year: Never true   Transportation Needs: No Transportation Needs (6/20/2025)    PRAPARE - Transportation     Lack of Transportation (Medical): No     Lack of Transportation (Non-Medical): No   Physical Activity: Inactive (6/20/2025)    Exercise Vital Sign     Days of Exercise per Week: 0 days     Minutes of Exercise per Session: 0 min   Stress: No Stress Concern Present (6/20/2025)    [employer] [place] [employer] - Occupational Stress Questionnaire     Feeling of Stress : Not at all   Housing Stability: Low Risk  (6/20/2025)    Housing Stability Vital Sign     Unable to Pay for Housing in the Last Year: No     Homeless in the Last Year: No

## 2025-07-09 NOTE — PLAN OF CARE
Problem: Adult Inpatient Plan of Care  Goal: Plan of Care Review  Outcome: Ongoing  Goal: Patient-Specific Goal (Individualized)  Outcome: Ongoing  Goal: Absence of Hospital-Acquired Illness or Injury  Outcome: Ongoing  Goal: Optimal Comfort and Wellbeing  Outcome: Ongoing  Goal: Readiness for Transition of Care  Outcome: Ongoing     Problem: Diabetes Comorbidity  Goal: Blood Glucose Level Within Targeted Range  Outcome: Ongoing     Problem: Infection  Goal: Absence of Infection Signs and Symptoms  Outcome: Ongoing

## 2025-07-09 NOTE — ASSESSMENT & PLAN NOTE
Lab Results   Component Value Date     (H) 07/09/2025   Creatinine slightly higher than previous but within baseline and encephalopathic.   Orders placed for 2L in ED. Monitor response

## 2025-07-09 NOTE — PLAN OF CARE
PT EVAL complete. Required MIN A for bed mobility, MAX A for STS with RW, unable to stand fully upright, unable to transfer to the chair. Recommending moderate intensity therapy upon d/c.

## 2025-07-09 NOTE — SUBJECTIVE & OBJECTIVE
Review of Systems   All other systems reviewed and are negative.    Objective:     Vital Signs (Most Recent):  Temp: 98.1 °F (36.7 °C) (07/09/25 1641)  Pulse: 88 (07/09/25 1641)  Resp: 18 (07/09/25 1641)  BP: (!) 109/56 (07/09/25 1641)  SpO2: 97 % (07/09/25 1641) Vital Signs (24h Range):  Temp:  [97.8 °F (36.6 °C)-98.3 °F (36.8 °C)] 98.1 °F (36.7 °C)  Pulse:  [] 88  Resp:  [16-18] 18  SpO2:  [94 %-99 %] 97 %  BP: ()/(51-76) 109/56     Weight: 80.2 kg (176 lb 14 oz)  Body mass index is 26.12 kg/m².  No intake or output data in the 24 hours ending 07/09/25 1827      Physical Exam  Constitutional:       General: He is not in acute distress.     Appearance: Normal appearance. He is ill-appearing.   Cardiovascular:      Rate and Rhythm: Regular rhythm. Tachycardia present.      Heart sounds: No murmur heard.  Pulmonary:      Effort: Pulmonary effort is normal. No respiratory distress.      Breath sounds: Normal breath sounds. No wheezing.   Abdominal:      General: There is no distension.      Palpations: Abdomen is soft.      Tenderness: There is no abdominal tenderness.   Neurological:      Mental Status: He is alert.               Significant Labs: All pertinent labs within the past 24 hours have been reviewed.  Recent Lab Results         07/09/25  0501        Albumin 2.2       ALP 54       ALT 24       Anion Gap 7       AST 56       Baso # 0.02       Basophil % 0.5       BILIRUBIN TOTAL 0.4  Comment: For infants and newborns, interpretation of results should be based   on gestational age, weight and in agreement with clinical   observations.    Premature Infant recommended reference ranges:   0-24 hours:  <8.0 mg/dL   24-48 hours: <12.0 mg/dL   3-5 days:    <15.0 mg/dL   6-29 days:   <15.0 mg/dL       BUN 17       Calcium 8.7       Chloride 101       CO2 25              Creatinine 1.3       eGFR 54  Comment: Estimated GFR calculated using the CKD-EPI creatinine (2021) equation.       Eos # 0.03        Eos % 0.8       Glucose 87       Gran # (ANC) 2.05       Hematocrit 25.3       Hemoglobin 8.5       Immature Grans (Abs) 0.01  Comment: Mild elevation in immature granulocytes is non specific and can be seen in a variety of conditions including stress response, acute inflammation, trauma and pregnancy. Correlation with other laboratory and clinical findings is essential.       Immature Granulocytes 0.3       Lymph # 1.14       Lymph % 29.5       Magnesium  1.9       MCH 31.4       MCHC 33.6       MCV 93       Mono # 0.62       Mono % 16.0       MPV 10.3       Neut % 52.9       nRBC 0       Platelet Count 242       Potassium 2.9       PROTEIN TOTAL 6.1       RBC 2.71       RDW 15.9       Sodium 133       WBC 3.87               Significant Imaging: I have reviewed all pertinent imaging results/findings within the past 24 hours.    Fl Modified Barium Swallow Speech   Final Result      See speech pathologist report.         Electronically signed by: Ahsan Aldrich MD   Date:    07/09/2025   Time:    12:54      X-Ray Chest AP Portable   Final Result      1. There is no focal pulmonary infiltrate visualized.   2. There is mild cardiomegaly.   .         Electronically signed by: Reyes Yeh MD   Date:    07/08/2025   Time:    12:00      CT Head Without Contrast   Final Result      1.  Negative for acute intracranial process, considering there is motion artifact on a number of images and subtle abnormalities could be overlooked.  Negative for hemorrhage, or skull fracture.      2.  Cerebral atrophy noted.  Intracranial atherosclerotic disease noted.  Small vessel ischemic changes noted.  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      3.  Nonemergent findings as described above.      All CT scans at this facility are performed  using dose modulation techniques as appropriate to performed exam including the following:  automated exposure control; adjustment of mA  and/or kV according to the patients size (this includes techniques or standardized protocols for targeted exams where dose is matched to indication/reason for exam: i.e. extremities or head);  iterative reconstruction technique.         Electronically signed by: Brendon Taylor MD   Date:    07/08/2025   Time:    11:33

## 2025-07-09 NOTE — PLAN OF CARE
[facility] - Telemetry (Hospital)  Initial Discharge Assessment       Primary Care Provider: Landry Ferguson MD    Admission Diagnosis: Weakness [R53.1]  ROSA ELENA (acute kidney injury) [N17.9]  Non-traumatic rhabdomyolysis [M62.82]  Altered mental status, unspecified altered mental status type [R41.82]    Admission Date: 7/8/2025  Expected Discharge Date:     Transition of Care Barriers: None    Payor: [employer] [place] MEDICARE / Plan: [employer] HMO PPO SPECIAL NEEDS / Product Type: Medicare Advantage /     Extended Emergency Contact Information  Primary Emergency Contact: Debora Hummel  Home Phone: [phone]  Work Phone: [phone]  Mobile Phone: [phone]  Relation: Sister  Preferred language: English   needed? No    Discharge Plan A: Skilled Nursing Facility         [id number] [place] - [address] [facility]  [address] [place] [address]  Phone: [phone] Fax: [phone]      Initial Assessment (most recent)       Adult Discharge Assessment - 07/09/25 1214          Discharge Assessment    Assessment Type Discharge Planning Assessment     Confirmed/corrected address, phone number and insurance Yes     Confirmed Demographics Correct on Facesheet     Source of Information patient     Communicated YANCY with patient/caregiver Date not available/Unable to determine     People in Home sibling(s)     Name(s) of People in Home Debora Seymour     Facility Arrived From: Home     Do you expect to return to your current living situation? Yes     Do you have help at home or someone to help you manage your care at home? Yes     Who are your caregiver(s) and their phone number(s)? Sister     Prior to hospitilization cognitive status: Unable to Assess     Current cognitive status: Unable to Assess     Walking or Climbing Stairs Difficulty yes     Walking or Climbing Stairs ambulation difficulty, requires equipment     Mobility Management walker at home     Equipment  Currently Used at Home walker, rolling     Readmission within 30 days? No     Patient currently being followed by outpatient case management? No     Do you currently have service(s) that help you manage your care at home? No     Do you take prescription medications? Yes     Do you have prescription coverage? Yes     Do you have any problems affording any of your prescribed medications? No     Is the patient taking medications as prescribed? yes     Who is going to help you get home at discharge? Facility transport     How do you get to doctors appointments? family or friend will provide     Are you on dialysis? No     Do you take coumadin? No     Discharge Plan A Skilled Nursing Facility     DME Needed Upon Discharge  none     Discharge Plan discussed with: Patient;Sibling     Transition of Care Barriers None                     Readmission Assessment (most recent)       Readmission Assessment - 07/09/25 1218          Readmission    Was this a planned readmission? No     Why were you hospitalized in the last 30 days? Abnormal labs, kidney failure     Why were you readmitted? Alarmed about signs/symptoms;New medical problem     When you left the hospital where did you go? SNF     Did patient/caregiver refused recommended DC plan? No     Tell me about what happened between when you left the hospital and the day you returned. AMS at SNF                    Anticipated DC dispo: Return to [facility] SNF unless otherwise recommended   Prior Level of Function: Presented from SNF; prior to SNF, pt was living at home with sister, Debora, who is primary caregiver. Pt ambulates with a walker and sister assists with transport needs  PCP: MD Marty      Comments: If plans are to return to SNF, authorization needed for return. Pt has covered SNF days remaining per facility admissions. Referral in Epic. PT/OT eval pending. SW to follow.     Pt's whiteboard updated with CM contact and anticipated discharge disposition. SW to  remain available as needed.

## 2025-07-10 LAB
ABSOLUTE EOSINOPHIL (OHS): 0.08 K/UL
ABSOLUTE MONOCYTE (OHS): 0.66 K/UL (ref 0.3–1)
ABSOLUTE NEUTROPHIL COUNT (OHS): 1.9 K/UL (ref 1.8–7.7)
ANION GAP (OHS): 7 MMOL/L (ref 8–16)
BASOPHILS # BLD AUTO: 0.04 K/UL
BASOPHILS NFR BLD AUTO: 1 %
BUN SERPL-MCNC: 17 MG/DL (ref 8–23)
CALCIUM SERPL-MCNC: 9.3 MG/DL (ref 8.7–10.5)
CHLORIDE SERPL-SCNC: 101 MMOL/L (ref 95–110)
CK SERPL-CCNC: 406 U/L (ref 20–200)
CO2 SERPL-SCNC: 25 MMOL/L (ref 23–29)
CREAT SERPL-MCNC: 1.2 MG/DL (ref 0.5–1.4)
ERYTHROCYTE [DISTWIDTH] IN BLOOD BY AUTOMATED COUNT: 16.1 % (ref 11.5–14.5)
GFR SERPLBLD CREATININE-BSD FMLA CKD-EPI: 59 ML/MIN/1.73/M2
GLUCOSE SERPL-MCNC: 96 MG/DL (ref 70–110)
HCT VFR BLD AUTO: 27.7 % (ref 40–54)
HGB BLD-MCNC: 9.2 GM/DL (ref 14–18)
IMM GRANULOCYTES # BLD AUTO: 0.01 K/UL (ref 0–0.04)
IMM GRANULOCYTES NFR BLD AUTO: 0.3 % (ref 0–0.5)
LYMPHOCYTES # BLD AUTO: 1.26 K/UL (ref 1–4.8)
MAGNESIUM SERPL-MCNC: 1.8 MG/DL (ref 1.6–2.6)
MCH RBC QN AUTO: 31.7 PG (ref 27–31)
MCHC RBC AUTO-ENTMCNC: 33.2 G/DL (ref 32–36)
MCV RBC AUTO: 96 FL (ref 82–98)
NUCLEATED RBC (/100WBC) (OHS): 0 /100 WBC
PLATELET # BLD AUTO: 247 K/UL (ref 150–450)
PMV BLD AUTO: 9.9 FL (ref 9.2–12.9)
POTASSIUM SERPL-SCNC: 3.3 MMOL/L (ref 3.5–5.1)
RBC # BLD AUTO: 2.9 M/UL (ref 4.6–6.2)
RELATIVE EOSINOPHIL (OHS): 2 %
RELATIVE LYMPHOCYTE (OHS): 31.9 % (ref 18–48)
RELATIVE MONOCYTE (OHS): 16.7 % (ref 4–15)
RELATIVE NEUTROPHIL (OHS): 48.1 % (ref 38–73)
SODIUM SERPL-SCNC: 133 MMOL/L (ref 136–145)
WBC # BLD AUTO: 3.95 K/UL (ref 3.9–12.7)

## 2025-07-10 PROCEDURE — 97530 THERAPEUTIC ACTIVITIES: CPT | Mod: HCNC,CQ

## 2025-07-10 PROCEDURE — 83735 ASSAY OF MAGNESIUM: CPT | Mod: HCNC | Performed by: HOSPITALIST

## 2025-07-10 PROCEDURE — 63600175 PHARM REV CODE 636 W HCPCS: Mod: HCNC | Performed by: NURSE PRACTITIONER

## 2025-07-10 PROCEDURE — 97530 THERAPEUTIC ACTIVITIES: CPT | Mod: HCNC

## 2025-07-10 PROCEDURE — 82310 ASSAY OF CALCIUM: CPT | Mod: HCNC | Performed by: HOSPITALIST

## 2025-07-10 PROCEDURE — 21400001 HC TELEMETRY ROOM: Mod: HCNC

## 2025-07-10 PROCEDURE — 25000003 PHARM REV CODE 250: Mod: HCNC | Performed by: HOSPITALIST

## 2025-07-10 PROCEDURE — 92526 ORAL FUNCTION THERAPY: CPT | Mod: HCNC

## 2025-07-10 PROCEDURE — 36415 COLL VENOUS BLD VENIPUNCTURE: CPT | Mod: HCNC | Performed by: HOSPITALIST

## 2025-07-10 PROCEDURE — 85025 COMPLETE CBC W/AUTO DIFF WBC: CPT | Mod: HCNC | Performed by: HOSPITALIST

## 2025-07-10 PROCEDURE — 82550 ASSAY OF CK (CPK): CPT | Mod: HCNC | Performed by: HOSPITALIST

## 2025-07-10 PROCEDURE — 25000003 PHARM REV CODE 250: Mod: HCNC | Performed by: NURSE PRACTITIONER

## 2025-07-10 RX ORDER — POLYETHYLENE GLYCOL 3350 17 G/17G
17 POWDER, FOR SOLUTION ORAL DAILY
Status: DISCONTINUED | OUTPATIENT
Start: 2025-07-10 | End: 2025-07-11 | Stop reason: HOSPADM

## 2025-07-10 RX ORDER — POTASSIUM CHLORIDE 750 MG/1
10 TABLET, EXTENDED RELEASE ORAL ONCE
Status: COMPLETED | OUTPATIENT
Start: 2025-07-10 | End: 2025-07-10

## 2025-07-10 RX ADMIN — POLYETHYLENE GLYCOL 3350 17 G: 17 POWDER, FOR SOLUTION ORAL at 01:07

## 2025-07-10 RX ADMIN — SODIUM CHLORIDE: 9 INJECTION, SOLUTION INTRAVENOUS at 03:07

## 2025-07-10 RX ADMIN — OXYBUTYNIN CHLORIDE 5 MG: 5 TABLET, EXTENDED RELEASE ORAL at 08:07

## 2025-07-10 RX ADMIN — BICTEGRAVIR SODIUM, EMTRICITABINE, AND TENOFOVIR ALAFENAMIDE FUMARATE 1 TABLET: 50; 200; 25 TABLET ORAL at 08:07

## 2025-07-10 RX ADMIN — ASPIRIN 81 MG: 81 TABLET, COATED ORAL at 08:07

## 2025-07-10 RX ADMIN — ENOXAPARIN SODIUM 40 MG: 40 INJECTION SUBCUTANEOUS at 05:07

## 2025-07-10 RX ADMIN — POTASSIUM CHLORIDE 10 MEQ: 750 TABLET, EXTENDED RELEASE ORAL at 10:07

## 2025-07-10 RX ADMIN — SENNOSIDES AND DOCUSATE SODIUM 1 TABLET: 50; 8.6 TABLET ORAL at 03:07

## 2025-07-10 NOTE — PT/OT/SLP PROGRESS
Speech Language Pathology Treatment    Patient Name:  Rocael Perez   MRN:  [id number]  Admitting Diagnosis: Rhabdomyolysis    Recommendations:                 General Recommendations:  Dysphagia therapy  Diet recommendations:  Minced & Moist Diet - IDDSI Level 5, Liquid Diet Level: Thin liquids - IDDSI Level 0   Aspiration Precautions: 1 bite/sip at a time, Assistance with meals, Avoid talking while eating, Check for pocketing/oral residue, Double swallow with each bite/sip, Eliminate distractions, Feed only when awake/alert, Frequent oral care, HOB to 90 degrees, Meds whole 1 at a time, Monitor for s/s of aspiration, Remain upright 30 minutes post meal, Small bites/sips, and Standard aspiration precautions   General Precautions: Standard, aspiration  Communication strategies:  provide increased time to answer  Discharge recommendations:  Moderate Intensity Therapy   Barriers to Discharge:  None    Assessment:     Rocael Perez is an 85 y.o. male admitted to [facility] acute with dx metabolic encephalopathy, hyponatremia, malnutrition and rhabdomyolysis. Symptoms upon admission were described as hallucinations, confusion and lethargy with decreased appetite.  He presents with improved mentation; however, baseline cognitive deficits present.  He is able to verbalize needs and engage in basic conversation; although intelligibility is reduced.  Functional decline noted this admission in comparison to recent evaluation approximately two weeks ago with difficulty performing basic ADL's and worsening mobility. See PT/OT evaluations.      7/10/25--Nonproductive cough persists with and without intake, and was also present during the MBSS in the absence of aspiration.  Current CxR revealing clear lungs. MBSS completed 7/9/25 with notable decline in swallow safety/efficiency compared to recent  IP MBSS 6/20/25. He is recommended for IDDSI 5-minced/moist solids and IDDSI 0-thin liquids, following aspiration precautions and meal  assist.  Patient is tolerating diet without incident and on RA.  Pt would benefit from dysphagia intervention at a moderate intensity level of treatment; however, prognosis is guarded. ACP and goals of care conversation is indicated s/t pt advanced age, recurrent admission and notable functional decline, despite recent SNF admission.     Subjective     Patient seen bedside for ST.  He was sleeping upon arrival; however easily arousable and able to participate in therapy session.  Patient stated feeling better today. No complaints of pain. No family present.    Patient goals:  To get stronger     Pain/Comfort:  Pain Rating 1: 0/10  Pain Rating Post-Intervention 1: 0/10  Pain Rating 2: 0/10  Pain Rating Post-Intervention 2: 0/10    Respiratory Status: Room air    Objective:     Has the patient been evaluated by SLP for swallowing?   Yes  Keep patient NPO? No   Current Respiratory Status:  Room air    Patient consuming IDDSI 5 minced/moist solids and IDDSI 0-thin liquids without overt signs or symptoms of aspiration.  His mentation has improved, and he currently presents at baseline with prior cognitive deficits noted during previous evaluation.  He is able to communicate acute needs.  ST reviewed results of MBSS with patient's including compensatory strategy use, specifically small single sips during liquid consumption, which improved patient safety during the study. Behavioral swallow exercises completed today, including effortful swallows.  Difficulty with performance of Mendelson maneuver. Questionable carryover however, secondary to baseline cognitive impairment.  Fatigue noted following exercise completion.      Goals:   Multidisciplinary Problems       SLP Goals          Problem: SLP    Goal Priority Disciplines Outcome   SLP Goal     SLP Progressing   Description: 1.  Pt will consume the least restrictive PO diet without overt s/s of aspiration.    2. Pt will utilize compensatory swallow strategies during po  intake and complete behavioral swallow exercises including effortful swallows and mendelsohn maneuver as tolerated.                       Plan:     Patient to be seen:  2 x/week, 3 x/week   Plan of Care expires:  07/16/25  Plan of Care reviewed with:  patient   SLP Follow-Up:  Yes       Time Tracking:     SLP Treatment Date:   07/10/25  Speech Start Time:  0845  Speech Stop Time:  0900     Speech Total Time (min):  15 min    Billable Minutes: Treatment Swallowing Dysfunction 15 minutes    07/10/2025

## 2025-07-10 NOTE — PT/OT/SLP PROGRESS
Occupational Therapy   Treatment    Name: Rocael Perez  MRN: [id number]  Admitting Diagnosis:  Rhabdomyolysis       Recommendations:     Discharge Recommendations: Moderate Intensity Therapy  Discharge Equipment Recommendations:  to be determined by next level of care  Barriers to discharge:       Assessment:     Rocael Perez is a 85 y.o. male with a medical diagnosis of Rhabdomyolysis.  He presents with the following performance deficits affecting function are weakness, impaired endurance, impaired self care skills, impaired functional mobility, gait instability, impaired balance, pain, impaired cardiopulmonary response to activity, decreased safety awareness, decreased lower extremity function, decreased upper extremity function, decreased coordination.     Rehab Prognosis:  Fair; patient would benefit from acute skilled OT services to address these deficits and reach maximum level of function.       Plan:     Patient to be seen 2 x/week to address the above listed problems via self-care/home management, therapeutic activities, therapeutic exercises  Plan of Care Expires: 07/23/25  Plan of Care Reviewed with: patient    Subjective     Chief Complaint: I'm ready to try this  Patient/Family Comments/goals: to get stronger and return home   Pain/Comfort:  Pain Rating 1: 0/10    Objective:     Communicated with: nursezackery, prior to session.  Patient found supine with peripheral IV, telemetry, bed alarm upon OT entry to room.    General Precautions: Standard, fall    Orthopedic Precautions:N/A  Braces: N/A  Respiratory Status: Room air     Occupational Performance:     Bed Mobility:    Patient completed Scooting  Anterior- min assist   Lateral/posterior- mod x2   Patient completed Supine to Sit with moderate assistance   Jeanmarie sitting x10 mins CGA with vc for midline awareness   L lateral lean noted, req vc for correction     Functional Mobility/Transfers:  Patient completed Sit <> Stand Transfer with maximal  assistance and of 2 persons  with  hand-held assist and rolling walker   Pt req x2 trials   No success with either with only half clearance noted throughout  Req increased rest breaks throughout tx   No functional transfer this date  for safety       [facility] 6 Click ADL: 10    Treatment & Education:  Therex reviewed in all planes and encouraged to complete throughout the day, while sitting supported EOB. Pt supported with wedges sitting safely EOB, with wife present at bedside. OT role, plan of care, progression of goals, importance of continued OOB activity, ADL/functional transfer and mobility retraining, call don't fall, safety precautions, fall prevention.  Explained importance of sitting OOB to improve CV endurance, and encouraged to complete at least 2 hours throughout the day.       Patient left sitting edge of bed propped with wedges with all lines intact, call button in reach, and wife present    GOALS:   Multidisciplinary Problems       Occupational Therapy Goals          Problem: Occupational Therapy    Goal Priority Disciplines Outcome Interventions   Occupational Therapy Goal     OT, PT/OT Progressing    Description: O.T. GOALS TO BE MET BY 7-23-25  PT WILL TOLERATE 1 SE X 12 REPS B UE ROM EXERCISE  PT WILL BE MOD A WITH BEDSIDE CHAIR TRANSFER  MOD A WITH UE DRESSING                         DME Justifications:  No DME recommended requiring DME justifications    Time Tracking:     OT Date of Treatment: 07/10/25  OT Start Time: 1310  OT Stop Time: 1340  OT Total Time (min): 30 min    Billable Minutes:Therapeutic Activity 30  ELSA Flores  OTR/L readily available for conference at the time of the provision of services- Shala Olivera OT  OT/OBINNA: OBINNA     Number of OBINNA visits since last OT visit: 1    7/10/2025

## 2025-07-10 NOTE — PLAN OF CARE
07/10/25 1320   Post-Acute Status   Post-Acute Authorization Placement   Post-Acute Placement Status Pending payor review/awaiting authorization (if required)   Discharge Delays None known at this time   Discharge Plan   Discharge Plan A Skilled Nursing Facility     Auth submitted for pts return to [facility]  SW to follow

## 2025-07-10 NOTE — PT/OT/SLP PROGRESS
Physical Therapy  Treatment    Rocael Perez   MRN: [id number]   Admitting Diagnosis: Rhabdomyolysis    PT Received On: 07/10/25  PT Start Time: 1310     PT Stop Time: 1340    PT Total Time (min): 30 min       Billable Minutes:  Therapeutic Activity 30    Treatment Type: Treatment  PT/PTA: PTA     Number of PTA visits since last PT visit: 1       General Precautions: Standard, fall  Orthopedic Precautions: N/A  Braces: N/A  Respiratory Status: Room air    Spiritual, Cultural Beliefs, Mormonism Practices, Values that Affect Care: no    Subjective:  Completed Epic chart review prior to PT session.   Patient agreed to participate in PT session.     Pain/Comfort  Pain Rating 1: 0/10    Objective:   Patient found with: peripheral IV, telemetry    Supine > sit EOB: Mod A     Forward scoot towards EOB: Min A     Seated EOB x 10 min total focusing on increased tolerance to upright position, core stability, trunk control and CV endurance.   Maintained self supported sitting balance with CGA  Maintained unsupported sitting balance with  Min A due to L lateral lean that worsened with fatigue. Able to correct when cued to return to midline.    STS from EOB > RW: only able to achieve 1/2 clearance with Max A of 2     STS from EOB w/ HHAx2 and B knee block/extension assist: only able to achieve 1/2 clearance with Max A of 2     T/F to chair deemed unsafe at this time due to patient unable to achieve upright positioning or adequate B knee extension.    Reviewed AROM TE to BLE including: hip flex/ext, knee flex/ext, ankle PF/DF  To be completed a minimum of 10 reps for each LE in order to promote return of function, strength and ROM.     Educated patient on importance of increased tolerance to upright position and direct impact on CV endurance and strength. Patient encouraged to utilize elevated HOB/supported EOB to simulate chair position until able to safely complete chair T/F. Patient given a minimum goal of majority of the day  to be spent in upright, especially with all meals. Encouraged patient to perform AROM TE to BLE throughout the day within all available planes of motion. Re enforced importance of utilizing call light to meet needs in room and not attempt to get up without staff assistance. Patient verbalized understanding and agreed to comply.    AM-PAC 6 CLICK MOBILITY  How much help from another person does this patient currently need?   1 = Unable, Total/Dependent Assistance  2 = A lot, Maximum/Moderate Assistance  3 = A little, Minimum/Contact Guard/Supervision  4 = None, Modified [place]/Independent    Turning over in bed (including adjusting bedclothes, sheets and blankets)?: 2  Sitting down on and standing up from a chair with arms (e.g., wheelchair, bedside commode, etc.): 2  Moving from lying on back to sitting on the side of the bed?: 2  Moving to and from a bed to a chair (including a wheelchair)?: 1  Need to walk in hospital room?: 1  Climbing 3-5 steps with a railing?: 1 (NT)  Basic Mobility Total Score: 9    AM-PAC Raw Score CMS G-Code Modifier Level of Impairment Assistance   6 % Total / Unable   7 - 9 CM 80 - 100% Maximal Assist   10 - 14 CL 60 - 80% Moderate Assist   15 - 19 CK 40 - 60% Moderate Assist   20 - 22 CJ 20 - 40% Minimal Assist   23 CI 1-20% SBA / CGA   24 CH 0% Independent/ Mod I     Patient left sitting edge of bed (supported with cushions & bed in lowest position) with call button in reach and visitor present and seated near patient.    Assessment:  Rocael Perez is a 85 y.o. male with a medical diagnosis of Rhabdomyolysis and presents with overall decline in functional mobility. Patient would continue to benefit from skilled PT to address functional limitations listed below in order to return to PLOF/decrease caregiver burden. Rapid fatigue noted throughout.     Rehab identified problem list/impairments: weakness, impaired endurance, impaired self care skills, impaired functional  mobility, gait instability, impaired balance, decreased safety awareness, decreased lower extremity function, decreased upper extremity function, decreased coordination, impaired cognition, decreased ROM, impaired cardiopulmonary response to activity    Rehab potential is fair.    Activity tolerance: Poor    Discharge recommendations: Moderate Intensity Therapy      Barriers to discharge:      Equipment recommendations: to be determined by next level of care     GOALS:   Multidisciplinary Problems       Physical Therapy Goals          Problem: Physical Therapy    Goal Priority Disciplines Outcome Interventions   Physical Therapy Goal     PT, PT/OT     Description: Goals to be met by 7/23/25.  1. Pt will complete bed mobility SBA.  2. Pt will complete sit to stand MIN A.  3. Pt will ambulate 20ft MIN A using RW.  4. Pt will increase AMPAC score by 2 points to progress functional mobility.                         DME Justifications:  No DME recommended requiring DME justifications    PLAN:    Patient to be seen 3 x/week to address the above listed problems via gait training, therapeutic activities, therapeutic exercises, neuromuscular re-education  Plan of Care expires: 07/23/25  Plan of Care reviewed with: patient         07/10/2025

## 2025-07-10 NOTE — PLAN OF CARE
"      A240/A240 ASridevi Perez is a 85 y.o.male admitted on 7/8/2025 for Rhabdomyolysis   Code Status: Full Code MRN: [id number]   Review of patient's allergies indicates:   Allergen Reactions    Seasonale [levonorgestrel-ethinyl estrad] Other (See Comments)     Past Medical History:   Diagnosis Date    CHF (congestive heart failure)     CLL (chronic lymphocytic leukemia)     HIV (human immunodeficiency virus infection)     Hyperlipidemia     MGUS (monoclonal gammopathy of unknown significance)       PRN meds    acetaminophen, 650 mg, Q4H PRN  albuterol-ipratropium, 3 mL, Q4H PRN  aluminum-magnesium hydroxide-simethicone, 30 mL, QID PRN  dextrose 50%, 12.5 g, PRN  dextrose 50%, 25 g, PRN  glucagon (human recombinant), 1 mg, PRN  glucose, 16 g, PRN  glucose, 24 g, PRN  melatonin, 6 mg, Nightly PRN  naloxone, 0.02 mg, PRN  ondansetron, 8 mg, Q8H PRN  senna-docusate, 1 tablet, BID PRN  simethicone, 1 tablet, QID PRN           Pt oriented x4.  VSS.  Pt remained afebrile throughout this shift.   All meds administered per order.   Pt remained free of falls this shift.   Plan of care reviewed. Patient verbalizes understanding.   Pt moving/turning independently.   Bed low, side rails up x 2, wheels locked, call light in reach.   Patient instructed to call for assistance.  Patient education provided  Will continue to monitor.              Orientation: disoriented to, situation  [place] Coma Scale Score: 15     Lead Monitored: Lead II Rhythm: normal sinus rhythm Frequency/Ectopy: PVCs  Cardiac/Telemetry Box Number: [id number]  VTE Core Measure: Pharmacological prophylaxis initiated/maintained Last Bowel Movement:  (Pt. said "A while")  Diet Minced & Moist (IDDSI Level 5) Standard Tray  Voiding Characteristics: incontinence  Mauro Score: 14  Fall Risk Score: 14  Accucheck []   Freq?      Lines/Drains/Airways       Peripheral Intravenous Line  Duration             Peripheral IV 07/10/25 0407 22 G Anterior;Right Forearm <1 day      " "               A240/A240 ASridevi Perez is a 85 y.o.male admitted on 7/8/2025 for Rhabdomyolysis   Code Status: Full Code MRN: [id number]   Review of patient's allergies indicates:   Allergen Reactions    Seasonale [levonorgestrel-ethinyl estrad] Other (See Comments)     Past Medical History:   Diagnosis Date    CHF (congestive heart failure)     CLL (chronic lymphocytic leukemia)     HIV (human immunodeficiency virus infection)     Hyperlipidemia     MGUS (monoclonal gammopathy of unknown significance)       PRN meds    acetaminophen, 650 mg, Q4H PRN  albuterol-ipratropium, 3 mL, Q4H PRN  aluminum-magnesium hydroxide-simethicone, 30 mL, QID PRN  dextrose 50%, 12.5 g, PRN  dextrose 50%, 25 g, PRN  glucagon (human recombinant), 1 mg, PRN  glucose, 16 g, PRN  glucose, 24 g, PRN  melatonin, 6 mg, Nightly PRN  naloxone, 0.02 mg, PRN  ondansetron, 8 mg, Q8H PRN  senna-docusate, 1 tablet, BID PRN  simethicone, 1 tablet, QID PRN           Pt oriented x4.  VSS.  Pt remained afebrile throughout this shift.   All meds administered per order.   Pt remained free of falls this shift.   Plan of care reviewed. Patient verbalizes understanding.   Pt moving/turning independently.   Bed low, side rails up x 2, wheels locked, call light in reach.   Patient instructed to call for assistance.  Patient education provided  Will continue to monitor.              Orientation: disoriented to, situation  [place] Coma Scale Score: 15     Lead Monitored: Lead II Rhythm: normal sinus rhythm Frequency/Ectopy: PVCs  Cardiac/Telemetry Box Number: [id number]  VTE Core Measure: Pharmacological prophylaxis initiated/maintained Last Bowel Movement:  (Pt. said "A while")  Diet Minced & Moist (IDDSI Level 5) Standard Tray  Voiding Characteristics: incontinence  Mauro Score: 14  Fall Risk Score: 14  Accucheck []   Freq?      Lines/Drains/Airways       Peripheral Intravenous Line  Duration             Peripheral IV 07/10/25 0407 22 G Anterior;Right Forearm <1 " "day                     A240/A240 UNRULY Perez is a 85 y.o.male admitted on 7/8/2025 for Rhabdomyolysis   Code Status: Full Code MRN: [id number]   Review of patient's allergies indicates:   Allergen Reactions    Seasonale [levonorgestrel-ethinyl estrad] Other (See Comments)     Past Medical History:   Diagnosis Date    CHF (congestive heart failure)     CLL (chronic lymphocytic leukemia)     HIV (human immunodeficiency virus infection)     Hyperlipidemia     MGUS (monoclonal gammopathy of unknown significance)       PRN meds    acetaminophen, 650 mg, Q4H PRN  albuterol-ipratropium, 3 mL, Q4H PRN  aluminum-magnesium hydroxide-simethicone, 30 mL, QID PRN  dextrose 50%, 12.5 g, PRN  dextrose 50%, 25 g, PRN  glucagon (human recombinant), 1 mg, PRN  glucose, 16 g, PRN  glucose, 24 g, PRN  melatonin, 6 mg, Nightly PRN  naloxone, 0.02 mg, PRN  ondansetron, 8 mg, Q8H PRN  senna-docusate, 1 tablet, BID PRN  simethicone, 1 tablet, QID PRN           Pt oriented x4.  VSS.  Pt remained afebrile throughout this shift.   All meds administered per order.   Pt remained free of falls this shift.   Plan of care reviewed. Patient verbalizes understanding.   Pt moving/turning independently.   Bed low, side rails up x 2, wheels locked, call light in reach.   Patient instructed to call for assistance.  Patient education provided  Will continue to monitor.              Orientation: disoriented to, situation  [place] Coma Scale Score: 15     Lead Monitored: Lead II Rhythm: normal sinus rhythm Frequency/Ectopy: PVCs  Cardiac/Telemetry Box Number: [id number]  VTE Core Measure: Pharmacological prophylaxis initiated/maintained Last Bowel Movement:  (Pt. said "A while")  Diet Minced & Moist (IDDSI Level 5) Standard Tray  Voiding Characteristics: incontinence  Mauro Score: 14  Fall Risk Score: 14  Accucheck []   Freq?      Lines/Drains/Airways       Peripheral Intravenous Line  Duration             Peripheral IV 07/10/25 0407 22 G Anterior;Right " "Forearm <1 day                     A240/A240 ASridevi Perez is a 85 y.o.male admitted on 7/8/2025 for Rhabdomyolysis   Code Status: Full Code MRN: [id number]   Review of patient's allergies indicates:   Allergen Reactions    Seasonale [levonorgestrel-ethinyl estrad] Other (See Comments)     Past Medical History:   Diagnosis Date    CHF (congestive heart failure)     CLL (chronic lymphocytic leukemia)     HIV (human immunodeficiency virus infection)     Hyperlipidemia     MGUS (monoclonal gammopathy of unknown significance)       PRN meds    acetaminophen, 650 mg, Q4H PRN  albuterol-ipratropium, 3 mL, Q4H PRN  aluminum-magnesium hydroxide-simethicone, 30 mL, QID PRN  dextrose 50%, 12.5 g, PRN  dextrose 50%, 25 g, PRN  glucagon (human recombinant), 1 mg, PRN  glucose, 16 g, PRN  glucose, 24 g, PRN  melatonin, 6 mg, Nightly PRN  naloxone, 0.02 mg, PRN  ondansetron, 8 mg, Q8H PRN  senna-docusate, 1 tablet, BID PRN  simethicone, 1 tablet, QID PRN           Pt oriented x4.  VSS.  Pt remained afebrile throughout this shift.   All meds administered per order.   Pt remained free of falls this shift.   Plan of care reviewed. Patient verbalizes understanding.   Pt moving/turning independently.   Bed low, side rails up x 2, wheels locked, call light in reach.   Patient instructed to call for assistance.  Patient education provided  Will continue to monitor.              Orientation: disoriented to, situation  [place] Coma Scale Score: 15     Lead Monitored: Lead II Rhythm: normal sinus rhythm Frequency/Ectopy: PVCs  Cardiac/Telemetry Box Number: [id number]  VTE Core Measure: Pharmacological prophylaxis initiated/maintained Last Bowel Movement:  (Pt. said "A while")  Diet Minced & Moist (IDDSI Level 5) Standard Tray  Voiding Characteristics: incontinence  [place] Score: 14  Fall Risk Score: 14  Accucheck []   Freq?      Lines/Drains/Airways       Peripheral Intravenous Line  Duration             Peripheral IV 07/10/25 0407 22 G " "Anterior;Right Forearm <1 day                     A240/A240 UNRULY Perez is a 85 y.o.male admitted on 7/8/2025 for Rhabdomyolysis   Code Status: Full Code MRN: [id number]   Review of patient's allergies indicates:   Allergen Reactions    Seasonale [levonorgestrel-ethinyl estrad] Other (See Comments)     Past Medical History:   Diagnosis Date    CHF (congestive heart failure)     CLL (chronic lymphocytic leukemia)     HIV (human immunodeficiency virus infection)     Hyperlipidemia     MGUS (monoclonal gammopathy of unknown significance)       PRN meds    acetaminophen, 650 mg, Q4H PRN  albuterol-ipratropium, 3 mL, Q4H PRN  aluminum-magnesium hydroxide-simethicone, 30 mL, QID PRN  dextrose 50%, 12.5 g, PRN  dextrose 50%, 25 g, PRN  glucagon (human recombinant), 1 mg, PRN  glucose, 16 g, PRN  glucose, 24 g, PRN  melatonin, 6 mg, Nightly PRN  naloxone, 0.02 mg, PRN  ondansetron, 8 mg, Q8H PRN  senna-docusate, 1 tablet, BID PRN  simethicone, 1 tablet, QID PRN           Pt oriented x4.  VSS.  Pt remained afebrile throughout this shift.   All meds administered per order.   Pt remained free of falls this shift.   Plan of care reviewed. Patient verbalizes understanding.   Pt moving/turning independently.   Bed low, side rails up x 2, wheels locked, call light in reach.   Patient instructed to call for assistance.  Patient education provided  Will continue to monitor.              Orientation: disoriented to, situation  [place] Coma Scale Score: 15     Lead Monitored: Lead II Rhythm: normal sinus rhythm Frequency/Ectopy: PVCs  Cardiac/Telemetry Box Number: [id number]  VTE Core Measure: Pharmacological prophylaxis initiated/maintained Last Bowel Movement:  (Pt. said "A while")  Diet Minced & Moist (IDDSI Level 5) Standard Tray  Voiding Characteristics: incontinence  Mauro Score: 14  Fall Risk Score: 14  Accucheck []   Freq?      Lines/Drains/Airways       Peripheral Intravenous Line  Duration             Peripheral IV 07/10/25 " "0407 22 G Anterior;Right Forearm <1 day                     A240/A240 ASridevi Perez is a 85 y.o.male admitted on 7/8/2025 for Rhabdomyolysis   Code Status: Full Code MRN: [id number]   Review of patient's allergies indicates:   Allergen Reactions    Seasonale [levonorgestrel-ethinyl estrad] Other (See Comments)     Past Medical History:   Diagnosis Date    CHF (congestive heart failure)     CLL (chronic lymphocytic leukemia)     HIV (human immunodeficiency virus infection)     Hyperlipidemia     MGUS (monoclonal gammopathy of unknown significance)       PRN meds    acetaminophen, 650 mg, Q4H PRN  albuterol-ipratropium, 3 mL, Q4H PRN  aluminum-magnesium hydroxide-simethicone, 30 mL, QID PRN  dextrose 50%, 12.5 g, PRN  dextrose 50%, 25 g, PRN  glucagon (human recombinant), 1 mg, PRN  glucose, 16 g, PRN  glucose, 24 g, PRN  melatonin, 6 mg, Nightly PRN  naloxone, 0.02 mg, PRN  ondansetron, 8 mg, Q8H PRN  senna-docusate, 1 tablet, BID PRN  simethicone, 1 tablet, QID PRN           Pt oriented x4.  VSS.  Pt remained afebrile throughout this shift.   All meds administered per order.   Pt remained free of falls this shift.   Plan of care reviewed. Patient verbalizes understanding.   Pt moving/turning independently.   Bed low, side rails up x 2, wheels locked, call light in reach.   Patient instructed to call for assistance.  Patient education provided  Will continue to monitor.              Orientation: disoriented to, situation  Whitney Coma Scale Score: 15     Lead Monitored: Lead II Rhythm: normal sinus rhythm Frequency/Ectopy: PVCs  Cardiac/Telemetry Box Number: [id number]  VTE Core Measure: Pharmacological prophylaxis initiated/maintained Last Bowel Movement:  (Pt. said "A while")  Diet Minced & Moist (IDDSI Level 5) Standard Tray  Voiding Characteristics: incontinence  Mauro Score: 14  Fall Risk Score: 14  Accucheck []   Freq?      Lines/Drains/Airways       Peripheral Intravenous Line  Duration             Peripheral IV " "07/10/25 0407 22 G Anterior;Right Forearm <1 day                     A240/A240 UNRULY Perez is a 85 y.o.male admitted on 7/8/2025 for Rhabdomyolysis   Code Status: Full Code MRN: [id number]   Review of patient's allergies indicates:   Allergen Reactions    Seasonale [levonorgestrel-ethinyl estrad] Other (See Comments)     Past Medical History:   Diagnosis Date    CHF (congestive heart failure)     CLL (chronic lymphocytic leukemia)     HIV (human immunodeficiency virus infection)     Hyperlipidemia     MGUS (monoclonal gammopathy of unknown significance)       PRN meds    acetaminophen, 650 mg, Q4H PRN  albuterol-ipratropium, 3 mL, Q4H PRN  aluminum-magnesium hydroxide-simethicone, 30 mL, QID PRN  dextrose 50%, 12.5 g, PRN  dextrose 50%, 25 g, PRN  glucagon (human recombinant), 1 mg, PRN  glucose, 16 g, PRN  glucose, 24 g, PRN  melatonin, 6 mg, Nightly PRN  naloxone, 0.02 mg, PRN  ondansetron, 8 mg, Q8H PRN  senna-docusate, 1 tablet, BID PRN  simethicone, 1 tablet, QID PRN           Pt oriented x4.  VSS.  Pt remained afebrile throughout this shift.   All meds administered per order.   Pt remained free of falls this shift.   Plan of care reviewed. Patient verbalizes understanding.   Pt moving/turning independently.   Bed low, side rails up x 2, wheels locked, call light in reach.   Patient instructed to call for assistance.  Patient education provided  Will continue to monitor.              Orientation: disoriented to, situation  [place] Coma Scale Score: 15     Lead Monitored: Lead II Rhythm: normal sinus rhythm Frequency/Ectopy: PVCs  Cardiac/Telemetry Box Number: [id number]  VTE Core Measure: Pharmacological prophylaxis initiated/maintained Last Bowel Movement:  (Pt. said "A while")  Diet Minced & Moist (IDDSI Level 5) Standard Tray  Voiding Characteristics: incontinence  Mauro Score: 14  Fall Risk Score: 14  Accucheck []   Freq?      Lines/Drains/Airways       Peripheral Intravenous Line  Duration             " "Peripheral IV 07/10/25 0407 22 G Anterior;Right Forearm <1 day                     A240/A240 ASridevi Perez is a 85 y.o.male admitted on 7/8/2025 for Rhabdomyolysis   Code Status: Full Code MRN: [id number]   Review of patient's allergies indicates:   Allergen Reactions    Seasonale [levonorgestrel-ethinyl estrad] Other (See Comments)     Past Medical History:   Diagnosis Date    CHF (congestive heart failure)     CLL (chronic lymphocytic leukemia)     HIV (human immunodeficiency virus infection)     Hyperlipidemia     MGUS (monoclonal gammopathy of unknown significance)       PRN meds    acetaminophen, 650 mg, Q4H PRN  albuterol-ipratropium, 3 mL, Q4H PRN  aluminum-magnesium hydroxide-simethicone, 30 mL, QID PRN  dextrose 50%, 12.5 g, PRN  dextrose 50%, 25 g, PRN  glucagon (human recombinant), 1 mg, PRN  glucose, 16 g, PRN  glucose, 24 g, PRN  melatonin, 6 mg, Nightly PRN  naloxone, 0.02 mg, PRN  ondansetron, 8 mg, Q8H PRN  senna-docusate, 1 tablet, BID PRN  simethicone, 1 tablet, QID PRN           Pt oriented x4.  VSS.  Pt remained afebrile throughout this shift.   All meds administered per order.   Pt remained free of falls this shift.   Plan of care reviewed. Patient verbalizes understanding.   Pt moving/turning independently.   Bed low, side rails up x 2, wheels locked, call light in reach.   Patient instructed to call for assistance.  Patient education provided  Will continue to monitor.              Orientation: disoriented to, situation  [place] Coma Scale Score: 15     Lead Monitored: Lead II Rhythm: normal sinus rhythm Frequency/Ectopy: PVCs  Cardiac/Telemetry Box Number: [id number]  VTE Core Measure: Pharmacological prophylaxis initiated/maintained Last Bowel Movement:  (Pt. said "A while")  Diet Minced & Moist (IDDSI Level 5) Standard Tray  Voiding Characteristics: incontinence  Mauro Score: 14  Fall Risk Score: 14  Accucheck []   Freq?      Lines/Drains/Airways       Peripheral Intravenous Line  Duration " "            Peripheral IV 07/10/25 0407 22 G Anterior;Right Forearm <1 day                     A240/A240 ASridevi Perez is a 85 y.o.male admitted on 7/8/2025 for Rhabdomyolysis   Code Status: Full Code MRN: [id number]   Review of patient's allergies indicates:   Allergen Reactions    Seasonale [levonorgestrel-ethinyl estrad] Other (See Comments)     Past Medical History:   Diagnosis Date    CHF (congestive heart failure)     CLL (chronic lymphocytic leukemia)     HIV (human immunodeficiency virus infection)     Hyperlipidemia     MGUS (monoclonal gammopathy of unknown significance)       PRN meds    acetaminophen, 650 mg, Q4H PRN  albuterol-ipratropium, 3 mL, Q4H PRN  aluminum-magnesium hydroxide-simethicone, 30 mL, QID PRN  dextrose 50%, 12.5 g, PRN  dextrose 50%, 25 g, PRN  glucagon (human recombinant), 1 mg, PRN  glucose, 16 g, PRN  glucose, 24 g, PRN  melatonin, 6 mg, Nightly PRN  naloxone, 0.02 mg, PRN  ondansetron, 8 mg, Q8H PRN  senna-docusate, 1 tablet, BID PRN  simethicone, 1 tablet, QID PRN           Pt oriented x4.  VSS.  Pt remained afebrile throughout this shift.   All meds administered per order.   Pt remained free of falls this shift.   Plan of care reviewed. Patient verbalizes understanding.   Pt moving/turning independently.   Bed low, side rails up x 2, wheels locked, call light in reach.   Patient instructed to call for assistance.  Patient education provided  Will continue to monitor.              Orientation: disoriented to, situation  [place] Coma Scale Score: 15     Lead Monitored: Lead II Rhythm: normal sinus rhythm Frequency/Ectopy: PVCs  Cardiac/Telemetry Box Number: [id number]  VTE Core Measure: Pharmacological prophylaxis initiated/maintained Last Bowel Movement:  (Pt. said "A while")  Diet Minced & Moist (IDDSI Level 5) Standard Tray  Voiding Characteristics: incontinence  Mauro Score: 14  Fall Risk Score: 14  Accucheck []   Freq?      Lines/Drains/Airways       Peripheral Intravenous Line  " "Duration             Peripheral IV 07/10/25 0407 22 G Anterior;Right Forearm <1 day                     A240/A240 ASridevi Perez is a 85 y.o.male admitted on 7/8/2025 for Rhabdomyolysis   Code Status: Full Code MRN: [id number]   Review of patient's allergies indicates:   Allergen Reactions    Seasonale [levonorgestrel-ethinyl estrad] Other (See Comments)     Past Medical History:   Diagnosis Date    CHF (congestive heart failure)     CLL (chronic lymphocytic leukemia)     HIV (human immunodeficiency virus infection)     Hyperlipidemia     MGUS (monoclonal gammopathy of unknown significance)       PRN meds    acetaminophen, 650 mg, Q4H PRN  albuterol-ipratropium, 3 mL, Q4H PRN  aluminum-magnesium hydroxide-simethicone, 30 mL, QID PRN  dextrose 50%, 12.5 g, PRN  dextrose 50%, 25 g, PRN  glucagon (human recombinant), 1 mg, PRN  glucose, 16 g, PRN  glucose, 24 g, PRN  melatonin, 6 mg, Nightly PRN  naloxone, 0.02 mg, PRN  ondansetron, 8 mg, Q8H PRN  senna-docusate, 1 tablet, BID PRN  simethicone, 1 tablet, QID PRN           Pt oriented x4.  VSS.  Pt remained afebrile throughout this shift.   All meds administered per order.   Pt remained free of falls this shift.   Plan of care reviewed. Patient verbalizes understanding.   Pt moving/turning independently.   Bed low, side rails up x 2, wheels locked, call light in reach.   Patient instructed to call for assistance.  Patient education provided  Will continue to monitor.              Orientation: disoriented to, situation  Whitney Coma Scale Score: 15     Lead Monitored: Lead II Rhythm: normal sinus rhythm Frequency/Ectopy: PVCs  Cardiac/Telemetry Box Number: [id number]  VTE Core Measure: Pharmacological prophylaxis initiated/maintained Last Bowel Movement:  (Pt. said "A while")  Diet Minced & Moist (IDDSI Level 5) Standard Tray  Voiding Characteristics: incontinence  Mauro Score: 14  Fall Risk Score: 14  Accucheck []   Freq?      Lines/Drains/Airways       Peripheral " "Intravenous Line  Duration             Peripheral IV 07/10/25 0407 22 G Anterior;Right Forearm <1 day                     A240/A240 UNRULY Perez is a 85 y.o.male admitted on 7/8/2025 for Rhabdomyolysis   Code Status: Full Code MRN: [id number]   Review of patient's allergies indicates:   Allergen Reactions    Seasonale [levonorgestrel-ethinyl estrad] Other (See Comments)     Past Medical History:   Diagnosis Date    CHF (congestive heart failure)     CLL (chronic lymphocytic leukemia)     HIV (human immunodeficiency virus infection)     Hyperlipidemia     MGUS (monoclonal gammopathy of unknown significance)       PRN meds    acetaminophen, 650 mg, Q4H PRN  albuterol-ipratropium, 3 mL, Q4H PRN  aluminum-magnesium hydroxide-simethicone, 30 mL, QID PRN  dextrose 50%, 12.5 g, PRN  dextrose 50%, 25 g, PRN  glucagon (human recombinant), 1 mg, PRN  glucose, 16 g, PRN  glucose, 24 g, PRN  melatonin, 6 mg, Nightly PRN  naloxone, 0.02 mg, PRN  ondansetron, 8 mg, Q8H PRN  senna-docusate, 1 tablet, BID PRN  simethicone, 1 tablet, QID PRN           Pt oriented x4.  VSS.  Pt remained afebrile throughout this shift.   All meds administered per order.   Pt remained free of falls this shift.   Plan of care reviewed. Patient verbalizes understanding.   Pt moving/turning independently.   Bed low, side rails up x 2, wheels locked, call light in reach.   Patient instructed to call for assistance.  Patient education provided  Will continue to monitor.              Orientation: disoriented to, situation  [place] Coma Scale Score: 15     Lead Monitored: Lead II Rhythm: normal sinus rhythm Frequency/Ectopy: PVCs  Cardiac/Telemetry Box Number: [id number]  VTE Core Measure: Pharmacological prophylaxis initiated/maintained Last Bowel Movement:  (Pt. said "A while")  Diet Minced & Moist (IDDSI Level 5) Standard Tray  Voiding Characteristics: incontinence  Mauro Score: 14  Fall Risk Score: 14  Accucheck []   Freq?      Lines/Drains/Airways       " "Peripheral Intravenous Line  Duration             Peripheral IV 07/10/25 0407 22 G Anterior;Right Forearm <1 day                     A240/A240 UNRULY Perez is a 85 y.o.male admitted on 7/8/2025 for Rhabdomyolysis   Code Status: Full Code MRN: [id number]   Review of patient's allergies indicates:   Allergen Reactions    Seasonale [levonorgestrel-ethinyl estrad] Other (See Comments)     Past Medical History:   Diagnosis Date    CHF (congestive heart failure)     CLL (chronic lymphocytic leukemia)     HIV (human immunodeficiency virus infection)     Hyperlipidemia     MGUS (monoclonal gammopathy of unknown significance)       PRN meds    acetaminophen, 650 mg, Q4H PRN  albuterol-ipratropium, 3 mL, Q4H PRN  aluminum-magnesium hydroxide-simethicone, 30 mL, QID PRN  dextrose 50%, 12.5 g, PRN  dextrose 50%, 25 g, PRN  glucagon (human recombinant), 1 mg, PRN  glucose, 16 g, PRN  glucose, 24 g, PRN  melatonin, 6 mg, Nightly PRN  naloxone, 0.02 mg, PRN  ondansetron, 8 mg, Q8H PRN  senna-docusate, 1 tablet, BID PRN  simethicone, 1 tablet, QID PRN           Pt oriented x4.  VSS.  Pt remained afebrile throughout this shift.   All meds administered per order.   Pt remained free of falls this shift.   Plan of care reviewed. Patient verbalizes understanding.   Pt moving/turning independently.   Bed low, side rails up x 2, wheels locked, call light in reach.   Patient instructed to call for assistance.  Patient education provided  Will continue to monitor.              Orientation: disoriented to, situation  [place] Coma Scale Score: 15     Lead Monitored: Lead II Rhythm: normal sinus rhythm Frequency/Ectopy: PVCs  Cardiac/Telemetry Box Number: [id number]  VTE Core Measure: Pharmacological prophylaxis initiated/maintained Last Bowel Movement:  (Pt. said "A while")  Diet Minced & Moist (IDDSI Level 5) Standard Tray  Voiding Characteristics: incontinence  Mauro Score: 14  Fall Risk Score: 14  Accucheck []   Freq?  " "    Lines/Drains/Airways       Peripheral Intravenous Line  Duration             Peripheral IV 07/10/25 0407 22 G Anterior;Right Forearm <1 day                     A240/A240 ASridevi Perez is a 85 y.o.male admitted on 7/8/2025 for Rhabdomyolysis   Code Status: Full Code MRN: [id number]   Review of patient's allergies indicates:   Allergen Reactions    Seasonale [levonorgestrel-ethinyl estrad] Other (See Comments)     Past Medical History:   Diagnosis Date    CHF (congestive heart failure)     CLL (chronic lymphocytic leukemia)     HIV (human immunodeficiency virus infection)     Hyperlipidemia     MGUS (monoclonal gammopathy of unknown significance)       PRN meds    acetaminophen, 650 mg, Q4H PRN  albuterol-ipratropium, 3 mL, Q4H PRN  aluminum-magnesium hydroxide-simethicone, 30 mL, QID PRN  dextrose 50%, 12.5 g, PRN  dextrose 50%, 25 g, PRN  glucagon (human recombinant), 1 mg, PRN  glucose, 16 g, PRN  glucose, 24 g, PRN  melatonin, 6 mg, Nightly PRN  naloxone, 0.02 mg, PRN  ondansetron, 8 mg, Q8H PRN  senna-docusate, 1 tablet, BID PRN  simethicone, 1 tablet, QID PRN           Pt oriented x4.  VSS.  Pt remained afebrile throughout this shift.   All meds administered per order.   Pt remained free of falls this shift.   Plan of care reviewed. Patient verbalizes understanding.   Pt moving/turning independently.   Bed low, side rails up x 2, wheels locked, call light in reach.   Patient instructed to call for assistance.  Patient education provided  Will continue to monitor.              Orientation: disoriented to, situation  [place] Coma Scale Score: 15     Lead Monitored: Lead II Rhythm: normal sinus rhythm Frequency/Ectopy: PVCs  Cardiac/Telemetry Box Number: [id number]  VTE Core Measure: Pharmacological prophylaxis initiated/maintained Last Bowel Movement:  (Pt. said "A while")  Diet Minced & Moist (IDDSI Level 5) Standard Tray  Voiding Characteristics: incontinence  Mauro Score: 14  Fall Risk Score: 14  Accucheck [] " "  Freq?      Lines/Drains/Airways       Peripheral Intravenous Line  Duration             Peripheral IV 07/10/25 0407 22 G Anterior;Right Forearm <1 day                     A240/A240 UNRULY Perez is a 85 y.o.male admitted on 7/8/2025 for Rhabdomyolysis   Code Status: Full Code MRN: [id number]   Review of patient's allergies indicates:   Allergen Reactions    Seasonale [levonorgestrel-ethinyl estrad] Other (See Comments)     Past Medical History:   Diagnosis Date    CHF (congestive heart failure)     CLL (chronic lymphocytic leukemia)     HIV (human immunodeficiency virus infection)     Hyperlipidemia     MGUS (monoclonal gammopathy of unknown significance)       PRN meds    acetaminophen, 650 mg, Q4H PRN  albuterol-ipratropium, 3 mL, Q4H PRN  aluminum-magnesium hydroxide-simethicone, 30 mL, QID PRN  dextrose 50%, 12.5 g, PRN  dextrose 50%, 25 g, PRN  glucagon (human recombinant), 1 mg, PRN  glucose, 16 g, PRN  glucose, 24 g, PRN  melatonin, 6 mg, Nightly PRN  naloxone, 0.02 mg, PRN  ondansetron, 8 mg, Q8H PRN  senna-docusate, 1 tablet, BID PRN  simethicone, 1 tablet, QID PRN           Pt oriented x4.  VSS.  Pt remained afebrile throughout this shift.   All meds administered per order.   Pt remained free of falls this shift.   Plan of care reviewed. Patient verbalizes understanding.   Pt moving/turning independently.   Bed low, side rails up x 2, wheels locked, call light in reach.   Patient instructed to call for assistance.  Patient education provided  Will continue to monitor.              Orientation: disoriented to, situation  [place] Coma Scale Score: 15     Lead Monitored: Lead II Rhythm: normal sinus rhythm Frequency/Ectopy: PVCs  Cardiac/Telemetry Box Number: [id number]  VTE Core Measure: Pharmacological prophylaxis initiated/maintained Last Bowel Movement:  (Pt. said "A while")  Diet Minced & Moist (IDDSI Level 5) Standard Tray  Voiding Characteristics: incontinence  Mauro Score: 14  Fall Risk Score: " "14  Accucheck []   Freq?      Lines/Drains/Airways       Peripheral Intravenous Line  Duration             Peripheral IV 07/10/25 0407 22 G Anterior;Right Forearm <1 day                     A240/A240 UNRULY Perez is a 85 y.o.male admitted on 7/8/2025 for Rhabdomyolysis   Code Status: Full Code MRN: [id number]   Review of patient's allergies indicates:   Allergen Reactions    Seasonale [levonorgestrel-ethinyl estrad] Other (See Comments)     Past Medical History:   Diagnosis Date    CHF (congestive heart failure)     CLL (chronic lymphocytic leukemia)     HIV (human immunodeficiency virus infection)     Hyperlipidemia     MGUS (monoclonal gammopathy of unknown significance)       PRN meds    acetaminophen, 650 mg, Q4H PRN  albuterol-ipratropium, 3 mL, Q4H PRN  aluminum-magnesium hydroxide-simethicone, 30 mL, QID PRN  dextrose 50%, 12.5 g, PRN  dextrose 50%, 25 g, PRN  glucagon (human recombinant), 1 mg, PRN  glucose, 16 g, PRN  glucose, 24 g, PRN  melatonin, 6 mg, Nightly PRN  naloxone, 0.02 mg, PRN  ondansetron, 8 mg, Q8H PRN  senna-docusate, 1 tablet, BID PRN  simethicone, 1 tablet, QID PRN           Pt oriented x4.  VSS.  Pt remained afebrile throughout this shift.   All meds administered per order.   Pt remained free of falls this shift.   Plan of care reviewed. Patient verbalizes understanding.   Pt moving/turning independently.   Bed low, side rails up x 2, wheels locked, call light in reach.   Patient instructed to call for assistance.  Patient education provided  Will continue to monitor.              Orientation: disoriented to, situation  [place] Coma Scale Score: 15     Lead Monitored: Lead II Rhythm: normal sinus rhythm Frequency/Ectopy: PVCs  Cardiac/Telemetry Box Number: [id number]  VTE Core Measure: Pharmacological prophylaxis initiated/maintained Last Bowel Movement:  (Pt. said "A while")  Diet Minced & Moist (IDDSI Level 5) Standard Tray  Voiding Characteristics: incontinence  Mauro Score: 14  Fall " "Risk Score: 14  Accucheck []   Freq?      Lines/Drains/Airways       Peripheral Intravenous Line  Duration             Peripheral IV 07/10/25 0407 22 G Anterior;Right Forearm <1 day                     A240/A240 UNRULY Perez is a 85 y.o.male admitted on 7/8/2025 for Rhabdomyolysis   Code Status: Full Code MRN: [id number]   Review of patient's allergies indicates:   Allergen Reactions    Seasonale [levonorgestrel-ethinyl estrad] Other (See Comments)     Past Medical History:   Diagnosis Date    CHF (congestive heart failure)     CLL (chronic lymphocytic leukemia)     HIV (human immunodeficiency virus infection)     Hyperlipidemia     MGUS (monoclonal gammopathy of unknown significance)       PRN meds    acetaminophen, 650 mg, Q4H PRN  albuterol-ipratropium, 3 mL, Q4H PRN  aluminum-magnesium hydroxide-simethicone, 30 mL, QID PRN  dextrose 50%, 12.5 g, PRN  dextrose 50%, 25 g, PRN  glucagon (human recombinant), 1 mg, PRN  glucose, 16 g, PRN  glucose, 24 g, PRN  melatonin, 6 mg, Nightly PRN  naloxone, 0.02 mg, PRN  ondansetron, 8 mg, Q8H PRN  senna-docusate, 1 tablet, BID PRN  simethicone, 1 tablet, QID PRN           Pt oriented x4.  VSS.  Pt remained afebrile throughout this shift.   All meds administered per order.   Pt remained free of falls this shift.   Plan of care reviewed. Patient verbalizes understanding.   Pt moving/turning independently.   Bed low, side rails up x 2, wheels locked, call light in reach.   Patient instructed to call for assistance.  Patient education provided  Will continue to monitor.              Orientation: disoriented to, situation  [place] Coma Scale Score: 15     Lead Monitored: Lead II Rhythm: normal sinus rhythm Frequency/Ectopy: PVCs  Cardiac/Telemetry Box Number: [id number]  VTE Core Measure: Pharmacological prophylaxis initiated/maintained Last Bowel Movement:  (Pt. said "A while")  Diet Minced & Moist (IDDSI Level 5) Standard Tray  Voiding Characteristics: incontinence  Mauro Score: " "14  Fall Risk Score: 14  Accucheck []   Freq?      Lines/Drains/Airways       Peripheral Intravenous Line  Duration             Peripheral IV 07/10/25 0407 22 G Anterior;Right Forearm <1 day                     A240/A240 UNRULY Perez is a 85 y.o.male admitted on 7/8/2025 for Rhabdomyolysis   Code Status: Full Code MRN: [id number]   Review of patient's allergies indicates:   Allergen Reactions    Seasonale [levonorgestrel-ethinyl estrad] Other (See Comments)     Past Medical History:   Diagnosis Date    CHF (congestive heart failure)     CLL (chronic lymphocytic leukemia)     HIV (human immunodeficiency virus infection)     Hyperlipidemia     MGUS (monoclonal gammopathy of unknown significance)       PRN meds    acetaminophen, 650 mg, Q4H PRN  albuterol-ipratropium, 3 mL, Q4H PRN  aluminum-magnesium hydroxide-simethicone, 30 mL, QID PRN  dextrose 50%, 12.5 g, PRN  dextrose 50%, 25 g, PRN  glucagon (human recombinant), 1 mg, PRN  glucose, 16 g, PRN  glucose, 24 g, PRN  melatonin, 6 mg, Nightly PRN  naloxone, 0.02 mg, PRN  ondansetron, 8 mg, Q8H PRN  senna-docusate, 1 tablet, BID PRN  simethicone, 1 tablet, QID PRN           Pt oriented x4.  VSS.  Pt remained afebrile throughout this shift.   All meds administered per order.   Pt remained free of falls this shift.   Plan of care reviewed. Patient verbalizes understanding.   Pt moving/turning independently.   Bed low, side rails up x 2, wheels locked, call light in reach.   Patient instructed to call for assistance.  Patient education provided  Will continue to monitor.              Orientation: disoriented to, situation  [place] Coma Scale Score: 15     Lead Monitored: Lead II Rhythm: normal sinus rhythm Frequency/Ectopy: PVCs  Cardiac/Telemetry Box Number: [id number]  VTE Core Measure: Pharmacological prophylaxis initiated/maintained Last Bowel Movement:  (Pt. said "A while")  Diet Minced & Moist (IDDSI Level 5) Standard Tray  Voiding Characteristics: " "incontinence  Mauro Score: 14  Fall Risk Score: 14  Accucheck []   Freq?      Lines/Drains/Airways       Peripheral Intravenous Line  Duration             Peripheral IV 07/10/25 0407 22 G Anterior;Right Forearm <1 day                     A240/A240 UNRULY Perez is a 85 y.o.male admitted on 7/8/2025 for Rhabdomyolysis   Code Status: Full Code MRN: [id number]   Review of patient's allergies indicates:   Allergen Reactions    Seasonale [levonorgestrel-ethinyl estrad] Other (See Comments)     Past Medical History:   Diagnosis Date    CHF (congestive heart failure)     CLL (chronic lymphocytic leukemia)     HIV (human immunodeficiency virus infection)     Hyperlipidemia     MGUS (monoclonal gammopathy of unknown significance)       PRN meds    acetaminophen, 650 mg, Q4H PRN  albuterol-ipratropium, 3 mL, Q4H PRN  aluminum-magnesium hydroxide-simethicone, 30 mL, QID PRN  dextrose 50%, 12.5 g, PRN  dextrose 50%, 25 g, PRN  glucagon (human recombinant), 1 mg, PRN  glucose, 16 g, PRN  glucose, 24 g, PRN  melatonin, 6 mg, Nightly PRN  naloxone, 0.02 mg, PRN  ondansetron, 8 mg, Q8H PRN  senna-docusate, 1 tablet, BID PRN  simethicone, 1 tablet, QID PRN           Pt oriented x4.  VSS.  Pt remained afebrile throughout this shift.   All meds administered per order.   Pt remained free of falls this shift.   Plan of care reviewed. Patient verbalizes understanding.   Pt moving/turning independently.   Bed low, side rails up x 2, wheels locked, call light in reach.   Patient instructed to call for assistance.  Patient education provided  Will continue to monitor.              Orientation: disoriented to, situation  [place] Coma Scale Score: 15     Lead Monitored: Lead II Rhythm: normal sinus rhythm Frequency/Ectopy: PVCs  Cardiac/Telemetry Box Number: [id number]  VTE Core Measure: Pharmacological prophylaxis initiated/maintained Last Bowel Movement:  (Pt. said "A while")  Diet Minced & Moist (IDDSI Level 5) Standard Tray  Voiding " "Characteristics: incontinence  Mauro Score: 14  Fall Risk Score: 14  Accucheck []   Freq?      Lines/Drains/Airways       Peripheral Intravenous Line  Duration             Peripheral IV 07/10/25 0407 22 G Anterior;Right Forearm <1 day                     A240/A240 UNRULY Perez is a 85 y.o.male admitted on 7/8/2025 for Rhabdomyolysis   Code Status: Full Code MRN: [id number]   Review of patient's allergies indicates:   Allergen Reactions    Seasonale [levonorgestrel-ethinyl estrad] Other (See Comments)     Past Medical History:   Diagnosis Date    CHF (congestive heart failure)     CLL (chronic lymphocytic leukemia)     HIV (human immunodeficiency virus infection)     Hyperlipidemia     MGUS (monoclonal gammopathy of unknown significance)       PRN meds    acetaminophen, 650 mg, Q4H PRN  albuterol-ipratropium, 3 mL, Q4H PRN  aluminum-magnesium hydroxide-simethicone, 30 mL, QID PRN  dextrose 50%, 12.5 g, PRN  dextrose 50%, 25 g, PRN  glucagon (human recombinant), 1 mg, PRN  glucose, 16 g, PRN  glucose, 24 g, PRN  melatonin, 6 mg, Nightly PRN  naloxone, 0.02 mg, PRN  ondansetron, 8 mg, Q8H PRN  senna-docusate, 1 tablet, BID PRN  simethicone, 1 tablet, QID PRN           Pt oriented x4.  VSS.  Pt remained afebrile throughout this shift.   All meds administered per order.   Pt remained free of falls this shift.   Plan of care reviewed. Patient verbalizes understanding.   Pt moving/turning independently.   Bed low, side rails up x 2, wheels locked, call light in reach.   Patient instructed to call for assistance.  Patient education provided  Will continue to monitor.              Orientation: disoriented to, situation  [place] Coma Scale Score: 15     Lead Monitored: Lead II Rhythm: normal sinus rhythm Frequency/Ectopy: PVCs  Cardiac/Telemetry Box Number: [id number]  VTE Core Measure: Pharmacological prophylaxis initiated/maintained Last Bowel Movement:  (Pt. said "A while")  Diet Minced & Moist (IDDSI Level 5) Standard " "Tray  Voiding Characteristics: incontinence  Mauro Score: 14  Fall Risk Score: 14  Accucheck []   Freq?      Lines/Drains/Airways       Peripheral Intravenous Line  Duration             Peripheral IV 07/10/25 0407 22 G Anterior;Right Forearm <1 day                     A240/A240 UNRULY Perez is a 85 y.o.male admitted on 7/8/2025 for Rhabdomyolysis   Code Status: Full Code MRN: [id number]   Review of patient's allergies indicates:   Allergen Reactions    Seasonale [levonorgestrel-ethinyl estrad] Other (See Comments)     Past Medical History:   Diagnosis Date    CHF (congestive heart failure)     CLL (chronic lymphocytic leukemia)     HIV (human immunodeficiency virus infection)     Hyperlipidemia     MGUS (monoclonal gammopathy of unknown significance)       PRN meds    acetaminophen, 650 mg, Q4H PRN  albuterol-ipratropium, 3 mL, Q4H PRN  aluminum-magnesium hydroxide-simethicone, 30 mL, QID PRN  dextrose 50%, 12.5 g, PRN  dextrose 50%, 25 g, PRN  glucagon (human recombinant), 1 mg, PRN  glucose, 16 g, PRN  glucose, 24 g, PRN  melatonin, 6 mg, Nightly PRN  naloxone, 0.02 mg, PRN  ondansetron, 8 mg, Q8H PRN  senna-docusate, 1 tablet, BID PRN  simethicone, 1 tablet, QID PRN           Pt oriented x4.  VSS.  Pt remained afebrile throughout this shift.   All meds administered per order.   Pt remained free of falls this shift.   Plan of care reviewed. Patient verbalizes understanding.   Pt moving/turning independently.   Bed low, side rails up x 2, wheels locked, call light in reach.   Patient instructed to call for assistance.  Patient education provided  Will continue to monitor.              Orientation: disoriented to, situation  [place] Coma Scale Score: 15     Lead Monitored: Lead II Rhythm: normal sinus rhythm Frequency/Ectopy: PVCs  Cardiac/Telemetry Box Number: [id number]  VTE Core Measure: Pharmacological prophylaxis initiated/maintained Last Bowel Movement:  (Pt. said "A while")  Diet Minced & Moist (IDDSI Level 5) " "Standard Tray  Voiding Characteristics: incontinence  Mauro Score: 14  Fall Risk Score: 14  Accucheck []   Freq?      Lines/Drains/Airways       Peripheral Intravenous Line  Duration             Peripheral IV 07/10/25 0407 22 G Anterior;Right Forearm <1 day                     A240/A240 UNRULY Perez is a 85 y.o.male admitted on 7/8/2025 for Rhabdomyolysis   Code Status: Full Code MRN: [id number]   Review of patient's allergies indicates:   Allergen Reactions    Seasonale [levonorgestrel-ethinyl estrad] Other (See Comments)     Past Medical History:   Diagnosis Date    CHF (congestive heart failure)     CLL (chronic lymphocytic leukemia)     HIV (human immunodeficiency virus infection)     Hyperlipidemia     MGUS (monoclonal gammopathy of unknown significance)       PRN meds    acetaminophen, 650 mg, Q4H PRN  albuterol-ipratropium, 3 mL, Q4H PRN  aluminum-magnesium hydroxide-simethicone, 30 mL, QID PRN  dextrose 50%, 12.5 g, PRN  dextrose 50%, 25 g, PRN  glucagon (human recombinant), 1 mg, PRN  glucose, 16 g, PRN  glucose, 24 g, PRN  melatonin, 6 mg, Nightly PRN  naloxone, 0.02 mg, PRN  ondansetron, 8 mg, Q8H PRN  senna-docusate, 1 tablet, BID PRN  simethicone, 1 tablet, QID PRN           Pt oriented x4.  VSS.  Pt remained afebrile throughout this shift.   All meds administered per order.   Pt remained free of falls this shift.   Plan of care reviewed. Patient verbalizes understanding.   Pt moving/turning independently.   Bed low, side rails up x 2, wheels locked, call light in reach.   Patient instructed to call for assistance.  Patient education provided  Will continue to monitor.              Orientation: disoriented to, situation  [place] Coma Scale Score: 15     Lead Monitored: Lead II Rhythm: normal sinus rhythm Frequency/Ectopy: PVCs  Cardiac/Telemetry Box Number: [id number]  VTE Core Measure: Pharmacological prophylaxis initiated/maintained Last Bowel Movement:  (Pt. said "A while")  Diet Minced & Moist (IDDSI " "Level 5) Standard Tray  Voiding Characteristics: incontinence  Mauro Score: 14  Fall Risk Score: 14  Accucheck []   Freq?      Lines/Drains/Airways       Peripheral Intravenous Line  Duration             Peripheral IV 07/10/25 0407 22 G Anterior;Right Forearm <1 day                     A240/A240 UNRULY Perez is a 85 y.o.male admitted on 7/8/2025 for Rhabdomyolysis   Code Status: Full Code MRN: [id number]   Review of patient's allergies indicates:   Allergen Reactions    Seasonale [levonorgestrel-ethinyl estrad] Other (See Comments)     Past Medical History:   Diagnosis Date    CHF (congestive heart failure)     CLL (chronic lymphocytic leukemia)     HIV (human immunodeficiency virus infection)     Hyperlipidemia     MGUS (monoclonal gammopathy of unknown significance)       PRN meds    acetaminophen, 650 mg, Q4H PRN  albuterol-ipratropium, 3 mL, Q4H PRN  aluminum-magnesium hydroxide-simethicone, 30 mL, QID PRN  dextrose 50%, 12.5 g, PRN  dextrose 50%, 25 g, PRN  glucagon (human recombinant), 1 mg, PRN  glucose, 16 g, PRN  glucose, 24 g, PRN  melatonin, 6 mg, Nightly PRN  naloxone, 0.02 mg, PRN  ondansetron, 8 mg, Q8H PRN  senna-docusate, 1 tablet, BID PRN  simethicone, 1 tablet, QID PRN           Pt oriented x4.  VSS.  Pt remained afebrile throughout this shift.   All meds administered per order.   Pt remained free of falls this shift.   Plan of care reviewed. Patient verbalizes understanding.   Pt moving/turning independently.   Bed low, side rails up x 2, wheels locked, call light in reach.   Patient instructed to call for assistance.  Patient education provided  Will continue to monitor.              Orientation: disoriented to, situation  [place] Coma Scale Score: 15     Lead Monitored: Lead II Rhythm: normal sinus rhythm Frequency/Ectopy: PVCs  Cardiac/Telemetry Box Number: [id number]  VTE Core Measure: Pharmacological prophylaxis initiated/maintained Last Bowel Movement:  (Pt. said "A while")  Diet Minced & " "Moist (IDDSI Level 5) Standard Tray  Voiding Characteristics: incontinence  Mauro Score: 14  Fall Risk Score: 14  Accucheck []   Freq?      Lines/Drains/Airways       Peripheral Intravenous Line  Duration             Peripheral IV 07/10/25 0407 22 G Anterior;Right Forearm <1 day                     A240/A240 UNRULY Perez is a 85 y.o.male admitted on 7/8/2025 for Rhabdomyolysis   Code Status: Full Code MRN: [id number]   Review of patient's allergies indicates:   Allergen Reactions    Seasonale [levonorgestrel-ethinyl estrad] Other (See Comments)     Past Medical History:   Diagnosis Date    CHF (congestive heart failure)     CLL (chronic lymphocytic leukemia)     HIV (human immunodeficiency virus infection)     Hyperlipidemia     MGUS (monoclonal gammopathy of unknown significance)       PRN meds    acetaminophen, 650 mg, Q4H PRN  albuterol-ipratropium, 3 mL, Q4H PRN  aluminum-magnesium hydroxide-simethicone, 30 mL, QID PRN  dextrose 50%, 12.5 g, PRN  dextrose 50%, 25 g, PRN  glucagon (human recombinant), 1 mg, PRN  glucose, 16 g, PRN  glucose, 24 g, PRN  melatonin, 6 mg, Nightly PRN  naloxone, 0.02 mg, PRN  ondansetron, 8 mg, Q8H PRN  senna-docusate, 1 tablet, BID PRN  simethicone, 1 tablet, QID PRN           Pt oriented x4.  VSS.  Pt remained afebrile throughout this shift.   All meds administered per order.   Pt remained free of falls this shift.   Plan of care reviewed. Patient verbalizes understanding.   Pt moving/turning independently.   Bed low, side rails up x 2, wheels locked, call light in reach.   Patient instructed to call for assistance.  Patient education provided  Will continue to monitor.              Orientation: disoriented to, situation  [place] Coma Scale Score: 15     Lead Monitored: Lead II Rhythm: normal sinus rhythm Frequency/Ectopy: PVCs  Cardiac/Telemetry Box Number: [id number]  VTE Core Measure: Pharmacological prophylaxis initiated/maintained Last Bowel Movement:  (Pt. said "A while")  Diet " "Minced & Moist (IDDSI Level 5) Standard Tray  Voiding Characteristics: incontinence  Mauro Score: 14  Fall Risk Score: 14  Accucheck []   Freq?      Lines/Drains/Airways       Peripheral Intravenous Line  Duration             Peripheral IV 07/10/25 0407 22 G Anterior;Right Forearm <1 day                     A240/A240 UNRULY Perez is a 85 y.o.male admitted on 7/8/2025 for Rhabdomyolysis   Code Status: Full Code MRN: [id number]   Review of patient's allergies indicates:   Allergen Reactions    Seasonale [levonorgestrel-ethinyl estrad] Other (See Comments)     Past Medical History:   Diagnosis Date    CHF (congestive heart failure)     CLL (chronic lymphocytic leukemia)     HIV (human immunodeficiency virus infection)     Hyperlipidemia     MGUS (monoclonal gammopathy of unknown significance)       PRN meds    acetaminophen, 650 mg, Q4H PRN  albuterol-ipratropium, 3 mL, Q4H PRN  aluminum-magnesium hydroxide-simethicone, 30 mL, QID PRN  dextrose 50%, 12.5 g, PRN  dextrose 50%, 25 g, PRN  glucagon (human recombinant), 1 mg, PRN  glucose, 16 g, PRN  glucose, 24 g, PRN  melatonin, 6 mg, Nightly PRN  naloxone, 0.02 mg, PRN  ondansetron, 8 mg, Q8H PRN  senna-docusate, 1 tablet, BID PRN  simethicone, 1 tablet, QID PRN           Pt oriented x4.  VSS.  Pt remained afebrile throughout this shift.   All meds administered per order.   Pt remained free of falls this shift.   Plan of care reviewed. Patient verbalizes understanding.   Pt moving/turning independently.   Bed low, side rails up x 2, wheels locked, call light in reach.   Patient instructed to call for assistance.  Patient education provided  Will continue to monitor.              Orientation: disoriented to, situation  [place] Coma Scale Score: 15     Lead Monitored: Lead II Rhythm: normal sinus rhythm Frequency/Ectopy: PVCs  Cardiac/Telemetry Box Number: [id number]  VTE Core Measure: Pharmacological prophylaxis initiated/maintained Last Bowel Movement:  (Pt. said "A " "while")  Diet Minced & Moist (IDDSI Level 5) Standard Tray  Voiding Characteristics: incontinence  Mauro Score: 14  Fall Risk Score: 14  Accucheck []   Freq?      Lines/Drains/Airways       Peripheral Intravenous Line  Duration             Peripheral IV 07/10/25 0407 22 G Anterior;Right Forearm <1 day                     A240/A240 UNRULY Perez is a 85 y.o.male admitted on 7/8/2025 for Rhabdomyolysis   Code Status: Full Code MRN: [id number]   Review of patient's allergies indicates:   Allergen Reactions    Seasonale [levonorgestrel-ethinyl estrad] Other (See Comments)     Past Medical History:   Diagnosis Date    CHF (congestive heart failure)     CLL (chronic lymphocytic leukemia)     HIV (human immunodeficiency virus infection)     Hyperlipidemia     MGUS (monoclonal gammopathy of unknown significance)       PRN meds    acetaminophen, 650 mg, Q4H PRN  albuterol-ipratropium, 3 mL, Q4H PRN  aluminum-magnesium hydroxide-simethicone, 30 mL, QID PRN  dextrose 50%, 12.5 g, PRN  dextrose 50%, 25 g, PRN  glucagon (human recombinant), 1 mg, PRN  glucose, 16 g, PRN  glucose, 24 g, PRN  melatonin, 6 mg, Nightly PRN  naloxone, 0.02 mg, PRN  ondansetron, 8 mg, Q8H PRN  senna-docusate, 1 tablet, BID PRN  simethicone, 1 tablet, QID PRN           Pt oriented x4.  VSS.  Pt remained afebrile throughout this shift.   All meds administered per order.   Pt remained free of falls this shift.   Plan of care reviewed. Patient verbalizes understanding.   Pt moving/turning independently.   Bed low, side rails up x 2, wheels locked, call light in reach.   Patient instructed to call for assistance.  Patient education provided  Will continue to monitor.              Orientation: disoriented to, situation  [place] Coma Scale Score: 15     Lead Monitored: Lead II Rhythm: normal sinus rhythm Frequency/Ectopy: PVCs  Cardiac/Telemetry Box Number: [id number]  VTE Core Measure: Pharmacological prophylaxis initiated/maintained Last Bowel Movement:  " "(Pt. said "A while")  Diet Minced & Moist (IDDSI Level 5) Standard Tray  Voiding Characteristics: incontinence  Mauro Score: 14  Fall Risk Score: 14  Accucheck []   Freq?      Lines/Drains/Airways       Peripheral Intravenous Line  Duration             Peripheral IV 07/10/25 0407 22 G Anterior;Right Forearm <1 day                     A240/A240 UNRULY Perez is a 85 y.o.male admitted on 7/8/2025 for Rhabdomyolysis   Code Status: Full Code MRN: [id number]   Review of patient's allergies indicates:   Allergen Reactions    Seasonale [levonorgestrel-ethinyl estrad] Other (See Comments)     Past Medical History:   Diagnosis Date    CHF (congestive heart failure)     CLL (chronic lymphocytic leukemia)     HIV (human immunodeficiency virus infection)     Hyperlipidemia     MGUS (monoclonal gammopathy of unknown significance)       PRN meds    acetaminophen, 650 mg, Q4H PRN  albuterol-ipratropium, 3 mL, Q4H PRN  aluminum-magnesium hydroxide-simethicone, 30 mL, QID PRN  dextrose 50%, 12.5 g, PRN  dextrose 50%, 25 g, PRN  glucagon (human recombinant), 1 mg, PRN  glucose, 16 g, PRN  glucose, 24 g, PRN  melatonin, 6 mg, Nightly PRN  naloxone, 0.02 mg, PRN  ondansetron, 8 mg, Q8H PRN  senna-docusate, 1 tablet, BID PRN  simethicone, 1 tablet, QID PRN           Pt oriented x4.  VSS.  Pt remained afebrile throughout this shift.   All meds administered per order.   Pt remained free of falls this shift.   Plan of care reviewed. Patient verbalizes understanding.   Pt moving/turning independently.   Bed low, side rails up x 2, wheels locked, call light in reach.   Patient instructed to call for assistance.  Patient education provided  Will continue to monitor.              Orientation: disoriented to, situation  Whitney Coma Scale Score: 15     Lead Monitored: Lead II Rhythm: normal sinus rhythm Frequency/Ectopy: PVCs  Cardiac/Telemetry Box Number: [id number]  VTE Core Measure: Pharmacological prophylaxis initiated/maintained Last Bowel " "Movement:  (Pt. said "A while")  Diet Minced & Moist (IDDSI Level 5) Standard Tray  Voiding Characteristics: incontinence  Mauro Score: 14  Fall Risk Score: 14  Accucheck []   Freq?      Lines/Drains/Airways       Peripheral Intravenous Line  Duration             Peripheral IV 07/10/25 0407 22 G Anterior;Right Forearm <1 day                     A240/A240 UNRULY Perez is a 85 y.o.male admitted on 7/8/2025 for Rhabdomyolysis   Code Status: Full Code MRN: [id number]   Review of patient's allergies indicates:   Allergen Reactions    Seasonale [levonorgestrel-ethinyl estrad] Other (See Comments)     Past Medical History:   Diagnosis Date    CHF (congestive heart failure)     CLL (chronic lymphocytic leukemia)     HIV (human immunodeficiency virus infection)     Hyperlipidemia     MGUS (monoclonal gammopathy of unknown significance)       PRN meds    acetaminophen, 650 mg, Q4H PRN  albuterol-ipratropium, 3 mL, Q4H PRN  aluminum-magnesium hydroxide-simethicone, 30 mL, QID PRN  dextrose 50%, 12.5 g, PRN  dextrose 50%, 25 g, PRN  glucagon (human recombinant), 1 mg, PRN  glucose, 16 g, PRN  glucose, 24 g, PRN  melatonin, 6 mg, Nightly PRN  naloxone, 0.02 mg, PRN  ondansetron, 8 mg, Q8H PRN  senna-docusate, 1 tablet, BID PRN  simethicone, 1 tablet, QID PRN           Pt oriented x4.  VSS.  Pt remained afebrile throughout this shift.   All meds administered per order.   Pt remained free of falls this shift.   Plan of care reviewed. Patient verbalizes understanding.   Pt moving/turning independently.   Bed low, side rails up x 2, wheels locked, call light in reach.   Patient instructed to call for assistance.  Patient education provided  Will continue to monitor.              Orientation: disoriented to, situation  Whitney Coma Scale Score: 15     Lead Monitored: Lead II Rhythm: normal sinus rhythm Frequency/Ectopy: PVCs  Cardiac/Telemetry Box Number: [id number]  VTE Core Measure: Pharmacological prophylaxis initiated/maintained " "Last Bowel Movement:  (Pt. said "A while")  Diet Minced & Moist (IDDSI Level 5) Standard Tray  Voiding Characteristics: incontinence  Mauro Score: 14  Fall Risk Score: 14  Accucheck []   Freq?      Lines/Drains/Airways       Peripheral Intravenous Line  Duration             Peripheral IV 07/10/25 0407 22 G Anterior;Right Forearm <1 day                     A240/A240 UNRULY Perez is a 85 y.o.male admitted on 7/8/2025 for Rhabdomyolysis   Code Status: Full Code MRN: [id number]   Review of patient's allergies indicates:   Allergen Reactions    Seasonale [levonorgestrel-ethinyl estrad] Other (See Comments)     Past Medical History:   Diagnosis Date    CHF (congestive heart failure)     CLL (chronic lymphocytic leukemia)     HIV (human immunodeficiency virus infection)     Hyperlipidemia     MGUS (monoclonal gammopathy of unknown significance)       PRN meds    acetaminophen, 650 mg, Q4H PRN  albuterol-ipratropium, 3 mL, Q4H PRN  aluminum-magnesium hydroxide-simethicone, 30 mL, QID PRN  dextrose 50%, 12.5 g, PRN  dextrose 50%, 25 g, PRN  glucagon (human recombinant), 1 mg, PRN  glucose, 16 g, PRN  glucose, 24 g, PRN  melatonin, 6 mg, Nightly PRN  naloxone, 0.02 mg, PRN  ondansetron, 8 mg, Q8H PRN  senna-docusate, 1 tablet, BID PRN  simethicone, 1 tablet, QID PRN           Pt oriented x4.  VSS.  Pt remained afebrile throughout this shift.   All meds administered per order.   Pt remained free of falls this shift.   Plan of care reviewed. Patient verbalizes understanding.   Pt moving/turning independently.   Bed low, side rails up x 2, wheels locked, call light in reach.   Patient instructed to call for assistance.  Patient education provided  Will continue to monitor.              Orientation: disoriented to, situation  [place] Coma Scale Score: 15     Lead Monitored: Lead II Rhythm: normal sinus rhythm Frequency/Ectopy: PVCs  Cardiac/Telemetry Box Number: [id number]  VTE Core Measure: Pharmacological prophylaxis " "initiated/maintained Last Bowel Movement:  (Pt. said "A while")  Diet Minced & Moist (IDDSI Level 5) Standard Tray  Voiding Characteristics: incontinence  Mauro Score: 14  Fall Risk Score: 14  Accucheck []   Freq?      Lines/Drains/Airways       Peripheral Intravenous Line  Duration             Peripheral IV 07/10/25 0407 22 G Anterior;Right Forearm <1 day                     A240/A240 UNRULY Perez is a 85 y.o.male admitted on 7/8/2025 for Rhabdomyolysis   Code Status: Full Code MRN: [id number]   Review of patient's allergies indicates:   Allergen Reactions    Seasonale [levonorgestrel-ethinyl estrad] Other (See Comments)     Past Medical History:   Diagnosis Date    CHF (congestive heart failure)     CLL (chronic lymphocytic leukemia)     HIV (human immunodeficiency virus infection)     Hyperlipidemia     MGUS (monoclonal gammopathy of unknown significance)       PRN meds    acetaminophen, 650 mg, Q4H PRN  albuterol-ipratropium, 3 mL, Q4H PRN  aluminum-magnesium hydroxide-simethicone, 30 mL, QID PRN  dextrose 50%, 12.5 g, PRN  dextrose 50%, 25 g, PRN  glucagon (human recombinant), 1 mg, PRN  glucose, 16 g, PRN  glucose, 24 g, PRN  melatonin, 6 mg, Nightly PRN  naloxone, 0.02 mg, PRN  ondansetron, 8 mg, Q8H PRN  senna-docusate, 1 tablet, BID PRN  simethicone, 1 tablet, QID PRN           Pt oriented x4.  VSS.  Pt remained afebrile throughout this shift.   All meds administered per order.   Pt remained free of falls this shift.   Plan of care reviewed. Patient verbalizes understanding.   Pt moving/turning independently.   Bed low, side rails up x 2, wheels locked, call light in reach.   Patient instructed to call for assistance.  Patient education provided  Will continue to monitor.              Orientation: disoriented to, situation  Whitney Coma Scale Score: 15     Lead Monitored: Lead II Rhythm: normal sinus rhythm Frequency/Ectopy: PVCs  Cardiac/Telemetry Box Number: [id number]  VTE Core Measure: Pharmacological " "prophylaxis initiated/maintained Last Bowel Movement:  (Pt. said "A while")  Diet Minced & Moist (IDDSI Level 5) Standard Tray  Voiding Characteristics: incontinence  Mauro Score: 14  Fall Risk Score: 14  Accucheck []   Freq?      Lines/Drains/Airways       Peripheral Intravenous Line  Duration             Peripheral IV 07/10/25 0407 22 G Anterior;Right Forearm <1 day                     A240/A240 UNRULY Perez is a 85 y.o.male admitted on 7/8/2025 for Rhabdomyolysis   Code Status: Full Code MRN: [id number]   Review of patient's allergies indicates:   Allergen Reactions    Seasonale [levonorgestrel-ethinyl estrad] Other (See Comments)     Past Medical History:   Diagnosis Date    CHF (congestive heart failure)     CLL (chronic lymphocytic leukemia)     HIV (human immunodeficiency virus infection)     Hyperlipidemia     MGUS (monoclonal gammopathy of unknown significance)       PRN meds    acetaminophen, 650 mg, Q4H PRN  albuterol-ipratropium, 3 mL, Q4H PRN  aluminum-magnesium hydroxide-simethicone, 30 mL, QID PRN  dextrose 50%, 12.5 g, PRN  dextrose 50%, 25 g, PRN  glucagon (human recombinant), 1 mg, PRN  glucose, 16 g, PRN  glucose, 24 g, PRN  melatonin, 6 mg, Nightly PRN  naloxone, 0.02 mg, PRN  ondansetron, 8 mg, Q8H PRN  senna-docusate, 1 tablet, BID PRN  simethicone, 1 tablet, QID PRN           Pt oriented x4.  VSS.  Pt remained afebrile throughout this shift.   All meds administered per order.   Pt remained free of falls this shift.   Plan of care reviewed. Patient verbalizes understanding.   Pt moving/turning independently.   Bed low, side rails up x 2, wheels locked, call light in reach.   Patient instructed to call for assistance.  Patient education provided  Will continue to monitor.              Orientation: disoriented to, situation  Whitney Coma Scale Score: 15     Lead Monitored: Lead II Rhythm: normal sinus rhythm Frequency/Ectopy: PVCs  Cardiac/Telemetry Box Number: [id number]  VTE Core Measure: " "Pharmacological prophylaxis initiated/maintained Last Bowel Movement:  (Pt. said "A while")  Diet Minced & Moist (IDDSI Level 5) Standard Tray  Voiding Characteristics: incontinence  Mauro Score: 14  Fall Risk Score: 14  Accucheck []   Freq?      Lines/Drains/Airways       Peripheral Intravenous Line  Duration             Peripheral IV 07/10/25 0407 22 G Anterior;Right Forearm <1 day                     A240/A240 UNRULY Perez is a 85 y.o.male admitted on 7/8/2025 for Rhabdomyolysis   Code Status: Full Code MRN: [id number]   Review of patient's allergies indicates:   Allergen Reactions    Seasonale [levonorgestrel-ethinyl estrad] Other (See Comments)     Past Medical History:   Diagnosis Date    CHF (congestive heart failure)     CLL (chronic lymphocytic leukemia)     HIV (human immunodeficiency virus infection)     Hyperlipidemia     MGUS (monoclonal gammopathy of unknown significance)       PRN meds    acetaminophen, 650 mg, Q4H PRN  albuterol-ipratropium, 3 mL, Q4H PRN  aluminum-magnesium hydroxide-simethicone, 30 mL, QID PRN  dextrose 50%, 12.5 g, PRN  dextrose 50%, 25 g, PRN  glucagon (human recombinant), 1 mg, PRN  glucose, 16 g, PRN  glucose, 24 g, PRN  melatonin, 6 mg, Nightly PRN  naloxone, 0.02 mg, PRN  ondansetron, 8 mg, Q8H PRN  senna-docusate, 1 tablet, BID PRN  simethicone, 1 tablet, QID PRN           Pt oriented x2-3, confused at times and forgetful.  VSS. Sister at bedside  Pt remained afebrile throughout this shift.   All meds administered per order.   Pt remained free of falls this shift.   Plan of care reviewed. Patient verbalizes understanding.   Pt moving/turning q2, incontinent care prn  Bed low, side rails up x 2, wheels locked, call light in reach.   Patient instructed to call for assistance.  Patient education provided  Bowel meds given. Pt c/o constipation             Orientation: disoriented to, situation  Whitney Coma Scale Score: 15     Lead Monitored: Lead II Rhythm: normal sinus " "rhythm Frequency/Ectopy: PVCs  Cardiac/Telemetry Box Number: [id number]  VTE Core Measure: Pharmacological prophylaxis initiated/maintained Last Bowel Movement:  (Pt. said "A while")  Diet Minced & Moist (IDDSI Level 5) Standard Tray  Voiding Characteristics: incontinence  Mauro Score: 14  Fall Risk Score: 14  Accucheck []   Freq?      Lines/Drains/Airways       Peripheral Intravenous Line  Duration             Peripheral IV 07/10/25 0407 22 G Anterior;Right Forearm <1 day                                                                               "

## 2025-07-10 NOTE — PT/OT/SLP PROGRESS
Physical Therapy      Patient Name:  Rocael Perez   MRN:  [id number]    12:00 p.m.  Patient eating lunch at this time. Will follow up at next available opportunity.

## 2025-07-11 VITALS
SYSTOLIC BLOOD PRESSURE: 104 MMHG | WEIGHT: 170.63 LBS | DIASTOLIC BLOOD PRESSURE: 65 MMHG | HEART RATE: 74 BPM | TEMPERATURE: 98 F | BODY MASS INDEX: 25.2 KG/M2 | RESPIRATION RATE: 18 BRPM | OXYGEN SATURATION: 99 %

## 2025-07-11 PROBLEM — M62.82 RHABDOMYOLYSIS: Status: RESOLVED | Noted: 2025-07-08 | Resolved: 2025-07-11

## 2025-07-11 PROBLEM — G93.41 ENCEPHALOPATHY, METABOLIC: Status: RESOLVED | Noted: 2025-07-08 | Resolved: 2025-07-11

## 2025-07-11 PROBLEM — E87.6 HYPOKALEMIA: Status: RESOLVED | Noted: 2025-06-19 | Resolved: 2025-07-11

## 2025-07-11 PROBLEM — R63.0 DECREASED APPETITE: Status: RESOLVED | Noted: 2025-07-08 | Resolved: 2025-07-11

## 2025-07-11 PROBLEM — E87.6 HYPOKALEMIA: Status: RESOLVED | Noted: 2025-07-11 | Resolved: 2025-07-11

## 2025-07-11 PROBLEM — E87.6 HYPOKALEMIA: Status: ACTIVE | Noted: 2025-07-11

## 2025-07-11 PROBLEM — E87.1 HYPONATREMIA: Status: RESOLVED | Noted: 2025-07-09 | Resolved: 2025-07-11

## 2025-07-11 LAB
ANION GAP (OHS): 7 MMOL/L (ref 8–16)
BUN SERPL-MCNC: 14 MG/DL (ref 8–23)
CALCIUM SERPL-MCNC: 9.5 MG/DL (ref 8.7–10.5)
CHLORIDE SERPL-SCNC: 101 MMOL/L (ref 95–110)
CO2 SERPL-SCNC: 26 MMOL/L (ref 23–29)
CREAT SERPL-MCNC: 1.2 MG/DL (ref 0.5–1.4)
GFR SERPLBLD CREATININE-BSD FMLA CKD-EPI: 59 ML/MIN/1.73/M2
GLUCOSE SERPL-MCNC: 125 MG/DL (ref 70–110)
HOLD SPECIMEN: NORMAL
POTASSIUM SERPL-SCNC: 3.3 MMOL/L (ref 3.5–5.1)
SODIUM SERPL-SCNC: 134 MMOL/L (ref 136–145)

## 2025-07-11 PROCEDURE — 97530 THERAPEUTIC ACTIVITIES: CPT | Mod: HCNC,CQ

## 2025-07-11 PROCEDURE — 97530 THERAPEUTIC ACTIVITIES: CPT | Mod: HCNC

## 2025-07-11 PROCEDURE — 93010 ELECTROCARDIOGRAM REPORT: CPT | Mod: HCNC,,, | Performed by: INTERNAL MEDICINE

## 2025-07-11 PROCEDURE — 92526 ORAL FUNCTION THERAPY: CPT | Mod: HCNC

## 2025-07-11 PROCEDURE — 36415 COLL VENOUS BLD VENIPUNCTURE: CPT | Mod: HCNC | Performed by: HOSPITALIST

## 2025-07-11 PROCEDURE — 25000003 PHARM REV CODE 250: Mod: HCNC | Performed by: HOSPITALIST

## 2025-07-11 PROCEDURE — 93005 ELECTROCARDIOGRAM TRACING: CPT | Mod: HCNC

## 2025-07-11 PROCEDURE — 25000003 PHARM REV CODE 250: Mod: HCNC | Performed by: NURSE PRACTITIONER

## 2025-07-11 PROCEDURE — 82374 ASSAY BLOOD CARBON DIOXIDE: CPT | Mod: HCNC | Performed by: HOSPITALIST

## 2025-07-11 RX ORDER — POTASSIUM CHLORIDE 20 MEQ/1
20 TABLET, EXTENDED RELEASE ORAL ONCE
Status: COMPLETED | OUTPATIENT
Start: 2025-07-11 | End: 2025-07-11

## 2025-07-11 RX ORDER — OXYBUTYNIN CHLORIDE 5 MG/1
5 TABLET, EXTENDED RELEASE ORAL DAILY
Qty: 30 TABLET | Refills: 11 | Status: SHIPPED | OUTPATIENT
Start: 2025-07-11 | End: 2026-07-11

## 2025-07-11 RX ADMIN — OXYBUTYNIN CHLORIDE 5 MG: 5 TABLET, EXTENDED RELEASE ORAL at 09:07

## 2025-07-11 RX ADMIN — ASPIRIN 81 MG: 81 TABLET, COATED ORAL at 09:07

## 2025-07-11 RX ADMIN — POTASSIUM CHLORIDE 20 MEQ: 1500 TABLET, EXTENDED RELEASE ORAL at 10:07

## 2025-07-11 RX ADMIN — BICTEGRAVIR SODIUM, EMTRICITABINE, AND TENOFOVIR ALAFENAMIDE FUMARATE 1 TABLET: 50; 200; 25 TABLET ORAL at 09:07

## 2025-07-11 RX ADMIN — POLYETHYLENE GLYCOL 3350 17 G: 17 POWDER, FOR SOLUTION ORAL at 09:07

## 2025-07-11 NOTE — PLAN OF CARE
Pt disoriented to situation  No complaints at this time.  On cardiac monitor.  Free of falls and instructed to call for mobility  Patient resting. Hourly rounding and chart check complete.  Problem: Adult Inpatient Plan of Care  Goal: Plan of Care Review  Outcome: Not Progressing  Goal: Patient-Specific Goal (Individualized)  Outcome: Not Progressing     Problem: Diabetes Comorbidity  Goal: Blood Glucose Level Within Targeted Range  Outcome: Progressing     Problem: Skin Injury Risk Increased  Goal: Skin Health and Integrity  Outcome: Progressing     Problem: Fall Injury Risk  Goal: Absence of Fall and Fall-Related Injury  Outcome: Progressing

## 2025-07-11 NOTE — ASSESSMENT & PLAN NOTE
Creatine stable for now. BMP reviewed- noted Estimated Creatinine Clearance: 45 mL/min (based on SCr of 1.2 mg/dL). according to latest data. Based on current GFR, CKD stage is stage 3 - GFR 30-59.  Monitor UOP and serial BMP and adjust therapy as needed. Renally dose meds. Avoid nephrotoxic medications and procedures.

## 2025-07-11 NOTE — PT/OT/SLP PROGRESS
Physical Therapy  Treatment    Rocael Perez   MRN: [id number]   Admitting Diagnosis: Rhabdomyolysis    PT Received On: 07/11/25  PT Start Time: 1010     PT Stop Time: 1035    PT Total Time (min): 25 min       Billable Minutes:  Therapeutic Activity 25    Treatment Type: Treatment  PT/PTA: PTA     Number of PTA visits since last PT visit: 2       General Precautions: Standard, fall  Orthopedic Precautions: N/A  Braces: N/A  Respiratory Status: Room air    Spiritual, Cultural Beliefs, [employer]al Practices, Values that Affect Care: no    Subjective:  Completed Epic chart review prior to PT session.   Patient agreed to participate in PT session.     Pain/Comfort  Pain Rating 1: 0/10    Objective:   Patient found with: peripheral IV, telemetry    Supine > sit EOB: Min A     Forward scoot towards EOB: Min A     Seated EOB x 10 min total focusing on increased tolerance to upright position, core stability, trunk control and CV endurance.   Maintained self supported sitting balance with SBA  Lateral lean that was present in previous session was absent this date    STS x2 trials from EOB w/ HHAx2: Mod A of 2   Unable to achieve full upright due to extensive hip and knee flexion. Per patient's sister, this was premorbid posture but not to this extent    Squat pivot T/F from EOB > chair: Mod A of 2     Reviewed AROM TE to BLE including: hip flex/ext, knee flex/ext, ankle PF/DF  To be completed a minimum of 10 reps for each LE in order to promote return of function, strength and ROM.     Educated patient on importance of increased tolerance to upright position and direct impact on CV endurance and strength. Patient encouraged to sit up in chair/ EOB, for a minimum of 2 consecutive hours, 3x per day. Encouraged patient to perform AROM TE to BLE throughout the day within all available planes of motion. Re enforced importance of utilizing call light to meet needs in room and not attempt to get up without staff assistance. Patient  verbalized understanding and agreed to comply.     AM-PAC 6 CLICK MOBILITY  How much help from another person does this patient currently need?   1 = Unable, Total/Dependent Assistance  2 = A lot, Maximum/Moderate Assistance  3 = A little, Minimum/Contact Guard/Supervision  4 = None, Modified [place]/Independent    Turning over in bed (including adjusting bedclothes, sheets and blankets)?: 3  Sitting down on and standing up from a chair with arms (e.g., wheelchair, bedside commode, etc.): 2  Moving from lying on back to sitting on the side of the bed?: 3  Moving to and from a bed to a chair (including a wheelchair)?: 2  Need to walk in hospital room?: 1  Climbing 3-5 steps with a railing?: 1 (NT)  Basic Mobility Total Score: 12    AM-PAC Raw Score CMS G-Code Modifier Level of Impairment Assistance   6 % Total / Unable   7 - 9 CM 80 - 100% Maximal Assist   10 - 14 CL 60 - 80% Moderate Assist   15 - 19 CK 40 - 60% Moderate Assist   20 - 22 CJ 20 - 40% Minimal Assist   23 CI 1-20% SBA / CGA   24 CH 0% Independent/ Mod I     Patient left up in chair with call button in reach, chair alarm on, nurse notified, and sister present.    Assessment:  Rocael Perez is a 85 y.o. male with a medical diagnosis of Rhabdomyolysis and presents with overall decline in functional mobility. Patient would continue to benefit from skilled PT to address functional limitations listed below in order to return to PLOF/decrease caregiver burden.     Rehab identified problem list/impairments: weakness, impaired endurance, impaired self care skills, impaired functional mobility, gait instability, impaired balance, decreased safety awareness, decreased lower extremity function, decreased upper extremity function, decreased coordination, impaired cognition, decreased ROM, impaired cardiopulmonary response to activity    Rehab potential is fair.    Activity tolerance: Fair    Discharge recommendations: Moderate Intensity Therapy       Barriers to discharge:      Equipment recommendations: to be determined by next level of care     GOALS:   Multidisciplinary Problems       Physical Therapy Goals          Problem: Physical Therapy    Goal Priority Disciplines Outcome Interventions   Physical Therapy Goal     PT, PT/OT     Description: Goals to be met by 7/23/25.  1. Pt will complete bed mobility SBA.  2. Pt will complete sit to stand MIN A.  3. Pt will ambulate 20ft MIN A using RW.  4. Pt will increase AMPAC score by 2 points to progress functional mobility.                         DME Justifications:  No DME recommended requiring DME justifications    PLAN:    Patient to be seen 3 x/week to address the above listed problems via gait training, therapeutic activities, therapeutic exercises, neuromuscular re-education  Plan of Care expires: 07/23/25  Plan of Care reviewed with: patient         07/11/2025

## 2025-07-11 NOTE — PT/OT/SLP PROGRESS
Speech Language Pathology Treatment    Patient Name:  Rocael Perez   MRN:  [id number]  Admitting Diagnosis: Rhabdomyolysis    Recommendations:                 General Recommendations:  Dysphagia therapy  Diet recommendations:  Minced & Moist Diet - IDDSI Level 5, Liquid Diet Level: Thin liquids - IDDSI Level 0   Aspiration Precautions: 1 bite/sip at a time, Assistance with meals, Avoid talking while eating, Check for pocketing/oral residue, Double swallow with each bite/sip, Eliminate distractions, Feed only when awake/alert, Frequent oral care, HOB to 90 degrees, Meds whole 1 at a time, Monitor for s/s of aspiration, Remain upright 30 minutes post meal, Small bites/sips, and Standard aspiration precautions   General Precautions: Standard, aspiration  Communication strategies:  provide increased time to answer  Discharge recommendations:  Moderate Intensity Therapy   Barriers to Discharge:  None    Assessment:     Rocael Perez is a 85 y.o. male admitted to [facility] acute with dx metabolic encephalopathy, hyponatremia, malnutrition and rhabdomyolysis. Symptoms upon admission were described as hallucinations, confusion and lethargy with decreased appetite.  He presents with improved mentation; however, baseline cognitive deficits present.  He is able to verbalize needs and engage in basic conversation; although intelligibility is reduced.  Functional decline noted this admission in comparison to recent evaluation approximately two weeks ago with difficulty performing basic ADL's and worsening mobility. See PT/OT evaluations.       Nonproductive cough persists with and without intake, and was also present during the MBSS in the absence of aspiration.  Current CxR revealing clear lungs. MBSS completed 7/9/25 with notable decline in swallow safety/efficiency compared to recent IP MBSS 6/20/25. He is recommended for IDDSI 5-minced/moist solids and IDDSI 0-thin liquids, following aspiration precautions and meal assist.   Patient is tolerating diet without incident and on RA.  Pt would benefit from dysphagia intervention at a moderate intensity level of treatment; however, prognosis is guarded. ACP and goals of care conversation is indicated s/t pt advanced age, recurrent admission and notable functional decline, despite recent SNF admission.     Subjective     Patient seen bedside for ST.  Pending disposition back to [facility].  Patient's sister in room.  Patient goals:  To improve strength     Pain/Comfort:  Pain Rating 1: 0/10  Pain Rating Post-Intervention 1: 0/10  Pain Rating 2: 0/10  Pain Rating Post-Intervention 2: 0/10    Respiratory Status: Room air    Objective:     Has the patient been evaluated by SLP for swallowing?   Yes  Keep patient NPO? No   Current Respiratory Status:  RA      Patient is consuming IDDSI 5-minced/moist solids and IDDSI 0-thin liquids without overt signs or symptoms of aspiration.  Patient is consuming 100% of meals and prefers to remain on this solid diet modification.  Patient unable to feed himself and requires assist, following aspiration precautions.  Daily oral care and hygiene is recommended.  Intermittent nonproductive cough persists in the absence of p.o. intake.      Goals:   Multidisciplinary Problems       SLP Goals          Problem: SLP    Goal Priority Disciplines Outcome   SLP Goal     SLP Progressing   Description: 1.  Pt will consume the least restrictive PO diet without overt s/s of aspiration.    2. Pt will utilize compensatory swallow strategies during po intake and complete behavioral swallow exercises including effortful swallows and mendelsohn maneuver as tolerated.                       Plan:     Patient to be seen:  2 x/week, 3 x/week   Plan of Care expires:  07/16/25  Plan of Care reviewed with:  patient, sibling (sister)   SLP Follow-Up:  Yes       Time Tracking:     SLP Treatment Date:   07/11/25  Speech Start Time:  1100  Speech Stop Time:  1115     Speech Total  Time (min):  15 min    Billable Minutes: Treatment Swallowing Dysfunction 15 minutes    07/11/2025

## 2025-07-11 NOTE — PLAN OF CARE
07/11/25 0930   Post-Acute Status   Post-Acute Authorization Placement   Post-Acute Placement Status Set-up Complete/Auth obtained   Discharge Plan   Discharge Plan A Skilled Nursing Facility     SNF auth received for pt's return to [facility]   MD notified  SW to coordinate DC with nursing once finalized

## 2025-07-11 NOTE — DISCHARGE SUMMARY
[facility] - Telemetry ([facility] Medicine  Discharge Summary      Patient Name: Rocael Perez  MRN: [id number]  KYLAH: [id number]  Patient Class: IP- Inpatient  Admission Date: 7/8/2025  Hospital Length of Stay: 2 days  Discharge Date and Time: 07/11/2025 9:46 AM  Attending Physician: Michael Peraza MD   Discharging Provider: Michael Peraza MD  Primary Care Provider: Landry Ferguson MD    Primary Care Team: [facility] MEDICINE     HPI:   Rocael Perez is a 85 year old male with history of CHF, CLL, HIV, CKD stage III, and HLD who presented to ED for further evaluation of altered mental status that began Sunday. Symptoms are described as hallucinations, confusion, and lethargy with decreased appetite. Sister was at bedside in ED, but no longer present at bedside. He is oriented to person only. According to medical record, although he is a poor historian he is not at usual baseline. It is noted, patient was started on Wellbutrin 7/4/25.     In ED, CT head negative. CXR shows mild cardiomegaly but otherwise unremarkable. Labs consistent with CKD, but demonstrated hypokalemia, hyponatremia, and elevated CPK.  . Initial troponin 0.070. Urine studies pending. Hospital medicine consulted for admission.     * No surgery found *      Hospital Course:     7/9/25  NAEON, patient resting in bed, denies complaints  Updated family members at bedside  Admitted for non-traumatic rhabdo, possibly due to wellbutryin  K 2.9, replete and monitor  CPK 1280 --> 834, continue IV NS @ 75 cc/hr x 1 day  Recent TTE reviewed grade 2 DD  Monitor volume status closely, currently stable on RA    7/10/25  NAEON, patient in bed, denies complaints  Wife at bedside  Replete K  CK level trending down  Stable for discharge to SNF  Insurance authorization pending per     7/11/25  NAEON  Labs reviewed, CK trending down, K improved  Stable for discharge to SNF  F/U with PCP as needed     Goals of Care Treatment Preferences:  Code  Status: Full Code         Consults:     Assessment & Plan  Chronic diastolic congestive heart failure  Patient has Diastolic (HFpEF) heart failure that is Chronic. On presentation their CHF was well compensated. Most recent BNP and echo results are listed below.  Recent Labs     07/08/25  1137   *     Latest ECHO  Results for orders placed during the hospital encounter of 06/19/25    Echo    Interpretation Summary    Left Ventricle: The left ventricle is normal in size. Mildly increased wall thickness. There is mild concentric hypertrophy. There is normal systolic function with a visually estimated ejection fraction of 60 - 65%. Grade II diastolic dysfunction.    Right Ventricle: The right ventricle is normal in size Wall thickness is normal. Systolic function is normal.    Left Atrium: The left atrium is mildly dilated    Aortic Valve: Moderately calcified cusps. There is moderate annular calcification present. Moderately restricted motion. There is moderate stenosis. Aortic valve area by VTI is 1.0 cm². Aortic valve peak velocity is 2.6 m/s. Mean gradient is 18 mmHg. The dimensionless index is 0.32.    Mitral Valve: There is bileaflet sclerosis. Mildly thickened leaflets. There is mild mitral annular calcification. There is mild to moderate regurgitation.    Tricuspid Valve: There is mild to moderate regurgitation.    Pulmonic Valve: There is mild regurgitation.    Pulmonary Artery: The estimated pulmonary artery systolic pressure is 34 mmHg.    IVC/SVC: Normal venous pressure at 3 mmHg.    Current Heart Failure Medications       Plan  - Monitor strict I&Os and daily weights.    - Place on telemetry  - Low sodium diet  - Place on fluid restriction of 1.5 L.   - Cardiology has not been consulted  - The patient's volume status is stable  CLL (chronic lymphocytic leukemia)  Stable   Followed by heme/Onc for surveillance     HIV positive  --followed by Dr. Martinez  --resume Biktarvy  Stage 3b chronic kidney  disease  Creatine stable for now. BMP reviewed- noted Estimated Creatinine Clearance: 45 mL/min (based on SCr of 1.2 mg/dL). according to latest data. Based on current GFR, CKD stage is stage 3 - GFR 30-59.  Monitor UOP and serial BMP and adjust therapy as needed. Renally dose meds. Avoid nephrotoxic medications and procedures.  Malnutrition of moderate degree  Nutrition consulted. Most recent weight and BMI monitored-     Measurements:  Wt Readings from Last 1 Encounters:   07/10/25 77.4 kg (170 lb 10.2 oz)   Body mass index is 25.2 kg/m².    Patient has been screened and assessed by RD.    Malnutrition Type:  Context:    Level:      Malnutrition Characteristic Summary:       Interventions/Recommendations (treatment strategy):       Hypokalemia  Patient's most recent potassium results are listed below.   Recent Labs     07/08/25  1137 07/09/25  0501 07/10/25  1126   K 3.0* 2.9* 3.3*     Plan  - Replete potassium per protocol  - Monitor potassium Daily  - Patient's hypokalemia is improving    Final Active Diagnoses:    Diagnosis Date Noted POA    Chronic diastolic congestive heart failure [I50.32]  Yes    Stage 3b chronic kidney disease [N18.32] 05/08/2024 Yes    HIV positive [Z21] 01/21/2021 Yes    CLL (chronic lymphocytic leukemia) [C91.10] 10/01/2020 Yes    Malnutrition of moderate degree [E44.0] 07/08/2025 Yes    Hypokalemia [E87.6] 07/11/2025 Unknown      Problems Resolved During this Admission:    Diagnosis Date Noted Date Resolved POA    PRINCIPAL PROBLEM:  Rhabdomyolysis [M62.82] 07/08/2025 07/11/2025 Yes    Encephalopathy, metabolic [G93.41] 07/08/2025 07/11/2025 Yes    Hyponatremia [E87.1] 07/09/2025 07/11/2025 Yes    Hypokalemia [E87.6] 06/19/2025 07/11/2025 Yes    Decreased appetite [R63.0] 07/08/2025 07/11/2025 Yes       Discharged Condition: stable    Disposition: Skilled Nursing Facility    Follow Up:   Follow-up Information       Landry Ferguson MD Follow up.    Specialty: Family Medicine  Why:  As needed  Contact information:  [id number]  [place] [address]  [phone]                           Patient Instructions:      ACCEPT - Ambulatory referral/consult to [facility]   Standing Status: Future   Referral Priority: Routine Referral Type: Consultation   Referral Reason: Specialty Services Required   Requested Specialty: Cardiology   Number of Visits Requested: 1       Significant Diagnostic Studies: Labs: All labs within the past 24 hours have been reviewed    Pending Diagnostic Studies:       Procedure Component Value Units Date/Time    Basic metabolic panel [[id number]]     Order Status: Sent Lab Status: No result     Specimen: Blood     HCV Virus Hold Specimen [[id number]] Collected: 07/08/25 1137    Order Status: Sent Lab Status: In process Updated: 07/08/25 1139    Specimen: Blood            Medications:  Reconciled Home Medications:      Medication List        START taking these medications      oxybutynin 5 MG Tr24  Commonly known as: DITROPAN-XL  Take 1 tablet (5 mg total) by mouth once daily.            CONTINUE taking these medications      aspirin 81 MG EC tablet  Commonly known as: ECOTRIN  Take 81 mg by mouth once daily.     BIKTARVY -25 mg (25 kg or greater)  Generic drug: pjswdvubu-wwdeubgc-ewabagd ala  Take 1 tablet by mouth once daily.     GEMTESA 75 mg Tab  Generic drug: vibegron  TAKE 1 TABLET BY MOUTH EVERY DAY     potassium chloride 10 MEQ Tbsr  Commonly known as: KLOR-CON  Take 10 mEq by mouth once. Pt takes only on Mondays and Fridays     rosuvastatin 40 MG Tab  Commonly known as: CRESTOR  TAKE 1 TABLET BY MOUTH EVERY DAY IN THE EVENING     vitamin D 1000 units Tab  Commonly known as: VITAMIN D3  Take 1,000 Units by mouth once daily.              Indwelling Lines/Drains at time of discharge:   Lines/Drains/Airways       None                       Time spent on the discharge of patient: 40 minutes         Michael Peraza MD  Department of  [facility] Medicine  [facility] - Tele[facility] ([facility])

## 2025-07-11 NOTE — PROGRESS NOTES
[facility] - Telemetry ([facility] Medicine  Progress Note    Patient Name: Rocael Perez  MRN: [id number]  Patient Class: IP- Inpatient   Admission Date: 7/8/2025  Length of Stay: 1 days  Attending Physician: Michael Peraza MD  Primary Care Provider: Landry Ferguson MD        Subjective     Principal Problem:Rhabdomyolysis        HPI:  Rocael Perez is a 85 year old male with history of CHF, CLL, HIV, CKD stage III, and HLD who presented to ED for further evaluation of altered mental status that began Sunday. Symptoms are described as hallucinations, confusion, and lethargy with decreased appetite. Sister was at bedside in ED, but no longer present at bedside. He is oriented to person only. According to medical record, although he is a poor historian he is not at usual baseline. It is noted, patient was started on Wellbutrin 7/4/25.     In ED, CT head negative. CXR shows mild cardiomegaly but otherwise unremarkable. Labs consistent with CKD, but demonstrated hypokalemia, hyponatremia, and elevated CPK.  . Initial troponin 0.070. Urine studies pending. Hospital medicine consulted for admission.     Overview/Hospital Course:    7/9/25  NAEON, patient resting in bed, denies complaints  Updated family members at bedside  Admitted for non-traumatic rhabdo, possibly due to wellbutryin  K 2.9, replete and monitor  CPK 1280 --> 834, continue IV NS @ 75 cc/hr x 1 day  Recent TTE reviewed grade 2 DD  Monitor volume status closely, currently stable on RA    7/10/25  NAEON, patient in bed, denies complaints  Wife at bedside  Replete K  CK level trending down  Stable for discharge to SNF  Insurance authorization pending per       Review of Systems   All other systems reviewed and are negative.    Objective:     Vital Signs (Most Recent):  Temp: 98.3 °F (36.8 °C) (07/10/25 2038)  Pulse: 69 (07/10/25 2100)  Resp: 20 (07/10/25 2038)  BP: (!) 94/51 (07/10/25 2038)  SpO2: 95 % (07/10/25 2038) Vital Signs (24h  Range):  Temp:  [97.3 °F (36.3 °C)-98.3 °F (36.8 °C)] 98.3 °F (36.8 °C)  Pulse:  [65-86] 69  Resp:  [18-20] 20  SpO2:  [95 %-100 %] 95 %  BP: ()/(51-64) 94/51     Weight: 74.1 kg (163 lb 5.8 oz)  Body mass index is 24.12 kg/m².  No intake or output data in the 24 hours ending 07/10/25 2140      Physical Exam  Constitutional:       General: He is not in acute distress.     Appearance: Normal appearance. He is ill-appearing.   Cardiovascular:      Rate and Rhythm: Regular rhythm. Tachycardia present.      Heart sounds: No murmur heard.  Pulmonary:      Effort: Pulmonary effort is normal. No respiratory distress.      Breath sounds: Normal breath sounds. No wheezing.   Abdominal:      General: There is no distension.      Palpations: Abdomen is soft.      Tenderness: There is no abdominal tenderness.   Neurological:      Mental Status: He is alert.               Significant Labs: All pertinent labs within the past 24 hours have been reviewed.  Recent Lab Results         07/10/25  1126        Anion Gap 7       Baso # 0.04       Basophil % 1.0       BUN 17       Calcium 9.3       Chloride 101       CO2 25              Creatinine 1.2       eGFR 59  Comment: Estimated GFR calculated using the CKD-EPI creatinine (2021) equation.       Eos # 0.08       Eos % 2.0       Glucose 96       Gran # (ANC) 1.90       Hematocrit 27.7       Hemoglobin 9.2       Immature Grans (Abs) 0.01  Comment: Mild elevation in immature granulocytes is non specific and can be seen in a variety of conditions including stress response, acute inflammation, trauma and pregnancy. Correlation with other laboratory and clinical findings is essential.       Immature Granulocytes 0.3       Lymph # 1.26       Lymph % 31.9       Magnesium  1.8       MCH 31.7       MCHC 33.2       MCV 96       Mono # 0.66       Mono % 16.7       MPV 9.9       Neut % 48.1       nRBC 0       Platelet Count 247       Potassium 3.3       RBC 2.90       RDW 16.1        Sodium 133       WBC 3.95               Significant Imaging: I have reviewed all pertinent imaging results/findings within the past 24 hours.    Fl Modified Barium Swallow Speech   Final Result      See speech pathologist report.         Electronically signed by: Ahsan Aldrich MD   Date:    07/09/2025   Time:    12:54      X-Ray Chest AP Portable   Final Result      1. There is no focal pulmonary infiltrate visualized.   2. There is mild cardiomegaly.   .         Electronically signed by: Reyes Yeh MD   Date:    07/08/2025   Time:    12:00      CT Head Without Contrast   Final Result      1.  Negative for acute intracranial process, considering there is motion artifact on a number of images and subtle abnormalities could be overlooked.  Negative for hemorrhage, or skull fracture.      2.  Cerebral atrophy noted.  Intracranial atherosclerotic disease noted.  Small vessel ischemic changes noted.  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      3.  Nonemergent findings as described above.      All CT scans at this facility are performed  using dose modulation techniques as appropriate to performed exam including the following:  automated exposure control; adjustment of mA and/or kV according to the patients size (this includes techniques or standardized protocols for targeted exams where dose is matched to indication/reason for exam: i.e. extremities or head);  iterative reconstruction technique.         Electronically signed by: Brendon Taylor MD   Date:    07/08/2025   Time:    11:33              Assessment & Plan  Encephalopathy, metabolic  CT head negative.   Recently started on Wellbutrin  Elevated CPK  Urine studies are pending.  Neuro checks Q 4 hours    7/9/25  Awake, alert, oriented  Mental status improved, back to baseline      Chronic diastolic congestive heart failure  Patient has Diastolic (HFpEF) heart failure that is Chronic. On presentation their  CHF was well compensated. Most recent BNP and echo results are listed below.  Recent Labs     07/08/25  1137   *     Latest ECHO  Results for orders placed during the hospital encounter of 06/19/25    Echo    Interpretation Summary    Left Ventricle: The left ventricle is normal in size. Mildly increased wall thickness. There is mild concentric hypertrophy. There is normal systolic function with a visually estimated ejection fraction of 60 - 65%. Grade II diastolic dysfunction.    Right Ventricle: The right ventricle is normal in size Wall thickness is normal. Systolic function is normal.    Left Atrium: The left atrium is mildly dilated    Aortic Valve: Moderately calcified cusps. There is moderate annular calcification present. Moderately restricted motion. There is moderate stenosis. Aortic valve area by VTI is 1.0 cm². Aortic valve peak velocity is 2.6 m/s. Mean gradient is 18 mmHg. The dimensionless index is 0.32.    Mitral Valve: There is bileaflet sclerosis. Mildly thickened leaflets. There is mild mitral annular calcification. There is mild to moderate regurgitation.    Tricuspid Valve: There is mild to moderate regurgitation.    Pulmonic Valve: There is mild regurgitation.    Pulmonary Artery: The estimated pulmonary artery systolic pressure is 34 mmHg.    IVC/SVC: Normal venous pressure at 3 mmHg.    Current Heart Failure Medications       Plan  - Monitor strict I&Os and daily weights.    - Place on telemetry  - Low sodium diet  - Place on fluid restriction of 1.5 L.   - Cardiology has not been consulted  - The patient's volume status is stable  CLL (chronic lymphocytic leukemia)  Stable   Followed by heme/Onc for surveillance     HIV positive  --followed by Dr. Martinez  --resume Biktarvy  Stage 3b chronic kidney disease  Creatine stable for now. BMP reviewed- noted Estimated Creatinine Clearance: 45 mL/min (based on SCr of 1.2 mg/dL). according to latest data. Based on current GFR, CKD stage is stage  3 - GFR 30-59.  Monitor UOP and serial BMP and adjust therapy as needed. Renally dose meds. Avoid nephrotoxic medications and procedures.  Hypokalemia  Patient's most recent potassium results are listed below.   Recent Labs     07/08/25  1137 07/09/25  0501 07/10/25  1126   K 3.0* 2.9* 3.3*     Plan  - Replete potassium per protocol  - Monitor potassium Daily  - Patient's hypokalemia is lower than previous  - Check magnesium    Decreased appetite  Patient has a history of reduced eating last admission. Chart review shows he has lost 30 lbs over last several months due to decreased intake. He has intermittent nonprogressive dysphagia  with solids likely due to mild esophageal stenosis relate to chronic reflux esophagitis  --reconsult SLP to assess for progression    On minced/moist diet  Aspiration precautions  ST following, MBBS pending    Malnutrition of moderate degree  Nutrition consulted. Most recent weight and BMI monitored-     Measurements:  Wt Readings from Last 1 Encounters:   07/10/25 74.1 kg (163 lb 5.8 oz)   Body mass index is 24.12 kg/m².    Patient has been screened and assessed by RD.    Malnutrition Type:  Context:    Level:      Malnutrition Characteristic Summary:       Interventions/Recommendations (treatment strategy):       Rhabdomyolysis  Lab Results   Component Value Date     (H) 07/10/2025   Creatinine slightly higher than previous but within baseline and encephalopathic.   Orders placed for 2L in ED. Monitor response    Hyponatremia  Hyponatremia is likely due to Dehydration/hypovolemia. The patient's most recent sodium results are listed below.  Recent Labs     07/08/25  1137 07/09/25  0501 07/10/25  1126   * 133* 133*     Plan  - Correct the sodium by 4-6mEq in 24 hours.   - Will treat the hyponatremia with IV fluids as follows: NS @ 75 cc/hr  - Monitor sodium Daily.   - Patient hyponatremia is improving    VTE Risk Mitigation (From admission, onward)           Ordered      enoxaparin injection 40 mg  Daily         07/08/25 2145     IP VTE HIGH RISK PATIENT  Once         07/08/25 1444     Place sequential compression device  Until discontinued         07/08/25 1444                    Discharge Planning   YANCY: 7/12/2025     Code Status: Full Code   Medical Readiness for Discharge Date:   Discharge Plan A: Skilled Nursing Facility   Discharge Delays: None known at this time              Please place Justification for DME      Michael Peraza MD  Department of Hospital Medicine   [facility] - Telemetry ([facility])

## 2025-07-11 NOTE — PT/OT/SLP PROGRESS
Occupational Therapy   Treatment    Name: Rocael Perez  MRN: [id number]  Admitting Diagnosis:  Rhabdomyolysis       Recommendations:     Discharge Recommendations: Moderate Intensity Therapy  Discharge Equipment Recommendations:  to be determined by next level of care  Barriers to discharge:       Assessment:     Rocael Perez is a 85 y.o. male with a medical diagnosis of Rhabdomyolysis.  He presents with the following performance deficits affecting function are weakness, impaired endurance, impaired self care skills, impaired functional mobility, gait instability, impaired balance, pain, impaired cardiopulmonary response to activity, decreased safety awareness, decreased lower extremity function, decreased upper extremity function, decreased coordination, impaired cognition.     Rehab Prognosis:  Fair; patient would benefit from acute skilled OT services to address these deficits and reach maximum level of function.       Plan:     Patient to be seen 2 x/week to address the above listed problems via self-care/home management, therapeutic activities, therapeutic exercises  Plan of Care Expires: 07/23/25  Plan of Care Reviewed with: patient    Subjective     Chief Complaint: I'm doing good this morning  Patient/Family Comments/goals: no goals reported   Pain/Comfort:  Pain Rating 1: 0/10    Objective:     Communicated with: nurseWilver, prior to session.  Patient found supine with peripheral IV, telemetry upon OT entry to room.    General Precautions: Standard, fall    Orthopedic Precautions:N/A  Braces: N/A  Respiratory Status: Room air     Occupational Performance:     Bed Mobility:    Patient completed Scooting/Bridging with minimum assistance  Patient completed Supine to Sit with minimum assistance   Req increased time and BUE support on bed rails for transition   10 mins SBA for safety  Increased midline orientation this AM req decreased cues    Functional Mobility/Transfers:  Patient completed Sit <> Stand  Transfer with moderate assistance and of 2 persons  with  hand-held assist   HHA x2   Unable to achieve full upright posture but family reports it is chronic likely 2' to RW being too short creating decreased ability to correct posture and decreased muscle length   Family reports forward flexion through thoracic cavity, and unable to extend B knees   Patient completed Bed <> Chair Transfer using Squat Pivot technique with moderate assistance and of 2 persons with hand-held assist  Vc for hand placement 2' to reaching and weight shifting to initiate transfer       [facility] 6 Click ADL: 10    Treatment & Education:  Explained importance of sitting OOB to improve CV endurance, and encouraged to complete at least 2 hours throughout the day.   OT role, plan of care, progression of goals, importance of continued OOB activity, ADL/functional transfer and mobility retraining, call don't fall, safety precautions, fall prevention.    Patient left up in chair with all lines intact, call button in reach, chair alarm on, and family present    GOALS:   Multidisciplinary Problems       Occupational Therapy Goals          Problem: Occupational Therapy    Goal Priority Disciplines Outcome Interventions   Occupational Therapy Goal     OT, PT/OT Progressing    Description: O.T. GOALS TO BE MET BY 7-23-25  PT WILL TOLERATE 1 SE X 12 REPS B UE ROM EXERCISE  PT WILL BE MOD A WITH BEDSIDE CHAIR TRANSFER  MOD A WITH UE DRESSING                         DME Justifications:  No DME recommended requiring DME justifications    Time Tracking:     OT Date of Treatment: 07/11/25  OT Start Time: 1010  OT Stop Time: 1035  OT Total Time (min): 25 min    Billable Minutes:Therapeutic Activity 25  ELSA Flores  OTR/L readily available for conference at the time of the provision of services- Francy Rivera OT  OT/OBINNA: OBINNA     Number of OBINNA visits since last OT visit: 2    7/11/2025

## 2025-07-11 NOTE — PLAN OF CARE
Problem: Adult Inpatient Plan of Care  Goal: Plan of Care Review  Outcome: Met  Goal: Patient-Specific Goal (Individualized)  Outcome: Met  Goal: Absence of Hospital-Acquired Illness or Injury  Outcome: Met  Goal: Optimal Comfort and Wellbeing  Outcome: Met  Goal: Readiness for Transition of Care  Outcome: Met     Problem: Diabetes Comorbidity  Goal: Blood Glucose Level Within Targeted Range  Outcome: Met     Problem: Diabetes Comorbidity  Goal: Blood Glucose Level Within Targeted Range  Outcome: Met     Problem: Infection  Goal: Absence of Infection Signs and Symptoms  Outcome: Met     Problem: Skin Injury Risk Increased  Goal: Skin Health and Integrity  Outcome: Met

## 2025-07-11 NOTE — ASSESSMENT & PLAN NOTE
Patient's most recent potassium results are listed below.   Recent Labs     07/08/25  1137 07/09/25  0501 07/10/25  1126   K 3.0* 2.9* 3.3*     Plan  - Replete potassium per protocol  - Monitor potassium Daily  - Patient's hypokalemia is improving

## 2025-07-11 NOTE — ASSESSMENT & PLAN NOTE
Nutrition consulted. Most recent weight and BMI monitored-     Measurements:  Wt Readings from Last 1 Encounters:   07/10/25 77.4 kg (170 lb 10.2 oz)   Body mass index is 25.2 kg/m².    Patient has been screened and assessed by RD.    Malnutrition Type:  Context:    Level:      Malnutrition Characteristic Summary:       Interventions/Recommendations (treatment strategy):

## 2025-07-11 NOTE — ASSESSMENT & PLAN NOTE
Nutrition consulted. Most recent weight and BMI monitored-     Measurements:  Wt Readings from Last 1 Encounters:   07/10/25 74.1 kg (163 lb 5.8 oz)   Body mass index is 24.12 kg/m².    Patient has been screened and assessed by RD.    Malnutrition Type:  Context:    Level:      Malnutrition Characteristic Summary:       Interventions/Recommendations (treatment strategy):

## 2025-07-11 NOTE — ASSESSMENT & PLAN NOTE
Patient's most recent potassium results are listed below.   Recent Labs     07/08/25  1137 07/09/25  0501 07/10/25  1126   K 3.0* 2.9* 3.3*     Plan  - Replete potassium per protocol  - Monitor potassium Daily  - Patient's hypokalemia is lower than previous  - Check magnesium

## 2025-07-11 NOTE — SUBJECTIVE & OBJECTIVE
Review of Systems   All other systems reviewed and are negative.    Objective:     Vital Signs (Most Recent):  Temp: 98.3 °F (36.8 °C) (07/10/25 2038)  Pulse: 69 (07/10/25 2100)  Resp: 20 (07/10/25 2038)  BP: (!) 94/51 (07/10/25 2038)  SpO2: 95 % (07/10/25 2038) Vital Signs (24h Range):  Temp:  [97.3 °F (36.3 °C)-98.3 °F (36.8 °C)] 98.3 °F (36.8 °C)  Pulse:  [65-86] 69  Resp:  [18-20] 20  SpO2:  [95 %-100 %] 95 %  BP: ()/(51-64) 94/51     Weight: 74.1 kg (163 lb 5.8 oz)  Body mass index is 24.12 kg/m².  No intake or output data in the 24 hours ending 07/10/25 2140      Physical Exam  Constitutional:       General: He is not in acute distress.     Appearance: Normal appearance. He is ill-appearing.   Cardiovascular:      Rate and Rhythm: Regular rhythm. Tachycardia present.      Heart sounds: No murmur heard.  Pulmonary:      Effort: Pulmonary effort is normal. No respiratory distress.      Breath sounds: Normal breath sounds. No wheezing.   Abdominal:      General: There is no distension.      Palpations: Abdomen is soft.      Tenderness: There is no abdominal tenderness.   Neurological:      Mental Status: He is alert.               Significant Labs: All pertinent labs within the past 24 hours have been reviewed.  Recent Lab Results         07/10/25  1126        Anion Gap 7       Baso # 0.04       Basophil % 1.0       BUN 17       Calcium 9.3       Chloride 101       CO2 25              Creatinine 1.2       eGFR 59  Comment: Estimated GFR calculated using the CKD-EPI creatinine (2021) equation.       Eos # 0.08       Eos % 2.0       Glucose 96       Gran # (ANC) 1.90       Hematocrit 27.7       Hemoglobin 9.2       Immature Grans (Abs) 0.01  Comment: Mild elevation in immature granulocytes is non specific and can be seen in a variety of conditions including stress response, acute inflammation, trauma and pregnancy. Correlation with other laboratory and clinical findings is essential.       Immature  Granulocytes 0.3       Lymph # 1.26       Lymph % 31.9       Magnesium  1.8       MCH 31.7       MCHC 33.2       MCV 96       Mono # 0.66       Mono % 16.7       MPV 9.9       Neut % 48.1       nRBC 0       Platelet Count 247       Potassium 3.3       RBC 2.90       RDW 16.1       Sodium 133       WBC 3.95               Significant Imaging: I have reviewed all pertinent imaging results/findings within the past 24 hours.    Fl Modified Barium Swallow Speech   Final Result      See speech pathologist report.         Electronically signed by: Ahsan Aldrich MD   Date:    07/09/2025   Time:    12:54      X-Ray Chest AP Portable   Final Result      1. There is no focal pulmonary infiltrate visualized.   2. There is mild cardiomegaly.   .         Electronically signed by: Reyes Yeh MD   Date:    07/08/2025   Time:    12:00      CT Head Without Contrast   Final Result      1.  Negative for acute intracranial process, considering there is motion artifact on a number of images and subtle abnormalities could be overlooked.  Negative for hemorrhage, or skull fracture.      2.  Cerebral atrophy noted.  Intracranial atherosclerotic disease noted.  Small vessel ischemic changes noted.  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      3.  Nonemergent findings as described above.      All CT scans at this facility are performed  using dose modulation techniques as appropriate to performed exam including the following:  automated exposure control; adjustment of mA and/or kV according to the patients size (this includes techniques or standardized protocols for targeted exams where dose is matched to indication/reason for exam: i.e. extremities or head);  iterative reconstruction technique.         Electronically signed by: Brendon Taylor MD   Date:    07/08/2025   Time:    11:33

## 2025-07-11 NOTE — ASSESSMENT & PLAN NOTE
Lab Results   Component Value Date     (H) 07/10/2025   Creatinine slightly higher than previous but within baseline and encephalopathic.   Orders placed for 2L in ED. Monitor response

## 2025-07-11 NOTE — ASSESSMENT & PLAN NOTE
Hyponatremia is likely due to Dehydration/hypovolemia. The patient's most recent sodium results are listed below.  Recent Labs     07/08/25  1137 07/09/25  0501 07/10/25  1126   * 133* 133*     Plan  - Correct the sodium by 4-6mEq in 24 hours.   - Will treat the hyponatremia with IV fluids as follows: NS @ 75 cc/hr  - Monitor sodium Daily.   - Patient hyponatremia is improving

## 2025-07-11 NOTE — PLAN OF CARE
[facility] - Telemetry (Hospital)  Discharge Final Note    Primary Care Provider: Landry Ferguson MD    Expected Discharge Date: 7/11/2025    Final Discharge Note (most recent)       Final Note - 07/11/25 1454          Final Note    Assessment Type Final Discharge Note     Anticipated Discharge Disposition Skilled Nursing Facility     Hospital Resources/Appts/Education Provided Post-Acute resouces added to AVS        Post-Acute Status    Post-Acute Authorization Placement     Post-Acute Placement Status Set-up Complete/Auth obtained     Discharge Delays None known at this time                   Pt discharged to [facility]  Discharge clinicals provided via Epic  Nursing provided with number for report ([phone]); facility arranged ambulance transport    Important Message from Medicare  Important Message from Medicare regarding Discharge Appeal Rights: Given to patient/caregiver, Explained to patient/caregiver, Signed/date by patient/caregiver     Date IMM was signed: 07/11/25  Time IMM was signed: 1110     Follow-up providers       Landry Ferguson MD   Specialty: Family Medicine   Relationship: PCP - [facility]    [address] [facility] [place] [address]   Phone: [phone]       Next Steps: Follow up    Instructions: As needed              After-discharge care                Destination       [facility]   Service: Skilled Nursing    [address]  [place] [address]   Phone: [phone]

## 2025-07-13 LAB
OHS QRS DURATION: 92 MS
OHS QRS DURATION: 94 MS
OHS QTC CALCULATION: 482 MS
OHS QTC CALCULATION: 503 MS